# Patient Record
Sex: MALE | Race: WHITE | Employment: FULL TIME | URBAN - METROPOLITAN AREA
[De-identification: names, ages, dates, MRNs, and addresses within clinical notes are randomized per-mention and may not be internally consistent; named-entity substitution may affect disease eponyms.]

---

## 2019-10-13 ENCOUNTER — APPOINTMENT (OUTPATIENT)
Dept: GENERAL RADIOLOGY | Facility: CLINIC | Age: 54
DRG: 003 | End: 2019-10-13
Attending: STUDENT IN AN ORGANIZED HEALTH CARE EDUCATION/TRAINING PROGRAM
Payer: COMMERCIAL

## 2019-10-13 ENCOUNTER — TRANSFERRED RECORDS (OUTPATIENT)
Dept: HEALTH INFORMATION MANAGEMENT | Facility: CLINIC | Age: 54
End: 2019-10-13

## 2019-10-13 ENCOUNTER — APPOINTMENT (OUTPATIENT)
Dept: CT IMAGING | Facility: CLINIC | Age: 54
DRG: 003 | End: 2019-10-13
Attending: STUDENT IN AN ORGANIZED HEALTH CARE EDUCATION/TRAINING PROGRAM
Payer: COMMERCIAL

## 2019-10-13 ENCOUNTER — HOSPITAL ENCOUNTER (INPATIENT)
Facility: CLINIC | Age: 54
LOS: 23 days | Discharge: HOME | DRG: 003 | End: 2019-11-05
Attending: INTERNAL MEDICINE | Admitting: INTERNAL MEDICINE
Payer: COMMERCIAL

## 2019-10-13 DIAGNOSIS — I46.9 CARDIAC ARREST (H): ICD-10-CM

## 2019-10-13 DIAGNOSIS — I50.20 SYSTOLIC HEART FAILURE SECONDARY TO CORONARY ARTERY DISEASE (H): ICD-10-CM

## 2019-10-13 DIAGNOSIS — I25.10 SYSTOLIC HEART FAILURE SECONDARY TO CORONARY ARTERY DISEASE (H): ICD-10-CM

## 2019-10-13 DIAGNOSIS — S22.43XD MULTIPLE CLOSED FRACTURES OF RIBS OF BOTH SIDES WITH ROUTINE HEALING, SUBSEQUENT ENCOUNTER: ICD-10-CM

## 2019-10-13 DIAGNOSIS — I47.20 VENTRICULAR TACHYCARDIA (H): Primary | ICD-10-CM

## 2019-10-13 DIAGNOSIS — T14.8XXA OPEN WOUND: ICD-10-CM

## 2019-10-13 DIAGNOSIS — G40.909 SEIZURE DISORDER (H): ICD-10-CM

## 2019-10-13 DIAGNOSIS — I25.119 CORONARY ARTERY DISEASE INVOLVING NATIVE CORONARY ARTERY OF NATIVE HEART WITH ANGINA PECTORIS (H): ICD-10-CM

## 2019-10-13 LAB
ALBUMIN SERPL-MCNC: 3.1 G/DL (ref 3.4–5)
ALP SERPL-CCNC: 67 U/L (ref 40–150)
ALT SERPL W P-5'-P-CCNC: 89 U/L (ref 0–70)
ANION GAP SERPL CALCULATED.3IONS-SCNC: 12 MMOL/L (ref 3–14)
AST SERPL W P-5'-P-CCNC: 624 U/L (ref 0–45)
BASE DEFICIT BLDA-SCNC: 0.7 MMOL/L
BASOPHILS # BLD AUTO: 0 10E9/L (ref 0–0.2)
BASOPHILS NFR BLD AUTO: 0.2 %
BILIRUB SERPL-MCNC: 1.3 MG/DL (ref 0.2–1.3)
BLD PROD TYP BPU: NORMAL
BLD UNIT ID BPU: 0
BLOOD PRODUCT CODE: NORMAL
BPU ID: NORMAL
BUN SERPL-MCNC: 10 MG/DL (ref 7–30)
CA-I BLD-MCNC: 4 MG/DL (ref 4.4–5.2)
CALCIUM SERPL-MCNC: 6.6 MG/DL (ref 8.5–10.1)
CHLORIDE SERPL-SCNC: 110 MMOL/L (ref 94–109)
CO2 SERPL-SCNC: 21 MMOL/L (ref 20–32)
CORTIS SERPL-MCNC: 15.1 UG/DL (ref 4–22)
CREAT SERPL-MCNC: 0.67 MG/DL (ref 0.66–1.25)
CREAT SERPL-MCNC: 0.86 MG/DL (ref 0.5–1.3)
CRP SERPL-MCNC: 69 MG/L (ref 0–8)
D DIMER PPP FEU-MCNC: 5.7 UG/ML FEU (ref 0–0.5)
DIFFERENTIAL METHOD BLD: ABNORMAL
EOSINOPHIL # BLD AUTO: 0 10E9/L (ref 0–0.7)
EOSINOPHIL NFR BLD AUTO: 0.1 %
ERYTHROCYTE [DISTWIDTH] IN BLOOD BY AUTOMATED COUNT: 14.6 % (ref 10–15)
ERYTHROCYTE [SEDIMENTATION RATE] IN BLOOD BY WESTERGREN METHOD: 8 MM/H (ref 0–20)
GFR SERPL CREATININE-BSD FRML MDRD: 99 ML/MIN
GFR SERPL CREATININE-BSD FRML MDRD: >90 ML/MIN/{1.73_M2}
GLUCOSE BLDC GLUCOMTR-MCNC: 160 MG/DL (ref 70–99)
GLUCOSE SERPL-MCNC: 155 MG/DL (ref 70–100)
GLUCOSE SERPL-MCNC: 158 MG/DL (ref 70–99)
HBA1C MFR BLD: 5.4 % (ref 0–5.6)
HCO3 BLD-SCNC: 24 MMOL/L (ref 21–28)
HCT VFR BLD AUTO: 29.8 % (ref 40–53)
HGB BLD-MCNC: 10.1 G/DL (ref 13.3–17.7)
HGB FREE PLAS-MCNC: 90 MG/DL
IMM GRANULOCYTES # BLD: 0.1 10E9/L (ref 0–0.4)
IMM GRANULOCYTES NFR BLD: 0.4 %
INR PPP: 1.3 (ref 2–3.5)
KCT BLD-ACNC: 227 SEC (ref 75–150)
LACTATE BLD-SCNC: 4.3 MMOL/L (ref 0.7–2)
LDH SERPL L TO P-CCNC: 1413 U/L (ref 85–227)
LYMPHOCYTES # BLD AUTO: 2.3 10E9/L (ref 0.8–5.3)
LYMPHOCYTES NFR BLD AUTO: 19.7 %
MCH RBC QN AUTO: 35.2 PG (ref 26.5–33)
MCHC RBC AUTO-ENTMCNC: 33.9 G/DL (ref 31.5–36.5)
MCV RBC AUTO: 104 FL (ref 78–100)
MONOCYTES # BLD AUTO: 0.8 10E9/L (ref 0–1.3)
MONOCYTES NFR BLD AUTO: 7 %
NEUTROPHILS # BLD AUTO: 8.4 10E9/L (ref 1.6–8.3)
NEUTROPHILS NFR BLD AUTO: 72.6 %
NRBC # BLD AUTO: 0 10*3/UL
NRBC BLD AUTO-RTO: 0 /100
NUM BPU REQUESTED: 1
O2/TOTAL GAS SETTING VFR VENT: 100 %
OXYHGB MFR BLD: 99 % (ref 92–100)
PCO2 BLD: 41 MM HG (ref 35–45)
PH BLD: 7.38 PH (ref 7.35–7.45)
PLATELET # BLD AUTO: 154 10E9/L (ref 150–450)
PO2 BLD: 305 MM HG (ref 80–105)
POTASSIUM SERPL-SCNC: 3.9 MEQ/L (ref 3.5–5)
POTASSIUM SERPL-SCNC: 4.3 MMOL/L (ref 3.4–5.3)
PROCALCITONIN SERPL-MCNC: 5.04 NG/ML
PROT SERPL-MCNC: 5.3 G/DL (ref 6.8–8.8)
RBC # BLD AUTO: 2.87 10E12/L (ref 4.4–5.9)
SODIUM SERPL-SCNC: 144 MMOL/L (ref 133–144)
TRANSFUSION STATUS PATIENT QL: NORMAL
TROPONIN I SERPL-MCNC: 177.84 UG/L (ref 0–0.04)
WBC # BLD AUTO: 11.6 10E9/L (ref 4–11)

## 2019-10-13 PROCEDURE — 74176 CT ABD & PELVIS W/O CONTRAST: CPT

## 2019-10-13 PROCEDURE — 82330 ASSAY OF CALCIUM: CPT | Performed by: STUDENT IN AN ORGANIZED HEALTH CARE EDUCATION/TRAINING PROGRAM

## 2019-10-13 PROCEDURE — 85652 RBC SED RATE AUTOMATED: CPT | Performed by: STUDENT IN AN ORGANIZED HEALTH CARE EDUCATION/TRAINING PROGRAM

## 2019-10-13 PROCEDURE — 85520 HEPARIN ASSAY: CPT | Performed by: STUDENT IN AN ORGANIZED HEALTH CARE EDUCATION/TRAINING PROGRAM

## 2019-10-13 PROCEDURE — 85347 COAGULATION TIME ACTIVATED: CPT

## 2019-10-13 PROCEDURE — 86923 COMPATIBILITY TEST ELECTRIC: CPT | Performed by: STUDENT IN AN ORGANIZED HEALTH CARE EDUCATION/TRAINING PROGRAM

## 2019-10-13 PROCEDURE — 86140 C-REACTIVE PROTEIN: CPT | Performed by: STUDENT IN AN ORGANIZED HEALTH CARE EDUCATION/TRAINING PROGRAM

## 2019-10-13 PROCEDURE — 86316 IMMUNOASSAY TUMOR OTHER: CPT | Performed by: STUDENT IN AN ORGANIZED HEALTH CARE EDUCATION/TRAINING PROGRAM

## 2019-10-13 PROCEDURE — 83615 LACTATE (LD) (LDH) ENZYME: CPT | Performed by: STUDENT IN AN ORGANIZED HEALTH CARE EDUCATION/TRAINING PROGRAM

## 2019-10-13 PROCEDURE — 83605 ASSAY OF LACTIC ACID: CPT | Performed by: STUDENT IN AN ORGANIZED HEALTH CARE EDUCATION/TRAINING PROGRAM

## 2019-10-13 PROCEDURE — 33952 ECMO/ECLS INSJ PRPH CANNULA: CPT | Mod: GC | Performed by: INTERNAL MEDICINE

## 2019-10-13 PROCEDURE — C1894 INTRO/SHEATH, NON-LASER: HCPCS | Performed by: INTERNAL MEDICINE

## 2019-10-13 PROCEDURE — 99221 1ST HOSP IP/OBS SF/LOW 40: CPT | Mod: 25 | Performed by: INTERNAL MEDICINE

## 2019-10-13 PROCEDURE — 86901 BLOOD TYPING SEROLOGIC RH(D): CPT | Performed by: STUDENT IN AN ORGANIZED HEALTH CARE EDUCATION/TRAINING PROGRAM

## 2019-10-13 PROCEDURE — 80053 COMPREHEN METABOLIC PANEL: CPT | Performed by: STUDENT IN AN ORGANIZED HEALTH CARE EDUCATION/TRAINING PROGRAM

## 2019-10-13 PROCEDURE — 93005 ELECTROCARDIOGRAM TRACING: CPT

## 2019-10-13 PROCEDURE — 93010 ELECTROCARDIOGRAM REPORT: CPT | Mod: 59 | Performed by: INTERNAL MEDICINE

## 2019-10-13 PROCEDURE — 84145 PROCALCITONIN (PCT): CPT | Performed by: STUDENT IN AN ORGANIZED HEALTH CARE EDUCATION/TRAINING PROGRAM

## 2019-10-13 PROCEDURE — 83051 HEMOGLOBIN PLASMA: CPT | Performed by: STUDENT IN AN ORGANIZED HEALTH CARE EDUCATION/TRAINING PROGRAM

## 2019-10-13 PROCEDURE — P9041 ALBUMIN (HUMAN),5%, 50ML: HCPCS

## 2019-10-13 PROCEDURE — 25000132 ZZH RX MED GY IP 250 OP 250 PS 637: Performed by: STUDENT IN AN ORGANIZED HEALTH CARE EDUCATION/TRAINING PROGRAM

## 2019-10-13 PROCEDURE — 25800030 ZZH RX IP 258 OP 636: Performed by: STUDENT IN AN ORGANIZED HEALTH CARE EDUCATION/TRAINING PROGRAM

## 2019-10-13 PROCEDURE — 25000128 H RX IP 250 OP 636: Performed by: STUDENT IN AN ORGANIZED HEALTH CARE EDUCATION/TRAINING PROGRAM

## 2019-10-13 PROCEDURE — 83735 ASSAY OF MAGNESIUM: CPT | Performed by: STUDENT IN AN ORGANIZED HEALTH CARE EDUCATION/TRAINING PROGRAM

## 2019-10-13 PROCEDURE — 40000275 ZZH STATISTIC RCP TIME EA 10 MIN

## 2019-10-13 PROCEDURE — 83036 HEMOGLOBIN GLYCOSYLATED A1C: CPT | Performed by: STUDENT IN AN ORGANIZED HEALTH CARE EDUCATION/TRAINING PROGRAM

## 2019-10-13 PROCEDURE — 86850 RBC ANTIBODY SCREEN: CPT | Performed by: STUDENT IN AN ORGANIZED HEALTH CARE EDUCATION/TRAINING PROGRAM

## 2019-10-13 PROCEDURE — 85379 FIBRIN DEGRADATION QUANT: CPT | Performed by: STUDENT IN AN ORGANIZED HEALTH CARE EDUCATION/TRAINING PROGRAM

## 2019-10-13 PROCEDURE — 85610 PROTHROMBIN TIME: CPT | Performed by: STUDENT IN AN ORGANIZED HEALTH CARE EDUCATION/TRAINING PROGRAM

## 2019-10-13 PROCEDURE — 82533 TOTAL CORTISOL: CPT | Performed by: STUDENT IN AN ORGANIZED HEALTH CARE EDUCATION/TRAINING PROGRAM

## 2019-10-13 PROCEDURE — 70450 CT HEAD/BRAIN W/O DYE: CPT

## 2019-10-13 PROCEDURE — 82805 BLOOD GASES W/O2 SATURATION: CPT | Performed by: STUDENT IN AN ORGANIZED HEALTH CARE EDUCATION/TRAINING PROGRAM

## 2019-10-13 PROCEDURE — 37799 UNLISTED PX VASCULAR SURGERY: CPT | Performed by: INTERNAL MEDICINE

## 2019-10-13 PROCEDURE — C1769 GUIDE WIRE: HCPCS | Performed by: INTERNAL MEDICINE

## 2019-10-13 PROCEDURE — 99152 MOD SED SAME PHYS/QHP 5/>YRS: CPT | Performed by: INTERNAL MEDICINE

## 2019-10-13 PROCEDURE — 84484 ASSAY OF TROPONIN QUANT: CPT | Performed by: STUDENT IN AN ORGANIZED HEALTH CARE EDUCATION/TRAINING PROGRAM

## 2019-10-13 PROCEDURE — 25000128 H RX IP 250 OP 636: Performed by: INTERNAL MEDICINE

## 2019-10-13 PROCEDURE — 80307 DRUG TEST PRSMV CHEM ANLYZR: CPT | Performed by: STUDENT IN AN ORGANIZED HEALTH CARE EDUCATION/TRAINING PROGRAM

## 2019-10-13 PROCEDURE — 36620 INSERTION CATHETER ARTERY: CPT | Performed by: INTERNAL MEDICINE

## 2019-10-13 PROCEDURE — 94002 VENT MGMT INPAT INIT DAY: CPT

## 2019-10-13 PROCEDURE — 27210794 ZZH OR GENERAL SUPPLY STERILE: Performed by: INTERNAL MEDICINE

## 2019-10-13 PROCEDURE — 85027 COMPLETE CBC AUTOMATED: CPT | Performed by: STUDENT IN AN ORGANIZED HEALTH CARE EDUCATION/TRAINING PROGRAM

## 2019-10-13 PROCEDURE — 25800030 ZZH RX IP 258 OP 636: Performed by: INTERNAL MEDICINE

## 2019-10-13 PROCEDURE — 33947 ECMO/ECLS INITIATION ARTERY: CPT

## 2019-10-13 PROCEDURE — 00000146 ZZHCL STATISTIC GLUCOSE BY METER IP

## 2019-10-13 PROCEDURE — 85730 THROMBOPLASTIN TIME PARTIAL: CPT | Performed by: STUDENT IN AN ORGANIZED HEALTH CARE EDUCATION/TRAINING PROGRAM

## 2019-10-13 PROCEDURE — 25000128 H RX IP 250 OP 636

## 2019-10-13 PROCEDURE — 85396 CLOTTING ASSAY WHOLE BLOOD: CPT | Performed by: INTERNAL MEDICINE

## 2019-10-13 PROCEDURE — 86900 BLOOD TYPING SEROLOGIC ABO: CPT | Performed by: STUDENT IN AN ORGANIZED HEALTH CARE EDUCATION/TRAINING PROGRAM

## 2019-10-13 PROCEDURE — 20000004 ZZH R&B ICU UMMC

## 2019-10-13 PROCEDURE — 80347 BENZODIAZEPINES 13 OR MORE: CPT | Performed by: STUDENT IN AN ORGANIZED HEALTH CARE EDUCATION/TRAINING PROGRAM

## 2019-10-13 PROCEDURE — P9016 RBC LEUKOCYTES REDUCED: HCPCS | Performed by: STUDENT IN AN ORGANIZED HEALTH CARE EDUCATION/TRAINING PROGRAM

## 2019-10-13 PROCEDURE — 40000986 XR CHEST PORT 1 VW

## 2019-10-13 PROCEDURE — 85025 COMPLETE CBC W/AUTO DIFF WBC: CPT | Performed by: STUDENT IN AN ORGANIZED HEALTH CARE EDUCATION/TRAINING PROGRAM

## 2019-10-13 PROCEDURE — 5A1955Z RESPIRATORY VENTILATION, GREATER THAN 96 CONSECUTIVE HOURS: ICD-10-PCS | Performed by: INTERNAL MEDICINE

## 2019-10-13 PROCEDURE — 41000018 ZZH PER-PERFUSION 1ST 30 MIN: Performed by: INTERNAL MEDICINE

## 2019-10-13 RX ORDER — HEPARIN SODIUM (PORCINE) LOCK FLUSH IV SOLN 100 UNIT/ML 100 UNIT/ML
5-10 SOLUTION INTRAVENOUS EVERY 30 MIN PRN
Status: DISCONTINUED | OUTPATIENT
Start: 2019-10-13 | End: 2019-10-14

## 2019-10-13 RX ORDER — HEPARIN SODIUM 10000 [USP'U]/100ML
10-50 INJECTION, SOLUTION INTRAVENOUS CONTINUOUS
Status: DISCONTINUED | OUTPATIENT
Start: 2019-10-13 | End: 2019-10-13

## 2019-10-13 RX ORDER — ALBUMIN, HUMAN INJ 5% 5 %
SOLUTION INTRAVENOUS
Status: COMPLETED
Start: 2019-10-13 | End: 2019-10-13

## 2019-10-13 RX ORDER — IPRATROPIUM BROMIDE AND ALBUTEROL SULFATE 2.5; .5 MG/3ML; MG/3ML
3 SOLUTION RESPIRATORY (INHALATION) EVERY 4 HOURS PRN
Status: DISCONTINUED | OUTPATIENT
Start: 2019-10-13 | End: 2019-10-28

## 2019-10-13 RX ORDER — FENTANYL CITRATE 50 UG/ML
50 INJECTION, SOLUTION INTRAMUSCULAR; INTRAVENOUS EVERY 30 MIN PRN
Status: DISCONTINUED | OUTPATIENT
Start: 2019-10-13 | End: 2019-10-25

## 2019-10-13 RX ORDER — SODIUM CHLORIDE 9 MG/ML
INJECTION, SOLUTION INTRAVENOUS CONTINUOUS
Status: DISCONTINUED | OUTPATIENT
Start: 2019-10-13 | End: 2019-11-03

## 2019-10-13 RX ORDER — POTASSIUM CHLORIDE 29.8 MG/ML
20 INJECTION INTRAVENOUS
Status: DISCONTINUED | OUTPATIENT
Start: 2019-10-13 | End: 2019-10-15

## 2019-10-13 RX ORDER — DOPAMINE HYDROCHLORIDE 160 MG/100ML
2-20 INJECTION, SOLUTION INTRAVENOUS CONTINUOUS
Status: DISCONTINUED | OUTPATIENT
Start: 2019-10-13 | End: 2019-10-14

## 2019-10-13 RX ORDER — NALOXONE HYDROCHLORIDE 0.4 MG/ML
.1-.4 INJECTION, SOLUTION INTRAMUSCULAR; INTRAVENOUS; SUBCUTANEOUS
Status: DISCONTINUED | OUTPATIENT
Start: 2019-10-13 | End: 2019-11-05 | Stop reason: HOSPADM

## 2019-10-13 RX ORDER — HEPARIN SODIUM 10000 [USP'U]/100ML
10-50 INJECTION, SOLUTION INTRAVENOUS CONTINUOUS
Status: DISCONTINUED | OUTPATIENT
Start: 2019-10-13 | End: 2019-10-18

## 2019-10-13 RX ORDER — ALBUMIN (HUMAN) 12.5 G/50ML
12.5 SOLUTION INTRAVENOUS
Status: DISCONTINUED | OUTPATIENT
Start: 2019-10-13 | End: 2019-11-01

## 2019-10-13 RX ORDER — MAGNESIUM SULFATE HEPTAHYDRATE 40 MG/ML
4 INJECTION, SOLUTION INTRAVENOUS EVERY 4 HOURS PRN
Status: DISCONTINUED | OUTPATIENT
Start: 2019-10-13 | End: 2019-10-15

## 2019-10-13 RX ORDER — LIDOCAINE 40 MG/G
CREAM TOPICAL
Status: DISCONTINUED | OUTPATIENT
Start: 2019-10-13 | End: 2019-11-05 | Stop reason: HOSPADM

## 2019-10-13 RX ORDER — MIDAZOLAM (PF) 1 MG/ML IN 0.9 % SODIUM CHLORIDE INTRAVENOUS SOLUTION
1-14 CONTINUOUS
Status: DISCONTINUED | OUTPATIENT
Start: 2019-10-13 | End: 2019-10-16

## 2019-10-13 RX ORDER — ASPIRIN 81 MG/1
81 TABLET, CHEWABLE ORAL DAILY
Status: DISCONTINUED | OUTPATIENT
Start: 2019-10-14 | End: 2019-11-03

## 2019-10-13 RX ORDER — NOREPINEPHRINE BITARTRATE 0.06 MG/ML
.03-.4 INJECTION, SOLUTION INTRAVENOUS CONTINUOUS
Status: DISCONTINUED | OUTPATIENT
Start: 2019-10-13 | End: 2019-10-17

## 2019-10-13 RX ORDER — ALBUMIN, HUMAN INJ 5% 5 %
SOLUTION INTRAVENOUS
Status: DISCONTINUED
Start: 2019-10-13 | End: 2019-10-13 | Stop reason: HOSPADM

## 2019-10-13 RX ORDER — PROPOFOL 10 MG/ML
5-75 INJECTION, EMULSION INTRAVENOUS CONTINUOUS
Status: DISCONTINUED | OUTPATIENT
Start: 2019-10-13 | End: 2019-10-14

## 2019-10-13 RX ORDER — NICOTINE POLACRILEX 4 MG
15-30 LOZENGE BUCCAL
Status: DISCONTINUED | OUTPATIENT
Start: 2019-10-13 | End: 2019-10-17

## 2019-10-13 RX ORDER — DEXTROSE MONOHYDRATE 25 G/50ML
25-50 INJECTION, SOLUTION INTRAVENOUS
Status: DISCONTINUED | OUTPATIENT
Start: 2019-10-13 | End: 2019-10-17

## 2019-10-13 RX ORDER — PIPERACILLIN SODIUM, TAZOBACTAM SODIUM 3; .375 G/15ML; G/15ML
3.38 INJECTION, POWDER, LYOPHILIZED, FOR SOLUTION INTRAVENOUS EVERY 6 HOURS
Status: COMPLETED | OUTPATIENT
Start: 2019-10-13 | End: 2019-10-18

## 2019-10-13 RX ADMIN — VANCOMYCIN HYDROCHLORIDE 1500 MG: 10 INJECTION, POWDER, LYOPHILIZED, FOR SOLUTION INTRAVENOUS at 23:34

## 2019-10-13 RX ADMIN — HEPARIN SODIUM 1500 UNITS/HR: 10000 INJECTION, SOLUTION INTRAVENOUS at 19:55

## 2019-10-13 RX ADMIN — HEPARIN SODIUM 3 ML/HR: 1000 INJECTION, SOLUTION INTRAVENOUS; SUBCUTANEOUS at 23:44

## 2019-10-13 RX ADMIN — Medication 100 MCG/HR: at 19:55

## 2019-10-13 RX ADMIN — HEPARIN SODIUM 1500 UNITS/HR: 10000 INJECTION, SOLUTION INTRAVENOUS at 23:35

## 2019-10-13 RX ADMIN — PROPOFOL 70 MCG/KG/MIN: 10 INJECTION, EMULSION INTRAVENOUS at 20:03

## 2019-10-13 RX ADMIN — TICAGRELOR 90 MG: 90 TABLET ORAL at 23:32

## 2019-10-13 RX ADMIN — PIPERACILLIN SODIUM AND TAZOBACTAM SODIUM 3.38 G: 3; .375 INJECTION, POWDER, LYOPHILIZED, FOR SOLUTION INTRAVENOUS at 23:32

## 2019-10-13 RX ADMIN — ALBUMIN HUMAN 12.5 G: 0.05 INJECTION, SOLUTION INTRAVENOUS at 20:26

## 2019-10-13 RX ADMIN — AMIODARONE HYDROCHLORIDE 0.5 MG/MIN: 50 INJECTION, SOLUTION INTRAVENOUS at 20:27

## 2019-10-13 ASSESSMENT — MIFFLIN-ST. JEOR: SCORE: 1671.24

## 2019-10-13 ASSESSMENT — ACTIVITIES OF DAILY LIVING (ADL): ADLS_ACUITY_SCORE: 19

## 2019-10-13 NOTE — Clinical Note
dry, intact, no bleeding and no hematoma. Sheaths sutured in place and sites covered with Ioban dressing

## 2019-10-13 NOTE — LETTER
Transition Communication Hand-off for Care Transitions to Next Level of Care Provider    Name: Joel Campbell  : 1965  MRN #: [id number]  Primary Care Provider:    Primary Clinic:  [facility], [place], [place].      Reason for Hospitalization:  ECMO  Chest Pain  Cardiac Arrest  Ventricular tachycardia (H)  Admit Date/Time: 10/13/2019  6:41 PM  Discharge Date: 19  Payor Source: Payor: COMMERCIAL / Plan: OTHER / Product Type: Indemnity   Readmission Assessment Measure (LUKE) Risk Score/category: elevated.   Reason for Communication Hand-off Referral: Multiple providers/specialties  Discharge Plan:  Discharge Needs Assessment:  Needs      Most Recent Value   DME  -- [[employer] (Ph: [phone])]   Other Resources  Other (see comment)   Other  -- [[facility] for INR. ]      Follow-up specialty is recommended: Yes    Follow-up plan:  Appointment with IAN Rai on 19 at 2:15 PM.   Any outstanding tests or procedures:        Referrals     Future Labs/Procedures    Cardiac Rehab Referral     Process Instructions:    Advance to Wellness and Exercise for Life (WEL) Program or to another maintenance exercise program upon completion of phase 2 cardiac rehab.    Weight mgt program is only offered at [facility].    *This therapy referral will be filtered to a centralized scheduling office at [facility] and the patient will receive a call to schedule an appointment at a [place] location most convenient for them. *        Comments:    If you have not heard from the scheduling office within 2 business days, please call [phone] for all locations, with the exception of [place], please call [phone] and [place], please call [phone].    Please be aware that coverage of these services is subject to the terms and limitations of your health insurance plan.  Call member services at your health plan with any benefit or coverage questions.           Key Recommendations: Post hospitalization follow up.     JUSTIN DIANA RN BSN  Care Coordinator Unit [address] [place] [address]  Phone: [phone]

## 2019-10-13 NOTE — H&P
"[facility]  Cardiac ICU H&P  October 13, 2019          H&P:   Joel Campbell is a 53 year old male w/ no PMH who was admitted on 10/13/19 as a transfer for an OSH for refractory VT/VF arrest s/p placement on peripheral VA ECMO.     Per OSH records, patient around 8pm on 10/12 had \"seizure-like activity\" per wife and then became unresponsive. EMS was called and atfer kellen reported 3-4 minutes, EMS arrived and sis not find pulses, so CPR was started. Initial rhythm was shockable. Notes reports that CPR occurred for 40 minutes before ROCS was obtained. Then transferred over to [facility]. There he had refractory VT and amiodarone was started. EKG showed anterior ST-elevations. He was then transferred to [facility].    Upon arrival to [facility], he underwent PCI to pLAD, placement of IABP and was started on VA ECMO due to cardiogenic shock and transferred to ICU on vaso and NE per report. Patient was also started on a cooling protocol.    LVEF was reported to be ~25% on TTE on 10/13. LVEDP 17. Patient was loaded with 180 mg ticagrelor at 6am on 10/13.    Upon arrival to [facility], patient intubated, sedated and not responding to commands.         Medications:   I have reviewed this patient's current medications    No past medical history on file.    No family history on file.  Social History     Socioeconomic History     Marital status: Not on file     Spouse name: Not on file     Number of children: Not on file     Years of education: Not on file     Highest education level: Not on file   Occupational History     Not on file   Social Needs     Financial resource strain: Not on file     Food insecurity:     Worry: Not on file     Inability: Not on file     Transportation needs:     Medical: Not on file     Non-medical: Not on file   Tobacco Use     Smoking status: Not on file   Substance and Sexual Activity     Alcohol use: Not on file     Drug use: Not on file     Sexual activity: Not on file "   Lifestyle     Physical activity:     Days per week: Not on file     Minutes per session: Not on file     Stress: Not on file   Relationships     Social connections:     Talks on phone: Not on file     Gets together: Not on file     Attends Taoist service: Not on file     Active member of club or organization: Not on file     Attends meetings of clubs or organizations: Not on file     Relationship status: Not on file     Intimate partner violence:     Fear of current or ex partner: Not on file     Emotionally abused: Not on file     Physically abused: Not on file     Forced sexual activity: Not on file   Other Topics Concern     Not on file   Social History Narrative     Not on file            Review of Systems:   Unable to obtain as patient is intubated and sedated.            Physical Exam:   Exam and Labs by System  Neuro: intubated and sedated, pupils equal and reactive  Cardiovascular: RRR, nromal S1/S2, no rmg  Pulm: mechanical breath sounds, but no wheezes  GI: soft, mildly distended, +BS  Ext: no edema in BLE, difficult to doppler DP/PT pulses bilaterally, cool extremities  MSK: right chest asymmetry            Data:   All lab results reviewed past 24 hours.  All imaging results reviewed past 24 hours.         Assessment and Plan:   Joel Campbell is a 53 year old male who was admitted on 10/13/19 as a transfer for an OSH for refractory VT/VF arrest s/p placement on peripheral VA ECMO.     Neurology: Intubated, sedated, paralyzed. Cooled to 34 degrees.  - Continue propofol ggt, and cis gtt as needed to maintain paralysis - RASS goal -4 to -5   - CT Head ordered on admission, no acute process   Cardiovascular / Hemodynamics: Refractory VF arrest s/p peripheral V-A ECMO at OSH on 10/13/19.  TTE: ordered  EKG: ordered  - Wean pressors/inotropes as able  - Echo tomorrow with ECMO turndown potentially  - Continue ASA 81mg and ticagrelor 90mg BID  - Hold temp at 34 degrees  - ACT goal 180-200  - Hold lipitor  for now given likely hepatic injury during arrest  - Hold ACE/ARB for now given likely reduced renal fxn after arrest  - Holding beta blocker given shock   - To cath lab for distal reperfusion cannula   Pulmonary: Now weaning vent requirements.  CXR: ordered  - Wean vent as able  - Daily CXR  - Q2H ABGs for now  - Consider scheduled duonebs if signs of lung dz, currently PRN     GI and Nutrition: No known medical hx.  - Monitor BID LFTs  - NPO while cooled - nutrition consult pending feeding tube placement once he is warmed   - Bowel regimen - on hold for now due to hypothermia  - GI Prophylaxis: PPI    Renal, Fluid and Electrolytes: - Monitor urine output  - Maintain K>3 and Mg>2    Infectious Disease: No signs of infection. Leukocytosis c/w arrest. Blood cultures collected.   - Vancomycin/zosyn x5 days for ECMO  - Daily blood cultures  - Monitor for signs of infection given cooling, lines, and leukocytosis   Hematology and Oncology: Receiving heparin for ECMO and ASA/ticagrelor for KAMRON.   - Cryo PRN fibrinogen < 200; FFP for INR >2  - Transfuse for Hgb<10  - Heparin gtt for ECMO with ACT goal 180-200  - US LE w/ arterial duplex per ECMO protocol   - DVT PPX: Heparin as above  - Right chest wall hematoma: monitor exam and Hgb   Endocrinology: No known medical history. BG elevated.  - Insulin gtt PRN   Lines: Restraint: needed    Current lines are required for patient management       Family updated today: Yes    Code Status: FULL    The Pt was discussed and evaluated with Dr. Roberts, attending physician, who agrees with the assessment and plan above.     Shane Valenzuela MD  Cardiology Fellow    I have seen and examined the patient with the CSI team. I agree with the assessment and plan of the note above.I have reviewed pertinent labs.     Anshu Roberts MD  Interventional Cardiology  Pager: [phone]

## 2019-10-14 ENCOUNTER — APPOINTMENT (OUTPATIENT)
Dept: CARDIOLOGY | Facility: CLINIC | Age: 54
DRG: 003 | End: 2019-10-14
Attending: STUDENT IN AN ORGANIZED HEALTH CARE EDUCATION/TRAINING PROGRAM
Payer: COMMERCIAL

## 2019-10-14 ENCOUNTER — APPOINTMENT (OUTPATIENT)
Dept: ULTRASOUND IMAGING | Facility: CLINIC | Age: 54
DRG: 003 | End: 2019-10-14
Attending: STUDENT IN AN ORGANIZED HEALTH CARE EDUCATION/TRAINING PROGRAM
Payer: COMMERCIAL

## 2019-10-14 ENCOUNTER — APPOINTMENT (OUTPATIENT)
Dept: CT IMAGING | Facility: CLINIC | Age: 54
DRG: 003 | End: 2019-10-14
Attending: STUDENT IN AN ORGANIZED HEALTH CARE EDUCATION/TRAINING PROGRAM
Payer: COMMERCIAL

## 2019-10-14 ENCOUNTER — ALLIED HEALTH/NURSE VISIT (OUTPATIENT)
Dept: NEUROLOGY | Facility: CLINIC | Age: 54
DRG: 003 | End: 2019-10-14
Attending: PSYCHIATRY & NEUROLOGY
Payer: COMMERCIAL

## 2019-10-14 ENCOUNTER — APPOINTMENT (OUTPATIENT)
Dept: CARDIOLOGY | Facility: CLINIC | Age: 54
DRG: 003 | End: 2019-10-14
Attending: INTERNAL MEDICINE
Payer: COMMERCIAL

## 2019-10-14 ENCOUNTER — APPOINTMENT (OUTPATIENT)
Dept: GENERAL RADIOLOGY | Facility: CLINIC | Age: 54
DRG: 003 | End: 2019-10-14
Attending: INTERNAL MEDICINE
Payer: COMMERCIAL

## 2019-10-14 DIAGNOSIS — I47.20 VENTRICULAR TACHYCARDIA (H): Primary | ICD-10-CM

## 2019-10-14 LAB
ALBUMIN SERPL-MCNC: 2.6 G/DL (ref 3.4–5)
ALBUMIN SERPL-MCNC: 2.8 G/DL (ref 3.4–5)
ALBUMIN SERPL-MCNC: 2.8 G/DL (ref 3.4–5)
ALBUMIN SERPL-MCNC: 2.9 G/DL (ref 3.4–5)
ALBUMIN SERPL-MCNC: 3 G/DL (ref 3.4–5)
ALBUMIN UR-MCNC: NEGATIVE MG/DL
ALP SERPL-CCNC: 29 U/L (ref 40–150)
ALP SERPL-CCNC: 34 U/L (ref 40–150)
ALP SERPL-CCNC: 42 U/L (ref 40–150)
ALP SERPL-CCNC: 44 U/L (ref 40–150)
ALP SERPL-CCNC: 47 U/L (ref 40–150)
ALT SERPL W P-5'-P-CCNC: 38 U/L (ref 0–70)
ALT SERPL W P-5'-P-CCNC: 39 U/L (ref 0–70)
ALT SERPL W P-5'-P-CCNC: 55 U/L (ref 0–70)
ALT SERPL W P-5'-P-CCNC: 59 U/L (ref 0–70)
ALT SERPL W P-5'-P-CCNC: 66 U/L (ref 0–70)
AMPHETAMINES UR QL SCN: NEGATIVE
ANGLE RATE OF CLOT STRENGTH: 35.8 DEGREES (ref 53–72)
ANION GAP SERPL CALCULATED.3IONS-SCNC: 10 MMOL/L (ref 3–14)
ANION GAP SERPL CALCULATED.3IONS-SCNC: 13 MMOL/L (ref 3–14)
ANION GAP SERPL CALCULATED.3IONS-SCNC: 14 MMOL/L (ref 3–14)
ANION GAP SERPL CALCULATED.3IONS-SCNC: 15 MMOL/L (ref 3–14)
ANION GAP SERPL CALCULATED.3IONS-SCNC: 7 MMOL/L (ref 3–14)
ANION GAP SERPL CALCULATED.3IONS-SCNC: 8 MMOL/L (ref 3–14)
APPEARANCE UR: CLEAR
APTT PPP: 105 SEC (ref 22–37)
APTT PPP: 115 SEC (ref 22–37)
APTT PPP: 38 SEC (ref 22–37)
APTT PPP: 48 SEC (ref 22–37)
APTT PPP: >240 SEC (ref 22–37)
APTT PPP: >240 SEC (ref 22–37)
AST SERPL W P-5'-P-CCNC: 173 U/L (ref 0–45)
AST SERPL W P-5'-P-CCNC: 185 U/L (ref 0–45)
AST SERPL W P-5'-P-CCNC: 308 U/L (ref 0–45)
AST SERPL W P-5'-P-CCNC: 417 U/L (ref 0–45)
AST SERPL W P-5'-P-CCNC: ABNORMAL U/L (ref 0–45)
AT III ACT/NOR PPP CHRO: 29 % (ref 85–135)
BARBITURATES UR QL: NEGATIVE
BASE DEFICIT BLDA-SCNC: 0.1 MMOL/L
BASE DEFICIT BLDA-SCNC: 1.1 MMOL/L
BASE DEFICIT BLDA-SCNC: 1.6 MMOL/L
BASE DEFICIT BLDA-SCNC: 2.3 MMOL/L
BASE DEFICIT BLDA-SCNC: 2.9 MMOL/L
BASE DEFICIT BLDA-SCNC: 2.9 MMOL/L
BASE DEFICIT BLDA-SCNC: 3.4 MMOL/L
BASE DEFICIT BLDA-SCNC: 3.4 MMOL/L
BASE DEFICIT BLDA-SCNC: 4.6 MMOL/L
BASE DEFICIT BLDA-SCNC: 6 MMOL/L
BASE DEFICIT BLDA-SCNC: 6.3 MMOL/L
BASE DEFICIT BLDA-SCNC: 8.8 MMOL/L
BASE DEFICIT BLDA-SCNC: 8.9 MMOL/L
BASE DEFICIT BLDV-SCNC: 2.4 MMOL/L
BASE DEFICIT BLDV-SCNC: 7.9 MMOL/L
BASE EXCESS BLDA CALC-SCNC: 1.5 MMOL/L
BENZODIAZ UR QL: POSITIVE
BILIRUB SERPL-MCNC: 1.3 MG/DL (ref 0.2–1.3)
BILIRUB SERPL-MCNC: 1.4 MG/DL (ref 0.2–1.3)
BILIRUB SERPL-MCNC: 1.6 MG/DL (ref 0.2–1.3)
BILIRUB SERPL-MCNC: 1.6 MG/DL (ref 0.2–1.3)
BILIRUB SERPL-MCNC: 1.8 MG/DL (ref 0.2–1.3)
BILIRUB UR QL STRIP: NEGATIVE
BLD PROD TYP BPU: NORMAL
BLD UNIT ID BPU: 0
BLOOD PRODUCT CODE: NORMAL
BPU ID: NORMAL
BUN SERPL-MCNC: 11 MG/DL (ref 7–30)
BUN SERPL-MCNC: 12 MG/DL (ref 7–30)
BUN SERPL-MCNC: 14 MG/DL (ref 7–30)
BUN SERPL-MCNC: 16 MG/DL (ref 7–30)
CA-I BLD-MCNC: 3.7 MG/DL (ref 4.4–5.2)
CA-I BLD-MCNC: 3.8 MG/DL (ref 4.4–5.2)
CA-I BLD-MCNC: 3.9 MG/DL (ref 4.4–5.2)
CA-I BLD-MCNC: 4.6 MG/DL (ref 4.4–5.2)
CALCIUM SERPL-MCNC: 6.1 MG/DL (ref 8.5–10.1)
CALCIUM SERPL-MCNC: 6.3 MG/DL (ref 8.5–10.1)
CALCIUM SERPL-MCNC: 6.5 MG/DL (ref 8.5–10.1)
CALCIUM SERPL-MCNC: 6.6 MG/DL (ref 8.5–10.1)
CALCIUM SERPL-MCNC: 6.7 MG/DL (ref 8.5–10.1)
CALCIUM SERPL-MCNC: 7.9 MG/DL (ref 8.5–10.1)
CANNABINOIDS UR QL SCN: NEGATIVE
CHLORIDE SERPL-SCNC: 109 MMOL/L (ref 94–109)
CHLORIDE SERPL-SCNC: 110 MMOL/L (ref 94–109)
CHLORIDE SERPL-SCNC: 110 MMOL/L (ref 94–109)
CHLORIDE SERPL-SCNC: 111 MMOL/L (ref 94–109)
CHLORIDE SERPL-SCNC: 111 MMOL/L (ref 94–109)
CHLORIDE SERPL-SCNC: 113 MMOL/L (ref 94–109)
CI HYPOCOAGULATION INDEX: 10.3 RATIO (ref 0–3)
CO2 SERPL-SCNC: 18 MMOL/L (ref 20–32)
CO2 SERPL-SCNC: 19 MMOL/L (ref 20–32)
CO2 SERPL-SCNC: 19 MMOL/L (ref 20–32)
CO2 SERPL-SCNC: 23 MMOL/L (ref 20–32)
CO2 SERPL-SCNC: 24 MMOL/L (ref 20–32)
CO2 SERPL-SCNC: 24 MMOL/L (ref 20–32)
COCAINE UR QL: NEGATIVE
COLOR UR AUTO: YELLOW
CREAT SERPL-MCNC: 0.6 MG/DL (ref 0.66–1.25)
CREAT SERPL-MCNC: 0.68 MG/DL (ref 0.66–1.25)
CREAT SERPL-MCNC: 0.68 MG/DL (ref 0.66–1.25)
CREAT SERPL-MCNC: 0.77 MG/DL (ref 0.66–1.25)
CREAT SERPL-MCNC: 0.77 MG/DL (ref 0.66–1.25)
CREAT SERPL-MCNC: 0.84 MG/DL (ref 0.66–1.25)
CRP SERPL-MCNC: 93 MG/L (ref 0–8)
D DIMER PPP FEU-MCNC: 1.4 UG/ML FEU (ref 0–0.5)
D DIMER PPP FEU-MCNC: 3.8 UG/ML FEU (ref 0–0.5)
ERYTHROCYTE [DISTWIDTH] IN BLOOD BY AUTOMATED COUNT: 15.2 % (ref 10–15)
ERYTHROCYTE [DISTWIDTH] IN BLOOD BY AUTOMATED COUNT: 16.1 % (ref 10–15)
ERYTHROCYTE [DISTWIDTH] IN BLOOD BY AUTOMATED COUNT: 16.3 % (ref 10–15)
ERYTHROCYTE [DISTWIDTH] IN BLOOD BY AUTOMATED COUNT: 17.1 % (ref 10–15)
ERYTHROCYTE [DISTWIDTH] IN BLOOD BY AUTOMATED COUNT: 17.3 % (ref 10–15)
ERYTHROCYTE [DISTWIDTH] IN BLOOD BY AUTOMATED COUNT: 17.4 % (ref 10–15)
ERYTHROCYTE [SEDIMENTATION RATE] IN BLOOD BY WESTERGREN METHOD: 14 MM/H (ref 0–20)
ETHANOL UR QL SCN: NEGATIVE
FIBRINOGEN PPP-MCNC: 200 MG/DL (ref 200–420)
FIBRINOGEN PPP-MCNC: 224 MG/DL (ref 200–420)
FIBRINOGEN PPP-MCNC: 230 MG/DL (ref 200–420)
FIBRINOGEN PPP-MCNC: 250 MG/DL (ref 200–420)
G ACTUAL CLOT STRENGTH: 3.9 KD/SC (ref 4.5–11)
GFR SERPL CREATININE-BSD FRML MDRD: >90 ML/MIN/{1.73_M2}
GLUCOSE BLD-MCNC: 104 MG/DL (ref 70–99)
GLUCOSE BLD-MCNC: 113 MG/DL (ref 70–99)
GLUCOSE BLD-MCNC: 129 MG/DL (ref 70–99)
GLUCOSE BLD-MCNC: 165 MG/DL (ref 70–99)
GLUCOSE BLD-MCNC: 209 MG/DL (ref 70–99)
GLUCOSE BLDC GLUCOMTR-MCNC: 100 MG/DL (ref 70–99)
GLUCOSE BLDC GLUCOMTR-MCNC: 104 MG/DL (ref 70–99)
GLUCOSE BLDC GLUCOMTR-MCNC: 119 MG/DL (ref 70–99)
GLUCOSE BLDC GLUCOMTR-MCNC: 121 MG/DL (ref 70–99)
GLUCOSE BLDC GLUCOMTR-MCNC: 126 MG/DL (ref 70–99)
GLUCOSE BLDC GLUCOMTR-MCNC: 133 MG/DL (ref 70–99)
GLUCOSE BLDC GLUCOMTR-MCNC: 151 MG/DL (ref 70–99)
GLUCOSE BLDC GLUCOMTR-MCNC: 153 MG/DL (ref 70–99)
GLUCOSE BLDC GLUCOMTR-MCNC: 186 MG/DL (ref 70–99)
GLUCOSE BLDC GLUCOMTR-MCNC: 210 MG/DL (ref 70–99)
GLUCOSE BLDC GLUCOMTR-MCNC: 212 MG/DL (ref 70–99)
GLUCOSE BLDC GLUCOMTR-MCNC: 223 MG/DL (ref 70–99)
GLUCOSE BLDC GLUCOMTR-MCNC: 254 MG/DL (ref 70–99)
GLUCOSE BLDC GLUCOMTR-MCNC: 67 MG/DL (ref 70–99)
GLUCOSE SERPL-MCNC: 112 MG/DL (ref 70–99)
GLUCOSE SERPL-MCNC: 121 MG/DL (ref 70–99)
GLUCOSE SERPL-MCNC: 162 MG/DL (ref 70–99)
GLUCOSE SERPL-MCNC: 187 MG/DL (ref 70–99)
GLUCOSE SERPL-MCNC: 200 MG/DL (ref 70–99)
GLUCOSE SERPL-MCNC: 97 MG/DL (ref 70–99)
GLUCOSE UR STRIP-MCNC: NEGATIVE MG/DL
GRAM STN SPEC: NORMAL
GRAM STN SPEC: NORMAL
HCO3 BLD-SCNC: 18 MMOL/L (ref 21–28)
HCO3 BLD-SCNC: 20 MMOL/L (ref 21–28)
HCO3 BLD-SCNC: 20 MMOL/L (ref 21–28)
HCO3 BLD-SCNC: 22 MMOL/L (ref 21–28)
HCO3 BLD-SCNC: 23 MMOL/L (ref 21–28)
HCO3 BLD-SCNC: 24 MMOL/L (ref 21–28)
HCO3 BLD-SCNC: 24 MMOL/L (ref 21–28)
HCO3 BLD-SCNC: 26 MMOL/L (ref 21–28)
HCO3 BLDA-SCNC: 18 MMOL/L (ref 21–28)
HCO3 BLDA-SCNC: 22 MMOL/L (ref 21–28)
HCO3 BLDV-SCNC: 20 MMOL/L (ref 21–28)
HCO3 BLDV-SCNC: 24 MMOL/L (ref 21–28)
HCT VFR BLD AUTO: 21.1 % (ref 40–53)
HCT VFR BLD AUTO: 22.7 % (ref 40–53)
HCT VFR BLD AUTO: 24.1 % (ref 40–53)
HCT VFR BLD AUTO: 24.2 % (ref 40–53)
HCT VFR BLD AUTO: 26 % (ref 40–53)
HCT VFR BLD AUTO: 26.5 % (ref 40–53)
HGB BLD-MCNC: 7.2 G/DL (ref 13.3–17.7)
HGB BLD-MCNC: 7.9 G/DL (ref 13.3–17.7)
HGB BLD-MCNC: 8 G/DL (ref 13.3–17.7)
HGB BLD-MCNC: 8.3 G/DL (ref 13.3–17.7)
HGB BLD-MCNC: 8.9 G/DL (ref 13.3–17.7)
HGB BLD-MCNC: 9 G/DL (ref 13.3–17.7)
HGB FREE PLAS-MCNC: 180 MG/DL
HGB UR QL STRIP: ABNORMAL
HYALINE CASTS #/AREA URNS LPF: 1 /LPF (ref 0–2)
INR PPP: 1.29 (ref 0.86–1.14)
INR PPP: 1.34 (ref 0.86–1.14)
INR PPP: 1.34 (ref 0.86–1.14)
INR PPP: 1.41 (ref 0.86–1.14)
INR PPP: 1.49 (ref 0.86–1.14)
INR PPP: 1.6 (ref 0.86–1.14)
INTERPRETATION ECG - MUSE: NORMAL
INTERPRETATION ECG - MUSE: NORMAL
K TIME TO SPEC CLOT STRENGTH: 5.4 MINUTE (ref 1–3)
KCT BLD-ACNC: 126 SEC (ref 75–150)
KCT BLD-ACNC: 142 SEC (ref 75–150)
KCT BLD-ACNC: 146 SEC (ref 75–150)
KCT BLD-ACNC: 155 SEC (ref 75–150)
KCT BLD-ACNC: 167 SEC (ref 75–150)
KCT BLD-ACNC: 175 SEC (ref 75–150)
KCT BLD-ACNC: 179 SEC (ref 75–150)
KCT BLD-ACNC: 179 SEC (ref 75–150)
KCT BLD-ACNC: 199 SEC (ref 75–150)
KCT BLD-ACNC: 207 SEC (ref 75–150)
KCT BLD-ACNC: 207 SEC (ref 75–150)
KCT BLD-ACNC: 211 SEC (ref 75–150)
KCT BLD-ACNC: 235 SEC (ref 75–150)
KETONES UR STRIP-MCNC: NEGATIVE MG/DL
LACTATE BLD-SCNC: 3.5 MMOL/L (ref 0.7–2)
LACTATE BLD-SCNC: 3.8 MMOL/L (ref 0.7–2)
LACTATE BLD-SCNC: 4.5 MMOL/L (ref 0.7–2)
LACTATE BLD-SCNC: 4.8 MMOL/L (ref 0.7–2)
LACTATE BLD-SCNC: 5 MMOL/L (ref 0.7–2)
LACTATE BLD-SCNC: 5 MMOL/L (ref 0.7–2)
LACTATE BLD-SCNC: 5.2 MMOL/L (ref 0.7–2)
LACTATE BLD-SCNC: 5.7 MMOL/L (ref 0.7–2)
LACTATE BLD-SCNC: 6.9 MMOL/L (ref 0.7–2)
LACTATE BLD-SCNC: 8.2 MMOL/L (ref 0.7–2)
LDH SERPL L TO P-CCNC: NORMAL U/L (ref 85–227)
LEUKOCYTE ESTERASE UR QL STRIP: NEGATIVE
LMWH PPP CHRO-ACNC: 0.14 IU/ML
LMWH PPP CHRO-ACNC: 0.26 IU/ML
LMWH PPP CHRO-ACNC: 0.71 IU/ML
LMWH PPP CHRO-ACNC: 0.75 IU/ML
LMWH PPP CHRO-ACNC: <0.1 IU/ML
LY30 LYSIS AT 30 MINUTES: 0 % (ref 0–8)
LY60 LYSIS AT 60 MINUTES: 0.4 % (ref 0–15)
MA MAXIMUM CLOT STRENGTH: 44 MM (ref 50–70)
MAGNESIUM SERPL-MCNC: 1.8 MG/DL (ref 1.6–2.3)
MAGNESIUM SERPL-MCNC: 1.9 MG/DL (ref 1.6–2.3)
MAGNESIUM SERPL-MCNC: 2 MG/DL (ref 1.6–2.3)
MAGNESIUM SERPL-MCNC: 2.1 MG/DL (ref 1.6–2.3)
MAGNESIUM SERPL-MCNC: 2.4 MG/DL (ref 1.6–2.3)
MAGNESIUM SERPL-MCNC: 2.4 MG/DL (ref 1.6–2.3)
MCH RBC QN AUTO: 32.1 PG (ref 26.5–33)
MCH RBC QN AUTO: 32.4 PG (ref 26.5–33)
MCH RBC QN AUTO: 32.7 PG (ref 26.5–33)
MCH RBC QN AUTO: 34.1 PG (ref 26.5–33)
MCH RBC QN AUTO: 34.2 PG (ref 26.5–33)
MCH RBC QN AUTO: 34.2 PG (ref 26.5–33)
MCHC RBC AUTO-ENTMCNC: 33.2 G/DL (ref 31.5–36.5)
MCHC RBC AUTO-ENTMCNC: 34 G/DL (ref 31.5–36.5)
MCHC RBC AUTO-ENTMCNC: 34.1 G/DL (ref 31.5–36.5)
MCHC RBC AUTO-ENTMCNC: 34.2 G/DL (ref 31.5–36.5)
MCHC RBC AUTO-ENTMCNC: 34.3 G/DL (ref 31.5–36.5)
MCHC RBC AUTO-ENTMCNC: 34.8 G/DL (ref 31.5–36.5)
MCV RBC AUTO: 100 FL (ref 78–100)
MCV RBC AUTO: 100 FL (ref 78–100)
MCV RBC AUTO: 101 FL (ref 78–100)
MCV RBC AUTO: 92 FL (ref 78–100)
MCV RBC AUTO: 96 FL (ref 78–100)
MCV RBC AUTO: 98 FL (ref 78–100)
MISCELLANEOUS TEST: NORMAL
MUCOUS THREADS #/AREA URNS LPF: PRESENT /LPF
NITRATE UR QL: NEGATIVE
NUM BPU REQUESTED: 1
O2/TOTAL GAS SETTING VFR VENT: 100 %
O2/TOTAL GAS SETTING VFR VENT: 40 %
O2/TOTAL GAS SETTING VFR VENT: 50 %
O2/TOTAL GAS SETTING VFR VENT: 60 %
O2/TOTAL GAS SETTING VFR VENT: 70 %
OPIATES UR QL SCN: NEGATIVE
OXYHGB MFR BLD: 92 % (ref 92–100)
OXYHGB MFR BLD: 95 % (ref 92–100)
OXYHGB MFR BLD: 97 % (ref 92–100)
OXYHGB MFR BLD: 97 % (ref 92–100)
OXYHGB MFR BLD: 98 % (ref 92–100)
OXYHGB MFR BLD: 99 % (ref 92–100)
OXYHGB MFR BLDA: 98 % (ref 75–100)
OXYHGB MFR BLDA: 98 % (ref 75–100)
OXYHGB MFR BLDV: 53 %
OXYHGB MFR BLDV: 55 %
PCO2 BLD: 35 MM HG (ref 35–45)
PCO2 BLD: 37 MM HG (ref 35–45)
PCO2 BLD: 38 MM HG (ref 35–45)
PCO2 BLD: 38 MM HG (ref 35–45)
PCO2 BLD: 39 MM HG (ref 35–45)
PCO2 BLD: 39 MM HG (ref 35–45)
PCO2 BLD: 40 MM HG (ref 35–45)
PCO2 BLD: 40 MM HG (ref 35–45)
PCO2 BLD: 41 MM HG (ref 35–45)
PCO2 BLD: 42 MM HG (ref 35–45)
PCO2 BLD: 42 MM HG (ref 35–45)
PCO2 BLD: 46 MM HG (ref 35–45)
PCO2 BLDA: 41 MM HG (ref 35–45)
PCO2 BLDA: 41 MM HG (ref 35–45)
PCO2 BLDV: 48 MM HG (ref 40–50)
PCO2 BLDV: 51 MM HG (ref 40–50)
PH BLD: 7.25 PH (ref 7.35–7.45)
PH BLD: 7.29 PH (ref 7.35–7.45)
PH BLD: 7.29 PH (ref 7.35–7.45)
PH BLD: 7.3 PH (ref 7.35–7.45)
PH BLD: 7.35 PH (ref 7.35–7.45)
PH BLD: 7.37 PH (ref 7.35–7.45)
PH BLD: 7.38 PH (ref 7.35–7.45)
PH BLD: 7.39 PH (ref 7.35–7.45)
PH BLD: 7.44 PH (ref 7.35–7.45)
PH BLD: 7.45 PH (ref 7.35–7.45)
PH BLDA: 7.25 PH (ref 7.35–7.45)
PH BLDA: 7.34 PH (ref 7.35–7.45)
PH BLDV: 7.2 PH (ref 7.32–7.43)
PH BLDV: 7.31 PH (ref 7.32–7.43)
PH UR STRIP: 5 PH (ref 5–7)
PHOSPHATE SERPL-MCNC: 2.5 MG/DL (ref 2.5–4.5)
PHOSPHATE SERPL-MCNC: 4.4 MG/DL (ref 2.5–4.5)
PLATELET # BLD AUTO: 108 10E9/L (ref 150–450)
PLATELET # BLD AUTO: 114 10E9/L (ref 150–450)
PLATELET # BLD AUTO: 80 10E9/L (ref 150–450)
PLATELET # BLD AUTO: 84 10E9/L (ref 150–450)
PLATELET # BLD AUTO: 88 10E9/L (ref 150–450)
PLATELET # BLD AUTO: 90 10E9/L (ref 150–450)
PO2 BLD: 107 MM HG (ref 80–105)
PO2 BLD: 113 MM HG (ref 80–105)
PO2 BLD: 152 MM HG (ref 80–105)
PO2 BLD: 155 MM HG (ref 80–105)
PO2 BLD: 208 MM HG (ref 80–105)
PO2 BLD: 222 MM HG (ref 80–105)
PO2 BLD: 228 MM HG (ref 80–105)
PO2 BLD: 231 MM HG (ref 80–105)
PO2 BLD: 243 MM HG (ref 80–105)
PO2 BLD: 255 MM HG (ref 80–105)
PO2 BLD: 70 MM HG (ref 80–105)
PO2 BLD: 85 MM HG (ref 80–105)
PO2 BLDA: 203 MM HG (ref 80–105)
PO2 BLDA: 228 MM HG (ref 80–105)
PO2 BLDV: 31 MM HG (ref 25–47)
PO2 BLDV: 32 MM HG (ref 25–47)
POTASSIUM BLD-SCNC: 3.6 MMOL/L (ref 3.4–5.3)
POTASSIUM SERPL-SCNC: 2.9 MMOL/L (ref 3.4–5.3)
POTASSIUM SERPL-SCNC: 3 MMOL/L (ref 3.4–5.3)
POTASSIUM SERPL-SCNC: 3 MMOL/L (ref 3.4–5.3)
POTASSIUM SERPL-SCNC: 3.4 MMOL/L (ref 3.4–5.3)
POTASSIUM SERPL-SCNC: 3.6 MMOL/L (ref 3.4–5.3)
POTASSIUM SERPL-SCNC: 4 MMOL/L (ref 3.4–5.3)
PROT SERPL-MCNC: 4.2 G/DL (ref 6.8–8.8)
PROT SERPL-MCNC: 4.4 G/DL (ref 6.8–8.8)
PROT SERPL-MCNC: 4.4 G/DL (ref 6.8–8.8)
PROT SERPL-MCNC: 4.7 G/DL (ref 6.8–8.8)
PROT SERPL-MCNC: 4.7 G/DL (ref 6.8–8.8)
R TIME UNTIL CLOT FORMS: 13.1 MINUTE (ref 5–10)
RBC # BLD AUTO: 2.2 10E12/L (ref 4.4–5.9)
RBC # BLD AUTO: 2.43 10E12/L (ref 4.4–5.9)
RBC # BLD AUTO: 2.46 10E12/L (ref 4.4–5.9)
RBC # BLD AUTO: 2.47 10E12/L (ref 4.4–5.9)
RBC # BLD AUTO: 2.61 10E12/L (ref 4.4–5.9)
RBC # BLD AUTO: 2.63 10E12/L (ref 4.4–5.9)
RBC #/AREA URNS AUTO: 1 /HPF (ref 0–2)
SODIUM SERPL-SCNC: 142 MMOL/L (ref 133–144)
SODIUM SERPL-SCNC: 143 MMOL/L (ref 133–144)
SODIUM SERPL-SCNC: 145 MMOL/L (ref 133–144)
SODIUM SERPL-SCNC: 145 MMOL/L (ref 133–144)
SOURCE: ABNORMAL
SP GR UR STRIP: 1.02 (ref 1–1.03)
SPECIMEN SOURCE: NORMAL
TRANSFUSION STATUS PATIENT QL: NORMAL
TROPONIN I SERPL-MCNC: 103.91 UG/L (ref 0–0.04)
TROPONIN I SERPL-MCNC: 45.77 UG/L (ref 0–0.04)
TROPONIN I SERPL-MCNC: 59.89 UG/L (ref 0–0.04)
TROPONIN I SERPL-MCNC: 71.69 UG/L (ref 0–0.04)
TROPONIN I SERPL-MCNC: 99.18 UG/L (ref 0–0.04)
UROBILINOGEN UR STRIP-MCNC: NORMAL MG/DL (ref 0–2)
VANCOMYCIN SERPL-MCNC: 13.4 MG/L
WBC # BLD AUTO: 7.4 10E9/L (ref 4–11)
WBC # BLD AUTO: 8.3 10E9/L (ref 4–11)
WBC # BLD AUTO: 8.4 10E9/L (ref 4–11)
WBC # BLD AUTO: 9 10E9/L (ref 4–11)
WBC # BLD AUTO: 9.3 10E9/L (ref 4–11)
WBC # BLD AUTO: 9.8 10E9/L (ref 4–11)
WBC #/AREA URNS AUTO: 2 /HPF (ref 0–5)

## 2019-10-14 PROCEDURE — 80307 DRUG TEST PRSMV CHEM ANLYZR: CPT | Performed by: STUDENT IN AN ORGANIZED HEALTH CARE EDUCATION/TRAINING PROGRAM

## 2019-10-14 PROCEDURE — 84295 ASSAY OF SERUM SODIUM: CPT

## 2019-10-14 PROCEDURE — 00000146 ZZHCL STATISTIC GLUCOSE BY METER IP

## 2019-10-14 PROCEDURE — 25000128 H RX IP 250 OP 636: Performed by: INTERNAL MEDICINE

## 2019-10-14 PROCEDURE — 84132 ASSAY OF SERUM POTASSIUM: CPT

## 2019-10-14 PROCEDURE — 85384 FIBRINOGEN ACTIVITY: CPT | Performed by: STUDENT IN AN ORGANIZED HEALTH CARE EDUCATION/TRAINING PROGRAM

## 2019-10-14 PROCEDURE — 25800030 ZZH RX IP 258 OP 636: Performed by: INTERNAL MEDICINE

## 2019-10-14 PROCEDURE — 25000132 ZZH RX MED GY IP 250 OP 250 PS 637: Performed by: STUDENT IN AN ORGANIZED HEALTH CARE EDUCATION/TRAINING PROGRAM

## 2019-10-14 PROCEDURE — 83051 HEMOGLOBIN PLASMA: CPT | Performed by: STUDENT IN AN ORGANIZED HEALTH CARE EDUCATION/TRAINING PROGRAM

## 2019-10-14 PROCEDURE — 84100 ASSAY OF PHOSPHORUS: CPT | Performed by: STUDENT IN AN ORGANIZED HEALTH CARE EDUCATION/TRAINING PROGRAM

## 2019-10-14 PROCEDURE — 25000128 H RX IP 250 OP 636: Performed by: STUDENT IN AN ORGANIZED HEALTH CARE EDUCATION/TRAINING PROGRAM

## 2019-10-14 PROCEDURE — P9037 PLATE PHERES LEUKOREDU IRRAD: HCPCS | Performed by: STUDENT IN AN ORGANIZED HEALTH CARE EDUCATION/TRAINING PROGRAM

## 2019-10-14 PROCEDURE — 82565 ASSAY OF CREATININE: CPT

## 2019-10-14 PROCEDURE — 84075 ASSAY ALKALINE PHOSPHATASE: CPT

## 2019-10-14 PROCEDURE — 80053 COMPREHEN METABOLIC PANEL: CPT | Performed by: STUDENT IN AN ORGANIZED HEALTH CARE EDUCATION/TRAINING PROGRAM

## 2019-10-14 PROCEDURE — 82805 BLOOD GASES W/O2 SATURATION: CPT | Performed by: STUDENT IN AN ORGANIZED HEALTH CARE EDUCATION/TRAINING PROGRAM

## 2019-10-14 PROCEDURE — 85730 THROMBOPLASTIN TIME PARTIAL: CPT | Performed by: STUDENT IN AN ORGANIZED HEALTH CARE EDUCATION/TRAINING PROGRAM

## 2019-10-14 PROCEDURE — 85300 ANTITHROMBIN III ACTIVITY: CPT | Performed by: STUDENT IN AN ORGANIZED HEALTH CARE EDUCATION/TRAINING PROGRAM

## 2019-10-14 PROCEDURE — 80320 DRUG SCREEN QUANTALCOHOLS: CPT | Performed by: STUDENT IN AN ORGANIZED HEALTH CARE EDUCATION/TRAINING PROGRAM

## 2019-10-14 PROCEDURE — 85652 RBC SED RATE AUTOMATED: CPT | Performed by: STUDENT IN AN ORGANIZED HEALTH CARE EDUCATION/TRAINING PROGRAM

## 2019-10-14 PROCEDURE — 87205 SMEAR GRAM STAIN: CPT | Performed by: STUDENT IN AN ORGANIZED HEALTH CARE EDUCATION/TRAINING PROGRAM

## 2019-10-14 PROCEDURE — 84460 ALANINE AMINO (ALT) (SGPT): CPT

## 2019-10-14 PROCEDURE — 25800030 ZZH RX IP 258 OP 636: Performed by: STUDENT IN AN ORGANIZED HEALTH CARE EDUCATION/TRAINING PROGRAM

## 2019-10-14 PROCEDURE — 84520 ASSAY OF UREA NITROGEN: CPT

## 2019-10-14 PROCEDURE — 85379 FIBRIN DEGRADATION QUANT: CPT | Performed by: STUDENT IN AN ORGANIZED HEALTH CARE EDUCATION/TRAINING PROGRAM

## 2019-10-14 PROCEDURE — P9059 PLASMA, FRZ BETWEEN 8-24HOUR: HCPCS | Performed by: STUDENT IN AN ORGANIZED HEALTH CARE EDUCATION/TRAINING PROGRAM

## 2019-10-14 PROCEDURE — 85520 HEPARIN ASSAY: CPT | Performed by: STUDENT IN AN ORGANIZED HEALTH CARE EDUCATION/TRAINING PROGRAM

## 2019-10-14 PROCEDURE — 80202 ASSAY OF VANCOMYCIN: CPT | Performed by: INTERNAL MEDICINE

## 2019-10-14 PROCEDURE — 82374 ASSAY BLOOD CARBON DIOXIDE: CPT

## 2019-10-14 PROCEDURE — 86316 IMMUNOASSAY TUMOR OTHER: CPT | Performed by: STUDENT IN AN ORGANIZED HEALTH CARE EDUCATION/TRAINING PROGRAM

## 2019-10-14 PROCEDURE — 84484 ASSAY OF TROPONIN QUANT: CPT | Performed by: STUDENT IN AN ORGANIZED HEALTH CARE EDUCATION/TRAINING PROGRAM

## 2019-10-14 PROCEDURE — 86140 C-REACTIVE PROTEIN: CPT | Performed by: STUDENT IN AN ORGANIZED HEALTH CARE EDUCATION/TRAINING PROGRAM

## 2019-10-14 PROCEDURE — 85027 COMPLETE CBC AUTOMATED: CPT | Performed by: INTERNAL MEDICINE

## 2019-10-14 PROCEDURE — 82310 ASSAY OF CALCIUM: CPT

## 2019-10-14 PROCEDURE — 84132 ASSAY OF SERUM POTASSIUM: CPT | Performed by: STUDENT IN AN ORGANIZED HEALTH CARE EDUCATION/TRAINING PROGRAM

## 2019-10-14 PROCEDURE — 83735 ASSAY OF MAGNESIUM: CPT | Performed by: INTERNAL MEDICINE

## 2019-10-14 PROCEDURE — 93010 ELECTROCARDIOGRAM REPORT: CPT | Performed by: INTERNAL MEDICINE

## 2019-10-14 PROCEDURE — 0W9930Z DRAINAGE OF RIGHT PLEURAL CAVITY WITH DRAINAGE DEVICE, PERCUTANEOUS APPROACH: ICD-10-PCS | Performed by: INTERNAL MEDICINE

## 2019-10-14 PROCEDURE — 85610 PROTHROMBIN TIME: CPT | Performed by: STUDENT IN AN ORGANIZED HEALTH CARE EDUCATION/TRAINING PROGRAM

## 2019-10-14 PROCEDURE — 40000275 ZZH STATISTIC RCP TIME EA 10 MIN

## 2019-10-14 PROCEDURE — 40000076 ZZH STATISTIC IABP MONITORING

## 2019-10-14 PROCEDURE — 85610 PROTHROMBIN TIME: CPT | Performed by: INTERNAL MEDICINE

## 2019-10-14 PROCEDURE — 85027 COMPLETE CBC AUTOMATED: CPT | Performed by: STUDENT IN AN ORGANIZED HEALTH CARE EDUCATION/TRAINING PROGRAM

## 2019-10-14 PROCEDURE — 84155 ASSAY OF PROTEIN SERUM: CPT

## 2019-10-14 PROCEDURE — C9113 INJ PANTOPRAZOLE SODIUM, VIA: HCPCS | Performed by: STUDENT IN AN ORGANIZED HEALTH CARE EDUCATION/TRAINING PROGRAM

## 2019-10-14 PROCEDURE — 25000125 ZZHC RX 250: Performed by: STUDENT IN AN ORGANIZED HEALTH CARE EDUCATION/TRAINING PROGRAM

## 2019-10-14 PROCEDURE — 25000125 ZZHC RX 250: Performed by: INTERNAL MEDICINE

## 2019-10-14 PROCEDURE — 94003 VENT MGMT INPAT SUBQ DAY: CPT

## 2019-10-14 PROCEDURE — 83735 ASSAY OF MAGNESIUM: CPT | Performed by: STUDENT IN AN ORGANIZED HEALTH CARE EDUCATION/TRAINING PROGRAM

## 2019-10-14 PROCEDURE — P9041 ALBUMIN (HUMAN),5%, 50ML: HCPCS

## 2019-10-14 PROCEDURE — 74176 CT ABD & PELVIS W/O CONTRAST: CPT

## 2019-10-14 PROCEDURE — 33949 ECMO/ECLS DAILY MGMT ARTERY: CPT

## 2019-10-14 PROCEDURE — 82247 BILIRUBIN TOTAL: CPT

## 2019-10-14 PROCEDURE — 84100 ASSAY OF PHOSPHORUS: CPT | Performed by: INTERNAL MEDICINE

## 2019-10-14 PROCEDURE — 40000344 ZZHCL STATISTIC THAWING COMPONENT: Performed by: STUDENT IN AN ORGANIZED HEALTH CARE EDUCATION/TRAINING PROGRAM

## 2019-10-14 PROCEDURE — 87070 CULTURE OTHR SPECIMN AEROBIC: CPT | Performed by: STUDENT IN AN ORGANIZED HEALTH CARE EDUCATION/TRAINING PROGRAM

## 2019-10-14 PROCEDURE — 99291 CRITICAL CARE FIRST HOUR: CPT | Mod: 25 | Performed by: INTERNAL MEDICINE

## 2019-10-14 PROCEDURE — 25000128 H RX IP 250 OP 636

## 2019-10-14 PROCEDURE — 80048 BASIC METABOLIC PNL TOTAL CA: CPT | Performed by: INTERNAL MEDICINE

## 2019-10-14 PROCEDURE — 33949 ECMO/ECLS DAILY MGMT ARTERY: CPT | Performed by: INTERNAL MEDICINE

## 2019-10-14 PROCEDURE — 93925 LOWER EXTREMITY STUDY: CPT

## 2019-10-14 PROCEDURE — 85384 FIBRINOGEN ACTIVITY: CPT | Performed by: INTERNAL MEDICINE

## 2019-10-14 PROCEDURE — 93306 TTE W/DOPPLER COMPLETE: CPT

## 2019-10-14 PROCEDURE — 40000986 XR CHEST PORT 1 VW

## 2019-10-14 PROCEDURE — 82435 ASSAY OF BLOOD CHLORIDE: CPT

## 2019-10-14 PROCEDURE — 93880 EXTRACRANIAL BILAT STUDY: CPT

## 2019-10-14 PROCEDURE — 85347 COAGULATION TIME ACTIVATED: CPT

## 2019-10-14 PROCEDURE — 82947 ASSAY GLUCOSE BLOOD QUANT: CPT

## 2019-10-14 PROCEDURE — 85730 THROMBOPLASTIN TIME PARTIAL: CPT | Performed by: INTERNAL MEDICINE

## 2019-10-14 PROCEDURE — 82947 ASSAY GLUCOSE BLOOD QUANT: CPT | Performed by: STUDENT IN AN ORGANIZED HEALTH CARE EDUCATION/TRAINING PROGRAM

## 2019-10-14 PROCEDURE — 93306 TTE W/DOPPLER COMPLETE: CPT | Mod: 26 | Performed by: INTERNAL MEDICINE

## 2019-10-14 PROCEDURE — 95951 ZZHC EEG VIDEO EACH 24 HR: CPT | Mod: ZF

## 2019-10-14 PROCEDURE — 82330 ASSAY OF CALCIUM: CPT | Performed by: STUDENT IN AN ORGANIZED HEALTH CARE EDUCATION/TRAINING PROGRAM

## 2019-10-14 PROCEDURE — 40000014 ZZH STATISTIC ARTERIAL MONITORING DAILY

## 2019-10-14 PROCEDURE — G0463 HOSPITAL OUTPT CLINIC VISIT: HCPCS

## 2019-10-14 PROCEDURE — 93308 TTE F-UP OR LMTD: CPT | Mod: 26 | Performed by: INTERNAL MEDICINE

## 2019-10-14 PROCEDURE — P9016 RBC LEUKOCYTES REDUCED: HCPCS | Performed by: STUDENT IN AN ORGANIZED HEALTH CARE EDUCATION/TRAINING PROGRAM

## 2019-10-14 PROCEDURE — 82040 ASSAY OF SERUM ALBUMIN: CPT

## 2019-10-14 PROCEDURE — 93308 TTE F-UP OR LMTD: CPT

## 2019-10-14 PROCEDURE — 83605 ASSAY OF LACTIC ACID: CPT | Performed by: STUDENT IN AN ORGANIZED HEALTH CARE EDUCATION/TRAINING PROGRAM

## 2019-10-14 PROCEDURE — 27211402 ZZH SENSOR NIRS OXIMETER, ADULT

## 2019-10-14 PROCEDURE — 81001 URINALYSIS AUTO W/SCOPE: CPT | Performed by: STUDENT IN AN ORGANIZED HEALTH CARE EDUCATION/TRAINING PROGRAM

## 2019-10-14 PROCEDURE — 93325 DOPPLER ECHO COLOR FLOW MAPG: CPT | Mod: 26 | Performed by: INTERNAL MEDICINE

## 2019-10-14 PROCEDURE — 40000281 ZZH STATISTIC TRANSPORT TIME EA 15 MIN

## 2019-10-14 PROCEDURE — 32551 INSERTION OF CHEST TUBE: CPT | Performed by: INTERNAL MEDICINE

## 2019-10-14 PROCEDURE — 93321 DOPPLER ECHO F-UP/LMTD STD: CPT | Mod: 26 | Performed by: INTERNAL MEDICINE

## 2019-10-14 PROCEDURE — 20000004 ZZH R&B ICU UMMC

## 2019-10-14 RX ORDER — ALBUMIN, HUMAN INJ 5% 5 %
SOLUTION INTRAVENOUS
Status: COMPLETED
Start: 2019-10-14 | End: 2019-10-14

## 2019-10-14 RX ORDER — CALCIUM CHLORIDE 100 MG/ML
1 INJECTION INTRAVENOUS; INTRAVENTRICULAR ONCE
Status: COMPLETED | OUTPATIENT
Start: 2019-10-14 | End: 2019-10-14

## 2019-10-14 RX ADMIN — CALCIUM GLUCONATE 1 G: 98 INJECTION, SOLUTION INTRAVENOUS at 11:46

## 2019-10-14 RX ADMIN — PANTOPRAZOLE SODIUM 8 MG/HR: 40 INJECTION, POWDER, FOR SOLUTION INTRAVENOUS at 15:32

## 2019-10-14 RX ADMIN — Medication: at 19:50

## 2019-10-14 RX ADMIN — EPINEPHRINE 0.03 MCG/KG/MIN: 1 INJECTION PARENTERAL at 11:13

## 2019-10-14 RX ADMIN — TICAGRELOR 90 MG: 90 TABLET ORAL at 08:22

## 2019-10-14 RX ADMIN — VANCOMYCIN HYDROCHLORIDE 1500 MG: 10 INJECTION, POWDER, LYOPHILIZED, FOR SOLUTION INTRAVENOUS at 08:23

## 2019-10-14 RX ADMIN — SODIUM PHOSPHATE, MONOBASIC, MONOHYDRATE AND SODIUM PHOSPHATE, DIBASIC, ANHYDROUS 10 MMOL: 276; 142 INJECTION, SOLUTION INTRAVENOUS at 08:22

## 2019-10-14 RX ADMIN — ALBUMIN HUMAN 500 ML: 50 SOLUTION INTRAVENOUS at 15:08

## 2019-10-14 RX ADMIN — PANTOPRAZOLE SODIUM 8 MG/HR: 40 INJECTION, POWDER, FOR SOLUTION INTRAVENOUS at 23:29

## 2019-10-14 RX ADMIN — CALCIUM GLUCONATE 1 G: 98 INJECTION, SOLUTION INTRAVENOUS at 00:05

## 2019-10-14 RX ADMIN — SODIUM BICARBONATE 100 MEQ: 84 INJECTION, SOLUTION INTRAVENOUS at 18:19

## 2019-10-14 RX ADMIN — CALCIUM GLUCONATE 3 G: 98 INJECTION, SOLUTION INTRAVENOUS at 20:40

## 2019-10-14 RX ADMIN — PIPERACILLIN SODIUM AND TAZOBACTAM SODIUM 3.38 G: 3; .375 INJECTION, POWDER, LYOPHILIZED, FOR SOLUTION INTRAVENOUS at 19:50

## 2019-10-14 RX ADMIN — PROPOFOL 45 MCG/KG/MIN: 10 INJECTION, EMULSION INTRAVENOUS at 03:49

## 2019-10-14 RX ADMIN — Medication 2 G: at 02:35

## 2019-10-14 RX ADMIN — CALCIUM GLUCONATE 1 G: 98 INJECTION, SOLUTION INTRAVENOUS at 17:05

## 2019-10-14 RX ADMIN — PANTOPRAZOLE SODIUM 40 MG: 40 INJECTION, POWDER, FOR SOLUTION INTRAVENOUS at 08:22

## 2019-10-14 RX ADMIN — POTASSIUM CHLORIDE 20 MEQ: 29.8 INJECTION, SOLUTION INTRAVENOUS at 22:36

## 2019-10-14 RX ADMIN — ALBUMIN HUMAN 250 ML: 50 SOLUTION INTRAVENOUS at 11:45

## 2019-10-14 RX ADMIN — HUMAN INSULIN 4 UNITS/HR: 100 INJECTION, SOLUTION SUBCUTANEOUS at 21:14

## 2019-10-14 RX ADMIN — MIDAZOLAM 2 MG/HR: 5 INJECTION INTRAMUSCULAR; INTRAVENOUS at 10:07

## 2019-10-14 RX ADMIN — SODIUM BICARBONATE 50 MEQ: 84 INJECTION, SOLUTION INTRAVENOUS at 19:47

## 2019-10-14 RX ADMIN — ASPIRIN 81 MG CHEWABLE TABLET 81 MG: 81 TABLET CHEWABLE at 08:22

## 2019-10-14 RX ADMIN — Medication 100 MCG/HR: at 19:44

## 2019-10-14 RX ADMIN — PIPERACILLIN SODIUM AND TAZOBACTAM SODIUM 3.38 G: 3; .375 INJECTION, POWDER, LYOPHILIZED, FOR SOLUTION INTRAVENOUS at 08:22

## 2019-10-14 RX ADMIN — CALCIUM CHLORIDE 1 G: 100 INJECTION INTRAVENOUS; INTRAVENTRICULAR at 18:19

## 2019-10-14 RX ADMIN — PIPERACILLIN SODIUM AND TAZOBACTAM SODIUM 3.38 G: 3; .375 INJECTION, POWDER, LYOPHILIZED, FOR SOLUTION INTRAVENOUS at 15:43

## 2019-10-14 RX ADMIN — CALCIUM GLUCONATE 1 G: 98 INJECTION, SOLUTION INTRAVENOUS at 05:52

## 2019-10-14 RX ADMIN — Medication: at 10:50

## 2019-10-14 RX ADMIN — PIPERACILLIN SODIUM AND TAZOBACTAM SODIUM 3.38 G: 3; .375 INJECTION, POWDER, LYOPHILIZED, FOR SOLUTION INTRAVENOUS at 03:49

## 2019-10-14 RX ADMIN — TICAGRELOR 90 MG: 90 TABLET ORAL at 19:50

## 2019-10-14 RX ADMIN — HUMAN INSULIN 7 UNITS/HR: 100 INJECTION, SOLUTION SUBCUTANEOUS at 16:36

## 2019-10-14 ASSESSMENT — ACTIVITIES OF DAILY LIVING (ADL)
ADLS_ACUITY_SCORE: 22
ADLS_ACUITY_SCORE: 22
ADLS_ACUITY_SCORE: 19
ADLS_ACUITY_SCORE: 22

## 2019-10-14 ASSESSMENT — MIFFLIN-ST. JEOR: SCORE: 1901.24

## 2019-10-14 NOTE — SIGNIFICANT EVENT
Significant Event Note    Time of event: 1:35 AM October 14, 2019    Description of event:  Notified by RN that a chest port that patient came with fell out. Patient had the chest port in place upon transfer and per CT terminated in the R pec major. Chest port had been placed on LCS w/ total of 5-10cc sanguinous/serousanguinous drainage. Assessed patient after port fell out. Pt has asymmetric (R higher than L) since arrival, which has been unchanged. MAP remains stable in the mid 80s. Satting 100% w/ no change on vent setting. Breath sounds are heard bilaterally. Do not think pt is developing pneumo/hemothorax.   Plan:  - dress port site  - monitor for bleeding  - if bleeding increases will call surg to eval  - if HD decompensation or desatting, will get stat CXR and SICU eval immediately    Discussed with: bedside nurse    Ela Angela MD

## 2019-10-14 NOTE — PROGRESS NOTES
ECMO Shift Summary:    Patient remains on VA ECMO, all equipment is functioning and alarms are appropriately set. RPM's 3100 with flow range 3.65 L/min. Sweep gas is at 7 LPM and FiO2 70%. Circuit remains free of air, clot and fibrin. Cannulas are secure with no bleeding from site. Extremities are cool. Suctioned ETT for small amount of thick tan secretions.    Significant Shift Events: pt was shocked out of vfib prior to trying to go to CT    Vent settings:  Ventilation Mode: CMV/AC  (Continuous Mandatory Ventilation/ Assist Control)  FiO2 (%): 40 %  Rate Set (breaths/minute): 14 breaths/min  Tidal Volume Set (mL): 500 mL  PEEP (cm H2O): 7 cmH2O  Oxygen Concentration (%): 40 %  Resp: 14  .    Heparin is off, ACT range 160-180.    Urine output is as charted, blood loss was not visible. Product given included 4 unit(s) PRBC, 3 unit(s) FFP, 2 unit(s) PLTs 1500 of albumin.      Intake/Output Summary (Last 24 hours) at 10/14/2019 1820  Last data filed at 10/14/2019 1800  Gross per 24 hour   Intake 7868.13 ml   Output 3133 ml   Net 4735.13 ml       ECHO:  No results found for this or any previous visit.No results found for this or any previous visit.    CXR:  Recent Results (from the past 24 hour(s))   CT Head w/o Contrast    Narrative    CT HEAD W/O CONTRAST 10/13/2019 7:39 PM    Provided History: ECMO    Comparison: None.    Technique: Using multidetector thin collimation helical acquisition  technique, axial, coronal and sagittal CT images from the skull base  to the vertex were obtained without intravenous contrast.     Findings:    No intracranial hemorrhage, mass effect, extra-axial fluid collection,  or midline shift. The ventricles are proportionate to the cerebral  sulci. The gray to white matter differentiation of the cerebral  hemispheres is preserved. The basal cisterns are patent.   No calvarial fracture. Small laceration along the left posterior  scalp. Layering debris in the left maxillary sinus and  sphenoid sinus  locules along with scattered regions of paranasal sinus mucosal  thickening, nonspecific in the setting of intubation. Mastoid air  cells are clear. Debris in the left external auditory canal, likely  cerumen.       Impression    Impression:   1. No acute intracranial pathology.  2. Small laceration along the posterior left scalp.    I have personally reviewed the examination and initial interpretation  and I agree with the findings.    PABLO CASE MD   CT Chest Abdomen Pelvis w/o Contrast    Narrative    EXAMINATION: CT CHEST ABDOMEN PELVIS W/O CONTRAST, 10/13/2019 7:39 PM    TECHNIQUE:  Helical CT images from the thoracic inlet through the  symphysis pubis were obtained  without IV contrast.     COMPARISON: None    HISTORY: ECMO    FINDINGS:    Chest: Endotracheal tube tip at the high thoracic trachea 7 cm from  the adiel. Mild debris in the central airways with near complete  obstruction of the right lower lobe bronchus. There is confluent  consolidation in the right lower lobe with patchy consolidation in the  dependent portions of the right upper lobe and left lower lobe.  Scattered tree-in-bud nodularity and groundglass opacities along with  mild interlobular septal thickening, especially in the right upper  lobe. Small bilateral pleural effusions. No pneumothorax.    Left IJ CVC with tip at the high SVC. Left lower extremity ECMO  cannula with tip terminating at the innominate confluence. Heart size  is normal with small hemopericardium. Small hyperdense retrosternal  hematoma. Ascending aorta is normal caliber. LAD stent. Esophageal  temperature probe terminates in the midesophagus. No enlarged thoracic  lymph nodes.    Abdomen and pelvis:   Vicarious extravasation contrast into the gallbladder. Normal  noncontrast appearance of the liver, pancreas, and spleen. Tiny  splenule near the hilum.1.8 x 1.3 cm lipid rich right adrenal adenoma  (series 9 image 327). Indeterminant 1.5 x 1.4 cm left  adrenal nodule  (image 321). Indeterminant 2.2 x 1.9 cm exophytic right renal cystic  lesion arising from the inferior pole (series 9 image 470) Hounsfield  units 22. Left kidney is unremarkable. Contrast in the renal  collecting systems, presumably from prior intervention. Bladder is  decompressed with Lawrence catheter in place and small volume  intravesicular air. Bowel is nonobstructed. Appendix is not inflamed.  Colonic diverticulosis without diverticulitis. Enteric tube projects  at the gastric pylorus. Small volume ascites in the pelvis and mild  haziness of the mesentery. No free air.    Right lower extremity central venous catheter terminating at the  distal right common iliac vein. Right lower extremity arterial line  terminating at the external iliac vein. Left lower extremity AIBP with  proximal tip distal to the left subclavian origin and distal tip just  proximal to the renal artery ostia.     Bones and soft tissues: Mildly displaced fractures of right ribs 2-4  anteriorly. Nondisplaced fractures of left ribs 2 and 3 anteriorly  with subtle buckle fracture of left fourth rib anteriorly.  Nondisplaced fractures of bilateral first ribs posteriorly near the  costovertebral articulation. Mildly displaced fracture of the mid  sternal body. Large right intramuscular pectoral hematoma. Presumed  right chest wall port terminating in the right pectoral musculature.  Nonspecific lytic focus in the left ilium near the SI joint measuring  2.0 x 1.4 cm (series 9 image 534) Cannot assess for extravasation  given lack of intravenous contrast.      Impression    IMPRESSION:   1. Mildly displaced fractures of bilateral 2-4th ribs anteriorly and  bilateral nondisplaced fractures of the first ribs posteriorly. Large  right chest wall hematoma presumably intrapectoral, likely sequelae  from traumatic chest compressions.  2. Mildly displaced sternal fracture with associated small  retrosternal hematoma and small  hemopericardium.  3. Complete opacification of the right lower lobe with patchy  opacities along the dependent portions of the right upper lobe and  left lower lobe. Additional regions of scattered ground glass  opacities and mild interlobular septal thickening, right lung  predominant. These findings could represent atelectasis with  termination of aspiration and developing pulmonary edema.  4. Small bilateral pleural effusions.  5. 1.5 cm indeterminate left adrenal nodule. Consider comparison with  prior images if available to document stability. 1.8 cm lipid rich  right adrenal adenoma.  6. Left lower cavity echo cannulae tip terminates at the innominate  confluence. Endotracheal tube tip terminates at the high thoracic  trachea. Additional support devices as described above.  7. Small volume ascites in the pelvis.    I have personally reviewed the examination and initial interpretation  and I agree with the findings.    JOSE SANCHEZ MD   XR Chest Port 1 View    Narrative    Exam: XR CHEST PORT 1 VW, 10/13/2019 8:40 PM    Indication: Check endotracheal tube placement and ECLS cannula  placement. DO NOT log-roll patient.  Place film under patient using  patient safety handling process.    Comparison: CT 10/13/2018, 7:15 PM.    Findings:   Lower extremity sheath tip at the confluence of the innominate veins.  Left subclavian central line tip in the high superior vena cava.  Enteric balloon pump 2 cm above the adiel. Temperature probe tip in  the upper esophagus. Endotracheal tube tip at T2. Enteric tube is seen  passing through the mediastinum with its tip projecting off the film.  CT earlier shows the tip in the body of the stomach. Additional  sheath-like structure projecting over over the heart felt to be  extrinsic to the patient.    Increased diffuse opacity throughout the right thorax. This is  consistent with his known effusion and associated atelectasis seen on  the CT.      Impression    Impression:  Lines and tubes similar to the CT of 10/13/2019 performed  at 1915 hours. Opacity over the right chest found to be atelectasis  and effusion, better seen on CT same date.    JOSE SANCHEZ MD   US Carotid Bilateral    Narrative     US CAROTID BILATERAL 10/14/2019 1:11 AM     History:  Cardiac Arrest on ECMO     Comparison Study: None    Findings: Grayscale, color, and spectral ultrasound performed.    Right carotid peak velocities systolic/diastolic:  CCA mid:    56/10 cm/s  ICA proximal:  30/7 cm/s  ICA mid:    39/29 cm/s  ICA distal:    64/33cm/s  ICA/CCA:              1.1  ECA:      31 cm/s  vertebral:    Antegrade     There is not significant plaque burden identified.    Left carotid peak velocities systolic/diastolic:  CCA mid:    50/0 cm/s  ICA proximal:  26/12 cm/s  ICA mid:    46/15 cm/s  ICA distal:    98/60 cm/s  ICA/CCA:             2.0  ECA:      61 cm/s  vertebral:                 Antegrade    There is not significant plaque burden identified.    Consensus Panel Gray-Scale and Doppler US Criteria for Diagnosis of  ICA Stenosis (Radiology 11/2003)       Normal         ICA PSV < 125 cm/sec       Plaque Estimate None       ICA/CCA  PSV Ratio < 2.0       ICA EDV < 40 cm/sec       < 50%          ICA PSV < 125 cm/sec       Plaque Estimate < 50%       ICA/CCA  PSV Ratio < 2.0       ICA EDV < 40 cm/sec       50- 69%       ICA -230 cm/sec       Plaque Estimate > or = 50%       ICA/CCA PSV Ratio 2.0-4.0       ICA EDV  cm/sec         > or = 70%, less than near occlusion       ICA PSV > 230 cm/sec       Plaque Estimate > or = 50%       ICA/CCA Ratio > 4.0       ICA EDV > 100 cm/sec                                            Additional criteria from vascular surgery     > 80%       EDV > 120 cm/sec       Impression    Impression: No hemodynamically significant stenosis by velocity  criteria, although velocity criteria were not developed in the setting  of ECMO. Internal carotid artery to CCA ratio is  borderline elevated  on the left, of uncertain significance in the absence of significant  visible plaque burden. Abnormal waveforms due to ECMO.    I have personally reviewed the examination and initial interpretation  and I agree with the findings.    ISH FOX MD   US Lower Extremity Arterial Duplex Bilateral    Narrative    Exam: US LOWER EXTREMITY ARTERIAL DUPLEX BILATERAL, 10/14/2019 1:12 AM    Indication: Baseline to assess blood flow to Lower Extremities (VA  ECMO)    Technique: Duplex arterial ultrasound evaluation of the lateral lower  extremities.    Comparison: None    Findings: All velocities in cm/s    Right lower extremity: Abnormal monophasic waveforms with some  undulating phasicity due to ECMO cannulation.  SFA mid thigh: 71/54  SFA distal thigh: 17/3  Popliteal artery: 13/3  PTA: 10/5  JAMEY calf: 19/13    Left lower extremity: Abnormal monophasic waveforms with some  undulating phasicity due to ECMO cannulation  SFA mid thigh: 12/7  SFA distal thigh: 29/10  Popliteal artery: 18/12  PTA: 11/0  JAMEY calf: 29/0    Common femoral artery, profunda femoral artery, and superficial  femoral artery origin are obscured bilaterally due to overlying  bandages and dressings. Support devices lines seen within the SFA  bilaterally.      Impression    Impression: Patent bilateral lower arteries as described above with  abnormal monophasic waveforms due to ECMO. Bilateral CFA, PFA, and SFA  at the origin are obscured due to overlying bandages.    I have personally reviewed the examination and initial interpretation  and I agree with the findings.    ISH FOX MD       Labs:  Recent Labs   Lab 10/14/19  1759 10/14/19  1612 10/14/19  1332 10/14/19  1215   PH 7.30* 7.25* 7.29* 7.29*   PCO2 40 40 42 46*   PO2 70* 107* 208* 155*   HCO3 20* 18* 20* 22   O2PER 40.0 40.0 40 40       Lab Results   Component Value Date    HGB 8.0 (L) 10/14/2019    PHGB 180 (H) 10/14/2019    PLT 84 (L) 10/14/2019    FIBR 224  10/14/2019    INR 1.49 (H) 10/14/2019     (H) 10/14/2019    DD 1.4 (H) 10/14/2019    AXA 0.14 10/14/2019    ANTCH 29 (LL) 10/14/2019         Plan is continue to support.      Nicky Campbell, RT  10/14/2019 6:20 PM

## 2019-10-14 NOTE — PROGRESS NOTES
Care Coordinator Progress Note    Admission Date/Time:  10/13/2019  Attending MD:  Anshu Roberts MD    Data  Chart reviewed, discussed with interdisciplinary team.   Patient was admitted for: Ventricular tachycardia (H).    Concerns with insurance coverage for discharge needs: None.  Current Living Situation: Patient lives with spouse.  Support System: Supportive and Involved  Services Involved: None.  Transportation at Discharge: Family or friend will provide  Transportation to Medical Appointments:   - Not applicable  Barriers to Discharge: Medical stability.    Assessment  Pt transfer from OSH for refractory VT/VF arrest s/p PCI to LAD and placement on peripheral VA ECMO.   Pt is vented, sedated and with ECMO.  Met with pt spouse to introduce self and for support.  RNCC role discussed and contact info provided.  Per discussion with pt spouse, pt and spouse live in [place].  Pt was ind. with all cares and working prior hospitalization.  RNCC will cont to follow plan of care.     Plan  Anticipated Discharge Date:  TBD.  Anticipated Discharge Plan:   TBD.  RNCC will cont to follow plan of care.      Savana Quintanilla RN, PHN, BSN  4A and 4E/ ICU  Care Coordinator  Phone: [phone]  Pager: [phone]

## 2019-10-14 NOTE — PHARMACY-VANCOMYCIN DOSING SERVICE
Pharmacy Vancomycin Initial Note  Date of Service 2019  Patient's  1965  53 year old, male    Indication: ECMO ppx    Current estimated CrCl = Estimated Creatinine Clearance: 135.3 mL/min (based on SCr of 0.67 mg/dL).    Creatinine for last 3 days  10/13/2019:  7:47 PM Creatinine 0.67 mg/dL    Recent Vancomycin Level(s) for last 3 days  No results found for requested labs within last 72 hours.        Vancomycin IV Administrations (past 72 hours)      No vancomycin orders with administrations in past 72 hours.                Nephrotoxins and other renal medications (From now, onward)    Start     Dose/Rate Route Frequency Ordered Stop    10/13/19 2000  piperacillin-tazobactam (ZOSYN) 3.375 g vial to attach to  mL bag      3.375 g  over 30 Minutes Intravenous EVERY 6 HOURS 10/13/19 1902 10/18/19 1959    10/13/19 1945  vasopressin (VASOSTRICT) 40 Units in D5W 40 mL infusion      2.4 Units/hr  2.4 mL/hr  Intravenous CONTINUOUS 10/13/19 1943      10/13/19 1915  norepinephrine (LEVOPHED) 16 mg in  mL infusion      0.03-0.4 mcg/kg/min × 75 kg (Dosing Weight)  2.1-28.1 mL/hr  Intravenous CONTINUOUS 10/13/19 1902      10/13/19 1915  phenylephrine (MCKENNA-SYNEPHRINE) 50 mg in sodium chloride 0.9 % 250 mL infusion      0.5-6 mcg/kg/min × 75 kg (Dosing Weight)  11.3-135 mL/hr  Intravenous CONTINUOUS 10/13/19 1902            Contrast Orders - past 72 hours (72h ago, onward)    None                Plan:  1.  Start vancomycin 1500 mg (20 mg/kg) IV q12h x5d.  2.  Goal Trough Level: 15-20 mg/L   3.  Pharmacy will check trough levels as appropriate in 1-3 Days.    4. Serum creatinine levels will be ordered daily for the first week of therapy and at least twice weekly for subsequent weeks.  5. Valders method utilized to dose vancomycin therapy: Method 2    Lelo Montalvo, MisbahD

## 2019-10-14 NOTE — PROGRESS NOTES
Brief Note    SW was consulted to speak with pt's wife about lodging. Wife stated she has extremely limited funds and is coming from 5 hours away with no family or friends in the area to assist. [place]. SW explained that we have limited funds for lodging assistance but might be able to book a room for tonight.    SW attempted to contact accommodations coordinator coverage with no success as it was late in the day. Using after hours procedures, SW was able to book a room for pt's wife at [facility] for one night (tonight). SW provided information to wife. Wife expressed appreciation and is aware of limited funds. She states she will look into potential options on her own going forward. SW encouraged wife to ask RN to page SW if additional needs arise.    JERRY Anderson, [facility]  ICU    P: [phone]  Pager: [phone]

## 2019-10-14 NOTE — PROGRESS NOTES
Continuous EEG Monitoring  CPT Code:39126  [facility]/VEEG-started before Noon  MD: Spike    Date Continuous EEG monitoring qblfyfeit85/14/2019 @ 09:30  Hair braided: no-too short; male  Leads O1 and O2 changed: n/a  Optifoam around O1 and O2: yes  Skin breakdown/concerns: l sore between C4/P4 on R side-skin off  Asked about continuing EEG monitoring past Day 5(Day 3): na/  Due for hygiene break (day 5): yes-10/18/19  Removed electrodes: n/a

## 2019-10-14 NOTE — CONSULTS
[facility], [place]    NEUROCRITICAL CARE CONSULT NOTE    Reason for Consult:  Post cardiac arrest      Consult requested by:   Paradise Villareal MD    Consult Team:  Neuro-Critical Care    Patient Name:  Joel Campbell       Date of Admission:  10/13/2019       Problem List    Active Hospital Problems    Ventricular tachycardia (H)         Past Medical History   No past medical history on file.    Past Surgical History   No past surgical history on file.    Family History   No family history on file.    Social History   Not on file     Allergies   Allergies not on file       Brief summary of hospital stay to date:    53 year old male w/no pmh admitted 10/13/19 as transfer from [facility] for refractory VT/VF arrest placed on VA ECMO.   Per chart review patient had seizure like activity at 8 pm 10/12 and became unresponsive, EMS arrived in 3-4 minutes. CPR was started and occurred for 40 min prior to achieving ROSC. He was then transferred to [facility] where he had refractory VT and was started on amiodarone.   He was then transferred to [place] where he underwent PCI to LAD and was started on VA ecmo due to cardiac shock. He was then transferred here and started on cooling at 34 degrees and paralyzed on propofol and fentanyl.      Present Medications    artificial tears   Both Eyes Q8H     [START ON 10/14/2019] aspirin  81 mg Per Feeding Tube Daily     [START ON 10/14/2019] pantoprazole (PROTONIX) IV  40 mg Intravenous Daily     piperacillin-tazobactam  3.375 g Intravenous Q6H     sodium chloride (PF)  3 mL Intracatheter Q8H     ticagrelor  90 mg Oral or Feeding Tube BID     vancomycin (VANCOCIN) IV  1,500 mg Intravenous Q12H       amiodarone 0.5 mg/min (10/13/19 2027)     cisatracurium (NIMBEX) infusion ADULT Stopped (10/13/19 2019)     IV fluid REPLACEMENT ONLY       DOPamine 5 mcg/kg/min (10/13/19 1951)     EPINEPHrine IV infusion ADULT Stopped (10/13/19 2017)     fentaNYL 100  "mcg/hr (10/13/19 1955)     heparin 2 unit/mL in 0.9% NaCl       HEParin       insulin (regular) 1 Units/hr (10/13/19 2002)     midazolam Stopped (10/13/19 2017)     nitroPRUsside (NIPRIDE) IV infusion ADULT/PEDS GREATER than or EQUAL to 45 kg std conc       norepinephrine 0.01 mcg/kg/min (10/13/19 1953)     phenylephrine Stopped (10/13/19 2018)     propofol (DIPRIVAN) infusion 70 mcg/kg/min (10/13/19 2003)     sodium chloride       vasopressin (PITRESSIN) infusion ADULT (40 mL) 2.4 Units/hr (10/13/19 2004)       EXAMINATION    Vital Signs  Temp (!) 88.5  F (31.4  C)   Resp 14   Ht 1.89 m (6' 2.41\")   Wt 75 kg (165 lb 5.5 oz)   SpO2 100%   BMI 21.00 kg/m      Tmax: Temp (24hrs), Av.5  F (31.9  C), Min:88.5  F (31.4  C), Max:91.2  F (32.9  C)      Intake/Output:     Intake/Output Summary (Last 24 hours) at 10/13/2019 2128  Last data filed at 10/13/2019 2000  Gross per 24 hour   Intake --   Output 1300 ml   Net -1300 ml         NeuroCritical Examination   General: Adult male patient, lying in bed, sedated, on ECMO  HEENT: NC/AT, no icterus  Chest: intubated and mechanically ventilated  Abdomen: S/ND  Extremities: warm  Skin: No rash or lesion   Neuro: Sedated. Pupils equal, round, 3 mm and briskly reactive to light bilaterally. Cough not intact per nursing. Does not withdraw to noxious stimuli. Normal muscle bulk and tone.    Laboratory Findings    Data     CMP   Recent Labs   Lab 10/13/19  1947      POTASSIUM 4.3   CHLORIDE 110*   CO2 21   ANIONGAP 12   *   BUN 10   CR 0.67   GFRESTIMATED >90   GFRESTBLACK >90   HEATHER 6.6*   PROTTOTAL 5.3*   ALBUMIN 3.1*   BILITOTAL 1.3   ALKPHOS 67   *   ALT 89*        CBC   Recent Labs   Lab 10/13/19  1947   WBC 11.6*   RBC 2.87*   HGB 10.1*   HCT 29.8*   *   MCH 35.2*   MCHC 33.9   RDW 14.6          INR, PTT No lab results found in last 7 days.     Arterial Blood Gas   Recent Labs   Lab 10/13/19  1947   PH 7.38   PCO2 41   PO2 305*   HCO3 24 "   O2PER 100       UA  No lab results found in last 7 days.    Micro No lab results found in last 7 days.       Radiological Data  Data   Recent Results (from the past 48 hour(s))   CT Head w/o Contrast    Narrative    CT HEAD W/O CONTRAST 10/13/2019 7:39 PM    Provided History: ECMO    Comparison: None.    Technique: Using multidetector thin collimation helical acquisition  technique, axial, coronal and sagittal CT images from the skull base  to the vertex were obtained without intravenous contrast.     Findings:    No intracranial hemorrhage, mass effect, extra-axial fluid collection,  or midline shift. The ventricles are proportionate to the cerebral  sulci. The gray to white matter differentiation of the cerebral  hemispheres is preserved. The basal cisterns are patent.   No calvarial fracture. Small laceration along the left posterior  scalp. Layering debris in the left maxillary sinus and sphenoid sinus  locules along with scattered regions of paranasal sinus mucosal  thickening, nonspecific in the setting of intubation. Mastoid air  cells are clear. Debris in the left external auditory canal, likely  cerumen.       Impression    Impression:   1. No acute intracranial pathology.  2. Small laceration along the posterior left scalp.    I have personally reviewed the examination and initial interpretation  and I agree with the findings.    PABLO CASE MD   XR Chest Port 1 View    Narrative    Exam: XR CHEST PORT 1 VW, 10/13/2019 8:40 PM    Indication: Check endotracheal tube placement and ECLS cannula  placement. DO NOT log-roll patient.  Place film under patient using  patient safety handling process.    Comparison: CT 10/13/2018, 7:15 PM.    Findings:   Lower extremity sheath tip at the confluence of the innominate veins.  Left subclavian central line tip in the high superior vena cava.  Enteric balloon pump 2 cm above the adiel. Temperature probe tip in  the upper esophagus. Endotracheal tube tip at T2.  Enteric tube is seen  passing through the mediastinum with its tip projecting off the film.  CT earlier shows the tip in the body of the stomach. Additional  sheath-like structure projecting over over the heart felt to be  extrinsic to the patient.    Increased diffuse opacity throughout the right thorax. This is  consistent with his known effusion and associated atelectasis seen on  the CT.      Impression    Impression: Lines and tubes similar to the CT of 10/13/2019 performed  at 1915 hours. Opacity over the right chest found to be atelectasis  and effusion, better seen on CT same date.    JOSE SANCHEZ MD          ASSESSMENT AND PLAN  53 year old male with out known pmh s/p refractory VT/Vf arrest requiring 40 min CPR with second prior to ROSC, and then return of VT now on ECMO on sedation with propofol and fentanyl. Exam is limited, Cooling in progress beginning 10/13, got back from RLE reperfusion cannular and cooling catheter placement around 11 pm. Unable to assess extent of deficit(s) from potential anoxic brain injury in the setting of cardiac arrest at this time. Will re-evaluate once able to wean sedation.    -Neurocritical care will continue to follow and monitor clinically as patient is warmed    Data            Thank you for involving the Neuro-Critical Care Team in the care of this patient.  We will continue to follow.  Please do not hesitate to contact us for any further questions.    Tena Davis,

## 2019-10-14 NOTE — PROGRESS NOTES
ECMO Shift Summary:    Patient remains on VA ECMO, all equipment is functioning and alarms are appropriately set. RPM's 3310 with flow range 2.9-4.01 L/min. Sweep gas is at 6 LPM and FiO2 70%. Circuit remains free of air, clot and fibrin. Cannulas are secure with minimal oozing from site. Extremities are cool. Suctioned ETT for nothing.    Significant Shift Events: Emergent cathlab for DRPC with Y connector, Bedside ECHO, Carotid and ECHO of both limbs    Vent settings:  Ventilation Mode: CMV/AC  (Continuous Mandatory Ventilation/ Assist Control)  FiO2 (%): 70 %  Rate Set (breaths/minute): 14 breaths/min  Tidal Volume Set (mL): 500 mL  PEEP (cm H2O): 7 cmH2O  Oxygen Concentration (%): 70 %  Resp: 14  .    Heparin is running at 1000 u/hr, ACT range 207-235.    Urine output is as RN charted, blood loss was small mostly from cannulation site. Product given included 2 RBCS, 500 albumin, 1 unit LR, 1L saline      Intake/Output Summary (Last 24 hours) at 10/14/2019 0637  Last data filed at 10/14/2019 0600  Gross per 24 hour   Intake 2094.47 ml   Output 2360 ml   Net -265.53 ml       ECHO:  No results found for this or any previous visit.No results found for this or any previous visit.    CXR:  Recent Results (from the past 24 hour(s))   CT Head w/o Contrast    Narrative    CT HEAD W/O CONTRAST 10/13/2019 7:39 PM    Provided History: ECMO    Comparison: None.    Technique: Using multidetector thin collimation helical acquisition  technique, axial, coronal and sagittal CT images from the skull base  to the vertex were obtained without intravenous contrast.     Findings:    No intracranial hemorrhage, mass effect, extra-axial fluid collection,  or midline shift. The ventricles are proportionate to the cerebral  sulci. The gray to white matter differentiation of the cerebral  hemispheres is preserved. The basal cisterns are patent.   No calvarial fracture. Small laceration along the left posterior  scalp. Layering debris in  the left maxillary sinus and sphenoid sinus  locules along with scattered regions of paranasal sinus mucosal  thickening, nonspecific in the setting of intubation. Mastoid air  cells are clear. Debris in the left external auditory canal, likely  cerumen.       Impression    Impression:   1. No acute intracranial pathology.  2. Small laceration along the posterior left scalp.    I have personally reviewed the examination and initial interpretation  and I agree with the findings.    PABLO CASE MD   CT Chest Abdomen Pelvis w/o Contrast    Narrative    EXAMINATION: CT CHEST ABDOMEN PELVIS W/O CONTRAST, 10/13/2019 7:39 PM    TECHNIQUE:  Helical CT images from the thoracic inlet through the  symphysis pubis were obtained  without IV contrast.     COMPARISON: None    HISTORY: ECMO    FINDINGS:    Chest: Endotracheal tube tip at the high thoracic trachea 7 cm from  the adiel. Mild debris in the central airways with near complete  obstruction of the right lower lobe bronchus. There is confluent  consolidation in the right lower lobe with patchy consolidation in the  dependent portions of the right upper lobe and left lower lobe.  Scattered tree-in-bud nodularity and groundglass opacities along with  mild interlobular septal thickening, especially in the right upper  lobe. Small bilateral pleural effusions. No pneumothorax.    Left IJ CVC with tip at the high SVC. Left lower extremity ECMO  cannula with tip terminating at the innominate confluence. Heart size  is normal with small hemopericardium. Small hyperdense retrosternal  hematoma. Ascending aorta is normal caliber. LAD stent. Esophageal  temperature probe terminates in the midesophagus. No enlarged thoracic  lymph nodes.    Abdomen and pelvis:   Vicarious extravasation contrast into the gallbladder. Normal  noncontrast appearance of the liver, pancreas, and spleen. Tiny  splenule near the hilum.1.8 x 1.3 cm lipid rich right adrenal adenoma  (series 9 image 327).  Indeterminant 1.5 x 1.4 cm left adrenal nodule  (image 321). Indeterminant 2.2 x 1.9 cm exophytic right renal cystic  lesion arising from the inferior pole (series 9 image 470) Hounsfield  units 22. Left kidney is unremarkable. Contrast in the renal  collecting systems, presumably from prior intervention. Bladder is  decompressed with Lawrence catheter in place and small volume  intravesicular air. Bowel is nonobstructed. Appendix is not inflamed.  Colonic diverticulosis without diverticulitis. Enteric tube projects  at the gastric pylorus. Small volume ascites in the pelvis and mild  haziness of the mesentery. No free air.    Right lower extremity central venous catheter terminating at the  distal right common iliac vein. Right lower extremity arterial line  terminating at the external iliac vein. Left lower extremity AIBP with  proximal tip distal to the left subclavian origin and distal tip just  proximal to the renal artery ostia.     Bones and soft tissues: Mildly displaced fractures of right ribs 2-4  anteriorly. Nondisplaced fractures of left ribs 2 and 3 anteriorly  with subtle buckle fracture of left fourth rib anteriorly.  Nondisplaced fractures of bilateral first ribs posteriorly near the  costovertebral articulation. Mildly displaced fracture of the mid  sternal body. Large right intramuscular pectoral hematoma. Presumed  right chest wall port terminating in the right pectoral musculature.  Nonspecific lytic focus in the left ilium near the SI joint measuring  2.0 x 1.4 cm (series 9 image 534) Cannot assess for extravasation  given lack of intravenous contrast.      Impression    IMPRESSION:   1. Mildly displaced fractures of bilateral 2-4th ribs anteriorly and  bilateral nondisplaced fractures of the first ribs posteriorly. Large  right chest wall hematoma presumably intrapectoral, likely sequelae  from traumatic chest compressions.  2. Mildly displaced sternal fracture with associated small  retrosternal  hematoma and small hemopericardium.  3. Complete opacification of the right lower lobe with patchy  opacities along the dependent portions of the right upper lobe and  left lower lobe. Additional regions of scattered ground glass  opacities and mild interlobular septal thickening, right lung  predominant. These findings could represent atelectasis with  termination of aspiration and developing pulmonary edema.  4. Small bilateral pleural effusions.  5. 1.5 cm indeterminate left adrenal nodule. Consider comparison with  prior images if available to document stability. 1.8 cm lipid rich  right adrenal adenoma.  6. Left lower cavity echo cannulae tip terminates at the innominate  confluence. Endotracheal tube tip terminates at the high thoracic  trachea. Additional support devices as described above.  7. Small volume ascites in the pelvis.     XR Chest Port 1 View    Narrative    Exam: XR CHEST PORT 1 VW, 10/13/2019 8:40 PM    Indication: Check endotracheal tube placement and ECLS cannula  placement. DO NOT log-roll patient.  Place film under patient using  patient safety handling process.    Comparison: CT 10/13/2018, 7:15 PM.    Findings:   Lower extremity sheath tip at the confluence of the innominate veins.  Left subclavian central line tip in the high superior vena cava.  Enteric balloon pump 2 cm above the adiel. Temperature probe tip in  the upper esophagus. Endotracheal tube tip at T2. Enteric tube is seen  passing through the mediastinum with its tip projecting off the film.  CT earlier shows the tip in the body of the stomach. Additional  sheath-like structure projecting over over the heart felt to be  extrinsic to the patient.    Increased diffuse opacity throughout the right thorax. This is  consistent with his known effusion and associated atelectasis seen on  the CT.      Impression    Impression: Lines and tubes similar to the CT of 10/13/2019 performed  at 1915 hours. Opacity over the right chest found  to be atelectasis  and effusion, better seen on CT same date.    JOSE SANCHEZ MD       Labs:  Recent Labs   Lab 10/14/19  0630 10/14/19  0443 10/14/19  0243 10/14/19  0005   PH 7.37 7.37 7.35 7.44   PCO2 39 38 41 35   PO2 113* 85 231* 255*   HCO3 22 22 23 24   O2PER 50 50 70.0 100       Lab Results   Component Value Date    HGB 9.0 (L) 10/14/2019    PHGB 180 (H) 10/14/2019     (L) 10/14/2019    FIBR 200 10/14/2019    INR 1.41 (H) 10/14/2019    PTT >240 (HH) 10/14/2019    DD 3.8 (H) 10/14/2019    AXA 0.71 10/14/2019         Plan is remain on VA ECMO.      Bronson Lipscomb, RT  10/14/2019 6:37 AM

## 2019-10-14 NOTE — PROGRESS NOTES
[facility], [facility] Nurse Inpatient Adult Pressure Injury Prevention Assessment: ECMO  Initial     Positioning Tolerance: Fair  Date of ECMO cannulation: 10/13 bilateral groin VA ECMO  Presence of Ischemia: No  Location of ischemia: NA    Pressure Injury Prevention Interventions In Place:  Optifoam Dressing under ECMO Cannula, Z flow Positioner under head, Pillows for repositioning, TAPs Wedge Positioners in use, Heel off-loading boots, Pillows under calves for heel suspension, Mepilex Sacral Dressing, Dressing to sacrum for prevention and Preventative optifoam on occiput over EEG leads   Current support surface: Standard  Low air loss mattress with pulsation        Pressure Injury Prevention Interventions Added:  None        Plan of Care for Positioning and Pressure Injury Prevention  Reposition patient every 1-2 hours using TAP Wedges  Position head on Z flow positioner, mold indentation at areas of pressure points.  Pad ECMO IJ cannula and occipital EEG with Optifoam (#[id number]) along face and scalp between skin and cannula and under Coban head wraps    Pad ECMO groin and chest cannula under rigid connectors with Optifoam or Soft cloth  Heel off-loading Boots at all times  Sacral Mepilex for Prevention, change every 5 days and prn  Low Air loss mattress    Patient History:   According to medical record: Joel Campbell is a 53 year old male who was admitted on 10/13/19 as a transfer for an OSH for refractory VT/VF arrest s/p PCI to [facility] and placement on peripheral VA ECMO.      Current Diet / Nutrition:     Orders Placed This Encounter      NPO for Medical/Clinical Reasons Except for: No Exceptions    Output:    I/O last 3 completed shifts:  In: 2489.57 [I.V.:1859.57; NG/GT:30]  Out: 2460 [Urine:2060; Emesis/NG output:400]  Containment: of urine/stool: Urinary Catheter    Risk Assessment:   Sensory Perception: 1-->completely limited  Moisture: 3-->occasionally moist  Activity:  1-->bedfast  Mobility: 1-->completely immobile  Nutrition: 1-->very poor  Friction and Shear: 2-->potential problem  Franck Score: 9    Labs:    Recent Labs   Lab 10/14/19  0424  10/13/19  1947   ALBUMIN 2.8*   < > 3.1*   HGB 9.0*   < > 10.1*   INR 1.41*   < >  --    WBC 9.8   < > 11.6*   A1C  --   --  5.4   CRP 93.0*  --  69.0*    < > = values in this interval not displayed.       Focused Assessment:  Right groin ECMO cannula, Left groin ECMO cannula, Face and Feet    Pressure Injury Present::No    Education provided to: nurse  Discussed importance of:their role in pressure injury prevention  Discussed plan of care with Nurse  WOC Nurse follow-up plan:weekly    Mela Colón RN CWOCN

## 2019-10-14 NOTE — PROGRESS NOTES
"[facility]: [place]  NeuroCritical Care Consult    Patient Name:  Joel Campbell  MRN:  [id number]    :  1965  Date of Service:  2019  Primary care provider:  No primary care provider on file.    Reason for Consult: Post cardiac arrest     History of Present Illness:   Joel Campbell is a 53 year old male w/no PMH who was admitted on 10/13/19 as a transfer for an OSH for refractory VT/VF arrest s/p placement on peripheral VA ECMO.    Per OSH records, patient around 8pm on 10/12 had \"seizure-like activity\" per wife and then became unresponsive. EMS was called and atfer a reported 3-4 minutes, EMS arrived and did not find pulses, so CPR was started. Initial rhythm was shockable. Notes report that CPR occurred for 40 minutes before ROCS was obtained. Then transferred over to [facility]. There he had refractory VT and amiodarone was started. EKG showed anterior ST-elevations. He was then transferred to [place].     Upon arrival to [place], he underwent PCI to pLAD, placement of IABP and was started on VA ECMO due to cardiogenic shock and transferred to ICU on Vasopressin and Norepinephrine per report. Patient was also started on a cooling protocol.     LVEF was reported to be ~25% on TTE on 10/13. LVEDP 17. Patient was loaded with 180 mg ticagrelor at 6am on 10/13.     Pt this morning is heavily sedated. Pupils are +2 bilaterally, equal and reactive. Does not respond to noxious stimuli x 4 extremities. No cough present.    ROS: History obtained by chart review. The patient is intubated and sedated.    PMH:  No past medical history on file.  Past Surgical History:   Procedure Laterality Date     CV EXTRACORPERAL MEMBRANE OXYGENATION N/A 10/13/2019    Procedure: Placement of RLE reperfusion cannula, placement of cooling catheter;  Surgeon: Anshu Roberts MD;  Location:  HEART CARDIAC CATH LAB       Allergies:  No Known Allergies    Medications:      Current " Facility-Administered Medications:      albumin human 25 % injection 12.5 g, 12.5 g, Intravenous, Q1H PRN, Shane Valenzuela MD     amiodarone (NEXTERONE) 1,500 mg in D5W 250 mL infusion, 0.5 mg/min, Intravenous, Continuous, Anshu Roberts MD, Last Rate: 5 mL/hr at 10/14/19 1100, 0.5 mg/min at 10/14/19 1100     artificial tears ophthalmic ointment, , Both Eyes, Q8H, Paradise Villareal MD     aspirin (ASA) chewable tablet 81 mg, 81 mg, Per Feeding Tube, Daily, Paradise Villareal MD, 81 mg at 10/14/19 0822     calcium gluconate in D5W 100 mL intermittent infusion 1 g, 1 g, Intravenous, Q4H PRN, Shane Valenzuela MD, 1 g at 10/14/19 0552     dextrose 10 % 1,000 mL infusion, , Intravenous, Continuous PRN, Paradise Villareal MD     glucose gel 15-30 g, 15-30 g, Oral, Q15 Min PRN **OR** dextrose 50 % injection 25-50 mL, 25-50 mL, Intravenous, Q15 Min PRN **OR** glucagon injection 1 mg, 1 mg, Subcutaneous, Q15 Min PRN, Paradise Villareal MD     EPINEPHrine (ADRENALIN) 5 mg in sodium chloride 0.9 % 250 mL infusion, 0.03-0.3 mcg/kg/min (Dosing Weight), Intravenous, Continuous, Paradise Villareal MD, Last Rate: 9 mL/hr at 10/14/19 1124, 0.04 mcg/kg/min at 10/14/19 1124     fentaNYL (PF) (SUBLIMAZE) injection 50 mcg, 50 mcg, Intravenous, Q30 Min PRN, Paradise Villareal MD     fentaNYL (SUBLIMAZE) infusion,  mcg/hr, Intravenous, Continuous, Paradise Villareal MD, Last Rate: 2 mL/hr at 10/14/19 1100, 100 mcg/hr at 10/14/19 1100     heparin 100 UNIT/ML injection 420-830 Units, 5-10 Units/kg (Ideal), Other, Q30 Min PRN, Paradise Villareal MD     heparin 2 unit/mL in 0.9% NaCl (for REPERFUSION CATHETER), 3 mL/hr, Intravenous, Continuous, Paradise Villareal MD, Last Rate: 3 mL/hr at 10/14/19 0600, 3 mL/hr at 10/14/19 0600     heparin 25,000 units in 0.45% NaCl 250 mL infusion, 10-50 Units/kg/hr (Ideal), Other, Continuous, Paradise Villareal MD, Last Rate: 5  mL/hr at 10/14/19 1100, 500 Units/hr at 10/14/19 1100     insulin 1 unit/mL in saline (NovoLIN, HumuLIN Regular) drip - ADULT IV Infusion, 0-24 Units/hr, Intravenous, Continuous, Paradise Villareal MD, Last Rate: 1 mL/hr at 10/14/19 1100, 1 Units/hr at 10/14/19 1100     ipratropium - albuterol 0.5 mg/2.5 mg/3 mL (DUONEB) neb solution 3 mL, 3 mL, Nebulization, Q4H PRN, Paraidse Villareal MD     lidocaine (LMX4) cream, , Topical, Q1H PRN, Paradise Villareal MD     lidocaine 1 % 0.1-1 mL, 0.1-1 mL, Other, Q1H PRN, Paradise Villareal MD     magnesium sulfate 2 g in NS intermittent infusion (PharMEDium or FV Cmpd), 2 g, Intravenous, Daily PRN, Paradise Villareal MD, 2 g at 10/14/19 0235     magnesium sulfate 4 g in 100 mL sterile water (premade), 4 g, Intravenous, Q4H PRN, Paradise Villareal MD     midazolam (VERSED) drip - ADULT 100 mg/100 mL in NS PRE-MIX, 1-8 mg/hr, Intravenous, Continuous, Paradise Villareal MD, Last Rate: 4 mL/hr at 10/14/19 1100, 4 mg/hr at 10/14/19 1100     midazolam (VERSED) injection 1-3 mg, 1-3 mg, Intravenous, Q1H PRN, Paradise Villareal MD     naloxone (NARCAN) injection 0.1-0.4 mg, 0.1-0.4 mg, Intravenous, Q2 Min PRN, Paradise Villareal MD     norepinephrine (LEVOPHED) 16 mg in  mL infusion, 0.03-0.4 mcg/kg/min (Dosing Weight), Intravenous, Continuous, Paradise Villareal MD, Last Rate: 7 mL/hr at 10/14/19 1100, 0.1 mcg/kg/min at 10/14/19 1100     pantoprazole (PROTONIX) 40 mg IV push injection, 40 mg, Intravenous, Daily, Paradise Villareal MD, 40 mg at 10/14/19 0822     piperacillin-tazobactam (ZOSYN) 3.375 g vial to attach to  mL bag, 3.375 g, Intravenous, Q6H, Paradise Villareal MD, 3.375 g at 10/14/19 0822     potassium chloride 20 mEq in 50 mL intermittent infusion, 20 mEq, Intravenous, Q1H PRN, Paradise Villareal MD     propofol (DIPRIVAN) infusion, 5-75 mcg/kg/min (Dosing Weight), Intravenous, Continuous,  "Shane Valenzuela MD, Stopped at 10/14/19 1035     sodium chloride (PF) 0.9% PF flush 3 mL, 3 mL, Intracatheter, q1 min prn, Paradise Villareal MD     sodium chloride (PF) 0.9% PF flush 3 mL, 3 mL, Intracatheter, Q8H, Paradise Villareal MD, 3 mL at 10/14/19 1049     sodium chloride 0.9% infusion, , Intravenous, Continuous, Paradise Villareal MD     sodium phosphate 10 mmol in D5W intermittent infusion, 10 mmol, Intravenous, Daily PRN, Paradise Villareal MD, 10 mmol at 10/14/19 0822     sodium phosphate 15 mmol in D5W intermittent infusion, 15 mmol, Intravenous, Daily PRN, Paradise Villareal MD     sodium phosphate 20 mmol in D5W intermittent infusion, 20 mmol, Intravenous, Q6H PRN, Paradise Villareal MD     sodium phosphate 25 mmol in D5W intermittent infusion, 25 mmol, Intravenous, Q8H PRN, Paradise Villareal MD     ticagrelor (BRILINTA) tablet 90 mg, 90 mg, Oral or Feeding Tube, BID, Shane Valenzuela MD, 90 mg at 10/14/19 0822     vancomycin 1500 mg in 0.9% NaCl 250 ml intermittent infusion 1,500 mg, 1,500 mg, Intravenous, Q12H, Anshu Roberts MD, 1,500 mg at 10/14/19 0823     vasopressin (VASOSTRICT) 40 Units in D5W 40 mL infusion, 2.4 Units/hr, Intravenous, Continuous, Shane Valenzuela MD, Last Rate: 3 mL/hr at 10/14/19 1118, 3 Units/hr at 10/14/19 1118    Social History:  Social History     Tobacco Use     Smoking status: Not on file   Substance Use Topics     Alcohol use: Not on file       Family History:    No family history on file.    Physical Examination:   Temp 92.8  F (33.8  C)   Resp 14   Ht 1.89 m (6' 2.41\")   Wt 98 kg (216 lb 0.8 oz)   SpO2 100%   BMI 27.44 kg/m    EXAM: Examined while heavily sedated to prevent shivering during cooling protocol and to prevent movement while on ECMO  - No spontaneous eye opening or in response to verbal or tactile stimuli  - No eye movements  - Pupils symmetric, +2mm, and reactive bilaterally  - Absent " corneal response  - No clear facial asymmetry  - No cough present with deep suctioning  - No limb movements     Imagin. CT head w/o contrast on 10/13/2019  Impression:   1. No acute intracranial pathology.  2. Small laceration along the posterior left scalp.    Assessment:  Mr. Campbell is a 54yo male with no known history currently admitted for refractory VT/VF arrest s/p placement of VA ECMO.     Recommendations:   He remains deeply sedated while cannulated. When he is able to be decannulated and sedation is lifted, more accurate neurological evaluation will be possible. After sedation is lifted, if he continues to be encephalopathic or has neurological abnormalities, advanced imaging with MRI or EEG may be indicated.     Patient discussed with attending neurologist, Dr. Leigh, who agrees with above.    VIRGEN Jimenes, CNP  NeuroCritical Care Nurse Practitioner  [phone]
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
## 2019-10-14 NOTE — PROGRESS NOTES
[facility]   Cardiology Progress Note    Assessment & Plan   Joel Campbell is a 53 year old male who was admitted on 10/13/19 as a transfer for an OSH for refractory VT/VF arrest s/p PCI to LAD and placement on peripheral VA ECMO.     Changes Today:  - Switch dopamine to epi, wean pressors as able  - Switch propofol to versed  - Exchange femoral CVC to Thermogaurd, rewarm from 34 degrees starting this evening  - ACT goal 160-180  - TTE today     Neurology: Intubated, sedated, paralyzed. Cooled to 34 degrees.  - Continue propofol ggt, and cis gtt as needed to maintain paralysis - RASS goal -4 to -5   - CT Head ordered on admission, no acute process   Cardiovascular / Hemodynamics: Refractory VF arrest s/p peripheral V-A ECMO at OSH on 10/13/19.  TTE: ordered  - Wean pressors/inotropes as able  - Echo 10/14  - Continue ASA 81mg and ticagrelor 90mg BID  - Hold temp at 34 degrees for 24 hours then start rewarming  - ACT goal 160-180  - Hold lipitor for now given likely hepatic injury during arrest  - Hold ACE/ARB for now given likely reduced renal fxn after arrest  - Holding beta blocker given shock    Pulmonary: Now weaning vent requirements.  CXR: stable devices  - Wean vent as able  - Daily CXR  - Q2H ABGs for now  - Consider scheduled duonebs if signs of lung dz, currently PRN     GI and Nutrition: No known medical hx.  - Monitor BID LFTs  - NPO while cooled - nutrition consult pending feeding tube placement once he is warmed   - Bowel regimen - on hold for now due to hypothermia  - GI Prophylaxis: PPI    Renal, Fluid and Electrolytes: - Monitor urine output  - Maintain K>3 and Mg>2    Infectious Disease: No signs of infection. Leukocytosis c/w arrest. Blood cultures collected.   - Vancomycin/zosyn x5 days for ECMO  - Daily blood cultures  - Monitor for signs of infection given cooling, lines, and leukocytosis   Hematology and Oncology: Receiving heparin for ECMO and ASA/ticagrelor for KAMRON.   - Cryo PRN fibrinogen < 200;  "FFP for INR >2  - Transfuse for Hgb<10  - Heparin gtt for ECMO with ACT goal 160-180  - US LE w/ arterial duplex per ECMO protocol   - DVT PPX: Heparin as above  - Right chest wall hematoma: monitor exam and Hgb   Endocrinology: No known medical history. BG elevated.  - Insulin gtt PRN   Lines: Restraint: needed    Current lines are required for patient management       Family updated today: Yes  Code Status: FULL    Pt was seen and examined with Dr. Santillan, who agrees with the assessment and plan.    Paradise Villareal MD  Cardiology Fellow      Interval History    Pt's chest hematoma remains overall stable, otherwise no major changes since admission.    ROS: Unable to obtain as Pt is intubated and sedated.    Data reviewed today: I reviewed all new labs and imaging results over the last 24 hours. I personally reviewed:    Physical Exam   Temp: 93.4  F (34.1  C) Temp src: Axillary     Heart Rate: 76 Resp: 14 SpO2: 100 % O2 Device: Mechanical Ventilator    Vitals:    10/13/19 1905 10/14/19 0300   Weight: 75 kg (165 lb 5.5 oz) 98 kg (216 lb 0.8 oz)     Vital Signs with Ranges  Temp:  [59.4  F (15.2  C)-93.4  F (34.1  C)] 93.4  F (34.1  C)  Heart Rate:  [38-76] 76  Resp:  [14] 14  MAP:  [55 mmHg-98 mmHg] 61 mmHg  Arterial Line BP: ()/(35-78) 79/48  FiO2 (%):  [70 %] 70 %  SpO2:  [98 %-100 %] 100 %  I/O last 3 completed shifts:  In: 998.77 [I.V.:698.77]  Out: 1500 [Urine:1500]    Heart Rate: 76, Temperature 93.4  F (34.1  C), resp. rate 14, height 1.89 m (6' 2.41\"), weight 98 kg (216 lb 0.8 oz), SpO2 100 %.  216 lbs .81 oz  GEN:  Intubated, sedated, cooled  CV:  RRR, IABP augmentation  LUNGS: Mechanical breath sounds  ABD: Soft BS, soft, mildly distended  EXT: Cool, trace edema, dopplerable pulses  SKIN: Large hematoma along chest wall    Medications     amiodarone 0.5 mg/min (10/14/19 0500)     cisatracurium (NIMBEX) infusion ADULT Stopped (10/13/19 2019)     IV fluid REPLACEMENT ONLY       DOPamine 3 " mcg/kg/min (10/14/19 0400)     EPINEPHrine IV infusion ADULT Stopped (10/13/19 2017)     fentaNYL 100 mcg/hr (10/14/19 0500)     heparin 2 unit/mL in 0.9% NaCl 3 mL/hr (10/14/19 0500)     HEParin 1,200 Units/hr (10/14/19 0600)     insulin (regular) 0.5 Units/hr (10/14/19 0500)     midazolam Stopped (10/13/19 2017)     nitroPRUsside (NIPRIDE) IV infusion ADULT/PEDS GREATER than or EQUAL to 45 kg std conc       norepinephrine 0.09 mcg/kg/min (10/14/19 0500)     phenylephrine Stopped (10/13/19 2018)     propofol (DIPRIVAN) infusion 50 mcg/kg/min (10/14/19 0500)     sodium chloride       vasopressin (PITRESSIN) infusion ADULT (40 mL) 2.4 Units/hr (10/14/19 0500)       artificial tears   Both Eyes Q8H     aspirin  81 mg Per Feeding Tube Daily     pantoprazole (PROTONIX) IV  40 mg Intravenous Daily     piperacillin-tazobactam  3.375 g Intravenous Q6H     sodium chloride (PF)  3 mL Intracatheter Q8H     ticagrelor  90 mg Oral or Feeding Tube BID     vancomycin (VANCOCIN) IV  1,500 mg Intravenous Q12H       Data   Recent Labs   Lab 10/14/19  0630 10/14/19  0443 10/14/19  0424 10/14/19  0005 10/13/19  6161 10/13/19  0417   WBC  --   --  9.8  --  9.3 11.6*   HGB  --   --  9.0*  --  8.9* 10.1*   MCV  --   --  101*  --  100 104*   PLT  --   --  114*  --  108* 154   INR  --   --  1.41*  --  1.60*  --    NA  --   --  145*  --  142 144   POTASSIUM 3.6  --  3.6  --  4.0 4.3   CHLORIDE  --   --  113*  --  111* 110*   CO2  --   --  19*  --  24 21   BUN  --   --  12  --  11 10   CR  --   --  0.68  --  0.60* 0.67   ANIONGAP  --   --  13  --  7 12   HEATHER  --   --  6.3*  --  6.1* 6.6*   GLC  --  113* 112* 165* 162* 158*   ALBUMIN  --   --  2.8*  --  2.9* 3.1*   PROTTOTAL  --   --  4.4*  --  4.4* 5.3*   BILITOTAL  --   --  1.6*  --  1.3 1.3   ALKPHOS  --   --  47  --  44 67   ALT  --   --  59  --  66 89*   AST  --   --  Canceled, Test credited  --  417* 624*   TROPI  --   --  99.176*  --  103.910* 177.835*       Recent Results (from the  past 24 hour(s))   CT Head w/o Contrast    Narrative    CT HEAD W/O CONTRAST 10/13/2019 7:39 PM    Provided History: ECMO    Comparison: None.    Technique: Using multidetector thin collimation helical acquisition  technique, axial, coronal and sagittal CT images from the skull base  to the vertex were obtained without intravenous contrast.     Findings:    No intracranial hemorrhage, mass effect, extra-axial fluid collection,  or midline shift. The ventricles are proportionate to the cerebral  sulci. The gray to white matter differentiation of the cerebral  hemispheres is preserved. The basal cisterns are patent.   No calvarial fracture. Small laceration along the left posterior  scalp. Layering debris in the left maxillary sinus and sphenoid sinus  locules along with scattered regions of paranasal sinus mucosal  thickening, nonspecific in the setting of intubation. Mastoid air  cells are clear. Debris in the left external auditory canal, likely  cerumen.       Impression    Impression:   1. No acute intracranial pathology.  2. Small laceration along the posterior left scalp.    I have personally reviewed the examination and initial interpretation  and I agree with the findings.    PABLO CASE MD   CT Chest Abdomen Pelvis w/o Contrast    Narrative    EXAMINATION: CT CHEST ABDOMEN PELVIS W/O CONTRAST, 10/13/2019 7:39 PM    TECHNIQUE:  Helical CT images from the thoracic inlet through the  symphysis pubis were obtained  without IV contrast.     COMPARISON: None    HISTORY: ECMO    FINDINGS:    Chest: Endotracheal tube tip at the high thoracic trachea 7 cm from  the adiel. Mild debris in the central airways with near complete  obstruction of the right lower lobe bronchus. There is confluent  consolidation in the right lower lobe with patchy consolidation in the  dependent portions of the right upper lobe and left lower lobe.  Scattered tree-in-bud nodularity and groundglass opacities along with  mild interlobular  septal thickening, especially in the right upper  lobe. Small bilateral pleural effusions. No pneumothorax.    Left IJ CVC with tip at the high SVC. Left lower extremity ECMO  cannula with tip terminating at the innominate confluence. Heart size  is normal with small hemopericardium. Small hyperdense retrosternal  hematoma. Ascending aorta is normal caliber. LAD stent. Esophageal  temperature probe terminates in the midesophagus. No enlarged thoracic  lymph nodes.    Abdomen and pelvis:   Vicarious extravasation contrast into the gallbladder. Normal  noncontrast appearance of the liver, pancreas, and spleen. Tiny  splenule near the hilum.1.8 x 1.3 cm lipid rich right adrenal adenoma  (series 9 image 327). Indeterminant 1.5 x 1.4 cm left adrenal nodule  (image 321). Indeterminant 2.2 x 1.9 cm exophytic right renal cystic  lesion arising from the inferior pole (series 9 image 470) Hounsfield  units 22. Left kidney is unremarkable. Contrast in the renal  collecting systems, presumably from prior intervention. Bladder is  decompressed with Lawrence catheter in place and small volume  intravesicular air. Bowel is nonobstructed. Appendix is not inflamed.  Colonic diverticulosis without diverticulitis. Enteric tube projects  at the gastric pylorus. Small volume ascites in the pelvis and mild  haziness of the mesentery. No free air.    Right lower extremity central venous catheter terminating at the  distal right common iliac vein. Right lower extremity arterial line  terminating at the external iliac vein. Left lower extremity AIBP with  proximal tip distal to the left subclavian origin and distal tip just  proximal to the renal artery ostia.     Bones and soft tissues: Mildly displaced fractures of right ribs 2-4  anteriorly. Nondisplaced fractures of left ribs 2 and 3 anteriorly  with subtle buckle fracture of left fourth rib anteriorly.  Nondisplaced fractures of bilateral first ribs posteriorly near the  costovertebral  articulation. Mildly displaced fracture of the mid  sternal body. Large right intramuscular pectoral hematoma. Presumed  right chest wall port terminating in the right pectoral musculature.  Nonspecific lytic focus in the left ilium near the SI joint measuring  2.0 x 1.4 cm (series 9 image 534) Cannot assess for extravasation  given lack of intravenous contrast.      Impression    IMPRESSION:   1. Mildly displaced fractures of bilateral 2-4th ribs anteriorly and  bilateral nondisplaced fractures of the first ribs posteriorly. Large  right chest wall hematoma presumably intrapectoral, likely sequelae  from traumatic chest compressions.  2. Mildly displaced sternal fracture with associated small  retrosternal hematoma and small hemopericardium.  3. Complete opacification of the right lower lobe with patchy  opacities along the dependent portions of the right upper lobe and  left lower lobe. Additional regions of scattered ground glass  opacities and mild interlobular septal thickening, right lung  predominant. These findings could represent atelectasis with  termination of aspiration and developing pulmonary edema.  4. Small bilateral pleural effusions.  5. 1.5 cm indeterminate left adrenal nodule. Consider comparison with  prior images if available to document stability. 1.8 cm lipid rich  right adrenal adenoma.  6. Left lower cavity echo cannulae tip terminates at the innominate  confluence. Endotracheal tube tip terminates at the high thoracic  trachea. Additional support devices as described above.  7. Small volume ascites in the pelvis.     XR Chest Port 1 View    Narrative    Exam: XR CHEST PORT 1 VW, 10/13/2019 8:40 PM    Indication: Check endotracheal tube placement and ECLS cannula  placement. DO NOT log-roll patient.  Place film under patient using  patient safety handling process.    Comparison: CT 10/13/2018, 7:15 PM.    Findings:   Lower extremity sheath tip at the confluence of the innominate  veins.  Left subclavian central line tip in the high superior vena cava.  Enteric balloon pump 2 cm above the adiel. Temperature probe tip in  the upper esophagus. Endotracheal tube tip at T2. Enteric tube is seen  passing through the mediastinum with its tip projecting off the film.  CT earlier shows the tip in the body of the stomach. Additional  sheath-like structure projecting over over the heart felt to be  extrinsic to the patient.    Increased diffuse opacity throughout the right thorax. This is  consistent with his known effusion and associated atelectasis seen on  the CT.      Impression    Impression: Lines and tubes similar to the CT of 10/13/2019 performed  at 1915 hours. Opacity over the right chest found to be atelectasis  and effusion, better seen on CT same date.    JOSE SANCHEZ MD   US Carotid Bilateral    Narrative     US CAROTID BILATERAL 10/14/2019 1:11 AM     History:  Cardiac Arrest on ECMO     Comparison Study: None    Findings: Grayscale, color, and spectral ultrasound performed.    Right carotid peak velocities systolic/diastolic:  CCA mid:    56/10 cm/s  ICA proximal:  30/7 cm/s  ICA mid:    39/29 cm/s  ICA distal:    64/33cm/s  ICA/CCA:              1.1  ECA:      31 cm/s  vertebral:    Antegrade     There is not significant plaque burden identified.    Left carotid peak velocities systolic/diastolic:  CCA mid:    50/0 cm/s  ICA proximal:  26/12 cm/s  ICA mid:    46/15 cm/s  ICA distal:    98/60 cm/s  ICA/CCA:             2.0  ECA:      61 cm/s  vertebral:                 Antegrade    There is not significant plaque burden identified.    Consensus Panel Gray-Scale and Doppler US Criteria for Diagnosis of  ICA Stenosis (Radiology 11/2003)       Normal         ICA PSV < 125 cm/sec       Plaque Estimate None       ICA/CCA  PSV Ratio < 2.0       ICA EDV < 40 cm/sec       < 50%          ICA PSV < 125 cm/sec       Plaque Estimate < 50%       ICA/CCA  PSV Ratio < 2.0       ICA EDV < 40  cm/sec       50- 69%       ICA -230 cm/sec       Plaque Estimate > or = 50%       ICA/CCA PSV Ratio 2.0-4.0       ICA EDV  cm/sec         > or = 70%, less than near occlusion       ICA PSV > 230 cm/sec       Plaque Estimate > or = 50%       ICA/CCA Ratio > 4.0       ICA EDV > 100 cm/sec                                            Additional criteria from vascular surgery     > 80%       EDV > 120 cm/sec       Impression    Impression: No hemodynamically significant stenosis by velocity  criteria, although velocity criteria were not developed in the setting  of ECMO. Internal carotid artery to CCA ratio is borderline elevated  on the left, of uncertain significance in the absence of significant  visible plaque burden. Abnormal waveforms due to ECMO.    I have personally reviewed the examination and initial interpretation  and I agree with the findings.    ISH FOX MD   US Lower Extremity Arterial Duplex Bilateral    Narrative    Exam: US LOWER EXTREMITY ARTERIAL DUPLEX BILATERAL, 10/14/2019 1:12 AM    Indication: Baseline to assess blood flow to Lower Extremities (VA  ECMO)    Technique: Duplex arterial ultrasound evaluation of the lateral lower  extremities.    Comparison: None    Findings: All velocities in cm/s    Right lower extremity: Abnormal monophasic waveforms with some  undulating phasicity due to ECMO cannulation.  SFA mid thigh: 71/54  SFA distal thigh: 17/3  Popliteal artery: 13/3  PTA: 10/5  JAMEY calf: 19/13    Left lower extremity: Abnormal monophasic waveforms with some  undulating phasicity due to ECMO cannulation  SFA mid thigh: 12/7  SFA distal thigh: 29/10  Popliteal artery: 18/12  PTA: 11/0  JAMEY calf: 29/0    Common femoral artery, profunda femoral artery, and superficial  femoral artery origin are obscured bilaterally due to overlying  bandages and dressings. Support devices lines seen within the SFA  bilaterally.      Impression    Impression: Patent bilateral lower  arteries as described above with  abnormal monophasic waveforms due to ECMO. Bilateral CFA, PFA, and SFA  at the origin are obscured due to overlying bandages.    I have personally reviewed the examination and initial interpretation  and I agree with the findings.    ISH FOX MD

## 2019-10-14 NOTE — PLAN OF CARE
Nights:  HEMANTH  Pt arrived from OSH around 1900, Ct head completed and pt to cath lab for repurfusion cath to Right and Left V/A ECMO cannulation sites in groins.  Pt has doppler pulses in both LE only.  Hgb 8-10 and 2 units PRBC's given during shift.  Albumin 5% 750CC given for chugging of ECMO.  Pt cooled starting at 1900 temp 34.0C.  IABP 1:1, 100% augmentation.  HR SB 40-50 then junctional per 12 lead EKG 50-60's with Pvc'S.  Lungs coarse with left stronger than right.  Right pectoral muscle has pre-existing hematoma that is oozing blood and area is swollen on chest.  Propofol/Fentanyl for sedation and pt is comfortable with no response.  Perlla.  CMV 50%/14/500/7.  Dopamine, Levo, Vaso needed to keep MAP >65 (See MAR).  Patient remains on VA ECMO, all equipment is functioning and alarms are appropriately set. RPM's 3300 with flow range 3-4 L/min. Sweep gas is at 6 LPM and FiO2 70%. Circuit remains free of air, clot and fibrin. Cannulas are secure with moderate oozing from site.   P:  Cont ECMO and update MD with changes.  Rewarm 10/14 starting @ 1900.

## 2019-10-14 NOTE — PROGRESS NOTES
"CLINICAL NUTRITION SERVICES - ASSESSMENT NOTE     Nutrition Prescription    RECOMMENDATIONS FOR MDs/PROVIDERS TO ORDER:  If plan to begin nutrition support, please consult RD to \"Assess and Order TF\"     Malnutrition Status:    None observed    Recommendations already ordered by Registered Dietitian (RD):  None at this time    Future/Additional Recommendations:  Monitor ability to complete metabolic cart  Monitor propofol needs/usage    Nutren 1.5 @ 75 mL/hr to provide 2700 kcals (28 kcal/kg/day), 122 g PRO (1.2 g/kg/day), 1368 mL H2O, 317 g CHO and no fiber daily.    Pending need for more concentrated fluids, RD to rec:   TwoCal HN @ 55 mL/hr (1320 mL/day) to provide 2640 kcals, 111 g PRO, 924 mL H2O, 289 g CHO and 7 g Fiber daily. + 1 pkts Prsource   TF+ modular: 2680 kcals (27), 122 g pro (1.2)     REASON FOR ASSESSMENT  Joel Campbell is a/an 53 year old male assessed by the dietitian for Interdisciplinary Rounds    NUTRITION HISTORY  Per chart: admitted on 10/13/19 as a transfer for an OSH for refractory VT/VF arrest s/p placement on peripheral VA ECMO.     CURRENT NUTRITION ORDERS  Diet: NPO  Intake/Tolerance: NPO while cooled - nutrition consult pending feeding tube placement once he is warmed   Access: NGT     LABS  Tbili: 1.6 (H), Na: 145 (H), Labs reviewed    MEDICATIONS  Fentanyl, Vasopressin, Propofol, Norepinephrine, Medications reviewed    ANTHROPOMETRICS  Height: 189 cm (6' 2.409\")  Most Recent Weight: 98 kg (216 lb 0.8 oz)    IBW: 86.4 kg  BMI: Overweight BMI 25-29.9  Weight History:   Wt Readings from Last 10 Encounters:   10/14/19 98 kg (216 lb 0.8 oz)     Dosing Weight: 98 kg (actual)    ASSESSED NUTRITION NEEDS  Estimated Energy Needs: 6759-4788 kcals/day (25 - 30 kcals/kg)  Justification: Maintenance  Estimated Protein Needs: 118 - 147 grams protein/day (1.2-1.5 grams of pro/kg)  Justification: Hypercatabolism with critical illness and Maintenance  Estimated Fluid Needs: 1 mL/kcal/day "   Justification: Maintenance and Per provider pending fluid status    PHYSICAL FINDINGS  See malnutrition section below.    MALNUTRITION  % Intake: Unable to assess  % Weight Loss: Unable to assess  Subcutaneous Fat Loss: None observed  Muscle Loss: None observed  Fluid Accumulation/Edema: None noted  Malnutrition Diagnosis: Patient does not meet two of the above criteria necessary for diagnosing malnutrition    NUTRITION DIAGNOSIS  Inadequate protein-energy intake related to intubation inhibiting PO intakes as evidenced by NPO x 1 day and no plan for nutrition support at this time.       INTERVENTIONS  Implementation  Nutrition Education: Not appropriate at this time due to patient condition   Enteral Nutrition - recommendations      Goals  Diet adv v nutrition support within 1-2days.  Total avg nutritional intake to meet a minimum of 25 kcal/kg and 1.2 g PRO/kg daily (per dosing wt 98 kg).     Monitoring/Evaluation  Progress toward goals will be monitored and evaluated per protocol.      Ingrid Chapa, NARA, MS, LD  SICU: [phone]

## 2019-10-15 ENCOUNTER — ALLIED HEALTH/NURSE VISIT (OUTPATIENT)
Dept: NEUROLOGY | Facility: CLINIC | Age: 54
DRG: 003 | End: 2019-10-15
Attending: PSYCHIATRY & NEUROLOGY
Payer: COMMERCIAL

## 2019-10-15 ENCOUNTER — APPOINTMENT (OUTPATIENT)
Dept: GENERAL RADIOLOGY | Facility: CLINIC | Age: 54
DRG: 003 | End: 2019-10-15
Attending: STUDENT IN AN ORGANIZED HEALTH CARE EDUCATION/TRAINING PROGRAM
Payer: COMMERCIAL

## 2019-10-15 DIAGNOSIS — I47.20 VENTRICULAR TACHYCARDIA (H): Primary | ICD-10-CM

## 2019-10-15 LAB
ABO + RH BLD: NORMAL
ABO + RH BLD: NORMAL
ALBUMIN SERPL-MCNC: 2.7 G/DL (ref 3.4–5)
ALBUMIN SERPL-MCNC: 2.8 G/DL (ref 3.4–5)
ALBUMIN SERPL-MCNC: 3 G/DL (ref 3.4–5)
ALBUMIN SERPL-MCNC: 3 G/DL (ref 3.4–5)
ALP SERPL-CCNC: 37 U/L (ref 40–150)
ALP SERPL-CCNC: 43 U/L (ref 40–150)
ALP SERPL-CCNC: 45 U/L (ref 40–150)
ALP SERPL-CCNC: 48 U/L (ref 40–150)
ALT SERPL W P-5'-P-CCNC: 36 U/L (ref 0–70)
ALT SERPL W P-5'-P-CCNC: 37 U/L (ref 0–70)
ALT SERPL W P-5'-P-CCNC: 38 U/L (ref 0–70)
ALT SERPL W P-5'-P-CCNC: 44 U/L (ref 0–70)
ANGLE RATE OF CLOT STRENGTH: 59.6 DEGREES (ref 53–72)
ANION GAP SERPL CALCULATED.3IONS-SCNC: 12 MMOL/L (ref 3–14)
ANION GAP SERPL CALCULATED.3IONS-SCNC: 6 MMOL/L (ref 3–14)
ANION GAP SERPL CALCULATED.3IONS-SCNC: 7 MMOL/L (ref 3–14)
ANION GAP SERPL CALCULATED.3IONS-SCNC: 9 MMOL/L (ref 3–14)
APTT PPP: 56 SEC (ref 22–37)
APTT PPP: 57 SEC (ref 22–37)
APTT PPP: 63 SEC (ref 22–37)
APTT PPP: 67 SEC (ref 22–37)
AST SERPL W P-5'-P-CCNC: 173 U/L (ref 0–45)
AST SERPL W P-5'-P-CCNC: 175 U/L (ref 0–45)
AST SERPL W P-5'-P-CCNC: 183 U/L (ref 0–45)
AST SERPL W P-5'-P-CCNC: 190 U/L (ref 0–45)
AT III ACT/NOR PPP CHRO: 38 % (ref 85–135)
BASE DEFICIT BLDA-SCNC: 0.6 MMOL/L
BASE DEFICIT BLDA-SCNC: 2.3 MMOL/L
BASE EXCESS BLDA CALC-SCNC: 0.5 MMOL/L
BASE EXCESS BLDA CALC-SCNC: 1.8 MMOL/L
BASE EXCESS BLDA CALC-SCNC: 2.4 MMOL/L
BASE EXCESS BLDA CALC-SCNC: 2.9 MMOL/L
BASE EXCESS BLDA CALC-SCNC: 4.6 MMOL/L
BASE EXCESS BLDA CALC-SCNC: 4.8 MMOL/L
BASE EXCESS BLDA CALC-SCNC: 5.9 MMOL/L
BASE EXCESS BLDA CALC-SCNC: 6.1 MMOL/L
BASE EXCESS BLDA CALC-SCNC: 6.7 MMOL/L
BASE EXCESS BLDA CALC-SCNC: 6.9 MMOL/L
BASE EXCESS BLDA CALC-SCNC: 7.7 MMOL/L
BASE EXCESS BLDA CALC-SCNC: 7.9 MMOL/L
BASE EXCESS BLDV CALC-SCNC: 2.6 MMOL/L
BASE EXCESS BLDV CALC-SCNC: 6.6 MMOL/L
BILIRUB SERPL-MCNC: 2.2 MG/DL (ref 0.2–1.3)
BILIRUB SERPL-MCNC: 2.4 MG/DL (ref 0.2–1.3)
BILIRUB SERPL-MCNC: 2.5 MG/DL (ref 0.2–1.3)
BILIRUB SERPL-MCNC: 2.9 MG/DL (ref 0.2–1.3)
BLD GP AB SCN SERPL QL: NORMAL
BLD PROD TYP BPU: NORMAL
BLD UNIT ID BPU: 0
BLOOD BANK CMNT PATIENT-IMP: NORMAL
BLOOD PRODUCT CODE: NORMAL
BPU ID: NORMAL
BUN SERPL-MCNC: 15 MG/DL (ref 7–30)
BUN SERPL-MCNC: 16 MG/DL (ref 7–30)
BUN SERPL-MCNC: 18 MG/DL (ref 7–30)
BUN SERPL-MCNC: 19 MG/DL (ref 7–30)
CA-I BLD-MCNC: 4.2 MG/DL (ref 4.4–5.2)
CA-I BLD-MCNC: 4.3 MG/DL (ref 4.4–5.2)
CA-I BLD-MCNC: 4.3 MG/DL (ref 4.4–5.2)
CA-I BLD-MCNC: 4.5 MG/DL (ref 4.4–5.2)
CALCIUM SERPL-MCNC: 7.7 MG/DL (ref 8.5–10.1)
CALCIUM SERPL-MCNC: 7.8 MG/DL (ref 8.5–10.1)
CALCIUM SERPL-MCNC: 7.8 MG/DL (ref 8.5–10.1)
CALCIUM SERPL-MCNC: 8 MG/DL (ref 8.5–10.1)
CHLORIDE SERPL-SCNC: 106 MMOL/L (ref 94–109)
CHLORIDE SERPL-SCNC: 107 MMOL/L (ref 94–109)
CHLORIDE SERPL-SCNC: 108 MMOL/L (ref 94–109)
CHLORIDE SERPL-SCNC: 110 MMOL/L (ref 94–109)
CI HYPOCOAGULATION INDEX: 4.2 RATIO (ref 0–3)
CO2 SERPL-SCNC: 26 MMOL/L (ref 20–32)
CO2 SERPL-SCNC: 27 MMOL/L (ref 20–32)
CO2 SERPL-SCNC: 27 MMOL/L (ref 20–32)
CO2 SERPL-SCNC: 30 MMOL/L (ref 20–32)
CREAT SERPL-MCNC: 0.84 MG/DL (ref 0.66–1.25)
CREAT SERPL-MCNC: 0.87 MG/DL (ref 0.66–1.25)
CREAT SERPL-MCNC: 1.08 MG/DL (ref 0.66–1.25)
CREAT SERPL-MCNC: 1.14 MG/DL (ref 0.66–1.25)
CRP SERPL-MCNC: 180 MG/L (ref 0–8)
D DIMER PPP FEU-MCNC: 3.1 UG/ML FEU (ref 0–0.5)
D DIMER PPP FEU-MCNC: 3.5 UG/ML FEU (ref 0–0.5)
ERYTHROCYTE [DISTWIDTH] IN BLOOD BY AUTOMATED COUNT: 17.3 % (ref 10–15)
ERYTHROCYTE [DISTWIDTH] IN BLOOD BY AUTOMATED COUNT: 17.8 % (ref 10–15)
ERYTHROCYTE [DISTWIDTH] IN BLOOD BY AUTOMATED COUNT: 18 % (ref 10–15)
ERYTHROCYTE [DISTWIDTH] IN BLOOD BY AUTOMATED COUNT: 18.1 % (ref 10–15)
ERYTHROCYTE [SEDIMENTATION RATE] IN BLOOD BY WESTERGREN METHOD: 33 MM/H (ref 0–20)
FIBRINOGEN PPP-MCNC: 346 MG/DL (ref 200–420)
FIBRINOGEN PPP-MCNC: 474 MG/DL (ref 200–420)
G ACTUAL CLOT STRENGTH: 9.1 KD/SC (ref 4.5–11)
GFR SERPL CREATININE-BSD FRML MDRD: 73 ML/MIN/{1.73_M2}
GFR SERPL CREATININE-BSD FRML MDRD: 78 ML/MIN/{1.73_M2}
GFR SERPL CREATININE-BSD FRML MDRD: >90 ML/MIN/{1.73_M2}
GFR SERPL CREATININE-BSD FRML MDRD: >90 ML/MIN/{1.73_M2}
GLUCOSE BLD-MCNC: 103 MG/DL (ref 70–99)
GLUCOSE BLD-MCNC: 105 MG/DL (ref 70–99)
GLUCOSE BLD-MCNC: 110 MG/DL (ref 70–99)
GLUCOSE BLD-MCNC: 123 MG/DL (ref 70–99)
GLUCOSE BLD-MCNC: 131 MG/DL (ref 70–99)
GLUCOSE BLD-MCNC: 139 MG/DL (ref 70–99)
GLUCOSE BLD-MCNC: 91 MG/DL (ref 70–99)
GLUCOSE BLD-MCNC: 92 MG/DL (ref 70–99)
GLUCOSE BLD-MCNC: 98 MG/DL (ref 70–99)
GLUCOSE BLDC GLUCOMTR-MCNC: 100 MG/DL (ref 70–99)
GLUCOSE BLDC GLUCOMTR-MCNC: 108 MG/DL (ref 70–99)
GLUCOSE BLDC GLUCOMTR-MCNC: 114 MG/DL (ref 70–99)
GLUCOSE BLDC GLUCOMTR-MCNC: 67 MG/DL (ref 70–99)
GLUCOSE BLDC GLUCOMTR-MCNC: 68 MG/DL (ref 70–99)
GLUCOSE BLDC GLUCOMTR-MCNC: 72 MG/DL (ref 70–99)
GLUCOSE BLDC GLUCOMTR-MCNC: 79 MG/DL (ref 70–99)
GLUCOSE BLDC GLUCOMTR-MCNC: 92 MG/DL (ref 70–99)
GLUCOSE SERPL-MCNC: 103 MG/DL (ref 70–99)
GLUCOSE SERPL-MCNC: 130 MG/DL (ref 70–99)
GLUCOSE SERPL-MCNC: 92 MG/DL (ref 70–99)
GLUCOSE SERPL-MCNC: 99 MG/DL (ref 70–99)
HCO3 BLD-SCNC: 22 MMOL/L (ref 21–28)
HCO3 BLD-SCNC: 24 MMOL/L (ref 21–28)
HCO3 BLD-SCNC: 26 MMOL/L (ref 21–28)
HCO3 BLD-SCNC: 27 MMOL/L (ref 21–28)
HCO3 BLD-SCNC: 28 MMOL/L (ref 21–28)
HCO3 BLD-SCNC: 29 MMOL/L (ref 21–28)
HCO3 BLD-SCNC: 30 MMOL/L (ref 21–28)
HCO3 BLD-SCNC: 31 MMOL/L (ref 21–28)
HCO3 BLD-SCNC: 31 MMOL/L (ref 21–28)
HCO3 BLD-SCNC: 32 MMOL/L (ref 21–28)
HCO3 BLDA-SCNC: 28 MMOL/L (ref 21–28)
HCO3 BLDA-SCNC: 30 MMOL/L (ref 21–28)
HCO3 BLDV-SCNC: 29 MMOL/L (ref 21–28)
HCO3 BLDV-SCNC: 32 MMOL/L (ref 21–28)
HCT VFR BLD AUTO: 24.3 % (ref 40–53)
HCT VFR BLD AUTO: 24.7 % (ref 40–53)
HCT VFR BLD AUTO: 24.8 % (ref 40–53)
HCT VFR BLD AUTO: 25.3 % (ref 40–53)
HGB BLD-MCNC: 8.3 G/DL (ref 13.3–17.7)
HGB BLD-MCNC: 8.3 G/DL (ref 13.3–17.7)
HGB BLD-MCNC: 8.5 G/DL (ref 13.3–17.7)
HGB BLD-MCNC: 8.7 G/DL (ref 13.3–17.7)
HGB FREE PLAS-MCNC: <30 MG/DL
INR PPP: 1.24 (ref 0.86–1.14)
INR PPP: 1.25 (ref 0.86–1.14)
INR PPP: 1.26 (ref 0.86–1.14)
INR PPP: 1.29 (ref 0.86–1.14)
K TIME TO SPEC CLOT STRENGTH: 2.2 MINUTE (ref 1–3)
KCT BLD-ACNC: 126 SEC (ref 75–150)
KCT BLD-ACNC: 142 SEC (ref 75–150)
KCT BLD-ACNC: 146 SEC (ref 75–150)
KCT BLD-ACNC: 146 SEC (ref 75–150)
KCT BLD-ACNC: 150 SEC (ref 75–150)
KCT BLD-ACNC: 150 SEC (ref 75–150)
KCT BLD-ACNC: 155 SEC (ref 75–150)
KCT BLD-ACNC: 158 SEC (ref 75–150)
KCT BLD-ACNC: 175 SEC (ref 75–150)
KCT BLD-ACNC: 187 SEC (ref 75–150)
LACTATE BLD-SCNC: 1.9 MMOL/L (ref 0.7–2)
LACTATE BLD-SCNC: 2.2 MMOL/L (ref 0.7–2)
LACTATE BLD-SCNC: 2.6 MMOL/L (ref 0.7–2)
LACTATE BLD-SCNC: 3.4 MMOL/L (ref 0.7–2)
LDH SERPL L TO P-CCNC: 679 U/L (ref 85–227)
LMWH PPP CHRO-ACNC: 0.1 IU/ML
LMWH PPP CHRO-ACNC: 0.1 IU/ML
LMWH PPP CHRO-ACNC: 0.11 IU/ML
LMWH PPP CHRO-ACNC: <0.1 IU/ML
LY30 LYSIS AT 30 MINUTES: 0 % (ref 0–8)
LY60 LYSIS AT 60 MINUTES: 1.8 % (ref 0–15)
MA MAXIMUM CLOT STRENGTH: 64.6 MM (ref 50–70)
MAGNESIUM SERPL-MCNC: 1.9 MG/DL (ref 1.6–2.3)
MAGNESIUM SERPL-MCNC: 2.1 MG/DL (ref 1.6–2.3)
MAGNESIUM SERPL-MCNC: 2.3 MG/DL (ref 1.6–2.3)
MAGNESIUM SERPL-MCNC: 2.4 MG/DL (ref 1.6–2.3)
MCH RBC QN AUTO: 30.9 PG (ref 26.5–33)
MCH RBC QN AUTO: 31.1 PG (ref 26.5–33)
MCH RBC QN AUTO: 31.4 PG (ref 26.5–33)
MCH RBC QN AUTO: 31.5 PG (ref 26.5–33)
MCHC RBC AUTO-ENTMCNC: 33.6 G/DL (ref 31.5–36.5)
MCHC RBC AUTO-ENTMCNC: 34.2 G/DL (ref 31.5–36.5)
MCHC RBC AUTO-ENTMCNC: 34.3 G/DL (ref 31.5–36.5)
MCHC RBC AUTO-ENTMCNC: 34.4 G/DL (ref 31.5–36.5)
MCV RBC AUTO: 91 FL (ref 78–100)
MCV RBC AUTO: 91 FL (ref 78–100)
MCV RBC AUTO: 92 FL (ref 78–100)
MCV RBC AUTO: 92 FL (ref 78–100)
NUM BPU REQUESTED: 1
NUM BPU REQUESTED: 12
O2/TOTAL GAS SETTING VFR VENT: 50 %
O2/TOTAL GAS SETTING VFR VENT: 60 %
O2/TOTAL GAS SETTING VFR VENT: 60 %
OXYHGB MFR BLD: 94 % (ref 92–100)
OXYHGB MFR BLD: 95 % (ref 92–100)
OXYHGB MFR BLD: 95 % (ref 92–100)
OXYHGB MFR BLD: 96 % (ref 92–100)
OXYHGB MFR BLD: 98 % (ref 92–100)
OXYHGB MFR BLD: 99 % (ref 92–100)
OXYHGB MFR BLDA: 98 % (ref 75–100)
OXYHGB MFR BLDA: 99 % (ref 75–100)
OXYHGB MFR BLDV: 55 %
OXYHGB MFR BLDV: 65 %
PCO2 BLD: 35 MM HG (ref 35–45)
PCO2 BLD: 38 MM HG (ref 35–45)
PCO2 BLD: 39 MM HG (ref 35–45)
PCO2 BLD: 39 MM HG (ref 35–45)
PCO2 BLD: 40 MM HG (ref 35–45)
PCO2 BLD: 40 MM HG (ref 35–45)
PCO2 BLD: 43 MM HG (ref 35–45)
PCO2 BLD: 45 MM HG (ref 35–45)
PCO2 BLDA: 43 MM HG (ref 35–45)
PCO2 BLDA: 47 MM HG (ref 35–45)
PCO2 BLDV: 47 MM HG (ref 40–50)
PCO2 BLDV: 52 MM HG (ref 40–50)
PH BLD: 7.39 PH (ref 7.35–7.45)
PH BLD: 7.39 PH (ref 7.35–7.45)
PH BLD: 7.4 PH (ref 7.35–7.45)
PH BLD: 7.41 PH (ref 7.35–7.45)
PH BLD: 7.42 PH (ref 7.35–7.45)
PH BLD: 7.44 PH (ref 7.35–7.45)
PH BLD: 7.46 PH (ref 7.35–7.45)
PH BLD: 7.5 PH (ref 7.35–7.45)
PH BLD: 7.51 PH (ref 7.35–7.45)
PH BLD: 7.52 PH (ref 7.35–7.45)
PH BLDA: 7.38 PH (ref 7.35–7.45)
PH BLDA: 7.46 PH (ref 7.35–7.45)
PH BLDV: 7.35 PH (ref 7.32–7.43)
PH BLDV: 7.44 PH (ref 7.32–7.43)
PHOSPHATE SERPL-MCNC: 4.2 MG/DL (ref 2.5–4.5)
PLATELET # BLD AUTO: 105 10E9/L (ref 150–450)
PLATELET # BLD AUTO: 70 10E9/L (ref 150–450)
PLATELET # BLD AUTO: 82 10E9/L (ref 150–450)
PLATELET # BLD AUTO: 86 10E9/L (ref 150–450)
PO2 BLD: 106 MM HG (ref 80–105)
PO2 BLD: 120 MM HG (ref 80–105)
PO2 BLD: 140 MM HG (ref 80–105)
PO2 BLD: 153 MM HG (ref 80–105)
PO2 BLD: 237 MM HG (ref 80–105)
PO2 BLD: 267 MM HG (ref 80–105)
PO2 BLD: 71 MM HG (ref 80–105)
PO2 BLD: 73 MM HG (ref 80–105)
PO2 BLD: 83 MM HG (ref 80–105)
PO2 BLD: 86 MM HG (ref 80–105)
PO2 BLD: 88 MM HG (ref 80–105)
PO2 BLD: 92 MM HG (ref 80–105)
PO2 BLDA: 208 MM HG (ref 80–105)
PO2 BLDA: 269 MM HG (ref 80–105)
PO2 BLDV: 30 MM HG (ref 25–47)
PO2 BLDV: 35 MM HG (ref 25–47)
POTASSIUM SERPL-SCNC: 3.6 MMOL/L (ref 3.4–5.3)
POTASSIUM SERPL-SCNC: 3.7 MMOL/L (ref 3.4–5.3)
POTASSIUM SERPL-SCNC: 4.1 MMOL/L (ref 3.4–5.3)
POTASSIUM SERPL-SCNC: 4.2 MMOL/L (ref 3.4–5.3)
PROCALCITONIN SERPL-MCNC: 6.61 NG/ML
PROT SERPL-MCNC: 5 G/DL (ref 6.8–8.8)
PROT SERPL-MCNC: 5.1 G/DL (ref 6.8–8.8)
PROT SERPL-MCNC: 5.2 G/DL (ref 6.8–8.8)
PROT SERPL-MCNC: 5.3 G/DL (ref 6.8–8.8)
R TIME UNTIL CLOT FORMS: 12.2 MINUTE (ref 5–10)
RBC # BLD AUTO: 2.67 10E12/L (ref 4.4–5.9)
RBC # BLD AUTO: 2.69 10E12/L (ref 4.4–5.9)
RBC # BLD AUTO: 2.7 10E12/L (ref 4.4–5.9)
RBC # BLD AUTO: 2.77 10E12/L (ref 4.4–5.9)
S100 CA BINDING PROTEIN B SER-MCNC: 208 NG/L (ref 0–96)
S100 CA BINDING PROTEIN B SER-MCNC: 257 NG/L (ref 0–96)
SODIUM SERPL-SCNC: 142 MMOL/L (ref 133–144)
SODIUM SERPL-SCNC: 144 MMOL/L (ref 133–144)
SODIUM SERPL-SCNC: 145 MMOL/L (ref 133–144)
SODIUM SERPL-SCNC: 145 MMOL/L (ref 133–144)
SPECIMEN EXP DATE BLD: NORMAL
TRANSFUSION STATUS PATIENT QL: NORMAL
TROPONIN I SERPL-MCNC: 104.02 UG/L (ref 0–0.04)
TROPONIN I SERPL-MCNC: 59.43 UG/L (ref 0–0.04)
TROPONIN I SERPL-MCNC: 71.63 UG/L (ref 0–0.04)
TROPONIN I SERPL-MCNC: 81.63 UG/L (ref 0–0.04)
WBC # BLD AUTO: 6.2 10E9/L (ref 4–11)
WBC # BLD AUTO: 8 10E9/L (ref 4–11)
WBC # BLD AUTO: 8.2 10E9/L (ref 4–11)
WBC # BLD AUTO: 9.1 10E9/L (ref 4–11)

## 2019-10-15 PROCEDURE — P9037 PLATE PHERES LEUKOREDU IRRAD: HCPCS | Performed by: STUDENT IN AN ORGANIZED HEALTH CARE EDUCATION/TRAINING PROGRAM

## 2019-10-15 PROCEDURE — 85396 CLOTTING ASSAY WHOLE BLOOD: CPT | Performed by: INTERNAL MEDICINE

## 2019-10-15 PROCEDURE — 00000146 ZZHCL STATISTIC GLUCOSE BY METER IP

## 2019-10-15 PROCEDURE — 82330 ASSAY OF CALCIUM: CPT | Performed by: STUDENT IN AN ORGANIZED HEALTH CARE EDUCATION/TRAINING PROGRAM

## 2019-10-15 PROCEDURE — 82947 ASSAY GLUCOSE BLOOD QUANT: CPT | Performed by: STUDENT IN AN ORGANIZED HEALTH CARE EDUCATION/TRAINING PROGRAM

## 2019-10-15 PROCEDURE — 83605 ASSAY OF LACTIC ACID: CPT | Performed by: STUDENT IN AN ORGANIZED HEALTH CARE EDUCATION/TRAINING PROGRAM

## 2019-10-15 PROCEDURE — 20000004 ZZH R&B ICU UMMC

## 2019-10-15 PROCEDURE — 25000132 ZZH RX MED GY IP 250 OP 250 PS 637: Performed by: STUDENT IN AN ORGANIZED HEALTH CARE EDUCATION/TRAINING PROGRAM

## 2019-10-15 PROCEDURE — 80053 COMPREHEN METABOLIC PANEL: CPT | Performed by: INTERNAL MEDICINE

## 2019-10-15 PROCEDURE — 82805 BLOOD GASES W/O2 SATURATION: CPT | Performed by: STUDENT IN AN ORGANIZED HEALTH CARE EDUCATION/TRAINING PROGRAM

## 2019-10-15 PROCEDURE — 40000076 ZZH STATISTIC IABP MONITORING

## 2019-10-15 PROCEDURE — 40000275 ZZH STATISTIC RCP TIME EA 10 MIN

## 2019-10-15 PROCEDURE — 85347 COAGULATION TIME ACTIVATED: CPT

## 2019-10-15 PROCEDURE — 25800030 ZZH RX IP 258 OP 636: Performed by: INTERNAL MEDICINE

## 2019-10-15 PROCEDURE — P9059 PLASMA, FRZ BETWEEN 8-24HOUR: HCPCS | Performed by: STUDENT IN AN ORGANIZED HEALTH CARE EDUCATION/TRAINING PROGRAM

## 2019-10-15 PROCEDURE — 84484 ASSAY OF TROPONIN QUANT: CPT | Performed by: INTERNAL MEDICINE

## 2019-10-15 PROCEDURE — 25000128 H RX IP 250 OP 636: Performed by: STUDENT IN AN ORGANIZED HEALTH CARE EDUCATION/TRAINING PROGRAM

## 2019-10-15 PROCEDURE — 83615 LACTATE (LD) (LDH) ENZYME: CPT | Performed by: STUDENT IN AN ORGANIZED HEALTH CARE EDUCATION/TRAINING PROGRAM

## 2019-10-15 PROCEDURE — P9016 RBC LEUKOCYTES REDUCED: HCPCS | Performed by: STUDENT IN AN ORGANIZED HEALTH CARE EDUCATION/TRAINING PROGRAM

## 2019-10-15 PROCEDURE — 84145 PROCALCITONIN (PCT): CPT | Performed by: STUDENT IN AN ORGANIZED HEALTH CARE EDUCATION/TRAINING PROGRAM

## 2019-10-15 PROCEDURE — 94003 VENT MGMT INPAT SUBQ DAY: CPT

## 2019-10-15 PROCEDURE — 83735 ASSAY OF MAGNESIUM: CPT | Performed by: STUDENT IN AN ORGANIZED HEALTH CARE EDUCATION/TRAINING PROGRAM

## 2019-10-15 PROCEDURE — 25800030 ZZH RX IP 258 OP 636: Performed by: STUDENT IN AN ORGANIZED HEALTH CARE EDUCATION/TRAINING PROGRAM

## 2019-10-15 PROCEDURE — 85027 COMPLETE CBC AUTOMATED: CPT | Performed by: STUDENT IN AN ORGANIZED HEALTH CARE EDUCATION/TRAINING PROGRAM

## 2019-10-15 PROCEDURE — 25800025 ZZH RX 258: Performed by: STUDENT IN AN ORGANIZED HEALTH CARE EDUCATION/TRAINING PROGRAM

## 2019-10-15 PROCEDURE — 85730 THROMBOPLASTIN TIME PARTIAL: CPT | Performed by: STUDENT IN AN ORGANIZED HEALTH CARE EDUCATION/TRAINING PROGRAM

## 2019-10-15 PROCEDURE — 83051 HEMOGLOBIN PLASMA: CPT | Performed by: STUDENT IN AN ORGANIZED HEALTH CARE EDUCATION/TRAINING PROGRAM

## 2019-10-15 PROCEDURE — 99253 IP/OBS CNSLTJ NEW/EST LOW 45: CPT | Performed by: NURSE PRACTITIONER

## 2019-10-15 PROCEDURE — 99291 CRITICAL CARE FIRST HOUR: CPT | Mod: GC | Performed by: INTERNAL MEDICINE

## 2019-10-15 PROCEDURE — 85379 FIBRIN DEGRADATION QUANT: CPT | Performed by: STUDENT IN AN ORGANIZED HEALTH CARE EDUCATION/TRAINING PROGRAM

## 2019-10-15 PROCEDURE — 25000128 H RX IP 250 OP 636: Performed by: INTERNAL MEDICINE

## 2019-10-15 PROCEDURE — 84100 ASSAY OF PHOSPHORUS: CPT | Performed by: STUDENT IN AN ORGANIZED HEALTH CARE EDUCATION/TRAINING PROGRAM

## 2019-10-15 PROCEDURE — 85652 RBC SED RATE AUTOMATED: CPT | Performed by: STUDENT IN AN ORGANIZED HEALTH CARE EDUCATION/TRAINING PROGRAM

## 2019-10-15 PROCEDURE — 80053 COMPREHEN METABOLIC PANEL: CPT | Performed by: STUDENT IN AN ORGANIZED HEALTH CARE EDUCATION/TRAINING PROGRAM

## 2019-10-15 PROCEDURE — 85300 ANTITHROMBIN III ACTIVITY: CPT | Performed by: STUDENT IN AN ORGANIZED HEALTH CARE EDUCATION/TRAINING PROGRAM

## 2019-10-15 PROCEDURE — 86140 C-REACTIVE PROTEIN: CPT | Performed by: STUDENT IN AN ORGANIZED HEALTH CARE EDUCATION/TRAINING PROGRAM

## 2019-10-15 PROCEDURE — 85520 HEPARIN ASSAY: CPT | Performed by: STUDENT IN AN ORGANIZED HEALTH CARE EDUCATION/TRAINING PROGRAM

## 2019-10-15 PROCEDURE — 71045 X-RAY EXAM CHEST 1 VIEW: CPT

## 2019-10-15 PROCEDURE — 85730 THROMBOPLASTIN TIME PARTIAL: CPT | Performed by: INTERNAL MEDICINE

## 2019-10-15 PROCEDURE — 85384 FIBRINOGEN ACTIVITY: CPT | Performed by: STUDENT IN AN ORGANIZED HEALTH CARE EDUCATION/TRAINING PROGRAM

## 2019-10-15 PROCEDURE — 83735 ASSAY OF MAGNESIUM: CPT | Performed by: INTERNAL MEDICINE

## 2019-10-15 PROCEDURE — 40000014 ZZH STATISTIC ARTERIAL MONITORING DAILY

## 2019-10-15 PROCEDURE — 84484 ASSAY OF TROPONIN QUANT: CPT | Performed by: STUDENT IN AN ORGANIZED HEALTH CARE EDUCATION/TRAINING PROGRAM

## 2019-10-15 PROCEDURE — 27211402 ZZH SENSOR NIRS OXIMETER, ADULT

## 2019-10-15 PROCEDURE — 25000125 ZZHC RX 250: Performed by: STUDENT IN AN ORGANIZED HEALTH CARE EDUCATION/TRAINING PROGRAM

## 2019-10-15 PROCEDURE — C9113 INJ PANTOPRAZOLE SODIUM, VIA: HCPCS | Performed by: STUDENT IN AN ORGANIZED HEALTH CARE EDUCATION/TRAINING PROGRAM

## 2019-10-15 PROCEDURE — 95951 ZZHC EEG VIDEO EACH 24 HR: CPT | Mod: ZF

## 2019-10-15 PROCEDURE — 85520 HEPARIN ASSAY: CPT | Performed by: INTERNAL MEDICINE

## 2019-10-15 PROCEDURE — 87070 CULTURE OTHR SPECIMN AEROBIC: CPT | Performed by: STUDENT IN AN ORGANIZED HEALTH CARE EDUCATION/TRAINING PROGRAM

## 2019-10-15 PROCEDURE — 85027 COMPLETE CBC AUTOMATED: CPT | Performed by: INTERNAL MEDICINE

## 2019-10-15 PROCEDURE — 33949 ECMO/ECLS DAILY MGMT ARTERY: CPT

## 2019-10-15 PROCEDURE — 99207 ZZC CDG-CODE CATEGORY CHANGED: CPT | Performed by: NURSE PRACTITIONER

## 2019-10-15 PROCEDURE — 85610 PROTHROMBIN TIME: CPT | Performed by: INTERNAL MEDICINE

## 2019-10-15 PROCEDURE — 40000344 ZZHCL STATISTIC THAWING COMPONENT: Performed by: STUDENT IN AN ORGANIZED HEALTH CARE EDUCATION/TRAINING PROGRAM

## 2019-10-15 PROCEDURE — 85610 PROTHROMBIN TIME: CPT | Performed by: STUDENT IN AN ORGANIZED HEALTH CARE EDUCATION/TRAINING PROGRAM

## 2019-10-15 RX ORDER — LANOLIN ALCOHOL/MO/W.PET/CERES
100 CREAM (GRAM) TOPICAL DAILY
Status: DISCONTINUED | OUTPATIENT
Start: 2019-10-15 | End: 2019-11-05 | Stop reason: HOSPADM

## 2019-10-15 RX ORDER — AMOXICILLIN 250 MG
1 CAPSULE ORAL 2 TIMES DAILY
Status: DISCONTINUED | OUTPATIENT
Start: 2019-10-15 | End: 2019-10-17

## 2019-10-15 RX ORDER — POTASSIUM CL/LIDO/0.9 % NACL 10MEQ/0.1L
10 INTRAVENOUS SOLUTION, PIGGYBACK (ML) INTRAVENOUS
Status: DISCONTINUED | OUTPATIENT
Start: 2019-10-15 | End: 2019-11-05 | Stop reason: HOSPADM

## 2019-10-15 RX ORDER — POTASSIUM CHLORIDE 750 MG/1
20-40 TABLET, EXTENDED RELEASE ORAL
Status: DISCONTINUED | OUTPATIENT
Start: 2019-10-15 | End: 2019-11-05 | Stop reason: HOSPADM

## 2019-10-15 RX ORDER — FUROSEMIDE 10 MG/ML
60 INJECTION INTRAMUSCULAR; INTRAVENOUS ONCE
Status: COMPLETED | OUTPATIENT
Start: 2019-10-15 | End: 2019-10-15

## 2019-10-15 RX ORDER — FOLIC ACID 1 MG/1
1 TABLET ORAL DAILY
Status: DISCONTINUED | OUTPATIENT
Start: 2019-10-15 | End: 2019-11-05 | Stop reason: HOSPADM

## 2019-10-15 RX ORDER — POTASSIUM CHLORIDE 7.45 MG/ML
10 INJECTION INTRAVENOUS
Status: DISCONTINUED | OUTPATIENT
Start: 2019-10-15 | End: 2019-11-05 | Stop reason: HOSPADM

## 2019-10-15 RX ORDER — POTASSIUM CHLORIDE 1.5 G/1.58G
20-40 POWDER, FOR SOLUTION ORAL
Status: DISCONTINUED | OUTPATIENT
Start: 2019-10-15 | End: 2019-11-05 | Stop reason: HOSPADM

## 2019-10-15 RX ORDER — DEXTROSE MONOHYDRATE 100 MG/ML
INJECTION, SOLUTION INTRAVENOUS CONTINUOUS
Status: DISCONTINUED | OUTPATIENT
Start: 2019-10-15 | End: 2019-10-28

## 2019-10-15 RX ORDER — MAGNESIUM SULFATE HEPTAHYDRATE 40 MG/ML
4 INJECTION, SOLUTION INTRAVENOUS EVERY 4 HOURS PRN
Status: DISCONTINUED | OUTPATIENT
Start: 2019-10-15 | End: 2019-11-05 | Stop reason: HOSPADM

## 2019-10-15 RX ORDER — POTASSIUM CHLORIDE 29.8 MG/ML
20 INJECTION INTRAVENOUS
Status: DISCONTINUED | OUTPATIENT
Start: 2019-10-15 | End: 2019-11-05 | Stop reason: HOSPADM

## 2019-10-15 RX ADMIN — PIPERACILLIN SODIUM AND TAZOBACTAM SODIUM 3.38 G: 3; .375 INJECTION, POWDER, LYOPHILIZED, FOR SOLUTION INTRAVENOUS at 20:37

## 2019-10-15 RX ADMIN — Medication: at 02:42

## 2019-10-15 RX ADMIN — VANCOMYCIN HYDROCHLORIDE 1500 MG: 10 INJECTION, POWDER, LYOPHILIZED, FOR SOLUTION INTRAVENOUS at 06:39

## 2019-10-15 RX ADMIN — THIAMINE HCL TAB 100 MG 100 MG: 100 TAB at 20:36

## 2019-10-15 RX ADMIN — POTASSIUM CHLORIDE 20 MEQ: 29.8 INJECTION, SOLUTION INTRAVENOUS at 23:03

## 2019-10-15 RX ADMIN — TICAGRELOR 90 MG: 90 TABLET ORAL at 07:56

## 2019-10-15 RX ADMIN — TICAGRELOR 90 MG: 90 TABLET ORAL at 20:36

## 2019-10-15 RX ADMIN — ASPIRIN 81 MG CHEWABLE TABLET 81 MG: 81 TABLET CHEWABLE at 07:56

## 2019-10-15 RX ADMIN — PANTOPRAZOLE SODIUM 8 MG/HR: 40 INJECTION, POWDER, FOR SOLUTION INTRAVENOUS at 21:11

## 2019-10-15 RX ADMIN — SENNOSIDES AND DOCUSATE SODIUM 1 TABLET: 8.6; 5 TABLET ORAL at 20:36

## 2019-10-15 RX ADMIN — PIPERACILLIN SODIUM AND TAZOBACTAM SODIUM 3.38 G: 3; .375 INJECTION, POWDER, LYOPHILIZED, FOR SOLUTION INTRAVENOUS at 07:52

## 2019-10-15 RX ADMIN — HEPARIN SODIUM 750 UNITS/HR: 10000 INJECTION, SOLUTION INTRAVENOUS at 06:05

## 2019-10-15 RX ADMIN — DEXTROSE MONOHYDRATE: 100 INJECTION, SOLUTION INTRAVENOUS at 05:52

## 2019-10-15 RX ADMIN — FUROSEMIDE 60 MG: 10 INJECTION, SOLUTION INTRAVENOUS at 07:56

## 2019-10-15 RX ADMIN — PIPERACILLIN SODIUM AND TAZOBACTAM SODIUM 3.38 G: 3; .375 INJECTION, POWDER, LYOPHILIZED, FOR SOLUTION INTRAVENOUS at 14:30

## 2019-10-15 RX ADMIN — PANTOPRAZOLE SODIUM 8 MG/HR: 40 INJECTION, POWDER, FOR SOLUTION INTRAVENOUS at 10:45

## 2019-10-15 RX ADMIN — FOLIC ACID 1 MG: 1 TABLET ORAL at 20:36

## 2019-10-15 RX ADMIN — MIDAZOLAM 2 MG/HR: 5 INJECTION INTRAMUSCULAR; INTRAVENOUS at 21:11

## 2019-10-15 RX ADMIN — VASOPRESSIN 4 UNITS/HR: 20 INJECTION INTRAVENOUS at 07:50

## 2019-10-15 RX ADMIN — Medication 2 G: at 17:15

## 2019-10-15 RX ADMIN — MIDAZOLAM 16 MG/HR: 5 INJECTION INTRAMUSCULAR; INTRAVENOUS at 02:41

## 2019-10-15 RX ADMIN — PIPERACILLIN SODIUM AND TAZOBACTAM SODIUM 3.38 G: 3; .375 INJECTION, POWDER, LYOPHILIZED, FOR SOLUTION INTRAVENOUS at 02:40

## 2019-10-15 RX ADMIN — Medication 0.1 MCG/KG/MIN: at 07:48

## 2019-10-15 RX ADMIN — VASOPRESSIN 4 UNITS/HR: 20 INJECTION INTRAVENOUS at 03:12

## 2019-10-15 RX ADMIN — POTASSIUM CHLORIDE 20 MEQ: 29.8 INJECTION, SOLUTION INTRAVENOUS at 17:00

## 2019-10-15 RX ADMIN — CALCIUM GLUCONATE 1 G: 98 INJECTION, SOLUTION INTRAVENOUS at 16:54

## 2019-10-15 ASSESSMENT — ACTIVITIES OF DAILY LIVING (ADL)
ADLS_ACUITY_SCORE: 26

## 2019-10-15 ASSESSMENT — MIFFLIN-ST. JEOR: SCORE: 2013.24

## 2019-10-15 NOTE — PROGRESS NOTES
"[facility] Unit 4E  Palliative Care Initial Spiritual Assessment    Patient: Joel Campbell  Date of Admission:  10/13/2019  Reason for consult: goals of care and patient/family coping      Summary and Recommendations:  Patient Joel \"Claudio\" Shannan has strong support from wife Jackelyn and [place] of friends and family. Wife Jackelyn believes he may struggle emotionally and mentally during hospitalization and is appreciative of support, including integrative therapies.    Palliative Care  will follow while Palliative Care is consulted.    These recommendations have been discussed with family and palliative care team.    Laila Kathleen  Palliative   Pager [phone]  [facility] Inpatient Team Consult pager [phone] (M-F 8-4:30)  After-hours Answering Service [phone]      Assessments:   Visit with patient Joel \"Claudio\" Shannan's wife Jackelyn and mother-in-law Annetta.    Distress:  Distress in the context of serious illness was assessed today:    Existential/spiritual/emotional distress: Yes; Jackelyn understands the seriousness of Claudio's condition. She also talked about his struggle with alcohol abuse and smoking. She believes that Claudio self-medicates due to emotional and financial stress, including his grief over the death of his daughter by suicide 11 years ago.       Advent distress:  No; not Judaism or spiritual      Social/economic/relational distress:  Yes; Claudio is estranged from oldest daughter Gladys. It is also a financial stress for Jackelyn to have Claudio in hospital so far from their home in [place]. Jackelyn's co-workers (she works for a home hospice agency) have been supportive.    Coping, Meaning, & Spirituality:   Coping, meaning, and/or spirituality in the context of serious illness were assessed today:    Spiritual background and preferences: none      Beliefs, rituals, and practices: none for Claudio; Jackelyn finds value to integrative therapies including reiki " "and requested them for Claudio and herself.      Meaning-making: through relationship with Jackelyn and daughter Yennifer; Jackelyn described Claudio as someone who is \"macho\" and independent and \"rowdy\", who noemy with difficult times through use of humor.       Strengths and resources: family, friends    Prognosis, Goals, & Planning:     Prognosis, Goals, and/or Advance Care Planning were assessed today: Yes - Jackelyn is both aware of the seriousness of Claudio's situation and optimistic that Claudio will recover; she wants to \"focus on the positive\".      Preferred language: English      Patient's decision making preferences: unable to assess - although wife Jackelyn believes he would make decisions independently.      I have concerns about the patient/family's health literacy today: No      Patient has a completed Health Care Directive: No.       Code status per chart review: full code    Key Palliative Symptom Data:  We are not helping to manage these symptoms currently in this patient.      Interventions:  I offered emotional and spiritual support through reflective listening that affirmed emotions, experience, and meaning, assistance during conversation related to values and goals of care, and referral for energy work.              "

## 2019-10-15 NOTE — PROGRESS NOTES
ECMO Shift Summary:    Patient remains on [facility] ECMO, all equipment is functioning and alarms are appropriately set. RPM's 3100 with flow range 3.61-3.83 L/min. Sweep gas is at 4 LPM and FiO2 80%. Circuit remains free of air, clot and fibrin. Cannulas are secure with no bleeding from site. Extremities are cool to the touch. Suctioned ETT and patient had no secretions .    Significant Shift Events: Weaned off pressors and weaned sedation medications. Patient opened his eyes.     Vent settings:  Ventilation Mode: CMV/AC  (Continuous Mandatory Ventilation/ Assist Control)  FiO2 (%): 50 %  Rate Set (breaths/minute): 14 breaths/min  Tidal Volume Set (mL): 500 mL  PEEP (cm H2O): 7 cmH2O  Oxygen Concentration (%): 50 %  Resp: 14  .    Heparin is running at 600 u/hr, ACT range 146-155.    Urine output is good, blood loss was minimal. Product given included  2 units of PRBC, 2 units of FFP, 1 unit of Platelets.      Intake/Output Summary (Last 24 hours) at 10/15/2019 1716  Last data filed at 10/15/2019 1600  Gross per 24 hour   Intake 4850.68 ml   Output 4675 ml   Net 175.68 ml       ECHO:  No results found for this or any previous visit.No results found for this or any previous visit.    CXR:  Recent Results (from the past 24 hour(s))   CT Chest Abdomen Pelvis w/o Contrast    Narrative    EXAMINATION: CT CHEST ABDOMEN PELVIS W/O CONTRAST, 10/14/2019 7:24 PM    TECHNIQUE:  Helical CT images from the thoracic inlet through the  symphysis pubis were obtained  without contrast.     COMPARISON: CT CAP 10/13/2019    HISTORY: ECMO Pt with increasing pressor needs and concern for  bleeding    FINDINGS:    Chest: Redemonstration of endotracheal tube projecting at the thoracic  inlet. There is layering debris in the trachea and both bronchi. ECMO  cannula visualized in the low SVC. Left sided IJ central venous  catheter with tip located in the lower SVC. The main pulmonary artery  and aorta are normal in diameter. Mild hemopericardium.  Unchanged  appearance of mild retrosternal hematoma. LAD stent once again  visualized. Esophageal temperature probe with tip located in the mid  thoracic esophagus. Multiple enlarged periaortic lymph nodes measuring  1.2 x 1.5 cm (series 4 image 69).    There is increased fluid attenuation and consolidation, now complete,  of the right lower lobe. Increased consolidation in the right upper  lobe with continued nodular and groundglass opacities. Probable right  pleural effusion. Small left-sided pleural effusion with new  associated consolidation. No appreciable pneumothorax.     Abdomen and pelvis:   Liver is homogeneous. No focal hepatic lesion. Spleen is unremarkable.  Pancreas is within normal limits. Vicarious excretion of contrast from  the gallbladder. Bilaterally symmetric appearing kidneys. Unchanged  appearance of small right adrenal adenoma. Unchanged appearance of  left adrenal nodule. Unchanged exophytic cyst from the inferior pole  of the right kidney. Moderately prominent right renal pelvis.  Surrounding fat stranding adjacent to the kidneys is within normal  limits. No hydronephrosis. The bladder is decompressed with Lawrence  catheter in place.     Large bowel is normal in caliber. Diverticulosis without evidence of  diverticulitis. There is mild thickening of the large bowel most  prominently in the right lower quadrant. The appendix is unremarkable.  Small bowel is normal in size and appearance. Enteric tube with tip  located in the pylorus.    Bilateral lower posterior arterial lines with the right terminating in  the mid iliac artery and the left terminating in the distal aorta.  There is mild fusiform dilation of the infrarenal aorta. Bilateral  femoral vein approach ECMO cannulas.    No suspicious abdominal or pelvic lymphadenopathy.     Bones and soft tissues: Diffuse tissue anasarca. Once again there is  mildly displaced fractures of the bilateral 2nd through 4th ribs  anteriorly. Nondisplaced  fracture of the first ribs posteriorly.  Mildly displaced fracture of the sternal body. Again there is a right  pectoral intramuscular hematoma which appears similar. Mild hemorrhage  at the sites of puncture in the right groin.      Impression    IMPRESSION:     1. Increased right lower and upper lobe confluent consolidation which  may represent worsening aspiration or infection.  2. New left lower lobe aspiration, infection or atelectasis.   3. Probable bilateral pleural effusions  4. Unchanged right retropectoral hematoma, small anterior mediastinal  hematoma, hemopericardium and small right groin hematoma.  5. Redemonstration of displaced fractures of the bilateral second  through fourth ribs anteriorly and nondisplaced fracture of the first  ribs posteriorly.  6. Similar appearing mildly displaced sternal fracture with associated  small retrosternal hematoma and hemopericardium.  7. Endotracheal tube projects near the thoracic inlet. Consider  advancing.    I have personally reviewed the examination and initial interpretation  and I agree with the findings.    JOCELYN CASTRO MD   XR Chest Port 1 View    Narrative    EXAMINATION: XR CHEST PORT 1 VW, 10/14/2019 9:09 PM    INDICATION: f/u chest tube placement    COMPARISON: Chest radiograph 10/13/2019    FINDINGS: Single AP supine radiograph of the chest.    Defibrillator pads projecting over the mid chest. Endotracheal tube  projects just above the thoracic inlet. Esophageal temperature probe  projects in the mid thoracic esophagus. Enteric tube with tip obscured  by the defibrillator pads, however appears to be within the stomach.  ECMO cannula visualized. Intra-aortic balloon pump in unchanged  position. Left IJ central venous catheter in the mid SVC. Interval  replacement of right apical chest tube. No appreciable pneumothorax.  There are diffuse right lung opacities. Left basilar opacities.  Bilateral costophrenic angles are collimated off the  field-of-view.      Impression    IMPRESSION:  1. Intervally placed right apical chest tube with improved aeration of  the right lung  2. Endotracheal tube with tip projecting above the thoracic inlet.  Consider advancing.  3. Right lung consolidation and left base consolidation with possible  pleural effusions.     I have personally reviewed the examination and initial interpretation  and I agree with the findings.    JOCELYN CASTRO MD   XR Chest Port 1 View    Narrative    EXAMINATION: XR CHEST PORT 1 VW, 10/15/2019 6:10 AM    INDICATION: Check endotracheal tube placement and ECLS cannula  placement. DO NOT log-roll patient.  Place film under patient using  patient safety handling process.    COMPARISON: Chest x-ray yesterday    FINDINGS: Single AP supine radiograph of the chest.    Defibrillator pads projecting over the mid chest. Endotracheal tube  projects just above the thoracic inlet. Esophageal temperature probe  projects in the mid thoracic esophagus. Enteric tube with tip obscured  by the defibrillator pads, however appears to be within the stomach.  ECMO cannula visualized. Intra-aortic balloon pump in unchanged  position. Left IJ central venous catheter in the mid SVC with tip in  high SVC. Right apical chest tube in similar position with the  appearance of sidehole in chest wall. No appreciable pneumothorax.  There is complete hazy opacity at the right lung, unchanged. Left  basilar hazy opacities. Bilateral costophrenic angles are silhouetted.      Impression    IMPRESSION: In this patient undergoing ECMO after cardiac arrest  1. Endotracheal tube with tip terminating approximately 10 cm proximal  to adiel. Consider advancing.  2. Complete hazy opacity of right lung and hazy opacity left lung base  likely represents pleural effusions. Possible superimposed infection  and/or edema and/or atelectasis.  3. Right apical chest tube with appearance of sidehole in chest wall.    I have personally  reviewed the examination and initial interpretation  and I agree with the findings.    AGNIESZKA GRANADO MD       Labs:  Recent Labs   Lab 10/15/19  1614 10/15/19  1427 10/15/19  1217 10/15/19  0959   PH 7.51* 7.50* 7.44 7.46*   PCO2 38 38 43 40   PO2 71* 86 237* 83   HCO3 30* 30* 30* 29*   O2PER 50 50 50 50       Lab Results   Component Value Date    HGB 8.3 (L) 10/15/2019    PHGB <30 10/15/2019    PLT 70 (L) 10/15/2019    FIBR 474 (H) 10/15/2019    INR 1.26 (H) 10/15/2019    PTT 57 (H) 10/15/2019    DD 3.5 (H) 10/15/2019    AXA 0.10 10/15/2019    ANTCH 38 (L) 10/15/2019         Plan is to remain on [facility] ECMO support.       Kinsey Stewart  10/15/2019 5:16 PM

## 2019-10-15 NOTE — PROGRESS NOTES
[facility]   Cardiology Progress Note    Assessment & Plan   Joel Campbell is a 53 year old male who was admitted on 10/13/19 as a transfer for an OSH for refractory VT/VF arrest s/p PCI to LAD and placement on peripheral [facility] ECMO.     Changes Today:  - Place another chest tube on right-side to drain additional effusion  - Wean pressors as able  - Stop amiodarone given no further ectopy since rewarming  - Diuresis as able  - Monitor for s/s of bleeding  - Slowly rewarm off ECMO circuit  - ACT goal 140-160     Neurology: Intubated, sedated, paralyzed. Cooled to 34 degrees, slowly rewarming on 10/15  - NeuroCrit consulted  - Continue versed gtt, and fent gtt as needed  - CT Head ordered on admission, no acute changes   Cardiovascular / Hemodynamics: Refractory VF arrest s/p peripheral V-A ECMO at OSH on 10/13/19.  TTE: LVEF 5-10%  - Wean pressors/inotropes as able  - Possible turndown echo in next 1-2 days  - Continue ASA 81mg and ticagrelor 90mg BID  - Held temp at 34 degrees for 24 hours then started rewarming  - ACT goal 140-160  - Hold lipitor for now given likely hepatic injury during arrest  - Hold ACE/ARB for now given likely reduced renal fxn after arrest  - Holding beta blocker given shock    Pulmonary: Now weaning vent requirements. Large hemothorax on 10/14 s/p right-sided chest tube.  CXR: stable devices  - Wean vent as able  - Monitor CT output  - Daily CXR  - Q2H ABGs for now  - Consider scheduled duonebs if signs of lung dz, currently PRN     GI and Nutrition: Per Pt's wife, he is an alcoholic. Concern for possible GIB given black OG output.  - Monitor BID LFTs  - NPO while cooled - nutrition consult pending feeding tube placement once he is warmed   - Bowel regimen - on hold for now due to hypothermia  - GI Prophylaxis: PPI gtt   Renal, Fluid and Electrolytes: - Monitor urine output  - Maintain K>3 and Mg>2    Infectious Disease: No signs of infection. Leukocytosis c/w arrest. Blood cultures collected.   -  Vancomycin/zosyn x5 days for ECMO  - Daily blood cultures  - Monitor for signs of infection given cooling, lines, and leukocytosis   Hematology and Oncology: Receiving heparin for ECMO and ASA/ticagrelor for KAMRON. Large intramuscular hematoma of right pec and hemothorax on 10/14 with possible GIB as above.  - Cryo PRN fibrinogen < 200; FFP for INR >2  - Transfuse for Hgb<10, Plts<100  - Heparin gtt for ECMO with ACT goal 140-160 given bleeding concerns as above  - US LE w/ arterial duplex per ECMO protocol   - DVT PPX: Heparin as above  - Right chest wall hematoma: monitor exam and Hgb closely   Endocrinology: No known medical history. BG elevated.  - Insulin gtt PRN   Lines: Restraint: needed    Current lines are required for patient management       Family updated today: Yes  Code Status: FULL    Pt was seen and examined with Dr. Santillan, who agrees with the assessment and plan.    Paradise Villareal MD  Cardiology Fellow      Interval History    S/p chest tube placement at bedside last night with car blood output. Pt was weaned off epi and overall able to decrease amount of product given overnight.    ROS: Unable to obtain as Pt is intubated and sedated.    Data reviewed today: I reviewed all new labs and imaging results over the last 24 hours. I personally reviewed:    Physical Exam   Temp: 97.3  F (36.3  C) Temp src: Bladder     Heart Rate: 55 Resp: 14 SpO2: 100 % O2 Device: Mechanical Ventilator    Vitals:    10/13/19 1905 10/14/19 0300 10/15/19 0200   Weight: 75 kg (165 lb 5.5 oz) 98 kg (216 lb 0.8 oz) 109.2 kg (240 lb 11.9 oz)     Vital Signs with Ranges  Temp:  [92.1  F (33.4  C)-97.3  F (36.3  C)] 97.3  F (36.3  C)  Heart Rate:  [49-78] 55  Resp:  [14-15] 14  MAP:  [52 mmHg-206 mmHg] 65 mmHg  Arterial Line BP: ()/(-) 81/50  FiO2 (%):  [40 %-60 %] 50 %  SpO2:  [90 %-100 %] 100 %  I/O last 3 completed shifts:  In: 7490.53 [I.V.:4260.53; NG/GT:90]  Out: 2093 [Urine:1373; Emesis/NG output:250;  "Chest Tube:470]    Heart Rate: 55, Temperature 97.3  F (36.3  C), resp. rate 14, height 1.89 m (6' 2.41\"), weight 109.2 kg (240 lb 11.9 oz), SpO2 100 %.  240 lbs 11.88 oz  GEN:  Intubated, sedated, cool  CV:  RRR, IABP augmentation  LUNGS: Mechanical breath sounds, diminished along right side, right-sided chest tube in place  ABD: Soft BS, soft, mildly distended  EXT: Cool, trace edema, dopplerable pulses  SKIN: Large hematoma along chest wall    Medications     amiodarone 0.5 mg/min (10/15/19 0700)     dextrose 50 mL/hr at 10/15/19 0700     EPINEPHrine IV infusion ADULT Stopped (10/14/19 2300)     fentaNYL 100 mcg/hr (10/15/19 0700)     heparin 2 unit/mL in 0.9% NaCl 3 mL/hr (10/15/19 0700)     HEParin 600 Units/hr (10/15/19 0700)     insulin (regular) Stopped (10/14/19 2300)     midazolam 8 mg/hr (10/15/19 0700)     norepinephrine 0.1 mcg/kg/min (10/15/19 0700)     pantoprazole (PROTONIX) infusion ADULT/PEDS GREATER than or EQUAL to 45 kg 8 mg/hr (10/15/19 0700)     phenylephrine       sodium chloride       vasopressin (PITRESSIN) infusion ADULT (40 mL) 4 Units/hr (10/15/19 0700)       artificial tears   Both Eyes Q8H     aspirin  81 mg Per Feeding Tube Daily     piperacillin-tazobactam  3.375 g Intravenous Q6H     sodium chloride (PF)  3 mL Intracatheter Q8H     ticagrelor  90 mg Oral or Feeding Tube BID     vancomycin (VANCOCIN) IV  1,500 mg Intravenous Q24H       Data   Recent Labs   Lab 10/15/19  0341 10/14/19  2159 10/14/19  1812 10/14/19  1612   WBC 8.0 7.4 8.4 9.0   HGB 8.5* 7.9* 7.2* 8.0*   MCV 92 92 96 98   PLT 86* 80* 88* 84*   INR 1.29* 1.29* 1.34* 1.49*   * 145* 143 142   POTASSIUM 4.1 2.9* 3.0* 3.0*   CHLORIDE 110* 110* 109 110*   CO2 26 24 19* 18*   BUN 15 16 14 14   CR 0.84 0.77 0.84 0.77   ANIONGAP 9 10 15* 14   HEATHER 7.8* 7.9* 6.7* 6.5*   *  105* 97  104* 187* 200*  209*   ALBUMIN 3.0* 2.8*  --  3.0*   PROTTOTAL 5.0* 4.7*  --  4.7*   BILITOTAL 2.2* 1.6*  --  1.4*   ALKPHOS 37* 34*  " --  29*   ALT 36 39  --  38   * 173*  --  185*   TROPI 59.428* 59.890*  --  45.774*       Recent Results (from the past 24 hour(s))   CT Chest Abdomen Pelvis w/o Contrast    Narrative    EXAMINATION: CT CHEST ABDOMEN PELVIS W/O CONTRAST, 10/14/2019 7:24 PM    TECHNIQUE:  Helical CT images from the thoracic inlet through the  symphysis pubis were obtained  without contrast.     COMPARISON: CT CAP 10/13/2019    HISTORY: ECMO Pt with increasing pressor needs and concern for  bleeding    FINDINGS:    Chest: Redemonstration of endotracheal tube projecting at the thoracic  inlet. There is layering debris in the trachea and both bronchi. ECMO  cannula visualized in the low SVC. Left sided IJ central venous  catheter with tip located in the lower SVC. The main pulmonary artery  and aorta are normal in diameter. Mild hemopericardium. Unchanged  appearance of mild retrosternal hematoma. LAD stent once again  visualized. Esophageal temperature probe with tip located in the mid  thoracic esophagus. Multiple enlarged periaortic lymph nodes measuring  1.2 x 1.5 cm (series 4 image 69).    There is increased fluid attenuation and consolidation, now complete,  of the right lower lobe. Increased consolidation in the right upper  lobe with continued nodular and groundglass opacities. Probable right  pleural effusion. Small left-sided pleural effusion with new  associated consolidation. No appreciable pneumothorax.     Abdomen and pelvis:   Liver is homogeneous. No focal hepatic lesion. Spleen is unremarkable.  Pancreas is within normal limits. Vicarious excretion of contrast from  the gallbladder. Bilaterally symmetric appearing kidneys. Unchanged  appearance of small right adrenal adenoma. Unchanged appearance of  left adrenal nodule. Unchanged exophytic cyst from the inferior pole  of the right kidney. Moderately prominent right renal pelvis.  Surrounding fat stranding adjacent to the kidneys is within normal  limits. No  hydronephrosis. The bladder is decompressed with Lawrence  catheter in place.     Large bowel is normal in caliber. Diverticulosis without evidence of  diverticulitis. There is mild thickening of the large bowel most  prominently in the right lower quadrant. The appendix is unremarkable.  Small bowel is normal in size and appearance. Enteric tube with tip  located in the pylorus.    Bilateral lower posterior arterial lines with the right terminating in  the mid iliac artery and the left terminating in the distal aorta.  There is mild fusiform dilation of the infrarenal aorta. Bilateral  femoral vein approach ECMO cannulas.    No suspicious abdominal or pelvic lymphadenopathy.     Bones and soft tissues: Diffuse tissue anasarca. Once again there is  mildly displaced fractures of the bilateral 2nd through 4th ribs  anteriorly. Nondisplaced fracture of the first ribs posteriorly.  Mildly displaced fracture of the sternal body. Again there is a right  pectoral intramuscular hematoma which appears similar. Mild hemorrhage  at the sites of puncture in the right groin.      Impression    IMPRESSION:     1. Increased right lower and upper lobe confluent consolidation which  may represent worsening aspiration or infection.  2. New left lower lobe aspiration, infection or atelectasis.   3. Probable bilateral pleural effusions  4. Unchanged right retropectoral hematoma, small anterior mediastinal  hematoma, hemopericardium and small right groin hematoma.  5. Redemonstration of displaced fractures of the bilateral second  through fourth ribs anteriorly and nondisplaced fracture of the first  ribs posteriorly.  6. Similar appearing mildly displaced sternal fracture with associated  small retrosternal hematoma and hemopericardium.  7. Endotracheal tube projects near the thoracic inlet. Consider  advancing.    I have personally reviewed the examination and initial interpretation  and I agree with the findings.    JOCELYN  MD GLORIA   XR Chest Port 1 View    Narrative    EXAMINATION: XR CHEST PORT 1 VW, 10/14/2019 9:09 PM    INDICATION: f/u chest tube placement    COMPARISON: Chest radiograph 10/13/2019    FINDINGS: Single AP supine radiograph of the chest.    Defibrillator pads projecting over the mid chest. Endotracheal tube  projects just above the thoracic inlet. Esophageal temperature probe  projects in the mid thoracic esophagus. Enteric tube with tip obscured  by the defibrillator pads, however appears to be within the stomach.  ECMO cannula visualized. Intra-aortic balloon pump in unchanged  position. Left IJ central venous catheter in the mid SVC. Interval  replacement of right apical chest tube. No appreciable pneumothorax.  There are diffuse right lung opacities. Left basilar opacities.  Bilateral costophrenic angles are collimated off the field-of-view.      Impression    IMPRESSION:  1. Intervally placed right apical chest tube with improved aeration of  the right lung  2. Endotracheal tube with tip projecting above the thoracic inlet.  Consider advancing.  3. Right lung consolidation and left base consolidation with possible  pleural effusions.     I have personally reviewed the examination and initial interpretation  and I agree with the findings.    JOCELYN CASTRO MD

## 2019-10-15 NOTE — PROGRESS NOTES
Neuro- PERRL. No spontaneous movement or movement to painful stimuli. No seizure-like activity noted. BIS 50-70. Versed for sedation; continuing to wean as pt tolerates. Fentanyl for pain control. Re-warming slowly via ECMO circuit.  CV- SR/SB with frequent ectopy. IABP 1:1, 100% augmentation. MAP goal >60. Epi weaned off, continuing to titrate vaso and norepi. ECMO continues without issues. 1 unit PRBCs given for hgb<8. 2 units platelets given for platelet<100.  Pulm-Remains on vent, CMV settings. CT inserted at bedside per Dr. Santillan. Approx. 400mL out this shift.   GI- Hypoactive BS. BG low; insulin infusion turned off. D10 started at 50mL/hr, as ordered.  - Adequate UOP per bahena this shift  Skin-No new concerns. Repositioned Q0xsytn.  See flow sheets for further interventions and assessments. Continuing to monitor.

## 2019-10-15 NOTE — PROGRESS NOTES
Preliminary Video EEG impression on October 14-15, 2019 until 6am   Full report to follow. Please look in inpatient chart, under procedure section.     This video EEG is abnormal due to the presences of continuous generalized polymorphic delta/theta slowing. In the last 24 hours patient's temperature is gradually increased anesthetic drip medications have decreased.  Patient continues to have diffuse generalized theta slowing at times with faster rhythms.  There is no parieto-occipital rhythm noted overnight or sleep architecture.      This EEG is consistent with a severe diffuse encephalopathy in the setting of hypothermia treatment and anesthetic drip medications.  As patient is being rewarmed and there is a reduction of anesthetic drip medication there is no emergence of seizures. No epileptiform discharges or electrographic seizures were seen in this record. If events of interest are noted, please press the event button. Clinical correlation is advised.     Viri Lala MD  Staff Epilepsy Neurologist   [phone]

## 2019-10-15 NOTE — PROGRESS NOTES
Social Work Services Progress Note    Hospital Day: 3  Date of Initial Social Work Evaluation:  N/A  Collaborated with:  Pt's wife Jackelyn, [employer]SI team, bedside RN    Data:  Joel Campbell is a 53 year old male who was cannulated for ECMO 10/13/19 due to refractory VF arrest    Intervention:  SW was contacted by bedside RN to assist wife with FMLA paperwork. SW and wife reviewed FMLA paperwork that had been faxed by her job and the patient's job for completion by MD. SW left a copy with wife and gave copy to [employer] CSI team for completion. Alerted wife that we may not be able to complete it before Thursday, which she says is fine.    Emotional support provided, facilitated FMLA paperwork completion.    Assessment:  Pt remains critically ill    Plan:    Anticipated Disposition:  TBD    Barriers to d/c plan:  Pt remains critically ill    Follow Up:  SW will continue to remain available for patient and family support, discharge planning, other resources and support PRN.    JERRY Anderson, [facility]  [facility]    P: [phone]  Pager: [phone]

## 2019-10-15 NOTE — PROGRESS NOTES
Preliminary Video EEG impression on October 14, 2019 1st 2 hours  Full report to follow. Please look in inpatient chart, under procedure section.     This video EEG is abnormal due to the presences of continuous generalized polymorphic delta slowing. There is no clear parieto-occipital rhythm or well formed sleep architecture. EEG is  reactive with stimulation. This EEG is consistent with a severe diffuse encephalopathy. No epileptiform discharges or electrographic seizures were seen in this record. If events of interest are noted, please press the event button. Clinical correlation is advised.     Viri Lala MD  Staff Epilepsy Neurologist   [phone]

## 2019-10-15 NOTE — PROGRESS NOTES
ECMO Shift Summary:    Patient remains on [facility] ECMO, all equipment is functioning and alarms are appropriately set. RPM's 3100 with flow range 3.40-3.70 L/min. Sweep gas is at 5 LPM and FiO2 80%. Circuit remains free of air, clot and fibrin. Cannulas are secure with no bleeding from site. Extremities are cool and mottled. Suctioned ETT for thick blood streaked.    Significant Shift Events: Chest tube placed in right pleural space.    Vent settings:  Ventilation Mode: CMV/AC  (Continuous Mandatory Ventilation/ Assist Control)  FiO2 (%): 50 %  Rate Set (breaths/minute): 14 breaths/min  Tidal Volume Set (mL): 500 mL  PEEP (cm H2O): 7 cmH2O  Oxygen Concentration (%): 50 %  Resp: 14  .    Heparin is running at 600 u/hr, ACT range 126-175.    Urine output is good, blood loss was 480 mls from RCT. Product given included one unit RBC's, 2 units platelets.      Intake/Output Summary (Last 24 hours) at 10/15/2019 0625  Last data filed at 10/15/2019 0600  Gross per 24 hour   Intake 7490.53 ml   Output 2093 ml   Net 5397.53 ml       ECHO:  No results found for this or any previous visit.No results found for this or any previous visit.    CXR:  Recent Results (from the past 24 hour(s))   CT Chest Abdomen Pelvis w/o Contrast    Narrative    EXAMINATION: CT CHEST ABDOMEN PELVIS W/O CONTRAST, 10/14/2019 7:24 PM    TECHNIQUE:  Helical CT images from the thoracic inlet through the  symphysis pubis were obtained  without contrast.     COMPARISON: CT CAP 10/13/2019    HISTORY: ECMO Pt with increasing pressor needs and concern for  bleeding    FINDINGS:    Chest: Redemonstration of endotracheal tube projecting at the thoracic  inlet. There is layering debris in the trachea and both bronchi. ECMO  cannula visualized in the low SVC. Left sided IJ central venous  catheter with tip located in the lower SVC. The main pulmonary artery  and aorta are normal in diameter. Mild hemopericardium. Unchanged  appearance of mild retrosternal hematoma.  LAD stent once again  visualized. Esophageal temperature probe with tip located in the mid  thoracic esophagus. Multiple enlarged periaortic lymph nodes measuring  1.2 x 1.5 cm (series 4 image 69).    There is increased fluid attenuation and consolidation, now complete,  of the right lower lobe. Increased consolidation in the right upper  lobe with continued nodular and groundglass opacities. Probable right  pleural effusion. Small left-sided pleural effusion with new  associated consolidation. No appreciable pneumothorax.     Abdomen and pelvis:   Liver is homogeneous. No focal hepatic lesion. Spleen is unremarkable.  Pancreas is within normal limits. Vicarious excretion of contrast from  the gallbladder. Bilaterally symmetric appearing kidneys. Unchanged  appearance of small right adrenal adenoma. Unchanged appearance of  left adrenal nodule. Unchanged exophytic cyst from the inferior pole  of the right kidney. Moderately prominent right renal pelvis.  Surrounding fat stranding adjacent to the kidneys is within normal  limits. No hydronephrosis. The bladder is decompressed with Lawrence  catheter in place.     Large bowel is normal in caliber. Diverticulosis without evidence of  diverticulitis. There is mild thickening of the large bowel most  prominently in the right lower quadrant. The appendix is unremarkable.  Small bowel is normal in size and appearance. Enteric tube with tip  located in the pylorus.    Bilateral lower posterior arterial lines with the right terminating in  the mid iliac artery and the left terminating in the distal aorta.  There is mild fusiform dilation of the infrarenal aorta. Bilateral  femoral vein approach ECMO cannulas.    No suspicious abdominal or pelvic lymphadenopathy.     Bones and soft tissues: Diffuse tissue anasarca. Once again there is  mildly displaced fractures of the bilateral 2nd through 4th ribs  anteriorly. Nondisplaced fracture of the first ribs posteriorly.  Mildly  displaced fracture of the sternal body. Again there is a right  pectoral intramuscular hematoma which appears similar. Mild hemorrhage  at the sites of puncture in the right groin.      Impression    IMPRESSION:     1. Increased right lower and upper lobe confluent consolidation which  may represent worsening aspiration or infection.  2. New left lower lobe aspiration, infection or atelectasis.   3. Probable bilateral pleural effusions  4. Unchanged right retropectoral hematoma, small anterior mediastinal  hematoma, hemopericardium and small right groin hematoma.  5. Redemonstration of displaced fractures of the bilateral second  through fourth ribs anteriorly and nondisplaced fracture of the first  ribs posteriorly.  6. Similar appearing mildly displaced sternal fracture with associated  small retrosternal hematoma and hemopericardium.  7. Endotracheal tube projects near the thoracic inlet. Consider  advancing.    I have personally reviewed the examination and initial interpretation  and I agree with the findings.    JOCELYN CASTRO MD   XR Chest Port 1 View    Narrative    EXAMINATION: XR CHEST PORT 1 VW, 10/14/2019 9:09 PM    INDICATION: f/u chest tube placement    COMPARISON: Chest radiograph 10/13/2019    FINDINGS: Single AP supine radiograph of the chest.    Defibrillator pads projecting over the mid chest. Endotracheal tube  projects just above the thoracic inlet. Esophageal temperature probe  projects in the mid thoracic esophagus. Enteric tube with tip obscured  by the defibrillator pads, however appears to be within the stomach.  ECMO cannula visualized. Intra-aortic balloon pump in unchanged  position. Left IJ central venous catheter in the mid SVC. Interval  replacement of right apical chest tube. No appreciable pneumothorax.  There are diffuse right lung opacities. Left basilar opacities.  Bilateral costophrenic angles are collimated off the field-of-view.      Impression    IMPRESSION:  1.  Intervally placed right apical chest tube with improved aeration of  the right lung  2. Endotracheal tube with tip projecting above the thoracic inlet.  Consider advancing.  3. Right lung consolidation and left base consolidation with possible  pleural effusions.     I have personally reviewed the examination and initial interpretation  and I agree with the findings.    JOCELYN CASTRO MD       Labs:  Recent Labs   Lab 10/15/19  0557 10/15/19  0341 10/15/19  0209 10/14/19  2353   PH 7.40 7.39 7.39 7.41   PCO2 39 45 43 35   PO2 92 140* 153* 267*   HCO3 24 27 26 22   O2PER 50.0 50.0 50.0 50.0       Lab Results   Component Value Date    HGB 8.5 (L) 10/15/2019    PHGB 180 (H) 10/14/2019    PLT 86 (L) 10/15/2019    FIBR 346 10/15/2019    INR 1.29 (H) 10/15/2019    PTT 67 (H) 10/15/2019    DD 3.1 (H) 10/15/2019    AXA 0.10 10/15/2019    ANTCH 29 (LL) 10/14/2019         Plan is to continue ECMO support.      Jodie Carolina, RT  10/15/2019 6:25 AM

## 2019-10-15 NOTE — PROGRESS NOTES
ECMO Attending Progress Note  10/14/2019    Joel Campbell is a 53 year old male who was cannulated for ECMO 10/13/19 due to refractory VF arrest    Interval events: still cooled, changing pressors/inotropes, LA improving    Physical Exam:  Temp:  [59.4  F (15.2  C)-94.6  F (34.8  C)] (P) 94.6  F (34.8  C)  Heart Rate:  [38-78] 71  Resp:  [14] (P) 14  MAP:  [52 mmHg-92 mmHg] 92 mmHg  Arterial Line BP: ()/(-17-72) 116/72  FiO2 (%):  [40 %-70 %] (P) 60 %  SpO2:  [93 %-100 %] 94 %    Intake/Output Summary (Last 24 hours) at 10/14/2019 2050  Last data filed at 10/14/2019 2000  Gross per 24 hour   Intake 8173.93 ml   Output 2113 ml   Net 6060.93 ml    Ventilation Mode: CMV/AC  (Continuous Mandatory Ventilation/ Assist Control)  FiO2 (%): 40 %  Rate Set (breaths/minute): 14 breaths/min  Tidal Volume Set (mL): 500 mL  PEEP (cm H2O): 7 cmH2O  Oxygen Concentration (%): 40 %  Resp: 14     General: no acute distress, intubated and sedated  HEENT: PERRLA, conjunctiva clear, without icterus or pallor, oropharyx clear  Cardiac: RRR nl S1S2   Lungs: clear to auscultation and percussion bilaterally anterior  Abdomen: soft, nontender, non distended, no hepatosplenomegaly or masses  Extremities: without edema or cyanosis; pulses are dopplerable;   Skin: normal skin appearance without worrisome lesions.   Neurologic:intubated and sedated    Labs:  Recent Labs   Lab 10/14/19  2036 10/14/19  1759 10/14/19  1612 10/14/19  1332   PH 7.37 7.30* 7.25* 7.29*   PCO2 42 40 40 42   PO2 152* 70* 107* 208*   HCO3 24 20* 18* 20*   O2PER 60.0 40.0 40.0 40      Recent Labs   Lab 10/14/19  1812 10/14/19  1612 10/14/19  0943 10/14/19  0424   WBC 8.4 9.0 8.3 9.8   HGB 7.2* 8.0* 8.3* 9.0*     Creatinine   Date Value Ref Range Status   10/14/2019 0.84 0.66 - 1.25 mg/dL Final   10/14/2019 0.77 0.66 - 1.25 mg/dL Final   10/14/2019 0.68 0.66 - 1.25 mg/dL Final   10/14/2019 0.68 0.66 - 1.25 mg/dL Final       ECMO Issues including assessments and plan on  DOS 10/14/2019:  Neuro: Sedated for mechanical ventilation and ECMO.  NIRS stable b/l  RASS goal: -3  CV: Cardiogenic shock.  Hemodynamically stable on multiple pressors  Pulm: Flows unchanged at 3.5, Sweep unchanged at 7, Keep vent settings at rest settings  FEN/Renal: Electrolytes stable w/ replacement protocols in place, Cr stable, UOP stable  Heme: ACT goal: 140-160, Hemoglobin declining .  Goals: if O2 sat >85% Hgb >8.  If O2 Sat <85% keep Hgb 10-12.  Minimal oozing around the ECMO cannulas.  ID: Receiving empiric antibiotics  Cannulae: Position is acceptable on exam and the available imaging.  Distal perfusion cannula is in place and patent.     I have personally reviewed the ECMO flows, oxygenation and CO2 clearance, anticoagulation, and cannula position.  I have also personally assessed the patient's systemic response with hemodynamics, oxygenation, ventilation, and bleeding.       The patient requires continued ECMO support and management in the ICU.  I have discussed patient care and treatment plan with the primary team.      Juan Pablo Santillan MD, PhD  Interventional/Critical Care Cardiology  [phone]    October 14, 2019

## 2019-10-15 NOTE — PROCEDURES
Procedure Date: 10/14/2019      EEG #-1 (Video EEG day 1)      DATE OF RECORDING/SERVICE DATE:  10/14/2019      SOURCE FILE DURATION:  14 hours, 28 minutes.      PATIENT INFORMATION:  A 53-year-old male who presents after cardiac arrest.  The patient has been placed on hypothermia treatment.  Per outside hospital, the patient had seizure-like episode on 10/12/2019.  EEG is being done to evaluate for seizures.     TECHNICAL SUMMARY: This video EEG monitoring procedure was performed with 23 scalp electrodes in 10-20 system placements, and additional scalp, precordial and other surface electrodes used for electrical referencing and artifact detection. Video was reviewed intermittently by EEG technologist and physician for electroclinical seizures.      MEDICATIONS:  Fentanyl, propofol drip was stopped at 12:05 and patient is on midazolam.  On this day of recording, temperature is 92 to 93 degrees Fahrenheit.  Patient is on midazolam 4-6 mg per hour and propofol 50-60 mcg per kilogram per minute.      EEG FINDINGS:  The patient has diffuse generalized theta slowing.  Electrocerebral potentials are 3-5 Hz in frequency.  Well-formed parietooccipital rhythm or sleep architecture is not seen.  The patient does have somewhat of a monomorphic theta pattern noted throughout the entire EEG.      ACTIVATION PROCEDURES:  Auditory and sensory stimuli given and patient's EEG does not appear to deviate from baseline EEG.  There is electromyogenic artifact intermittently throughout the recording.      EPILEPTIFORM DISCHARGES:  None.      ICTAL:  None.      IMPRESSION:  Video EEG day 1 is abnormal due to the presence of diffuse generalized theta slowing with no parietooccipital rhythm.  These findings are consistent with severe encephalopathy.  On this day of recording, the patient is in hypothermia treatment and anesthetic drip medication which would account for this degree of slowing on the EEG.  No epileptiform discharges or  electrographic seizures were seen.  Clinical correlation is advised.         DUSTY SCHWARTZ MD             D: 10/15/2019   T: 10/15/2019   MT: CLEMENTINE      Name:     JF COLLAZO   MRN:      [id number]        Account:        [id number]   :      1965           Procedure Date: 10/14/2019      Document: [id number]

## 2019-10-15 NOTE — PLAN OF CARE
Problem: Goal Outcome Summary  Goal: Goal Outcome Summary  Outcome:  Sift Duration: 2731-0855      NEURO: LULI, WD to pain in all for extremities, opens eyes with oral care. Weaning sedation down throughout day.  CARDIAC: Increasing pulsatility, in arterial line, over the day. Vasopressin and Levophed weaned to off. IABP without issues. MEAN >65.  PULM: R lung very coarse crackles throughout. L lung clear and diminished. No sputum suctioned out all day. Lavaged once and still no secretions. PO2 fluctuating on blood gas. Low PO2 when lying on L side. MD increased PEEP, will recheck ABG at 1800.  GI/: No BM. 200 ml Maroon output from NG this afternoon, MD aware, continues on Protonix drip. Monitoring CBC and Coag's, transfusing as ordered. Good UO after lasix this morning. Electrolytes replaced.  SKIN: Intact except lines and drains. This morning feet cool, pale, cyanotic, knees cyanotic, L ear cyanotic-all steadily improved all day.  MOBILITY: Tolerating turns

## 2019-10-15 NOTE — PLAN OF CARE
Problem: Adult Inpatient Plan of Care  Goal: Plan of Care Review  Outcome: No Change  Goal: Patient-Specific Goal (Individualization)  Outcome: No Change  Goal: Absence of Hospital-Acquired Illness or Injury  Outcome: No Change  Goal: Optimal Comfort and Wellbeing  Outcome: No Change  Goal: Readiness for Transition of Care  Outcome: No Change  Goal: Rounds/Family Conference  Outcome: No Change     Problem: Bleeding (Extracorporeal Life Support/ECMO)  Goal: Absence of Bleeding  Outcome: No Change     Problem: Cardiac Output Decreased (Extracorporeal Life Support/ECMO)  Goal: Effective Cardiac Output  Outcome: No Change     Problem: Device-Related Complication Risk (Extracorporeal Life Support/ECMO)  Goal: Optimal Device Function  Outcome: No Change     Problem: Infection (Extracorporeal Life Support/ECMO)  Goal: Absence of Infection Signs/Symptoms  Outcome: No Change     Problem: Parent/Caregiver Adjustment to Therapy (Extracorporeal Life Support/ECMO)  Goal: Optimal Parent/Caregiver Coping  Outcome: No Change

## 2019-10-15 NOTE — PROCEDURES
Cardiac ICU Procedure Note  Procedure: Chest Tube Insertion  Indication: Right sided Hemothorax  Attending Provider: Dr. Juan Pablo Santillan    Risks and benefits of the procedure were discussed with the patient in detail.  Consent was signed and placed in the chart.  The site was marked and time out taken.      Mr. Joel Campbell was requiring multiple units of PRBCs leading to chest CT demonstrating a large R-sided hemothorax.  He was only requiring 40% FiO2 on the vent at the time but his vent pressures were bryan.  The patient was positioned in the supine position.  The area was cleaned w/ chlorhexadine before being draped in sterile fashion. No lidocaine was given due to comatose state. A 2cm incision was made in the upper lateral chest.  Blunt dissection was performed to the ribs.  A large curved clamp was used to guide the 32Fr chest tube through the pleura.  The tube was advanced and bloody fluid was immediately emerging into the tube.  The tube was connected to an atrium for continued fluid removal.  Post procedure CXR ordered immediately and pt had b/l breath sounds to auscultation. The patient tolerated the procedure well and there were no complications.    Juan Pablo Santillan MD, PhD  Interventional/Critical Care Cardiology  [phone]    October 14, 2019

## 2019-10-15 NOTE — PHARMACY-VANCOMYCIN DOSING SERVICE
Pharmacy Vancomycin Note  Date of Service 2019  Patient's  1965   53 year old, male    Indication:  ECMO ppx   Goal Trough Level: 15-20 mg/L  Day of Therapy: 2  Current Vancomycin regimen:  1500 mg IV q12h    Current estimated CrCl = Estimated Creatinine Clearance: 141 mL/min (based on SCr of 0.84 mg/dL).    Creatinine for last 3 days  10/13/2019:  7:47 PM Creatinine 0.67 mg/dL; 11:51 PM Creatinine 0.60 mg/dL  10/14/2019:  4:24 AM Creatinine 0.68 mg/dL;  9:43 AM Creatinine 0.68 mg/dL;  4:12 PM Creatinine 0.77 mg/dL;  6:12 PM Creatinine 0.84 mg/dL    Recent Vancomycin Levels (past 3 days)  10/14/2019:  8:36 PM Vancomycin Level 13.4 mg/L    Vancomycin IV Administrations (past 72 hours)                   vancomycin 1500 mg in 0.9% NaCl 250 ml intermittent infusion 1,500 mg (mg) 1,500 mg Given 10/14/19 0823     1,500 mg Given 10/13/19 2334                Nephrotoxins and other renal medications (From now, onward)    Start     Dose/Rate Route Frequency Ordered Stop    10/14/19 1530  phenylephrine (MCKENNA-SYNEPHRINE) 50 mg in sodium chloride 0.9 % 250 mL infusion      0.5-6 mcg/kg/min × 75 kg (Dosing Weight)  11.3-135 mL/hr  Intravenous CONTINUOUS 10/14/19 1519      10/13/19 2045  vancomycin 1500 mg in 0.9% NaCl 250 ml intermittent infusion 1,500 mg      1,500 mg  over 90 Minutes Intravenous EVERY 12 HOURS 10/13/19 2041 10/18/19 2044    10/13/19 2000  piperacillin-tazobactam (ZOSYN) 3.375 g vial to attach to  mL bag      3.375 g  over 30 Minutes Intravenous EVERY 6 HOURS 10/13/19 1902 10/18/19 1959    10/13/19 1945  vasopressin (VASOSTRICT) 40 Units in D5W 40 mL infusion      2.4 Units/hr  2.4 mL/hr  Intravenous CONTINUOUS 10/13/19 1943      10/13/19 191  norepinephrine (LEVOPHED) 16 mg in  mL infusion      0.03-0.4 mcg/kg/min × 75 kg (Dosing Weight)  2.1-28.1 mL/hr  Intravenous CONTINUOUS 10/13/19 903               Contrast Orders - past 72 hours (72h ago, onward)    None           Interpretation of levels and current regimen:  Trough level is an early level drawn after 2 doses, not at steady state, and is  Subtherapeutic. However level is higher than should be after 2 doses.     Has serum creatinine changed > 50% in last 72 hours: No; SrCr was 0.60mg/dL on 10/13 currently SrCr = 0.84mg/dL    Urine output:  good urine output    Renal Function: appears Stable however pt is on ECMO    Plan:  1.  Decrease Dose to vancomycin 1500mg IV q24h.  Pt is already almost therapeutic after 2 dose on q12h vancomycin.  Not at steady state.  Anticipate patient will be supra therapeutic once at steady state on this dose. Therefore lengthened dosing interval to q24h    2.  Pharmacy will check trough levels as appropriate in 1-3 Days.    3. Serum creatinine levels will be ordered daily for the first week of therapy and at least twice weekly for subsequent weeks.      Nurys Chan, PharmD

## 2019-10-16 ENCOUNTER — APPOINTMENT (OUTPATIENT)
Dept: ULTRASOUND IMAGING | Facility: CLINIC | Age: 54
DRG: 003 | End: 2019-10-16
Attending: STUDENT IN AN ORGANIZED HEALTH CARE EDUCATION/TRAINING PROGRAM
Payer: COMMERCIAL

## 2019-10-16 ENCOUNTER — APPOINTMENT (OUTPATIENT)
Dept: GENERAL RADIOLOGY | Facility: CLINIC | Age: 54
DRG: 003 | End: 2019-10-16
Attending: STUDENT IN AN ORGANIZED HEALTH CARE EDUCATION/TRAINING PROGRAM
Payer: COMMERCIAL

## 2019-10-16 ENCOUNTER — PREP FOR PROCEDURE (OUTPATIENT)
Dept: CARDIOLOGY | Facility: CLINIC | Age: 54
End: 2019-10-16

## 2019-10-16 ENCOUNTER — APPOINTMENT (OUTPATIENT)
Dept: CARDIOLOGY | Facility: CLINIC | Age: 54
DRG: 003 | End: 2019-10-16
Attending: STUDENT IN AN ORGANIZED HEALTH CARE EDUCATION/TRAINING PROGRAM
Payer: COMMERCIAL

## 2019-10-16 ENCOUNTER — APPOINTMENT (OUTPATIENT)
Dept: CT IMAGING | Facility: CLINIC | Age: 54
DRG: 003 | End: 2019-10-16
Attending: STUDENT IN AN ORGANIZED HEALTH CARE EDUCATION/TRAINING PROGRAM
Payer: COMMERCIAL

## 2019-10-16 ENCOUNTER — ALLIED HEALTH/NURSE VISIT (OUTPATIENT)
Dept: NEUROLOGY | Facility: CLINIC | Age: 54
DRG: 003 | End: 2019-10-16
Attending: PSYCHIATRY & NEUROLOGY
Payer: COMMERCIAL

## 2019-10-16 DIAGNOSIS — I47.20 VENTRICULAR TACHYCARDIA (H): Primary | ICD-10-CM

## 2019-10-16 DIAGNOSIS — I46.9 CARDIAC ARREST (H): ICD-10-CM

## 2019-10-16 DIAGNOSIS — I46.9 CARDIAC ARREST (H): Primary | ICD-10-CM

## 2019-10-16 LAB
ALBUMIN SERPL-MCNC: 2.8 G/DL (ref 3.4–5)
ALBUMIN SERPL-MCNC: 2.9 G/DL (ref 3.4–5)
ALBUMIN SERPL-MCNC: 2.9 G/DL (ref 3.4–5)
ALBUMIN SERPL-MCNC: 3.1 G/DL (ref 3.4–5)
ALP SERPL-CCNC: 54 U/L (ref 40–150)
ALP SERPL-CCNC: 62 U/L (ref 40–150)
ALP SERPL-CCNC: 62 U/L (ref 40–150)
ALP SERPL-CCNC: 66 U/L (ref 40–150)
ALT SERPL W P-5'-P-CCNC: 39 U/L (ref 0–70)
ALT SERPL W P-5'-P-CCNC: 44 U/L (ref 0–70)
ALT SERPL W P-5'-P-CCNC: 47 U/L (ref 0–70)
ALT SERPL W P-5'-P-CCNC: 49 U/L (ref 0–70)
ANION GAP SERPL CALCULATED.3IONS-SCNC: 4 MMOL/L (ref 3–14)
ANION GAP SERPL CALCULATED.3IONS-SCNC: 5 MMOL/L (ref 3–14)
ANION GAP SERPL CALCULATED.3IONS-SCNC: 5 MMOL/L (ref 3–14)
ANION GAP SERPL CALCULATED.3IONS-SCNC: 6 MMOL/L (ref 3–14)
ANION GAP SERPL CALCULATED.3IONS-SCNC: 9 MMOL/L (ref 3–14)
APTT PPP: 45 SEC (ref 22–37)
APTT PPP: 46 SEC (ref 22–37)
APTT PPP: 51 SEC (ref 22–37)
APTT PPP: 51 SEC (ref 22–37)
AST SERPL W P-5'-P-CCNC: 190 U/L (ref 0–45)
AST SERPL W P-5'-P-CCNC: 212 U/L (ref 0–45)
AST SERPL W P-5'-P-CCNC: 215 U/L (ref 0–45)
AST SERPL W P-5'-P-CCNC: 228 U/L (ref 0–45)
AT III ACT/NOR PPP CHRO: 49 % (ref 85–135)
BACTERIA SPEC CULT: NORMAL
BASE EXCESS BLDA CALC-SCNC: 4.6 MMOL/L
BASE EXCESS BLDA CALC-SCNC: 5.2 MMOL/L
BASE EXCESS BLDA CALC-SCNC: 6 MMOL/L
BASE EXCESS BLDA CALC-SCNC: 6.3 MMOL/L
BASE EXCESS BLDA CALC-SCNC: 6.4 MMOL/L
BASE EXCESS BLDA CALC-SCNC: 6.9 MMOL/L
BASE EXCESS BLDA CALC-SCNC: 6.9 MMOL/L
BASE EXCESS BLDA CALC-SCNC: 7.2 MMOL/L
BASE EXCESS BLDA CALC-SCNC: 8.6 MMOL/L
BASE EXCESS BLDA CALC-SCNC: 8.7 MMOL/L
BASE EXCESS BLDA CALC-SCNC: 8.9 MMOL/L
BASE EXCESS BLDA CALC-SCNC: 9.2 MMOL/L
BASE EXCESS BLDA CALC-SCNC: 9.5 MMOL/L
BASE EXCESS BLDA CALC-SCNC: 9.6 MMOL/L
BASE EXCESS BLDA CALC-SCNC: 9.7 MMOL/L
BASE EXCESS BLDV CALC-SCNC: 10.1 MMOL/L
BASE EXCESS BLDV CALC-SCNC: 7.5 MMOL/L
BILIRUB SERPL-MCNC: 3.2 MG/DL (ref 0.2–1.3)
BILIRUB SERPL-MCNC: 3.6 MG/DL (ref 0.2–1.3)
BILIRUB SERPL-MCNC: 3.6 MG/DL (ref 0.2–1.3)
BILIRUB SERPL-MCNC: 4 MG/DL (ref 0.2–1.3)
BLD PROD TYP BPU: NORMAL
BLD UNIT ID BPU: 0
BLD UNIT ID BPU: 0
BLOOD PRODUCT CODE: NORMAL
BLOOD PRODUCT CODE: NORMAL
BPU ID: NORMAL
BPU ID: NORMAL
BUN SERPL-MCNC: 19 MG/DL (ref 7–30)
BUN SERPL-MCNC: 20 MG/DL (ref 7–30)
BUN SERPL-MCNC: 22 MG/DL (ref 7–30)
CA-I BLD-MCNC: 4.3 MG/DL (ref 4.4–5.2)
CA-I BLD-MCNC: 4.5 MG/DL (ref 4.4–5.2)
CA-I BLD-MCNC: 4.6 MG/DL (ref 4.4–5.2)
CALCIUM SERPL-MCNC: 8.2 MG/DL (ref 8.5–10.1)
CALCIUM SERPL-MCNC: 8.3 MG/DL (ref 8.5–10.1)
CALCIUM SERPL-MCNC: 8.4 MG/DL (ref 8.5–10.1)
CALCIUM SERPL-MCNC: 8.5 MG/DL (ref 8.5–10.1)
CALCIUM SERPL-MCNC: 8.5 MG/DL (ref 8.5–10.1)
CHLORIDE SERPL-SCNC: 102 MMOL/L (ref 94–109)
CHLORIDE SERPL-SCNC: 103 MMOL/L (ref 94–109)
CHLORIDE SERPL-SCNC: 104 MMOL/L (ref 94–109)
CHLORIDE SERPL-SCNC: 106 MMOL/L (ref 94–109)
CHLORIDE SERPL-SCNC: 107 MMOL/L (ref 94–109)
CO2 SERPL-SCNC: 28 MMOL/L (ref 20–32)
CO2 SERPL-SCNC: 31 MMOL/L (ref 20–32)
CO2 SERPL-SCNC: 32 MMOL/L (ref 20–32)
CO2 SERPL-SCNC: 33 MMOL/L (ref 20–32)
CO2 SERPL-SCNC: 33 MMOL/L (ref 20–32)
CREAT SERPL-MCNC: 0.99 MG/DL (ref 0.66–1.25)
CREAT SERPL-MCNC: 1.03 MG/DL (ref 0.66–1.25)
CREAT SERPL-MCNC: 1.04 MG/DL (ref 0.66–1.25)
CREAT SERPL-MCNC: 1.09 MG/DL (ref 0.66–1.25)
CREAT SERPL-MCNC: 1.13 MG/DL (ref 0.66–1.25)
CRP SERPL-MCNC: 270 MG/L (ref 0–8)
D DIMER PPP FEU-MCNC: 3.3 UG/ML FEU (ref 0–0.5)
D DIMER PPP FEU-MCNC: 3.3 UG/ML FEU (ref 0–0.5)
ERYTHROCYTE [DISTWIDTH] IN BLOOD BY AUTOMATED COUNT: 17.7 % (ref 10–15)
ERYTHROCYTE [DISTWIDTH] IN BLOOD BY AUTOMATED COUNT: 18 % (ref 10–15)
ERYTHROCYTE [DISTWIDTH] IN BLOOD BY AUTOMATED COUNT: 18.1 % (ref 10–15)
ERYTHROCYTE [DISTWIDTH] IN BLOOD BY AUTOMATED COUNT: 18.2 % (ref 10–15)
ERYTHROCYTE [SEDIMENTATION RATE] IN BLOOD BY WESTERGREN METHOD: 68 MM/H (ref 0–20)
FIBRINOGEN PPP-MCNC: 546 MG/DL (ref 200–420)
FIBRINOGEN PPP-MCNC: 682 MG/DL (ref 200–420)
GFR SERPL CREATININE-BSD FRML MDRD: 73 ML/MIN/{1.73_M2}
GFR SERPL CREATININE-BSD FRML MDRD: 77 ML/MIN/{1.73_M2}
GFR SERPL CREATININE-BSD FRML MDRD: 81 ML/MIN/{1.73_M2}
GFR SERPL CREATININE-BSD FRML MDRD: 82 ML/MIN/{1.73_M2}
GFR SERPL CREATININE-BSD FRML MDRD: 86 ML/MIN/{1.73_M2}
GLUCOSE BLD-MCNC: 86 MG/DL (ref 70–99)
GLUCOSE BLD-MCNC: 88 MG/DL (ref 70–99)
GLUCOSE BLD-MCNC: 89 MG/DL (ref 70–99)
GLUCOSE BLD-MCNC: 90 MG/DL (ref 70–99)
GLUCOSE BLD-MCNC: 90 MG/DL (ref 70–99)
GLUCOSE BLD-MCNC: 94 MG/DL (ref 70–99)
GLUCOSE BLD-MCNC: 95 MG/DL (ref 70–99)
GLUCOSE BLD-MCNC: 95 MG/DL (ref 70–99)
GLUCOSE BLD-MCNC: 98 MG/DL (ref 70–99)
GLUCOSE BLDC GLUCOMTR-MCNC: 79 MG/DL (ref 70–99)
GLUCOSE BLDC GLUCOMTR-MCNC: 82 MG/DL (ref 70–99)
GLUCOSE BLDC GLUCOMTR-MCNC: 86 MG/DL (ref 70–99)
GLUCOSE BLDC GLUCOMTR-MCNC: 89 MG/DL (ref 70–99)
GLUCOSE BLDC GLUCOMTR-MCNC: 90 MG/DL (ref 70–99)
GLUCOSE BLDC GLUCOMTR-MCNC: 91 MG/DL (ref 70–99)
GLUCOSE BLDC GLUCOMTR-MCNC: 99 MG/DL (ref 70–99)
GLUCOSE SERPL-MCNC: 78 MG/DL (ref 70–99)
GLUCOSE SERPL-MCNC: 84 MG/DL (ref 70–99)
GLUCOSE SERPL-MCNC: 90 MG/DL (ref 70–99)
GLUCOSE SERPL-MCNC: 90 MG/DL (ref 70–99)
GLUCOSE SERPL-MCNC: 92 MG/DL (ref 70–99)
HCO3 BLD-SCNC: 29 MMOL/L (ref 21–28)
HCO3 BLD-SCNC: 30 MMOL/L (ref 21–28)
HCO3 BLD-SCNC: 30 MMOL/L (ref 21–28)
HCO3 BLD-SCNC: 31 MMOL/L (ref 21–28)
HCO3 BLD-SCNC: 32 MMOL/L (ref 21–28)
HCO3 BLD-SCNC: 32 MMOL/L (ref 21–28)
HCO3 BLD-SCNC: 33 MMOL/L (ref 21–28)
HCO3 BLD-SCNC: 34 MMOL/L (ref 21–28)
HCO3 BLD-SCNC: 35 MMOL/L (ref 21–28)
HCO3 BLDA-SCNC: 33 MMOL/L (ref 21–28)
HCO3 BLDA-SCNC: 35 MMOL/L (ref 21–28)
HCO3 BLDV-SCNC: 34 MMOL/L (ref 21–28)
HCO3 BLDV-SCNC: 36 MMOL/L (ref 21–28)
HCT VFR BLD AUTO: 23.8 % (ref 40–53)
HCT VFR BLD AUTO: 25.9 % (ref 40–53)
HCT VFR BLD AUTO: 27 % (ref 40–53)
HCT VFR BLD AUTO: 27.3 % (ref 40–53)
HGB BLD-MCNC: 7.8 G/DL (ref 13.3–17.7)
HGB BLD-MCNC: 8.4 G/DL (ref 13.3–17.7)
HGB BLD-MCNC: 8.8 G/DL (ref 13.3–17.7)
HGB BLD-MCNC: 9 G/DL (ref 13.3–17.7)
HGB FREE PLAS-MCNC: <30 MG/DL
INR PPP: 1.1 (ref 0.86–1.14)
INR PPP: 1.11 (ref 0.86–1.14)
INR PPP: 1.15 (ref 0.86–1.14)
INR PPP: 1.23 (ref 0.86–1.14)
KCT BLD-ACNC: 134 SEC (ref 75–150)
KCT BLD-ACNC: 138 SEC (ref 75–150)
KCT BLD-ACNC: 142 SEC (ref 75–150)
KCT BLD-ACNC: 150 SEC (ref 75–150)
KCT BLD-ACNC: 154 SEC (ref 75–150)
KCT BLD-ACNC: 155 SEC (ref 75–150)
LACTATE BLD-SCNC: 0.7 MMOL/L (ref 0.7–2)
LACTATE BLD-SCNC: 1 MMOL/L (ref 0.7–2)
LACTATE BLD-SCNC: 1 MMOL/L (ref 0.7–2)
LACTATE BLD-SCNC: 1.2 MMOL/L (ref 0.7–2)
LDH SERPL L TO P-CCNC: 647 U/L (ref 85–227)
LMWH PPP CHRO-ACNC: <0.1 IU/ML
MAGNESIUM SERPL-MCNC: 2.1 MG/DL (ref 1.6–2.3)
MAGNESIUM SERPL-MCNC: 2.2 MG/DL (ref 1.6–2.3)
MAGNESIUM SERPL-MCNC: 2.2 MG/DL (ref 1.6–2.3)
MAGNESIUM SERPL-MCNC: 2.4 MG/DL (ref 1.6–2.3)
MAGNESIUM SERPL-MCNC: 2.4 MG/DL (ref 1.6–2.3)
MCH RBC QN AUTO: 30.7 PG (ref 26.5–33)
MCH RBC QN AUTO: 30.7 PG (ref 26.5–33)
MCH RBC QN AUTO: 30.8 PG (ref 26.5–33)
MCH RBC QN AUTO: 31 PG (ref 26.5–33)
MCHC RBC AUTO-ENTMCNC: 32.4 G/DL (ref 31.5–36.5)
MCHC RBC AUTO-ENTMCNC: 32.6 G/DL (ref 31.5–36.5)
MCHC RBC AUTO-ENTMCNC: 32.8 G/DL (ref 31.5–36.5)
MCHC RBC AUTO-ENTMCNC: 33 G/DL (ref 31.5–36.5)
MCV RBC AUTO: 94 FL (ref 78–100)
MCV RBC AUTO: 94 FL (ref 78–100)
MCV RBC AUTO: 95 FL (ref 78–100)
MCV RBC AUTO: 95 FL (ref 78–100)
NSE SERPL-MCNC: 28 UG/L (ref 3.7–8.9)
NUM BPU REQUESTED: 1
O2/TOTAL GAS SETTING VFR VENT: 40 %
O2/TOTAL GAS SETTING VFR VENT: 60 %
O2/TOTAL GAS SETTING VFR VENT: 80 %
OXYHGB MFR BLD: 92 % (ref 92–100)
OXYHGB MFR BLD: 94 % (ref 92–100)
OXYHGB MFR BLD: 96 % (ref 92–100)
OXYHGB MFR BLD: 96 % (ref 92–100)
OXYHGB MFR BLD: 97 % (ref 92–100)
OXYHGB MFR BLD: 98 % (ref 92–100)
OXYHGB MFR BLD: 98 % (ref 92–100)
OXYHGB MFR BLDA: 98 % (ref 75–100)
OXYHGB MFR BLDA: 99 % (ref 75–100)
OXYHGB MFR BLDV: 67 %
OXYHGB MFR BLDV: 70 %
PCO2 BLD: 37 MM HG (ref 35–45)
PCO2 BLD: 37 MM HG (ref 35–45)
PCO2 BLD: 43 MM HG (ref 35–45)
PCO2 BLD: 45 MM HG (ref 35–45)
PCO2 BLD: 46 MM HG (ref 35–45)
PCO2 BLD: 47 MM HG (ref 35–45)
PCO2 BLD: 48 MM HG (ref 35–45)
PCO2 BLD: 49 MM HG (ref 35–45)
PCO2 BLD: 50 MM HG (ref 35–45)
PCO2 BLD: 52 MM HG (ref 35–45)
PCO2 BLD: 52 MM HG (ref 35–45)
PCO2 BLD: 53 MM HG (ref 35–45)
PCO2 BLD: 54 MM HG (ref 35–45)
PCO2 BLDA: 55 MM HG (ref 35–45)
PCO2 BLDA: 59 MM HG (ref 35–45)
PCO2 BLDV: 57 MM HG (ref 40–50)
PCO2 BLDV: 59 MM HG (ref 40–50)
PH BLD: 7.39 PH (ref 7.35–7.45)
PH BLD: 7.42 PH (ref 7.35–7.45)
PH BLD: 7.43 PH (ref 7.35–7.45)
PH BLD: 7.44 PH (ref 7.35–7.45)
PH BLD: 7.45 PH (ref 7.35–7.45)
PH BLD: 7.45 PH (ref 7.35–7.45)
PH BLD: 7.46 PH (ref 7.35–7.45)
PH BLD: 7.47 PH (ref 7.35–7.45)
PH BLD: 7.48 PH (ref 7.35–7.45)
PH BLD: 7.5 PH (ref 7.35–7.45)
PH BLD: 7.51 PH (ref 7.35–7.45)
PH BLDA: 7.38 PH (ref 7.35–7.45)
PH BLDA: 7.39 PH (ref 7.35–7.45)
PH BLDV: 7.38 PH (ref 7.32–7.43)
PH BLDV: 7.39 PH (ref 7.32–7.43)
PHOSPHATE SERPL-MCNC: 2.9 MG/DL (ref 2.5–4.5)
PHOSPHATE SERPL-MCNC: 3.2 MG/DL (ref 2.5–4.5)
PHOSPHATE SERPL-MCNC: 3.2 MG/DL (ref 2.5–4.5)
PLATELET # BLD AUTO: 103 10E9/L (ref 150–450)
PLATELET # BLD AUTO: 107 10E9/L (ref 150–450)
PLATELET # BLD AUTO: 110 10E9/L (ref 150–450)
PLATELET # BLD AUTO: 92 10E9/L (ref 150–450)
PO2 BLD: 106 MM HG (ref 80–105)
PO2 BLD: 117 MM HG (ref 80–105)
PO2 BLD: 122 MM HG (ref 80–105)
PO2 BLD: 137 MM HG (ref 80–105)
PO2 BLD: 153 MM HG (ref 80–105)
PO2 BLD: 64 MM HG (ref 80–105)
PO2 BLD: 71 MM HG (ref 80–105)
PO2 BLD: 72 MM HG (ref 80–105)
PO2 BLD: 72 MM HG (ref 80–105)
PO2 BLD: 73 MM HG (ref 80–105)
PO2 BLD: 82 MM HG (ref 80–105)
PO2 BLD: 94 MM HG (ref 80–105)
PO2 BLD: 96 MM HG (ref 80–105)
PO2 BLDA: 228 MM HG (ref 80–105)
PO2 BLDA: 330 MM HG (ref 80–105)
PO2 BLDV: 36 MM HG (ref 25–47)
PO2 BLDV: 39 MM HG (ref 25–47)
POTASSIUM SERPL-SCNC: 3.6 MMOL/L (ref 3.4–5.3)
POTASSIUM SERPL-SCNC: 3.8 MMOL/L (ref 3.4–5.3)
PROT SERPL-MCNC: 5.4 G/DL (ref 6.8–8.8)
PROT SERPL-MCNC: 6 G/DL (ref 6.8–8.8)
PROT SERPL-MCNC: 6.3 G/DL (ref 6.8–8.8)
PROT SERPL-MCNC: 6.3 G/DL (ref 6.8–8.8)
RADIOLOGIST FLAGS: ABNORMAL
RADIOLOGIST FLAGS: ABNORMAL
RBC # BLD AUTO: 2.54 10E12/L (ref 4.4–5.9)
RBC # BLD AUTO: 2.74 10E12/L (ref 4.4–5.9)
RBC # BLD AUTO: 2.84 10E12/L (ref 4.4–5.9)
RBC # BLD AUTO: 2.92 10E12/L (ref 4.4–5.9)
SODIUM SERPL-SCNC: 140 MMOL/L (ref 133–144)
SODIUM SERPL-SCNC: 141 MMOL/L (ref 133–144)
SODIUM SERPL-SCNC: 141 MMOL/L (ref 133–144)
SODIUM SERPL-SCNC: 142 MMOL/L (ref 133–144)
SODIUM SERPL-SCNC: 144 MMOL/L (ref 133–144)
SPECIMEN SOURCE: NORMAL
TRANSFUSION STATUS PATIENT QL: NORMAL
TROPONIN I SERPL-MCNC: 78.99 UG/L (ref 0–0.04)
TROPONIN I SERPL-MCNC: 81.31 UG/L (ref 0–0.04)
TROPONIN I SERPL-MCNC: 88.89 UG/L (ref 0–0.04)
TROPONIN I SERPL-MCNC: 91.87 UG/L (ref 0–0.04)
WBC # BLD AUTO: 5.8 10E9/L (ref 4–11)
WBC # BLD AUTO: 5.9 10E9/L (ref 4–11)
WBC # BLD AUTO: 6.4 10E9/L (ref 4–11)
WBC # BLD AUTO: 6.9 10E9/L (ref 4–11)

## 2019-10-16 PROCEDURE — 86140 C-REACTIVE PROTEIN: CPT | Performed by: STUDENT IN AN ORGANIZED HEALTH CARE EDUCATION/TRAINING PROGRAM

## 2019-10-16 PROCEDURE — 25000128 H RX IP 250 OP 636: Performed by: INTERNAL MEDICINE

## 2019-10-16 PROCEDURE — 25800030 ZZH RX IP 258 OP 636: Performed by: STUDENT IN AN ORGANIZED HEALTH CARE EDUCATION/TRAINING PROGRAM

## 2019-10-16 PROCEDURE — 00000146 ZZHCL STATISTIC GLUCOSE BY METER IP

## 2019-10-16 PROCEDURE — 99233 SBSQ HOSP IP/OBS HIGH 50: CPT | Mod: 25 | Performed by: INTERNAL MEDICINE

## 2019-10-16 PROCEDURE — C9113 INJ PANTOPRAZOLE SODIUM, VIA: HCPCS | Performed by: STUDENT IN AN ORGANIZED HEALTH CARE EDUCATION/TRAINING PROGRAM

## 2019-10-16 PROCEDURE — 25000128 H RX IP 250 OP 636: Performed by: STUDENT IN AN ORGANIZED HEALTH CARE EDUCATION/TRAINING PROGRAM

## 2019-10-16 PROCEDURE — 86901 BLOOD TYPING SEROLOGIC RH(D): CPT | Performed by: INTERNAL MEDICINE

## 2019-10-16 PROCEDURE — P9037 PLATE PHERES LEUKOREDU IRRAD: HCPCS | Performed by: STUDENT IN AN ORGANIZED HEALTH CARE EDUCATION/TRAINING PROGRAM

## 2019-10-16 PROCEDURE — 93308 TTE F-UP OR LMTD: CPT

## 2019-10-16 PROCEDURE — 85610 PROTHROMBIN TIME: CPT | Performed by: STUDENT IN AN ORGANIZED HEALTH CARE EDUCATION/TRAINING PROGRAM

## 2019-10-16 PROCEDURE — 82805 BLOOD GASES W/O2 SATURATION: CPT | Performed by: STUDENT IN AN ORGANIZED HEALTH CARE EDUCATION/TRAINING PROGRAM

## 2019-10-16 PROCEDURE — 93926 LOWER EXTREMITY STUDY: CPT | Mod: RT

## 2019-10-16 PROCEDURE — 33949 ECMO/ECLS DAILY MGMT ARTERY: CPT

## 2019-10-16 PROCEDURE — 25000132 ZZH RX MED GY IP 250 OP 250 PS 637: Performed by: STUDENT IN AN ORGANIZED HEALTH CARE EDUCATION/TRAINING PROGRAM

## 2019-10-16 PROCEDURE — 93005 ELECTROCARDIOGRAM TRACING: CPT

## 2019-10-16 PROCEDURE — 82947 ASSAY GLUCOSE BLOOD QUANT: CPT | Performed by: STUDENT IN AN ORGANIZED HEALTH CARE EDUCATION/TRAINING PROGRAM

## 2019-10-16 PROCEDURE — 93010 ELECTROCARDIOGRAM REPORT: CPT | Performed by: INTERNAL MEDICINE

## 2019-10-16 PROCEDURE — 25000125 ZZHC RX 250: Performed by: STUDENT IN AN ORGANIZED HEALTH CARE EDUCATION/TRAINING PROGRAM

## 2019-10-16 PROCEDURE — 40000281 ZZH STATISTIC TRANSPORT TIME EA 15 MIN

## 2019-10-16 PROCEDURE — 40000014 ZZH STATISTIC ARTERIAL MONITORING DAILY

## 2019-10-16 PROCEDURE — 86900 BLOOD TYPING SEROLOGIC ABO: CPT | Performed by: INTERNAL MEDICINE

## 2019-10-16 PROCEDURE — 70450 CT HEAD/BRAIN W/O DYE: CPT

## 2019-10-16 PROCEDURE — 71045 X-RAY EXAM CHEST 1 VIEW: CPT

## 2019-10-16 PROCEDURE — 25800025 ZZH RX 258: Performed by: STUDENT IN AN ORGANIZED HEALTH CARE EDUCATION/TRAINING PROGRAM

## 2019-10-16 PROCEDURE — 94003 VENT MGMT INPAT SUBQ DAY: CPT

## 2019-10-16 PROCEDURE — 83605 ASSAY OF LACTIC ACID: CPT | Performed by: STUDENT IN AN ORGANIZED HEALTH CARE EDUCATION/TRAINING PROGRAM

## 2019-10-16 PROCEDURE — 86923 COMPATIBILITY TEST ELECTRIC: CPT | Performed by: INTERNAL MEDICINE

## 2019-10-16 PROCEDURE — 40000344 ZZHCL STATISTIC THAWING COMPONENT: Performed by: STUDENT IN AN ORGANIZED HEALTH CARE EDUCATION/TRAINING PROGRAM

## 2019-10-16 PROCEDURE — 85652 RBC SED RATE AUTOMATED: CPT | Performed by: STUDENT IN AN ORGANIZED HEALTH CARE EDUCATION/TRAINING PROGRAM

## 2019-10-16 PROCEDURE — 36415 COLL VENOUS BLD VENIPUNCTURE: CPT | Performed by: INTERNAL MEDICINE

## 2019-10-16 PROCEDURE — 83051 HEMOGLOBIN PLASMA: CPT | Performed by: STUDENT IN AN ORGANIZED HEALTH CARE EDUCATION/TRAINING PROGRAM

## 2019-10-16 PROCEDURE — 85384 FIBRINOGEN ACTIVITY: CPT | Performed by: STUDENT IN AN ORGANIZED HEALTH CARE EDUCATION/TRAINING PROGRAM

## 2019-10-16 PROCEDURE — 20000004 ZZH R&B ICU UMMC

## 2019-10-16 PROCEDURE — 84100 ASSAY OF PHOSPHORUS: CPT | Performed by: INTERNAL MEDICINE

## 2019-10-16 PROCEDURE — 80053 COMPREHEN METABOLIC PANEL: CPT | Performed by: STUDENT IN AN ORGANIZED HEALTH CARE EDUCATION/TRAINING PROGRAM

## 2019-10-16 PROCEDURE — 85520 HEPARIN ASSAY: CPT | Performed by: STUDENT IN AN ORGANIZED HEALTH CARE EDUCATION/TRAINING PROGRAM

## 2019-10-16 PROCEDURE — 85379 FIBRIN DEGRADATION QUANT: CPT | Performed by: STUDENT IN AN ORGANIZED HEALTH CARE EDUCATION/TRAINING PROGRAM

## 2019-10-16 PROCEDURE — 87040 BLOOD CULTURE FOR BACTERIA: CPT | Performed by: INTERNAL MEDICINE

## 2019-10-16 PROCEDURE — 40000275 ZZH STATISTIC RCP TIME EA 10 MIN

## 2019-10-16 PROCEDURE — 83735 ASSAY OF MAGNESIUM: CPT | Performed by: STUDENT IN AN ORGANIZED HEALTH CARE EDUCATION/TRAINING PROGRAM

## 2019-10-16 PROCEDURE — 44500 INTRO GASTROINTESTINAL TUBE: CPT | Performed by: DIETITIAN, REGISTERED

## 2019-10-16 PROCEDURE — P9016 RBC LEUKOCYTES REDUCED: HCPCS | Performed by: STUDENT IN AN ORGANIZED HEALTH CARE EDUCATION/TRAINING PROGRAM

## 2019-10-16 PROCEDURE — 27210429 ZZH NUTRITION PRODUCT INTERMEDIATE LITER

## 2019-10-16 PROCEDURE — 93321 DOPPLER ECHO F-UP/LMTD STD: CPT | Mod: 26 | Performed by: INTERNAL MEDICINE

## 2019-10-16 PROCEDURE — 25800030 ZZH RX IP 258 OP 636: Performed by: INTERNAL MEDICINE

## 2019-10-16 PROCEDURE — 93926 LOWER EXTREMITY STUDY: CPT | Mod: 26 | Performed by: INTERNAL MEDICINE

## 2019-10-16 PROCEDURE — 82330 ASSAY OF CALCIUM: CPT | Performed by: STUDENT IN AN ORGANIZED HEALTH CARE EDUCATION/TRAINING PROGRAM

## 2019-10-16 PROCEDURE — 85300 ANTITHROMBIN III ACTIVITY: CPT | Performed by: STUDENT IN AN ORGANIZED HEALTH CARE EDUCATION/TRAINING PROGRAM

## 2019-10-16 PROCEDURE — 85730 THROMBOPLASTIN TIME PARTIAL: CPT | Performed by: STUDENT IN AN ORGANIZED HEALTH CARE EDUCATION/TRAINING PROGRAM

## 2019-10-16 PROCEDURE — 84484 ASSAY OF TROPONIN QUANT: CPT | Performed by: STUDENT IN AN ORGANIZED HEALTH CARE EDUCATION/TRAINING PROGRAM

## 2019-10-16 PROCEDURE — 93325 DOPPLER ECHO COLOR FLOW MAPG: CPT | Mod: 26 | Performed by: INTERNAL MEDICINE

## 2019-10-16 PROCEDURE — 80048 BASIC METABOLIC PNL TOTAL CA: CPT | Performed by: INTERNAL MEDICINE

## 2019-10-16 PROCEDURE — 93308 TTE F-UP OR LMTD: CPT | Mod: 26 | Performed by: INTERNAL MEDICINE

## 2019-10-16 PROCEDURE — P9059 PLASMA, FRZ BETWEEN 8-24HOUR: HCPCS | Performed by: STUDENT IN AN ORGANIZED HEALTH CARE EDUCATION/TRAINING PROGRAM

## 2019-10-16 PROCEDURE — 84100 ASSAY OF PHOSPHORUS: CPT | Performed by: STUDENT IN AN ORGANIZED HEALTH CARE EDUCATION/TRAINING PROGRAM

## 2019-10-16 PROCEDURE — 40000986 XR ABDOMEN PORT 1 VW

## 2019-10-16 PROCEDURE — 83735 ASSAY OF MAGNESIUM: CPT | Performed by: INTERNAL MEDICINE

## 2019-10-16 PROCEDURE — 85027 COMPLETE CBC AUTOMATED: CPT | Performed by: STUDENT IN AN ORGANIZED HEALTH CARE EDUCATION/TRAINING PROGRAM

## 2019-10-16 PROCEDURE — 85347 COAGULATION TIME ACTIVATED: CPT

## 2019-10-16 PROCEDURE — 86850 RBC ANTIBODY SCREEN: CPT | Performed by: INTERNAL MEDICINE

## 2019-10-16 PROCEDURE — 83615 LACTATE (LD) (LDH) ENZYME: CPT | Performed by: STUDENT IN AN ORGANIZED HEALTH CARE EDUCATION/TRAINING PROGRAM

## 2019-10-16 RX ORDER — FUROSEMIDE 10 MG/ML
80 INJECTION INTRAMUSCULAR; INTRAVENOUS ONCE
Status: COMPLETED | OUTPATIENT
Start: 2019-10-16 | End: 2019-10-16

## 2019-10-16 RX ORDER — LIDOCAINE HYDROCHLORIDE 20 MG/ML
5 SOLUTION OROPHARYNGEAL ONCE
Status: COMPLETED | OUTPATIENT
Start: 2019-10-16 | End: 2019-10-16

## 2019-10-16 RX ORDER — AMINO AC/PROTEIN HYDR/WHEY PRO 10G-100/30
1 LIQUID (ML) ORAL DAILY
Status: DISCONTINUED | OUTPATIENT
Start: 2019-10-16 | End: 2019-10-21

## 2019-10-16 RX ORDER — MIDAZOLAM (PF) 1 MG/ML IN 0.9 % SODIUM CHLORIDE INTRAVENOUS SOLUTION
1-8 CONTINUOUS
Status: DISCONTINUED | OUTPATIENT
Start: 2019-10-16 | End: 2019-10-20

## 2019-10-16 RX ADMIN — PIPERACILLIN SODIUM AND TAZOBACTAM SODIUM 3.38 G: 3; .375 INJECTION, POWDER, LYOPHILIZED, FOR SOLUTION INTRAVENOUS at 19:26

## 2019-10-16 RX ADMIN — POTASSIUM CHLORIDE 20 MEQ: 29.8 INJECTION, SOLUTION INTRAVENOUS at 17:17

## 2019-10-16 RX ADMIN — PIPERACILLIN SODIUM AND TAZOBACTAM SODIUM 3.38 G: 3; .375 INJECTION, POWDER, LYOPHILIZED, FOR SOLUTION INTRAVENOUS at 13:40

## 2019-10-16 RX ADMIN — POTASSIUM CHLORIDE 20 MEQ: 29.8 INJECTION, SOLUTION INTRAVENOUS at 05:09

## 2019-10-16 RX ADMIN — TICAGRELOR 90 MG: 90 TABLET ORAL at 19:26

## 2019-10-16 RX ADMIN — POTASSIUM CHLORIDE 20 MEQ: 29.8 INJECTION, SOLUTION INTRAVENOUS at 23:39

## 2019-10-16 RX ADMIN — SENNOSIDES AND DOCUSATE SODIUM 1 TABLET: 8.6; 5 TABLET ORAL at 07:38

## 2019-10-16 RX ADMIN — Medication 50 MCG/HR: at 02:27

## 2019-10-16 RX ADMIN — POTASSIUM CHLORIDE 20 MEQ: 29.8 INJECTION, SOLUTION INTRAVENOUS at 09:38

## 2019-10-16 RX ADMIN — MULTIVITAMIN 15 ML: LIQUID ORAL at 13:40

## 2019-10-16 RX ADMIN — THIAMINE HCL TAB 100 MG 100 MG: 100 TAB at 07:38

## 2019-10-16 RX ADMIN — CALCIUM GLUCONATE 1 G: 98 INJECTION, SOLUTION INTRAVENOUS at 00:20

## 2019-10-16 RX ADMIN — PANTOPRAZOLE SODIUM 8 MG/HR: 40 INJECTION, POWDER, FOR SOLUTION INTRAVENOUS at 08:12

## 2019-10-16 RX ADMIN — TICAGRELOR 90 MG: 90 TABLET ORAL at 07:38

## 2019-10-16 RX ADMIN — FOLIC ACID 1 MG: 1 TABLET ORAL at 07:38

## 2019-10-16 RX ADMIN — PANTOPRAZOLE SODIUM 8 MG/HR: 40 INJECTION, POWDER, FOR SOLUTION INTRAVENOUS at 18:44

## 2019-10-16 RX ADMIN — SENNOSIDES AND DOCUSATE SODIUM 1 TABLET: 8.6; 5 TABLET ORAL at 19:26

## 2019-10-16 RX ADMIN — DEXTROSE MONOHYDRATE: 100 INJECTION, SOLUTION INTRAVENOUS at 22:35

## 2019-10-16 RX ADMIN — Medication 1 PACKET: at 13:41

## 2019-10-16 RX ADMIN — CALCIUM GLUCONATE 1 G: 98 INJECTION, SOLUTION INTRAVENOUS at 13:43

## 2019-10-16 RX ADMIN — VANCOMYCIN HYDROCHLORIDE 1500 MG: 10 INJECTION, POWDER, LYOPHILIZED, FOR SOLUTION INTRAVENOUS at 06:58

## 2019-10-16 RX ADMIN — POTASSIUM CHLORIDE 20 MEQ: 29.8 INJECTION, SOLUTION INTRAVENOUS at 15:04

## 2019-10-16 RX ADMIN — LIDOCAINE HYDROCHLORIDE 5 ML: 20 SOLUTION ORAL; TOPICAL at 13:40

## 2019-10-16 RX ADMIN — PIPERACILLIN SODIUM AND TAZOBACTAM SODIUM 3.38 G: 3; .375 INJECTION, POWDER, LYOPHILIZED, FOR SOLUTION INTRAVENOUS at 07:38

## 2019-10-16 RX ADMIN — DEXTROSE MONOHYDRATE: 100 INJECTION, SOLUTION INTRAVENOUS at 01:31

## 2019-10-16 RX ADMIN — AMIODARONE HYDROCHLORIDE 0.5 MG/MIN: 50 INJECTION, SOLUTION INTRAVENOUS at 18:49

## 2019-10-16 RX ADMIN — ASPIRIN 81 MG CHEWABLE TABLET 81 MG: 81 TABLET CHEWABLE at 07:38

## 2019-10-16 RX ADMIN — PIPERACILLIN SODIUM AND TAZOBACTAM SODIUM 3.38 G: 3; .375 INJECTION, POWDER, LYOPHILIZED, FOR SOLUTION INTRAVENOUS at 02:12

## 2019-10-16 RX ADMIN — FUROSEMIDE 80 MG: 10 INJECTION, SOLUTION INTRAVENOUS at 07:38

## 2019-10-16 ASSESSMENT — ACTIVITIES OF DAILY LIVING (ADL)
RETIRED_COMMUNICATION: 0-->UNDERSTANDS/COMMUNICATES WITHOUT DIFFICULTY
COGNITION: 0 - NO COGNITION ISSUES REPORTED
DRESS: 0-->INDEPENDENT
ADLS_ACUITY_SCORE: 19
RETIRED_EATING: 0-->INDEPENDENT
ADLS_ACUITY_SCORE: 19
TOILETING: 0-->INDEPENDENT
ADLS_ACUITY_SCORE: 19
AMBULATION: 0-->INDEPENDENT
BATHING: 0-->INDEPENDENT
SWALLOWING: 0-->SWALLOWS FOODS/LIQUIDS WITHOUT DIFFICULTY
ADLS_ACUITY_SCORE: 26
FALL_HISTORY_WITHIN_LAST_SIX_MONTHS: NO
TRANSFERRING: 0-->INDEPENDENT

## 2019-10-16 ASSESSMENT — MIFFLIN-ST. JEOR: SCORE: 2023.24

## 2019-10-16 NOTE — PROCEDURES
ECMO [place] STUDY    Inotropes/Pressors:  N/A    Flow  HR BP                SVO2     3.6       64        111/55(77)    100%  3          66        102/50(71)    100%  2.5       66        100/47(71)    100%  2          70        98/52(72)       99%  1.5       69        98/48(72)       97%     Prelim review on the TTE shows LVEF moderately improved from 3.6 L to 1.5 L flow. ABG on 1.5L 7.43/53/64 on 60% FiO2 on the vent. Pt appeared to tolerate the turndown well. Will discuss with CVTS about decannulation.    Reviewed with Dr. Roberts.        Paradise Villareal MD  Cardiology Fellow

## 2019-10-16 NOTE — PROGRESS NOTES
Social Work Services Progress Note    Hospital Day: 4  Date of Initial Social Work Evaluation:  Not yet completed  Collaborated with:  Pt's wife Jackelyn, mother-in-law Annetta    Data:  Joel Campbell is a 53 year old male who was cannulated for ECMO 10/13/19 due to refractory VF arrest    Intervention:  Cards team completed FMLA paperwork and returned it to SW. SW met with pt's wife and mother. Wife requested SW fax paperwork directly to her and her 's respective HR departments. Signed ROIs to this effect. Wife experiencing some anxiety around insurance and hospitalization. SW was able to assure wife that we have a department that will connect with her insurance company if they have not already, and offered patient financial services number if future specific questions come up. Wife voiced understanding. SW faxed FMLA paperwork to respective companies using fax numbers provided on FMLA requests, and returned all paperwork to wife.    Assessment:  Pt remains critically ill    Plan:    Anticipated Disposition:  TBD    Barriers to d/c plan:  Pt remains critically ill    Follow Up:  SW will continue to remain available for patient and family support, discharge planning, other resources and support PRN.    JERRY Anderson, [facility]  ICU    P: [phone]  Pager: [phone]

## 2019-10-16 NOTE — PROGRESS NOTES
Cardiology Critical Care Note - Cardiology  Anshu Roberts M.D.  The patient remains unstable in the ICU with on-going need for ventilator support, parenteral medications for the adjustment of blood pressure and cardiac output and maintenance of renal function.      The patient is seen for prolonged ventilator management requiring oxygen and/or pressure modulation; shock requiring vasopressor and or inotropic agents; low cardiac output necessitating inotropic agents, vasopressors and afterload reducing agents; VA ECMO; acute renal failure requiring fluid and diuretic management; sepsis requiring antibiotic selection and monitoring, vasopressors, fluid management to maintain blood pressure and secondary organ function. Thoracic wall bleeding and right sided hemothorax: draining     I personally reviewed:  Arterial and venous blood gases to assess acid base balance, oxygenation, and ventilator settings.    Hemodynamic parameters were assessed such as  RAP, estimated LVEDP cardiac output and vascular resistances in order to adjust fluids and infused medications for blood pressure and cardiac output maintenance.  Ventilatory settings and/or supplement oxygen needs in order to obtain optimal oxygenation and electrolyte balance at low possible pressure support and inspired oxygen tension      .  ECMO Attending Progress Note  10/16/2019    Joel Campbell is a 53 year old male who was admitted on 10/13/19 as a transfer for an OSH for refractory VT/VF arrest s/p PCI to LAD and placement on peripheral VA ECMO.     Interval events: bleeding is improved     Physical Exam:  Temp:  [98.1  F (36.7  C)-98.6  F (37  C)] 98.1  F (36.7  C)  Heart Rate:  [53-92] 76  Resp:  [14] 14  MAP:  [61 mmHg-148 mmHg] 84 mmHg  Arterial Line BP: ()/() 111/56  FiO2 (%):  [50 %-60 %] 60 %  SpO2:  [86 %-100 %] 100 %    Intake/Output Summary (Last 24 hours) at 10/16/2019 0939  Last data filed at 10/16/2019 0900  Gross per 24 hour    Intake 6051.48 ml   Output 5695 ml   Net 356.48 ml    Ventilation Mode: CMV/AC  (Continuous Mandatory Ventilation/ Assist Control)  FiO2 (%): 60 %  Rate Set (breaths/minute): 14 breaths/min  Tidal Volume Set (mL): 500 mL  PEEP (cm H2O): 10 cmH2O  Oxygen Concentration (%): 50 %  Resp: 14     General: no acute distress, intubated and sedated  HEENT: PERRLA, conjunctiva clear, without icterus or pallor, oropharyx clear  Cardiac: RRR nl S1S2   Lungs: clear to auscultation and percussion bilaterally anterior  Abdomen: soft, nontender, non distended, no hepatosplenomegaly or masses  Extremities: without edema or cyanosis; pulses are palpable    Skin: normal skin appearance without worrisome lesions.   Neurologic:intubated and sedated,     Labs:  Recent Labs   Lab 10/16/19  0901 10/16/19  0731 10/16/19  0602 10/16/19  0351   PH 7.39 7.42 7.50* 7.48*   PCO2 54* 49* 37 43   PO2 106* 94 153* 117*   HCO3 33* 32* 29* 31*   O2PER 60 60.0 60.0 60.0      Recent Labs   Lab 10/16/19  0856 10/16/19  0351 10/15/19  2158 10/15/19  1614   WBC 6.9 5.8 6.2 8.2   HGB 9.0* 7.8* 8.3* 8.3*     Creatinine   Date Value Ref Range Status   10/16/2019 PENDING 0.66 - 1.25 mg/dL Incomplete   10/16/2019 1.09 0.66 - 1.25 mg/dL Final   10/15/2019 1.14 0.66 - 1.25 mg/dL Final   10/15/2019 1.08 0.66 - 1.25 mg/dL Final       ECMO Issues including assessments and plan on DOS 10/16/2019:  Neuro: Sedated for mechanical ventilation and ECMO.  NIRS stable  b/l  RASS goal: -4  CV: Cardiogenic shock.  Hemodynamically stable on ECMO  Pulm: Flows unchanged at 4 Sweep unchanged at 3, Keep vent settings at rest settings: stable  FEN/Renal: Electrolytes stable w/ replacement protocols in place, Cr stable, UOP stable  Heme: ACT goal: 140-160, Hemoglobin 9 .  Goals: if O2 sat >85% Hgb >8.  If O2 Sat <85% keep Hgb 10-12.  Minimal oozing around the ECMO cannulas.  ID: Receiving empiric antibiotics  Cannulae: Position is acceptable on exam and the available imaging.   Distal perfusion cannula is in place and patent.     I have personally reviewed the ECMO flows, oxygenation and CO2 clearance, anticoagulation, and cannula position.  I have also personally assessed the patient's systemic response with hemodynamics, oxygenation, ventilation, and bleeding.       The patient requires continued ECMO support and management in the ICU.  I have discussed patient care and treatment plan with the primary team.        Anshu Roberts MD  Interventional Cardiology  Pager: [phone]  October 16, 2019

## 2019-10-16 NOTE — PROGRESS NOTES
[facility]   Cardiology Progress Note    Assessment & Plan   Joel Campbell is a 53 year old male who was admitted on 10/13/19 as a transfer for an OSH for refractory VT/VF arrest s/p PCI to LAD and placement on peripheral VA ECMO.     Changes Today:  - ECMO turndown today  - Repeat CT Head  - Diuresis as able  - Wean sedation as able  - Wean ventilator as able  - Monitor for s/s of bleeding  - ACT goal 140-160     Neurology: Intubated, sedated, paralyzed. Cooled to 34 degrees, slowly rewarming on 10/15  - NeuroCrit consulted  - Continue versed gtt and fent gtt, wean as able  - CT Head ordered on admission, no acute changes   Cardiovascular / Hemodynamics: Refractory VF arrest s/p peripheral V-A ECMO at OSH on 10/13/19.  TTE: LVEF 5-10%  - Wean pressors/inotropes as able, currently not on any  - Turndown today  - Continue ASA 81mg and ticagrelor 90mg BID  - Held temp at 34 degrees for 24 hours, now rewarmed  - ACT goal 140-160  - Hold lipitor for now given likely hepatic injury during arrest  - Hold ACE/ARB for now given likely reduced renal fxn after arrest  - Holding beta blocker given shock    Pulmonary: Now weaning vent requirements. Large hemothorax on 10/14 s/p right-sided chest tube.  CXR: stable devices  - Wean vent as able  - Monitor CT output  - Daily CXR  - Q2H ABGs for now  - Consider scheduled duonebs if signs of lung dz, currently PRN     GI and Nutrition: Per Pt's wife, he is an alcoholic. Concern for possible GIB given black OG output.  - Monitor BID LFTs  - NPO while cooled - nutrition consult pending feeding tube placement once he is warmed   - Bowel regimen - started  - GI Prophylaxis: PPI gtt   Renal, Fluid and Electrolytes: - Monitor urine output  - Maintain K>3 and Mg>2    Infectious Disease: No signs of infection. Leukocytosis c/w arrest. Blood cultures collected.   - Vancomycin/zosyn x5 days for ECMO  - Daily blood cultures  - Monitor for signs of infection given cooling, lines, and leukocytosis  "  Hematology and Oncology: Receiving heparin for ECMO and ASA/ticagrelor for KAMRON. Large intramuscular hematoma of right pec and hemothorax on 10/14 with possible GIB as above.  - Cryo PRN fibrinogen < 200; FFP for INR >2  - Transfuse for Hgb<10, Plts<100  - Heparin gtt for ECMO with ACT goal 140-160 given bleeding concerns as above  - US LE w/ arterial duplex per ECMO protocol   - DVT PPX: Heparin as above  - Right chest wall hematoma: monitor exam and Hgb closely   Endocrinology: No known medical history. BG elevated.  - Insulin gtt PRN   Lines: Restraint: needed    Current lines are required for patient management       Family updated today: Yes  Code Status: FULL    Pt was seen and examined with Dr. Roberts, who agrees with the assessment and plan.    Paradise Villareal MD  Cardiology Fellow      Interval History    S/p chest tube placement at bedside last night with car blood output. Pt was weaned off epi and overall able to decrease amount of product given overnight.    ROS: Unable to obtain as Pt is intubated and sedated.    Data reviewed today: I reviewed all new labs and imaging results over the last 24 hours. I personally reviewed:    Physical Exam   Temp: 98.2  F (36.8  C) Temp src: Bladder     Heart Rate: 61 Resp: 14 SpO2: 100 % O2 Device: Mechanical Ventilator    Vitals:    10/14/19 0300 10/15/19 0200 10/16/19 0000   Weight: 98 kg (216 lb 0.8 oz) 109.2 kg (240 lb 11.9 oz) 110.2 kg (242 lb 15.2 oz)     Vital Signs with Ranges  Temp:  [97.3  F (36.3  C)-98.6  F (37  C)] 98.2  F (36.8  C)  Heart Rate:  [53-77] 61  Resp:  [14] 14  MAP:  [61 mmHg-148 mmHg] 73 mmHg  Arterial Line BP: ()/(3-158) 102/49  FiO2 (%):  [50 %-60 %] 60 %  SpO2:  [79 %-100 %] 100 %  I/O last 3 completed shifts:  In: 5250.88 [I.V.:2799.88; NG/GT:45]  Out: 5005 [Urine:3775; Emesis/NG output:700; Chest Tube:530]    Heart Rate: 61, Temperature 98.2  F (36.8  C), resp. rate 14, height 1.89 m (6' 2.41\"), weight 110.2 kg (242 " lb 15.2 oz), SpO2 100 %.  242 lbs 15.15 oz  GEN:  Intubated, sedated  CV:  RRR, IABP augmentation  LUNGS: Mechanical breath sounds, diminished along right side, right-sided chest tube in place  ABD: Soft BS, soft, mildly distended  EXT: warm, trace-1+ edema, dopplerable pulses  SKIN: Large hematoma along chest wall    Medications     dextrose 50 mL/hr at 10/16/19 0600     fentaNYL 50 mcg/hr (10/16/19 0600)     heparin 2 unit/mL in 0.9% NaCl 3 mL/hr (10/16/19 0600)     HEParin 600 Units/hr (10/16/19 0600)     insulin (regular) Stopped (10/14/19 2300)     midazolam 2 mg/hr (10/16/19 0600)     norepinephrine Stopped (10/15/19 1500)     pantoprazole (PROTONIX) infusion ADULT/PEDS GREATER than or EQUAL to 45 kg 8 mg/hr (10/16/19 0600)     sodium chloride       vasopressin (PITRESSIN) infusion ADULT (40 mL) Stopped (10/15/19 1600)       aspirin  81 mg Per Feeding Tube Daily     folic acid  1 mg Oral Daily     piperacillin-tazobactam  3.375 g Intravenous Q6H     senna-docusate  1 tablet Oral BID     sodium chloride (PF)  3 mL Intracatheter Q8H     ticagrelor  90 mg Oral or Feeding Tube BID     vancomycin (VANCOCIN) IV  1,500 mg Intravenous Q24H     vitamin B1  100 mg Oral Daily       Data   Recent Labs   Lab 10/16/19  0602 10/16/19  0351 10/16/19  0206  10/15/19  2158  10/15/19  1614   WBC  --  5.8  --   --  6.2  --  8.2   HGB  --  7.8*  --   --  8.3*  --  8.3*   MCV  --  94  --   --  92  --  91   PLT  --  92*  --   --  82*  --  70*   INR  --  1.23*  --   --  1.24*  --  1.26*   NA  --  144  --   --  144  --  145*   POTASSIUM  --  3.6  --   --  3.6  --  3.7   CHLORIDE  --  107  --   --  107  --  106   CO2  --  28  --   --  30  --  27   BUN  --  20  --   --  19  --  18   CR  --  1.09  --   --  1.14  --  1.08   ANIONGAP  --  9  --   --  7  --  12   HEATHER  --  8.2*  --   --  8.0*  --  7.7*   GLC 86 92  98 90   < > 92  98   < > 99  103*   ALBUMIN  --  2.9*  --   --  2.8*  --  2.7*   PROTTOTAL  --  5.4*  --   --  5.2*  --  5.1*    BILITOTAL  --  3.2*  --   --  2.9*  --  2.4*   ALKPHOS  --  54  --   --  48  --  45   ALT  --  39  --   --  37  --  38   AST  --  190*  --   --  183*  --  173*   TROPI  --  91.867*  --   --  104.016*  --  81.632*    < > = values in this interval not displayed.       No results found for this or any previous visit (from the past 24 hour(s)).  I have seen and examined the patient with the CSI team. I agree with the assessment and plan of the note above.I have reviewed pertinent labs.     Anshu Roberts MD  Interventional Cardiology  Pager: [phone]

## 2019-10-16 NOTE — PROGRESS NOTES
ECMO Shift Summary:    Patient remains on VA ECMO, all equipment is functioning and alarms are appropriately set. RPM's 3100 with flow range 3.64-3.72 L/min. Sweep gas is at 0.5 LPM and FiO2 80%. Circuit remains free of air and clot; fibrin beginning to form in corners of oxy. Cannulas are secure with no bleeding from site. Extremities are warm.    Significant Shift Events: none    Vent settings:  Ventilation Mode: CMV/AC  (Continuous Mandatory Ventilation/ Assist Control)  FiO2 (%): 60 %  Rate Set (breaths/minute): 14 breaths/min  Tidal Volume Set (mL): 500 mL  PEEP (cm H2O): 10 cmH2O  Oxygen Concentration (%): 60 %  Resp: 14  .    Heparin is running at 600 u/hr, ACT range 146-155.    Urine output is good, blood loss was none. Product given included 1 unit RBC's, 1 unit FFP, 2 units platelets.      Intake/Output Summary (Last 24 hours) at 10/16/2019 0635  Last data filed at 10/16/2019 0600  Gross per 24 hour   Intake 6117.71 ml   Output 5065 ml   Net 1052.71 ml       ECHO:  No results found for this or any previous visit.No results found for this or any previous visit.    CXR:  No results found for this or any previous visit (from the past 24 hour(s)).    Labs:  Recent Labs   Lab 10/16/19  0602 10/16/19  0351 10/16/19  0206 10/15/19  2358   PH 7.50* 7.48* 7.43 7.51*   PCO2 37 43 45 37   PO2 153* 117* 73* 137*   HCO3 29* 31* 30* 30*   O2PER 60.0 60.0 60.0 60.0       Lab Results   Component Value Date    HGB 7.8 (L) 10/16/2019    PHGB <30 10/16/2019    PLT 92 (L) 10/16/2019    FIBR 546 (H) 10/16/2019    INR 1.23 (H) 10/16/2019    PTT 51 (H) 10/16/2019    DD 3.3 (H) 10/16/2019    AXA <0.10 10/16/2019    ANTCH 38 (L) 10/15/2019         Plan is to continue ECMO support.      Jodie Carolina, RT  10/16/2019 6:35 AM

## 2019-10-16 NOTE — PROCEDURES
Small Bowel Feeding Tube Placement Assessment  Reason for Feeding Tube Placement: Post-pyloric feeding tube placement for nutrition and medication administration - on VA ECMO.  Cortrak Start Time: 1330   Cortrak End Time: 1415  Medicine Delivered During Procedure: Lidocaine gel  Placement Successful:  Yes - presumed post-pyloric. Confirmed tube patency with water flush at end of placement.    Procedure Complications: Pt agitation (moving head, opening eyes, tears - agitation improved when family member came to bedside), resistance from other NGT, kinking, coiling, difficulty passing presumed pylorus.   Final Placement Esteban at exit of nare 111 cm  Face to Face time with patient: 60 minutes total    Pamela Menon RD, LD  Pager: [phone]

## 2019-10-16 NOTE — PROGRESS NOTES
SPIRITUAL HEALTH SERVICES  SPIRITUAL ASSESSMENT Progress Note (Palliative Focus)  [facility] ([place] 4E    REFERRAL SOURCE: Palliative care follow up.    Visit with patient Joel Campbell's wife Jackelyn at Joel's bedside. Wife Jackelyn requested energy work for both Joel and herself, which Palliative Care Energy Work student Jhonathan will provide later this morning. Jackelyn finds great comfort in integrative therapies in addition to medical interventions.     Plan: I will follow for spiritual support while Palliative Care is consulted.    Laila Kathleen  Palliative   Pager [phone]  [facility] Inpatient Team Consult pager [phone] (M-F 8-4:30)  After-hours Answering Service [phone]

## 2019-10-16 NOTE — PROCEDURES
Bridle Placement:   Reason for bridle placement: Securement of feeding tube.   Medicine delivered during procedure: Lidocaine gel   Procedure: Successful - pt family report pt had bad nose bleed years ago that required multiple nasal tampons. No recent issue and no issue with bridle or FT placement.  Location of top of clip on FT: @ 113 cm marker   Condition of nose/skin at time of bridle placement: Unremarkable   Face to Face time with patient: <5 minutes.    Pamela Menon RD, LD  Pager: [phone]

## 2019-10-16 NOTE — PROGRESS NOTES
CLINICAL NUTRITION SERVICES - BRIEF NOTE   (See RD note on 10/14 for full assessment)     Reason for RD note: Provider order for Cortrak + TF. Post-pyloric d/t VA ECMO.    New Findings:  Nutrition support: Plan to start TF via post-pyloric feeding tube (pending AXR confirmation).    GI: 700 mL output from NGT yesterday. Concern for possible GIB d/t black NGT output, but no concern for blind FT placement per CSI team (Hgb stable, no prior issues with bleeds).     Labs: Na 142 (WNL), K 3.6 (WNL), Phos 3.2 (WNL), Mg 2.4 (H), BUN 19 (WNL), Cr 1.03 (WNL)     Meds: Folvite, thiamine, bowel regimen, D5W @ 50 mL/hr, insulin gtt, norepi gtt - received Lasix today    Nutrition History: Per pt family, eating well prior to falling ill on 10/12. Denies TF at previous hospitals before transfer here.     Interventions:  1. Given wt is up +12 kg since admit, starting more-volume concentrated formula (TwoCal HN):  Once post-pyloric FT position verified via AXR, initiate TwoCal HN @ 15 ml/hr and advance by 10 ml q8hr (pending K+/Mg++ WNL, Phos > 1.9 and GI tolerance) to goal @ 55 mL/hr (1320 mL/day) + 1 pkt Prosource to provide 2680 kcals (27 kcal/kg), 122 g PRO (1.2 g pro/kg), 924 mL H2O, 289 g CHO and 7 g Fiber daily    --H2O flushes 30 ml q4hr for tube patency   --Certavite daily     2. FT placement - presumed post-pyloric    3. Nutrition education: Explained FT placement procedure to pt's family.     Future/Additional Recommendations:  Monitor TF start/advancement/tolerance/lytes    Nutrition will continue to follow per protocol.    Pamela Menon RD, LD  Pager: [phone]

## 2019-10-16 NOTE — PROGRESS NOTES
Neuroscience Intensive Care Progress Note  10/16/2019    REASON FOR CRITICAL CARE ADMISSION: Ventricular tachycardia     ADMITTING PHYSICIAN: Anshu Roberts MD     Date of Service (when I saw the patient): 10/16/2019    ASSESSMENT: Joel Campbell is a 53 year old male admitted on 10/13/2019 w/no PMH who was a transfer from an OSH for refractory VT/VF arrest s/p placement on peripheral VA ECMO.    Problem List  1. Ventricular Tachycardia    24 hour events:  No acute events overnight, weaning sedation.    24 Hour Vital Signs Summary:  Temperatures:  Current - Temp: 98.1  F (36.7  C); Max - Temp  Av.3  F (36.8  C)  Min: 98.1  F (36.7  C)  Max: 98.6  F (37  C)  Respiration range: Resp  Av  Min: 14  Max: 14  Pulse range: No data recorded  Blood pressure range: No data recorded.  ; No data recorded.    Pulse oximetry range: SpO2  Av.9 %  Min: 86 %  Max: 100 %    Ventilator Settings  Ventilation Mode: CMV/AC  (Continuous Mandatory Ventilation/ Assist Control)  FiO2 (%): 60 %  Rate Set (breaths/minute): 14 breaths/min  Tidal Volume Set (mL): 500 mL  PEEP (cm H2O): 10 cmH2O  Oxygen Concentration (%): 50 %  Resp: 14      Intake/Output Summary (Last 24 hours) at 10/16/2019 1130  Last data filed at 10/16/2019 1033  Gross per 24 hour   Intake 5964.93 ml   Output 5390 ml   Net 574.93 ml       Arterial Line BP: ()/() 98/55  MAP:  [61 mmHg-148 mmHg] 73 mmHg  Location: Cerebral Right;Cerebral Left;Lower Extremity Right;Lower Extremity Left    Current Medications:    aspirin  81 mg Per Feeding Tube Daily     folic acid  1 mg Oral Daily     lidocaine  5 mL Topical Once     piperacillin-tazobactam  3.375 g Intravenous Q6H     senna-docusate  1 tablet Oral BID     sodium chloride (PF)  3 mL Intracatheter Q8H     ticagrelor  90 mg Oral or Feeding Tube BID     vancomycin (VANCOCIN) IV  1,500 mg Intravenous Q24H     vitamin B1  100 mg Oral Daily       PRN Medications:  albumin human, artificial tears,  "calcium gluconate, glucose **OR** dextrose **OR** glucagon, fentaNYL, ipratropium - albuterol 0.5 mg/2.5 mg/3 mL, lidocaine 4%, lidocaine (buffered or not buffered), magnesium sulfate, magnesium sulfate, midazolam, naloxone, potassium chloride, potassium chloride with lidocaine, potassium chloride, potassium chloride, potassium chloride, sodium chloride (PF)    Infusions:    dextrose 50 mL/hr at 10/16/19 1000     fentaNYL 50 mcg/hr (10/16/19 0900)     heparin 2 unit/mL in 0.9% NaCl 3 mL/hr (10/16/19 0800)     HEParin 600 Units/hr (10/16/19 0900)     insulin (regular) Stopped (10/14/19 2300)     midazolam 2 mg/hr (10/16/19 0900)     norepinephrine Stopped (10/15/19 1500)     pantoprazole (PROTONIX) infusion ADULT/PEDS GREATER than or EQUAL to 45 kg 8 mg/hr (10/16/19 0900)     sodium chloride         No Known Allergies    Physical Examination:  Vitals: Temp 98.1  F (36.7  C)   Resp 14   Ht 1.89 m (6' 2.41\")   Wt 110.2 kg (242 lb 15.2 oz)   SpO2 100%   BMI 30.85 kg/m    EXAM: Examined while sedated to prevent movement while on ECMO  - No spontaneous eye opening or in response to verbal or tactile stimuli  - No eye movements  - Pupils symmetric, +2mm, and reactive bilaterally  - Absent corneal response  - No clear facial asymmetry  - No cough present with deep suctioning  - No limb movements     Labs/Studies:  Recent Labs   Lab Test 10/16/19  0901 10/16/19  0856 10/16/19  0602 10/16/19  0351  10/15/19  3781   NA  --  142  --  144  --  144   POTASSIUM  --  3.6  --  3.6  --  3.6   CHLORIDE  --  106  --  107  --  107   CO2  --  31  --  28  --  30   ANIONGAP  --  5  --  9  --  7   GLC 88 84 86 92  98   < > 92  98   BUN  --  19  --  20  --  19   CR  --  1.03  --  1.09  --  1.14   HEATHER  --  8.3*  --  8.2*  --  8.0*   WBC  --  6.9  --  5.8  --  6.2   RBC  --  2.92*  --  2.54*  --  2.69*   HGB  --  9.0*  --  7.8*  --  8.3*   PLT  --  103*  --  92*  --  82*    < > = values in this interval not displayed.       Recent Labs "   Lab Test 10/16/19  0856 10/16/19  0351 10/15/19  2158   INR 1.15* 1.23* 1.24*   PTT 51* 51* 56*       Recent Labs   Lab 10/16/19  0901 10/16/19  0731 10/16/19  0602 10/16/19  0351   PH 7.39 7.42 7.50* 7.48*   PCO2 54* 49* 37 43   PO2 106* 94 153* 117*   HCO3 33* 32* 29* 31*   O2PER 60 60.0 60.0 60.0     Imagin. CT Head w/o contrast on 10/16 at 1115  Impression:   1. Better visualized nonspecific hypodensity in the right paracentral  lobule, which is indeterminate for a small infarct versus small mass  lesion is better evaluated by MRI (although may not be feasible), or post-contrast CT could help to visualize further.   2. A new right hemicerebellum lesion as well is more suspicious for  infarct.  3. Diffuse debris and fluid throughout the paranasal sinuses could  relate to intubation, but increased, and could also represent  sinusitis.     [Access Center: Right paracentral-frontal small infarct versus tiny  mass, and right cerebellar lesion that is new.]    Assessment/Plan  Mr. Campbell is a 54yo male currently admitted for refractory VT/VF arrest s/p placement of VA ECMO.     Plan  Neuro:  #Hypodenisty in the right paracentral lobule  #Small infarct vs small mass lesion  #New right hemicerebellum lesion  - Obtain MRI Brain when patient is more stable  - Will continue to follow QOD      Code Status: Full    Dispo: Remain in CVICU while on ECMO    The patient was seen and discussed with the attending, Dr. Leigh.    Daphne NEW, CNP  NeuroCritical Care Nurse Practitioner  [phone]

## 2019-10-16 NOTE — PROGRESS NOTES
Hemodynamically stable this shift. Given 1 unit PRBCs, 1 FFP, and 2 units platelets to meet goals. Replaced calcium and potassium per protocol. Midazolam and fentanyl for sedation/pain control. IABP and ECMO without issues or alarms. Remains on CMV settings on vent. NG continues to pull moderate amount of OP. UOP adequate this shift. Tolerated repositioning J4thryn. No acute events this shift. Continuing to monitor.

## 2019-10-16 NOTE — PLAN OF CARE
Problem: Adult Inpatient Plan of Care  Goal: Plan of Care Review  10/16/2019 0625 by Bree Yin RN  Outcome: Improving     Problem: Adult Inpatient Plan of Care  Goal: Patient-Specific Goal (Individualization)  Outcome: Improving     Problem: Adult Inpatient Plan of Care  Goal: Absence of Hospital-Acquired Illness or Injury  Outcome: Improving     Problem: Adult Inpatient Plan of Care  Goal: Optimal Comfort and Wellbeing  10/16/2019 0625 by Bree Yin RN  Outcome: Improving     Problem: Adult Inpatient Plan of Care  Goal: Readiness for Transition of Care  Outcome: Improving     Problem: Adult Inpatient Plan of Care  Goal: Rounds/Family Conference  Outcome: Improving     Problem: Bleeding (Extracorporeal Life Support/ECMO)  Goal: Absence of Bleeding  10/16/2019 0625 by Bree Yin RN  Outcome: Improving     Problem: Cardiac Output Decreased (Extracorporeal Life Support/ECMO)  Goal: Effective Cardiac Output  Outcome: Improving     Problem: Device-Related Complication Risk (Extracorporeal Life Support/ECMO)  Goal: Optimal Device Function  10/16/2019 0625 by Bree Yin RN  Outcome: Improving     Problem: Infection (Extracorporeal Life Support/ECMO)  Goal: Absence of Infection Signs/Symptoms  10/16/2019 0625 by Bree Yin RN  Outcome: Improving     Problem: Parent/Caregiver Adjustment to Therapy (Extracorporeal Life Support/ECMO)  Goal: Optimal Parent/Caregiver Coping  Outcome: Improving

## 2019-10-16 NOTE — PROGRESS NOTES
ECMO Shift Summary:    Patient remains on VA ECMO, all equipment is functioning and alarms are appropriately set. RPM's 3100 with flow range 3.5-3.6 L/min. Sweep gas is at 1 LPM and FiO2 80%. Circuit remains free of air, clot and fibrin. Cannulas are secure with no bleeding from site. Extremities are warm. Suctioned ETT for small amount of thick tan secretions.    Significant Shift Events: traveled to head ct and did bedside turndown with ECHO. See Cards note.    Vent settings:  Ventilation Mode: CMV/AC  (Continuous Mandatory Ventilation/ Assist Control)  FiO2 (%): 60 %  Rate Set (breaths/minute): 14 breaths/min  Tidal Volume Set (mL): 500 mL  PEEP (cm H2O): 10 cmH2O  Oxygen Concentration (%): 60 %  Resp: 14  .    Heparin is running at 600 u/hr, ACT range 140-160.    Urine output is as charted, blood loss was minimal oozing from groings. Product given included none.      Intake/Output Summary (Last 24 hours) at 10/16/2019 1756  Last data filed at 10/16/2019 1700  Gross per 24 hour   Intake 5446 ml   Output 6420 ml   Net -974 ml       ECHO:  No results found for this or any previous visit.No results found for this or any previous visit.    CXR:  Recent Results (from the past 24 hour(s))   XR Chest Port 1 View    Narrative    Exam: XR CHEST PORT 1 VW, 10/16/2019 1:50 AM    Indication: Check endotracheal tube placement and ECLS cannula  placement. DO NOT log-roll patient.  Place film under patient using  patient safety handling process.    Comparison: 10/15/2019.    Findings:   AP view the chest. ET tube tip high thoracic trachea 10.6 cm from the  adiel. Esophageal temperature probe in the midesophagus. Enteric tube  collimated outside the field-of-view. Left IJ CVC with tip at the mid  SVC. IABP and ECMO cannula in stable position. Stable right apical  chest tube with sidehole appearing outside the pleural cavity. Grossly  stable diffuse hazy opacification of the right lung. Mild hazy  opacities in the left lung  base, otherwise clear. Likely small  bilateral pleural effusions, right greater than left. No pneumothorax.      Impression    Impression:   1. High positioning of endotracheal tube 10.6 cm from the adiel.  Consider advancing.  2. Hazy opacification of the right lung and left lower lobe are  relatively stable and likely represent layering pleural effusions with  overlying atelectasis/consolidations  3. Stable position of right apical chest tube with sidehole appearing  outside the pleural cavity.    I have personally reviewed the examination and initial interpretation  and I agree with the findings.    QAMAR MELGAR MD   CT Head w/o Contrast   Result Value    Radiologist flags (Urgent)     Right paracentral-frontal small infarct versus tiny    Narrative    CT HEAD W/O CONTRAST 10/16/2019 11:15 AM    History: ECMO  ICD-10:  Comparison: 10/13/2019.    Technique: Using multidetector thin collimation helical acquisition  technique, axial, coronal and sagittal CT images from the skull base  to the vertex were obtained without intravenous contrast.     Findings:    On the noncontrast images of the brain, there is better visualization  of a possible hypodense abnormality in the right paracentral lobule at  the junction of the right frontal and parietal lobes (series 2 image  27) which is nonspecific and without adjacent mass effect. Right new  cerebellar linear focus is suspicious for infarct as it is  wedge-shaped. The ventricles are not enlarged. The gray to white  matter differentiation of the cerebral hemispheres is preserved. The  basal cisterns are patent.  Increasing diffuse pansinus debris and fluid. The mastoid air cells  are clear.       Impression    Impression:   1. Better visualized nonspecific hypodensity in the right paracentral  lobule, which is indeterminate for a small infarct versus small mass  lesion is better evaluated by MRI (although may not be feasible), or  postcontrast CT could help to  visualize further.   2. A new right hemicerebellum lesion as well is more suspicious for  infarct.  3. Diffuse debris and fluid throughout the paranasal sinuses could  relate to intubation, but increased, and could also represent  sinusitis.    [Access Center: Right paracentral-frontal small infarct versus tiny  mass, and right cerebellar lesion that is new.]    This report will be copied to the [facility] to ensure a  provider acknowledges the finding. Access Center is available Monday  through Friday 8am-3:30 pm.     DICKSON LUNSFORD MD   US Lower Extremity Arterial Duplex Right   Result Value    Radiologist flags (Urgent)     Absent flow in the distal anterior tibial artery and    Narrative    Exam: US LOWER EXTREMITY ARTERIAL DUPLEX RIGHT, 10/16/2019 1:56 PM    Indication: absent DP pulse on right, on VA ECMO    Comparison: Ultrasound dated 10/14/2019, CT CAP dated 10/14/2019.    TECHNIQUE: Duplex arterial ultrasound evaluation of the right lower  extremity    Technical note: Proximal arteries obscured by ECMO cannula    Findings:   Peak systolic velocities in the right lower extremity as follows:    CFA: Obscured by ECMO  PFA origin: Obscured by ECMO  SFA origin: Obscured by ECMO  SFA mid thigh: 53 cm/s  SFA distal thigh 11 cm/s  Popliteal artery: 17 cm/s  PTA at the ankle: 28 cm/s  JAMEY in the calf: 6 cm/s  JAMEY at the ankle: No flow  Dorsal pedal artery: Not visualized.    Waveforms are diffusely monophasic and with poor phasicity, likely  related to ECMO.      Impression    Impression:    1. In the right lower extremity the dorsal pedal artery was  nonvisualized and there was absent flow in the distal anterior tibial  artery.  2. Remaining visualized arteries are patent with velocities as  detailed above.    [Urgent Result: Absent flow in the distal anterior tibial artery and  nonvisualization of the dorsal pedal artery]    Finding was identified on 10/16/2019 2:29 PM.     Paradise Villareal was  contacted by Dr. Ariel Melo DO ([facility]iology [facility])  at 10/16/2019 2:37 PM and verbalized understanding of the urgent  finding.     I have personally reviewed the examination and initial interpretation  and I agree with the findings.    DWAIN GOSS MD   XR Abdomen Port 1 View    Narrative    Exam: XR ABDOMEN PORT 1 VW, 10/16/2019 5:09 PM    Indication: NJ placement    Comparison: 10/14/2019, 10/13/2019.    Findings:   A single supine AP view of the abdomen was obtained. A feeding tube  tip projects over the proximal jejunum. The gastric tube tip projects  over the expected location of the pylorus with the sidehole projecting  over the gastric antrum. An ECMO cannula extends below the  field-of-view. Nonobstructive bowel gas pattern without pneumatosis or  portal venous gas. Stable retrocardiac opacities, likely atelectasis.      Impression    Impression:   The feeding tube tip projects over the proximal jejunum. The gastric  tube tip projects over the expected location of the pylorus, consider  slight retraction.    JAKE STRINGER MD       Labs:  Recent Labs   Lab 10/16/19  1606 10/16/19  1330 10/16/19  1205 10/16/19  0901   PH 7.47* 7.43 7.43 7.39   PCO2 47* 52* 53* 54*   PO2 122* 96 64* 106*   HCO3 34* 34* 35* 33*   O2PER 80  60  60 60 60 60       Lab Results   Component Value Date    HGB 8.4 (L) 10/16/2019    PHGB <30 10/16/2019     (L) 10/16/2019    FIBR 682 (H) 10/16/2019    INR 1.10 10/16/2019    PTT 46 (H) 10/16/2019    DD 3.3 (H) 10/16/2019    AXA <0.10 10/16/2019    ANTCH 49 (L) 10/16/2019         Plan is continue to provide support and wean as able..      Nicky Campbell, RT  10/16/2019 5:56 PM

## 2019-10-16 NOTE — PLAN OF CARE
Condition stable, hematocrit revealed potentially new lesion, neurocrit following, ultrasound of right lower extremity for absent dorsalis pedis pulses, PPFT placed, TF started, turndown performed, diuresing with lasix, amiodarone started for increasingly frequent ectopy and runs of PSVT    Neuro: ELIO, moves both upper and minimal movement in lower extremities, EEG on,   Head CT performed per ECLS protocol, see results for further details  CV: stable HR with frequent ectopy, stable BP, turndown performed, 2+ upper extremitiy pulses, doppler lower pulses, absent dorsalis pedis pulses, ultrasound performed to assess perfusion, see ultrasound note for details  Pulm: LS coarse on vent, suction for minimal amount of thin clear/frothy secretions, saturating well on CMV 14/500/60/10  GI: OG to LIS, PPFT placed and starting feeds  : voiding with bahena  Skin: WDL  Pain: WDL    Continue to monitor

## 2019-10-16 NOTE — PROGRESS NOTES
"PALLIATIVE CARE INTEGRATIVE THERAPIES Progress Note   [facility] ([place]) 4E     REFERRAL SOURCE: Integrative therapies referral.     Visit with patient Joel Campbell at bedside. Joel's wife Jackelyn requested energy work (Reiki) for both Joel and herself.     Patient on ECMO and unable to name goals for 15 minute reiki session. Wife Jackelyn named goals for general health and well-being.     After Jackelyn's own 30 minute reiki session, she stated the session was \"very helpful\" and that she felt more \"relaxed.\" Jackelyn requested follow up for reiki for Joel and herself 1 to 2 times per week if possible during hospitalization.     Plan: I will follow for energy work while Palliative Care is consulted.     Casandra Ring  Integrative Therapies Intern    [facility] Inpatient Palliative Care Team Consult pager [phone] (M-F 8-4:30)  After-hours Answering Service [phone]  "

## 2019-10-16 NOTE — PROGRESS NOTES
Preliminary Video EEG impression on October 16-17, 2019 until 6am    Full report to follow. Please look in inpatient chart, under procedure section.     This video EEG is abnormal due to the presences of continuous generalized polymorphic delta/theta slowing. In the last 24 hours as anesthetic medications have been decreased and patient has become normothermic at 98  F's.  There is an emergence of generalized periodic epileptiform discharges that have occupied nearly 50% of the record.  Initially these discharges were slow in frequency occurring at 0.25 to 1 Hz in frequency.  Overnight these discharges have increased up to 3 Hz in frequency.  On the video during burst of 3 Hz generalized periodic epileptiform discharges there was not obvious clinical manifestations of seizures on the video.  This raises a concern for seizures.  EEG is consistent with diffuse cortical irritability and seizures in the setting of severe encephalopathy encephalopathy.  This morning at 5 AM the EEG does have.  To have improved compared to overnight EEG. If events of interest are noted, please press the event button. Clinical correlation is advised.     Viri Lala MD  Staff Epilepsy Neurologist   [phone]

## 2019-10-16 NOTE — PHARMACY-CONSULT NOTE
Pharmacy Tube Feeding Consult    Medication reviewed for administration by feeding tube and for potential food/drug interactions.    Recommendation: No changes are needed at this time.     Pharmacy will continue to follow as new medications are ordered.    Nurys Chan, PharmD

## 2019-10-16 NOTE — CONSULTS
"[facility] PATIENT CONSULTATION NOTE  Consulted by: FLORENCIA Solis MD  Reason for consultation: Loss of AT signal    HPI:  Joel Campbell is a 53 year old year old male who was transferred from outside hospital for refractory VT/VF arrest s/p peripheral VA ECMO.10/13/18. Prior to admission, the patient initially became unresponsive following seizure like activity without pulses and compressions were initiated. ROSC was obtained after 40 minutes of compressions. Patient was transferred from [place] then [place]. Patient had refractory VT/VF started on amio with EKG showing anterior ST elevations.  Patient underwent PCI, IABP placement then started on VA ECMO, cooling protocol. EF was found to be 25%. A Distal perfusion catheter was placed on 10/13 in the RLE. DP signal was unable to be obtained. A Duplex was performed showing occlusion of JAMEY, with continued flow from peroneal and PT arteries. Vascular surgery was consulted    Review Of Systems: Unable to be obtained    PAST MEDICAL HISTORY:  No past medical history on file.    PAST SURGICAL HISTORY:  Past Surgical History:   Procedure Laterality Date     CV EXTRACORPERAL MEMBRANE OXYGENATION N/A 10/13/2019    Procedure: Placement of RLE reperfusion cannula, placement of cooling catheter;  Surgeon: Anshu Roberts MD;  Location:  [facility]       FAMILY HISTORY:  No family history on file.    SOCIAL HISTORY:   Social History     Tobacco Use     Smoking status: Not on file   Substance Use Topics     Alcohol use: Not on file       HOME MEDICATIONS:  Prior to Admission medications    Not on File       VITAL SIGNS:  Temp 97.5  F (36.4  C)   Resp 14   Ht 1.89 m (6' 2.41\")   Wt 110.2 kg (242 lb 15.2 oz)   SpO2 99%   BMI 30.85 kg/m      Intake/Output Summary (Last 24 hours) at 10/16/2019 1503  Last data filed at 10/16/2019 1400  Gross per 24 hour   Intake 5818 ml   Output 6030 ml   Net -212 ml       Labs:  ROUTINE IP LABS (Last " four results)  BMP  Recent Labs   Lab 10/16/19  1330 10/16/19  0901 10/16/19  0856 10/16/19  0602 10/16/19  0351  10/15/19  2158     --  142  --  144  --  144   POTASSIUM 3.6  --  3.6  --  3.6  --  3.6   CHLORIDE 104  --  106  --  107  --  107   HEATHER 8.4*  --  8.3*  --  8.2*  --  8.0*   CO2 33*  --  31  --  28  --  30   BUN 19  --  19  --  20  --  19   CR 1.04  --  1.03  --  1.09  --  1.14   GLC 78 88 84 86 92  98   < > 92  98    < > = values in this interval not displayed.     CBC  Recent Labs   Lab 10/16/19  0856 10/16/19  0351 10/15/19  2158 10/15/19  1614   WBC 6.9 5.8 6.2 8.2   RBC 2.92* 2.54* 2.69* 2.67*   HGB 9.0* 7.8* 8.3* 8.3*   HCT 27.3* 23.8* 24.7* 24.3*   MCV 94 94 92 91   MCH 30.8 30.7 30.9 31.1   MCHC 33.0 32.8 33.6 34.2   RDW 18.0* 17.7* 17.3* 18.0*   * 92* 82* 70*     INR  Recent Labs   Lab 10/16/19  0856 10/16/19  0351 10/15/19  2158 10/15/19  1614   INR 1.15* 1.23* 1.24* 1.26*       PHYSICAL EXAM:    General: intubated, sedated  Extremities: no open lesions, extremities warm and well perfused, bilateral cannulas noted in place  Pulses: Right PT/Peroneal Signal, absent JAMEY/DP, Left DP/PT/peroneal signal  Hematology: no bruising on visible skin    IMAGING:  Recent Results (from the past 24 hour(s))   XR Chest Port 1 View    Narrative    Exam: XR CHEST PORT 1 VW, 10/16/2019 1:50 AM    Indication: Check endotracheal tube placement and ECLS cannula  placement. DO NOT log-roll patient.  Place film under patient using  patient safety handling process.    Comparison: 10/15/2019.    Findings:   AP view the chest. ET tube tip high thoracic trachea 10.6 cm from the  adiel. Esophageal temperature probe in the midesophagus. Enteric tube  collimated outside the field-of-view. Left IJ CVC with tip at the mid  SVC. IABP and ECMO cannula in stable position. Stable right apical  chest tube with sidehole appearing outside the pleural cavity. Grossly  stable diffuse hazy opacification of the right lung.  Mild hazy  opacities in the left lung base, otherwise clear. Likely small  bilateral pleural effusions, right greater than left. No pneumothorax.      Impression    Impression:   1. High positioning of endotracheal tube 10.6 cm from the adiel.  Consider advancing.  2. Hazy opacification of the right lung and left lower lobe are  relatively stable and likely represent layering pleural effusions with  overlying atelectasis/consolidations  3. Stable position of right apical chest tube with sidehole appearing  outside the pleural cavity.    I have personally reviewed the examination and initial interpretation  and I agree with the findings.    QAMAR MELGAR MD   CT Head w/o Contrast   Result Value    Radiologist flags (Urgent)     Right paracentral-frontal small infarct versus tiny    Narrative    CT HEAD W/O CONTRAST 10/16/2019 11:15 AM    History: ECMO  ICD-10:  Comparison: 10/13/2019.    Technique: Using multidetector thin collimation helical acquisition  technique, axial, coronal and sagittal CT images from the skull base  to the vertex were obtained without intravenous contrast.     Findings:    On the noncontrast images of the brain, there is better visualization  of a possible hypodense abnormality in the right paracentral lobule at  the junction of the right frontal and parietal lobes (series 2 image  27) which is nonspecific and without adjacent mass effect. Right new  cerebellar linear focus is suspicious for infarct as it is  wedge-shaped. The ventricles are not enlarged. The gray to white  matter differentiation of the cerebral hemispheres is preserved. The  basal cisterns are patent.  Increasing diffuse pansinus debris and fluid. The mastoid air cells  are clear.       Impression    Impression:   1. Better visualized nonspecific hypodensity in the right paracentral  lobule, which is indeterminate for a small infarct versus small mass  lesion is better evaluated by MRI (although may not be feasible),  or  postcontrast CT could help to visualize further.   2. A new right hemicerebellum lesion as well is more suspicious for  infarct.  3. Diffuse debris and fluid throughout the paranasal sinuses could  relate to intubation, but increased, and could also represent  sinusitis.    [Access Center: Right paracentral-frontal small infarct versus tiny  mass, and right cerebellar lesion that is new.]    This report will be copied to the [facility] to ensure a  provider acknowledges the finding. Access Center is available Monday  through Friday 8am-3:30 pm.     DICKSON LUNSFORD MD   US Lower Extremity Arterial Duplex Right   Result Value    Radiologist flags (Urgent)     Absent flow in the distal anterior tibial artery and    Narrative    Exam: US LOWER EXTREMITY ARTERIAL DUPLEX RIGHT, 10/16/2019 1:56 PM    Indication: absent DP pulse on right, on VA ECMO    Comparison: Ultrasound dated 10/14/2019, CT CAP dated 10/14/2019.    TECHNIQUE: Duplex arterial ultrasound evaluation of the right lower  extremity    Technical note: Proximal arteries obscured by ECMO cannula    Findings:   Peak systolic velocities in the right lower extremity as follows:    CFA: Obscured by ECMO  PFA origin: Obscured by ECMO  SFA origin: Obscured by ECMO  SFA mid thigh: 53 cm/s  SFA distal thigh 11 cm/s  Popliteal artery: 17 cm/s  PTA at the ankle: 28 cm/s  JAMEY in the calf: 6 cm/s  JAMEY at the ankle: No flow  Dorsal pedal artery: Not visualized.    Waveforms are diffusely monophasic and with poor phasicity, likely  related to ECMO.      Impression    Impression:    1. In the right lower extremity the dorsal pedal artery was  nonvisualized and there was absent flow in the distal anterior tibial  artery.  2. Remaining visualized arteries are patent with velocities as  detailed above.    [Urgent Result: Absent flow in the distal anterior tibial artery and  nonvisualization of the dorsal pedal artery]    Finding was identified on 10/16/2019  2:29 PM.     Paradise Villareal was contacted by Dr. Ariel Melo DO (Radiology [facility])  at 10/16/2019 2:37 PM and verbalized understanding of the urgent  finding.     I have personally reviewed the examination and initial interpretation  and I agree with the findings.    DWAIN GOSS MD         Patient Active Problem List   Diagnosis     Ventricular tachycardia (H)     Cardiac arrest (H)        ASSESSMENT:  54yo male currently admitted for refractory VT/VF arrest s/p placement of VA ECMO with RLE JAMEY occlusion, and patent peroneal and PT arteries      PLAN:  No acute surgical indications at this time  Wean pressors as tolerated  Decannulation as soon as possible  Continue heparin gtt    Jabier New MD  Vascular Surgery fellow  [facility]

## 2019-10-17 ENCOUNTER — ANESTHESIA EVENT (OUTPATIENT)
Dept: SURGERY | Facility: CLINIC | Age: 54
DRG: 003 | End: 2019-10-17
Payer: COMMERCIAL

## 2019-10-17 ENCOUNTER — ALLIED HEALTH/NURSE VISIT (OUTPATIENT)
Dept: NEUROLOGY | Facility: CLINIC | Age: 54
DRG: 003 | End: 2019-10-17
Attending: PSYCHIATRY & NEUROLOGY
Payer: COMMERCIAL

## 2019-10-17 ENCOUNTER — APPOINTMENT (OUTPATIENT)
Dept: CARDIOLOGY | Facility: CLINIC | Age: 54
DRG: 003 | End: 2019-10-17
Attending: STUDENT IN AN ORGANIZED HEALTH CARE EDUCATION/TRAINING PROGRAM
Payer: COMMERCIAL

## 2019-10-17 ENCOUNTER — APPOINTMENT (OUTPATIENT)
Dept: GENERAL RADIOLOGY | Facility: CLINIC | Age: 54
DRG: 003 | End: 2019-10-17
Attending: STUDENT IN AN ORGANIZED HEALTH CARE EDUCATION/TRAINING PROGRAM
Payer: COMMERCIAL

## 2019-10-17 DIAGNOSIS — I46.9 CARDIAC ARREST (H): Primary | ICD-10-CM

## 2019-10-17 LAB
ALBUMIN SERPL-MCNC: 3 G/DL (ref 3.4–5)
ALBUMIN SERPL-MCNC: 3.3 G/DL (ref 3.4–5)
ALBUMIN SERPL-MCNC: 3.4 G/DL (ref 3.4–5)
ALBUMIN SERPL-MCNC: 3.4 G/DL (ref 3.4–5)
ALP SERPL-CCNC: 110 U/L (ref 40–150)
ALP SERPL-CCNC: 70 U/L (ref 40–150)
ALP SERPL-CCNC: 78 U/L (ref 40–150)
ALP SERPL-CCNC: 78 U/L (ref 40–150)
ALT SERPL W P-5'-P-CCNC: 45 U/L (ref 0–70)
ALT SERPL W P-5'-P-CCNC: 45 U/L (ref 0–70)
ALT SERPL W P-5'-P-CCNC: 47 U/L (ref 0–70)
ALT SERPL W P-5'-P-CCNC: 49 U/L (ref 0–70)
ANION GAP SERPL CALCULATED.3IONS-SCNC: 5 MMOL/L (ref 3–14)
ANION GAP SERPL CALCULATED.3IONS-SCNC: 6 MMOL/L (ref 3–14)
ANION GAP SERPL CALCULATED.3IONS-SCNC: 7 MMOL/L (ref 3–14)
ANION GAP SERPL CALCULATED.3IONS-SCNC: 8 MMOL/L (ref 3–14)
APTT PPP: 44 SEC (ref 22–37)
APTT PPP: 48 SEC (ref 22–37)
APTT PPP: 52 SEC (ref 22–37)
APTT PPP: 60 SEC (ref 22–37)
AST SERPL W P-5'-P-CCNC: 203 U/L (ref 0–45)
AST SERPL W P-5'-P-CCNC: 203 U/L (ref 0–45)
AST SERPL W P-5'-P-CCNC: 208 U/L (ref 0–45)
AST SERPL W P-5'-P-CCNC: 241 U/L (ref 0–45)
AT III ACT/NOR PPP CHRO: 64 % (ref 85–135)
BACTERIA SPEC CULT: NO GROWTH
BASE EXCESS BLDA CALC-SCNC: 10.7 MMOL/L
BASE EXCESS BLDA CALC-SCNC: 10.9 MMOL/L
BASE EXCESS BLDA CALC-SCNC: 11.2 MMOL/L
BASE EXCESS BLDA CALC-SCNC: 11.5 MMOL/L
BASE EXCESS BLDA CALC-SCNC: 11.5 MMOL/L
BASE EXCESS BLDA CALC-SCNC: 11.6 MMOL/L
BASE EXCESS BLDA CALC-SCNC: 11.8 MMOL/L
BASE EXCESS BLDA CALC-SCNC: 12.1 MMOL/L
BASE EXCESS BLDA CALC-SCNC: 12.2 MMOL/L
BASE EXCESS BLDA CALC-SCNC: 12.5 MMOL/L
BASE EXCESS BLDA CALC-SCNC: 12.6 MMOL/L
BASE EXCESS BLDA CALC-SCNC: 13.4 MMOL/L
BASE EXCESS BLDA CALC-SCNC: 5.8 MMOL/L
BASE EXCESS BLDA CALC-SCNC: 6.4 MMOL/L
BASE EXCESS BLDA CALC-SCNC: 7.9 MMOL/L
BASE EXCESS BLDV CALC-SCNC: 11.6 MMOL/L
BASE EXCESS BLDV CALC-SCNC: 12.8 MMOL/L
BILIRUB SERPL-MCNC: 3.8 MG/DL (ref 0.2–1.3)
BILIRUB SERPL-MCNC: 3.9 MG/DL (ref 0.2–1.3)
BILIRUB SERPL-MCNC: 4.1 MG/DL (ref 0.2–1.3)
BILIRUB SERPL-MCNC: 4.2 MG/DL (ref 0.2–1.3)
BLD PROD TYP BPU: NORMAL
BLD UNIT ID BPU: 0
BLOOD PRODUCT CODE: NORMAL
BPU ID: NORMAL
BUN SERPL-MCNC: 20 MG/DL (ref 7–30)
BUN SERPL-MCNC: 21 MG/DL (ref 7–30)
BUN SERPL-MCNC: 21 MG/DL (ref 7–30)
BUN SERPL-MCNC: 22 MG/DL (ref 7–30)
CA-I BLD-MCNC: 4.2 MG/DL (ref 4.4–5.2)
CA-I BLD-MCNC: 4.4 MG/DL (ref 4.4–5.2)
CA-I BLD-MCNC: 4.4 MG/DL (ref 4.4–5.2)
CALCIUM SERPL-MCNC: 8.5 MG/DL (ref 8.5–10.1)
CALCIUM SERPL-MCNC: 8.6 MG/DL (ref 8.5–10.1)
CALCIUM SERPL-MCNC: 8.8 MG/DL (ref 8.5–10.1)
CALCIUM SERPL-MCNC: 8.8 MG/DL (ref 8.5–10.1)
CHLORIDE SERPL-SCNC: 100 MMOL/L (ref 94–109)
CHLORIDE SERPL-SCNC: 102 MMOL/L (ref 94–109)
CHLORIDE SERPL-SCNC: 98 MMOL/L (ref 94–109)
CHLORIDE SERPL-SCNC: 99 MMOL/L (ref 94–109)
CO2 SERPL-SCNC: 31 MMOL/L (ref 20–32)
CO2 SERPL-SCNC: 33 MMOL/L (ref 20–32)
CO2 SERPL-SCNC: 34 MMOL/L (ref 20–32)
CO2 SERPL-SCNC: 34 MMOL/L (ref 20–32)
CREAT SERPL-MCNC: 0.95 MG/DL (ref 0.66–1.25)
CREAT SERPL-MCNC: 0.99 MG/DL (ref 0.66–1.25)
CREAT SERPL-MCNC: 1 MG/DL (ref 0.66–1.25)
CREAT SERPL-MCNC: 1.1 MG/DL (ref 0.66–1.25)
CRP SERPL-MCNC: 290 MG/L (ref 0–8)
D DIMER PPP FEU-MCNC: 11.5 UG/ML FEU (ref 0–0.5)
D DIMER PPP FEU-MCNC: 6.2 UG/ML FEU (ref 0–0.5)
ERYTHROCYTE [DISTWIDTH] IN BLOOD BY AUTOMATED COUNT: 17.1 % (ref 10–15)
ERYTHROCYTE [DISTWIDTH] IN BLOOD BY AUTOMATED COUNT: 17.2 % (ref 10–15)
ERYTHROCYTE [DISTWIDTH] IN BLOOD BY AUTOMATED COUNT: 17.4 % (ref 10–15)
ERYTHROCYTE [DISTWIDTH] IN BLOOD BY AUTOMATED COUNT: 17.6 % (ref 10–15)
ERYTHROCYTE [SEDIMENTATION RATE] IN BLOOD BY WESTERGREN METHOD: 83 MM/H (ref 0–20)
FIBRINOGEN PPP-MCNC: 682 MG/DL (ref 200–420)
FIBRINOGEN PPP-MCNC: 766 MG/DL (ref 200–420)
GFR SERPL CREATININE-BSD FRML MDRD: 75 ML/MIN/{1.73_M2}
GFR SERPL CREATININE-BSD FRML MDRD: 85 ML/MIN/{1.73_M2}
GFR SERPL CREATININE-BSD FRML MDRD: 86 ML/MIN/{1.73_M2}
GFR SERPL CREATININE-BSD FRML MDRD: >90 ML/MIN/{1.73_M2}
GLUCOSE BLD-MCNC: 104 MG/DL (ref 70–99)
GLUCOSE BLD-MCNC: 107 MG/DL (ref 70–99)
GLUCOSE BLD-MCNC: 94 MG/DL (ref 70–99)
GLUCOSE BLDC GLUCOMTR-MCNC: 105 MG/DL (ref 70–99)
GLUCOSE BLDC GLUCOMTR-MCNC: 108 MG/DL (ref 70–99)
GLUCOSE BLDC GLUCOMTR-MCNC: 110 MG/DL (ref 70–99)
GLUCOSE BLDC GLUCOMTR-MCNC: 110 MG/DL (ref 70–99)
GLUCOSE BLDC GLUCOMTR-MCNC: 114 MG/DL (ref 70–99)
GLUCOSE BLDC GLUCOMTR-MCNC: 117 MG/DL (ref 70–99)
GLUCOSE SERPL-MCNC: 102 MG/DL (ref 70–99)
GLUCOSE SERPL-MCNC: 103 MG/DL (ref 70–99)
GLUCOSE SERPL-MCNC: 113 MG/DL (ref 70–99)
GLUCOSE SERPL-MCNC: 95 MG/DL (ref 70–99)
HCO3 BLD-SCNC: 30 MMOL/L (ref 21–28)
HCO3 BLD-SCNC: 30 MMOL/L (ref 21–28)
HCO3 BLD-SCNC: 32 MMOL/L (ref 21–28)
HCO3 BLD-SCNC: 36 MMOL/L (ref 21–28)
HCO3 BLD-SCNC: 36 MMOL/L (ref 21–28)
HCO3 BLD-SCNC: 37 MMOL/L (ref 21–28)
HCO3 BLD-SCNC: 38 MMOL/L (ref 21–28)
HCO3 BLD-SCNC: 38 MMOL/L (ref 21–28)
HCO3 BLDA-SCNC: 37 MMOL/L (ref 21–28)
HCO3 BLDA-SCNC: 37 MMOL/L (ref 21–28)
HCO3 BLDV-SCNC: 38 MMOL/L (ref 21–28)
HCO3 BLDV-SCNC: 39 MMOL/L (ref 21–28)
HCT VFR BLD AUTO: 25.3 % (ref 40–53)
HCT VFR BLD AUTO: 26.6 % (ref 40–53)
HCT VFR BLD AUTO: 27.4 % (ref 40–53)
HCT VFR BLD AUTO: 27.8 % (ref 40–53)
HGB BLD-MCNC: 8.2 G/DL (ref 13.3–17.7)
HGB BLD-MCNC: 8.6 G/DL (ref 13.3–17.7)
HGB BLD-MCNC: 8.9 G/DL (ref 13.3–17.7)
HGB BLD-MCNC: 8.9 G/DL (ref 13.3–17.7)
HGB FREE PLAS-MCNC: 30 MG/DL
INR PPP: 1.08 (ref 0.86–1.14)
INR PPP: 1.11 (ref 0.86–1.14)
INR PPP: 1.12 (ref 0.86–1.14)
INR PPP: 1.12 (ref 0.86–1.14)
INTERPRETATION ECG - MUSE: NORMAL
KCT BLD-ACNC: 126 SEC (ref 75–150)
KCT BLD-ACNC: 130 SEC (ref 75–150)
KCT BLD-ACNC: 134 SEC (ref 75–150)
KCT BLD-ACNC: 134 SEC (ref 75–150)
KCT BLD-ACNC: 138 SEC (ref 75–150)
KCT BLD-ACNC: 142 SEC (ref 75–150)
KCT BLD-ACNC: 142 SEC (ref 75–150)
KCT BLD-ACNC: 155 SEC (ref 75–150)
LACTATE BLD-SCNC: 0.8 MMOL/L (ref 0.7–2)
LACTATE BLD-SCNC: 0.8 MMOL/L (ref 0.7–2)
LACTATE BLD-SCNC: 1 MMOL/L (ref 0.7–2)
LDH SERPL L TO P-CCNC: 711 U/L (ref 85–227)
LMWH PPP CHRO-ACNC: 0.15 IU/ML
LMWH PPP CHRO-ACNC: <0.1 IU/ML
MAGNESIUM SERPL-MCNC: 2 MG/DL (ref 1.6–2.3)
MAGNESIUM SERPL-MCNC: 2.1 MG/DL (ref 1.6–2.3)
MAGNESIUM SERPL-MCNC: 2.4 MG/DL (ref 1.6–2.3)
MAGNESIUM SERPL-MCNC: 2.4 MG/DL (ref 1.6–2.3)
MCH RBC QN AUTO: 30.7 PG (ref 26.5–33)
MCH RBC QN AUTO: 30.7 PG (ref 26.5–33)
MCH RBC QN AUTO: 30.8 PG (ref 26.5–33)
MCH RBC QN AUTO: 30.8 PG (ref 26.5–33)
MCHC RBC AUTO-ENTMCNC: 32 G/DL (ref 31.5–36.5)
MCHC RBC AUTO-ENTMCNC: 32.3 G/DL (ref 31.5–36.5)
MCHC RBC AUTO-ENTMCNC: 32.4 G/DL (ref 31.5–36.5)
MCHC RBC AUTO-ENTMCNC: 32.5 G/DL (ref 31.5–36.5)
MCV RBC AUTO: 95 FL (ref 78–100)
MCV RBC AUTO: 96 FL (ref 78–100)
O2/TOTAL GAS SETTING VFR VENT: 40 %
O2/TOTAL GAS SETTING VFR VENT: 50 %
O2/TOTAL GAS SETTING VFR VENT: ABNORMAL %
OXYHGB MFR BLD: 92 % (ref 92–100)
OXYHGB MFR BLD: 93 % (ref 92–100)
OXYHGB MFR BLD: 96 % (ref 92–100)
OXYHGB MFR BLD: 97 % (ref 92–100)
OXYHGB MFR BLD: 98 % (ref 92–100)
OXYHGB MFR BLD: 98 % (ref 92–100)
OXYHGB MFR BLDA: 98 % (ref 75–100)
OXYHGB MFR BLDA: 99 % (ref 75–100)
OXYHGB MFR BLDV: 55 %
OXYHGB MFR BLDV: 70 %
PCO2 BLD: 39 MM HG (ref 35–45)
PCO2 BLD: 43 MM HG (ref 35–45)
PCO2 BLD: 43 MM HG (ref 35–45)
PCO2 BLD: 45 MM HG (ref 35–45)
PCO2 BLD: 46 MM HG (ref 35–45)
PCO2 BLD: 47 MM HG (ref 35–45)
PCO2 BLD: 48 MM HG (ref 35–45)
PCO2 BLD: 49 MM HG (ref 35–45)
PCO2 BLD: 50 MM HG (ref 35–45)
PCO2 BLD: 51 MM HG (ref 35–45)
PCO2 BLD: 51 MM HG (ref 35–45)
PCO2 BLDA: 56 MM HG (ref 35–45)
PCO2 BLDA: 56 MM HG (ref 35–45)
PCO2 BLDV: 58 MM HG (ref 40–50)
PCO2 BLDV: 61 MM HG (ref 40–50)
PH BLD: 7.46 PH (ref 7.35–7.45)
PH BLD: 7.47 PH (ref 7.35–7.45)
PH BLD: 7.48 PH (ref 7.35–7.45)
PH BLD: 7.49 PH (ref 7.35–7.45)
PH BLD: 7.5 PH (ref 7.35–7.45)
PH BLD: 7.5 PH (ref 7.35–7.45)
PH BLD: 7.51 PH (ref 7.35–7.45)
PH BLD: 7.52 PH (ref 7.35–7.45)
PH BLDA: 7.43 PH (ref 7.35–7.45)
PH BLDA: 7.43 PH (ref 7.35–7.45)
PH BLDV: 7.41 PH (ref 7.32–7.43)
PH BLDV: 7.43 PH (ref 7.32–7.43)
PHOSPHATE SERPL-MCNC: 2.8 MG/DL (ref 2.5–4.5)
PLATELET # BLD AUTO: 100 10E9/L (ref 150–450)
PLATELET # BLD AUTO: 77 10E9/L (ref 150–450)
PLATELET # BLD AUTO: 79 10E9/L (ref 150–450)
PLATELET # BLD AUTO: 91 10E9/L (ref 150–450)
PO2 BLD: 100 MM HG (ref 80–105)
PO2 BLD: 139 MM HG (ref 80–105)
PO2 BLD: 157 MM HG (ref 80–105)
PO2 BLD: 63 MM HG (ref 80–105)
PO2 BLD: 66 MM HG (ref 80–105)
PO2 BLD: 78 MM HG (ref 80–105)
PO2 BLD: 80 MM HG (ref 80–105)
PO2 BLD: 83 MM HG (ref 80–105)
PO2 BLD: 88 MM HG (ref 80–105)
PO2 BLD: 93 MM HG (ref 80–105)
PO2 BLD: 93 MM HG (ref 80–105)
PO2 BLD: 96 MM HG (ref 80–105)
PO2 BLD: 98 MM HG (ref 80–105)
PO2 BLDA: 167 MM HG (ref 80–105)
PO2 BLDA: 360 MM HG (ref 80–105)
PO2 BLDV: 30 MM HG (ref 25–47)
PO2 BLDV: 38 MM HG (ref 25–47)
POTASSIUM SERPL-SCNC: 3.1 MMOL/L (ref 3.4–5.3)
POTASSIUM SERPL-SCNC: 3.1 MMOL/L (ref 3.4–5.3)
POTASSIUM SERPL-SCNC: 3.4 MMOL/L (ref 3.4–5.3)
POTASSIUM SERPL-SCNC: 3.6 MMOL/L (ref 3.4–5.3)
PROT SERPL-MCNC: 6.4 G/DL (ref 6.8–8.8)
PROT SERPL-MCNC: 6.5 G/DL (ref 6.8–8.8)
PROT SERPL-MCNC: 6.6 G/DL (ref 6.8–8.8)
PROT SERPL-MCNC: 6.7 G/DL (ref 6.8–8.8)
RBC # BLD AUTO: 2.66 10E12/L (ref 4.4–5.9)
RBC # BLD AUTO: 2.8 10E12/L (ref 4.4–5.9)
RBC # BLD AUTO: 2.89 10E12/L (ref 4.4–5.9)
RBC # BLD AUTO: 2.9 10E12/L (ref 4.4–5.9)
SODIUM SERPL-SCNC: 139 MMOL/L (ref 133–144)
SODIUM SERPL-SCNC: 139 MMOL/L (ref 133–144)
SODIUM SERPL-SCNC: 140 MMOL/L (ref 133–144)
SODIUM SERPL-SCNC: 140 MMOL/L (ref 133–144)
SPECIMEN SOURCE: NORMAL
TRANSFUSION STATUS PATIENT QL: NORMAL
TROPONIN I SERPL-MCNC: 56.09 UG/L (ref 0–0.04)
TROPONIN I SERPL-MCNC: 60.22 UG/L (ref 0–0.04)
TROPONIN I SERPL-MCNC: 68.98 UG/L (ref 0–0.04)
TROPONIN I SERPL-MCNC: 75.62 UG/L (ref 0–0.04)
VANCOMYCIN SERPL-MCNC: 9.6 MG/L
WBC # BLD AUTO: 5.5 10E9/L (ref 4–11)
WBC # BLD AUTO: 5.9 10E9/L (ref 4–11)
WBC # BLD AUTO: 6.2 10E9/L (ref 4–11)
WBC # BLD AUTO: 6.3 10E9/L (ref 4–11)

## 2019-10-17 PROCEDURE — 25800030 ZZH RX IP 258 OP 636: Performed by: STUDENT IN AN ORGANIZED HEALTH CARE EDUCATION/TRAINING PROGRAM

## 2019-10-17 PROCEDURE — P9041 ALBUMIN (HUMAN),5%, 50ML: HCPCS

## 2019-10-17 PROCEDURE — 25000128 H RX IP 250 OP 636: Performed by: STUDENT IN AN ORGANIZED HEALTH CARE EDUCATION/TRAINING PROGRAM

## 2019-10-17 PROCEDURE — 93321 DOPPLER ECHO F-UP/LMTD STD: CPT | Mod: 26 | Performed by: INTERNAL MEDICINE

## 2019-10-17 PROCEDURE — 25000132 ZZH RX MED GY IP 250 OP 250 PS 637: Performed by: STUDENT IN AN ORGANIZED HEALTH CARE EDUCATION/TRAINING PROGRAM

## 2019-10-17 PROCEDURE — 40000344 ZZHCL STATISTIC THAWING COMPONENT: Performed by: STUDENT IN AN ORGANIZED HEALTH CARE EDUCATION/TRAINING PROGRAM

## 2019-10-17 PROCEDURE — 85610 PROTHROMBIN TIME: CPT | Performed by: STUDENT IN AN ORGANIZED HEALTH CARE EDUCATION/TRAINING PROGRAM

## 2019-10-17 PROCEDURE — 20000004 ZZH R&B ICU UMMC

## 2019-10-17 PROCEDURE — 84484 ASSAY OF TROPONIN QUANT: CPT | Performed by: STUDENT IN AN ORGANIZED HEALTH CARE EDUCATION/TRAINING PROGRAM

## 2019-10-17 PROCEDURE — 82805 BLOOD GASES W/O2 SATURATION: CPT | Performed by: STUDENT IN AN ORGANIZED HEALTH CARE EDUCATION/TRAINING PROGRAM

## 2019-10-17 PROCEDURE — 82330 ASSAY OF CALCIUM: CPT | Performed by: STUDENT IN AN ORGANIZED HEALTH CARE EDUCATION/TRAINING PROGRAM

## 2019-10-17 PROCEDURE — P9016 RBC LEUKOCYTES REDUCED: HCPCS | Performed by: INTERNAL MEDICINE

## 2019-10-17 PROCEDURE — 83615 LACTATE (LD) (LDH) ENZYME: CPT | Performed by: STUDENT IN AN ORGANIZED HEALTH CARE EDUCATION/TRAINING PROGRAM

## 2019-10-17 PROCEDURE — 85379 FIBRIN DEGRADATION QUANT: CPT | Performed by: STUDENT IN AN ORGANIZED HEALTH CARE EDUCATION/TRAINING PROGRAM

## 2019-10-17 PROCEDURE — 80202 ASSAY OF VANCOMYCIN: CPT | Performed by: INTERNAL MEDICINE

## 2019-10-17 PROCEDURE — 85347 COAGULATION TIME ACTIVATED: CPT

## 2019-10-17 PROCEDURE — 85730 THROMBOPLASTIN TIME PARTIAL: CPT | Performed by: STUDENT IN AN ORGANIZED HEALTH CARE EDUCATION/TRAINING PROGRAM

## 2019-10-17 PROCEDURE — 83051 HEMOGLOBIN PLASMA: CPT | Performed by: STUDENT IN AN ORGANIZED HEALTH CARE EDUCATION/TRAINING PROGRAM

## 2019-10-17 PROCEDURE — 93308 TTE F-UP OR LMTD: CPT

## 2019-10-17 PROCEDURE — 93308 TTE F-UP OR LMTD: CPT | Mod: 26 | Performed by: INTERNAL MEDICINE

## 2019-10-17 PROCEDURE — 36415 COLL VENOUS BLD VENIPUNCTURE: CPT | Performed by: INTERNAL MEDICINE

## 2019-10-17 PROCEDURE — C9113 INJ PANTOPRAZOLE SODIUM, VIA: HCPCS | Performed by: STUDENT IN AN ORGANIZED HEALTH CARE EDUCATION/TRAINING PROGRAM

## 2019-10-17 PROCEDURE — 27210429 ZZH NUTRITION PRODUCT INTERMEDIATE LITER

## 2019-10-17 PROCEDURE — 85520 HEPARIN ASSAY: CPT | Performed by: STUDENT IN AN ORGANIZED HEALTH CARE EDUCATION/TRAINING PROGRAM

## 2019-10-17 PROCEDURE — 40000275 ZZH STATISTIC RCP TIME EA 10 MIN

## 2019-10-17 PROCEDURE — 94003 VENT MGMT INPAT SUBQ DAY: CPT

## 2019-10-17 PROCEDURE — 25000128 H RX IP 250 OP 636: Performed by: INTERNAL MEDICINE

## 2019-10-17 PROCEDURE — 95951 ZZHC EEG VIDEO EACH 24 HR: CPT | Mod: ZF

## 2019-10-17 PROCEDURE — 99233 SBSQ HOSP IP/OBS HIGH 50: CPT | Mod: GC | Performed by: INTERNAL MEDICINE

## 2019-10-17 PROCEDURE — 84100 ASSAY OF PHOSPHORUS: CPT | Performed by: STUDENT IN AN ORGANIZED HEALTH CARE EDUCATION/TRAINING PROGRAM

## 2019-10-17 PROCEDURE — 82803 BLOOD GASES ANY COMBINATION: CPT | Performed by: STUDENT IN AN ORGANIZED HEALTH CARE EDUCATION/TRAINING PROGRAM

## 2019-10-17 PROCEDURE — 82947 ASSAY GLUCOSE BLOOD QUANT: CPT | Performed by: STUDENT IN AN ORGANIZED HEALTH CARE EDUCATION/TRAINING PROGRAM

## 2019-10-17 PROCEDURE — 33949 ECMO/ECLS DAILY MGMT ARTERY: CPT

## 2019-10-17 PROCEDURE — 87070 CULTURE OTHR SPECIMN AEROBIC: CPT | Performed by: STUDENT IN AN ORGANIZED HEALTH CARE EDUCATION/TRAINING PROGRAM

## 2019-10-17 PROCEDURE — 85300 ANTITHROMBIN III ACTIVITY: CPT | Performed by: STUDENT IN AN ORGANIZED HEALTH CARE EDUCATION/TRAINING PROGRAM

## 2019-10-17 PROCEDURE — 40000014 ZZH STATISTIC ARTERIAL MONITORING DAILY

## 2019-10-17 PROCEDURE — 85652 RBC SED RATE AUTOMATED: CPT | Performed by: STUDENT IN AN ORGANIZED HEALTH CARE EDUCATION/TRAINING PROGRAM

## 2019-10-17 PROCEDURE — 25800030 ZZH RX IP 258 OP 636: Performed by: INTERNAL MEDICINE

## 2019-10-17 PROCEDURE — 00000146 ZZHCL STATISTIC GLUCOSE BY METER IP

## 2019-10-17 PROCEDURE — 85027 COMPLETE CBC AUTOMATED: CPT | Performed by: STUDENT IN AN ORGANIZED HEALTH CARE EDUCATION/TRAINING PROGRAM

## 2019-10-17 PROCEDURE — 86140 C-REACTIVE PROTEIN: CPT | Performed by: STUDENT IN AN ORGANIZED HEALTH CARE EDUCATION/TRAINING PROGRAM

## 2019-10-17 PROCEDURE — 83605 ASSAY OF LACTIC ACID: CPT | Performed by: STUDENT IN AN ORGANIZED HEALTH CARE EDUCATION/TRAINING PROGRAM

## 2019-10-17 PROCEDURE — 85384 FIBRINOGEN ACTIVITY: CPT | Performed by: STUDENT IN AN ORGANIZED HEALTH CARE EDUCATION/TRAINING PROGRAM

## 2019-10-17 PROCEDURE — P9059 PLASMA, FRZ BETWEEN 8-24HOUR: HCPCS | Performed by: STUDENT IN AN ORGANIZED HEALTH CARE EDUCATION/TRAINING PROGRAM

## 2019-10-17 PROCEDURE — 93325 DOPPLER ECHO COLOR FLOW MAPG: CPT | Mod: 26 | Performed by: INTERNAL MEDICINE

## 2019-10-17 PROCEDURE — 25000128 H RX IP 250 OP 636

## 2019-10-17 PROCEDURE — 87040 BLOOD CULTURE FOR BACTERIA: CPT | Performed by: INTERNAL MEDICINE

## 2019-10-17 PROCEDURE — 80053 COMPREHEN METABOLIC PANEL: CPT | Performed by: STUDENT IN AN ORGANIZED HEALTH CARE EDUCATION/TRAINING PROGRAM

## 2019-10-17 PROCEDURE — 83735 ASSAY OF MAGNESIUM: CPT | Performed by: STUDENT IN AN ORGANIZED HEALTH CARE EDUCATION/TRAINING PROGRAM

## 2019-10-17 PROCEDURE — 71045 X-RAY EXAM CHEST 1 VIEW: CPT

## 2019-10-17 RX ORDER — POLYETHYLENE GLYCOL 3350 17 G/17G
17 POWDER, FOR SOLUTION ORAL DAILY
Status: DISCONTINUED | OUTPATIENT
Start: 2019-10-17 | End: 2019-10-20

## 2019-10-17 RX ORDER — BISACODYL 10 MG
10 SUPPOSITORY, RECTAL RECTAL DAILY PRN
Status: DISCONTINUED | OUTPATIENT
Start: 2019-10-17 | End: 2019-11-05 | Stop reason: HOSPADM

## 2019-10-17 RX ORDER — NICOTINE POLACRILEX 4 MG
15-30 LOZENGE BUCCAL
Status: DISCONTINUED | OUTPATIENT
Start: 2019-10-17 | End: 2019-11-05 | Stop reason: HOSPADM

## 2019-10-17 RX ORDER — FUROSEMIDE 80 MG
80 TABLET ORAL ONCE
Status: DISCONTINUED | OUTPATIENT
Start: 2019-10-17 | End: 2019-10-17

## 2019-10-17 RX ORDER — ALBUMIN, HUMAN INJ 5% 5 %
SOLUTION INTRAVENOUS
Status: COMPLETED
Start: 2019-10-17 | End: 2019-10-17

## 2019-10-17 RX ORDER — AMOXICILLIN 250 MG
2 CAPSULE ORAL 2 TIMES DAILY
Status: DISCONTINUED | OUTPATIENT
Start: 2019-10-17 | End: 2019-10-21

## 2019-10-17 RX ORDER — FUROSEMIDE 10 MG/ML
80 INJECTION INTRAMUSCULAR; INTRAVENOUS ONCE
Status: COMPLETED | OUTPATIENT
Start: 2019-10-17 | End: 2019-10-17

## 2019-10-17 RX ORDER — DEXTROSE MONOHYDRATE 25 G/50ML
25-50 INJECTION, SOLUTION INTRAVENOUS
Status: DISCONTINUED | OUTPATIENT
Start: 2019-10-17 | End: 2019-11-05 | Stop reason: HOSPADM

## 2019-10-17 RX ORDER — LEVETIRACETAM 15 MG/ML
1500 INJECTION INTRAVASCULAR ONCE
Status: COMPLETED | OUTPATIENT
Start: 2019-10-17 | End: 2019-10-17

## 2019-10-17 RX ADMIN — HEPARIN SODIUM 700 UNITS/HR: 10000 INJECTION, SOLUTION INTRAVENOUS at 04:03

## 2019-10-17 RX ADMIN — VANCOMYCIN HYDROCHLORIDE 1500 MG: 10 INJECTION, POWDER, LYOPHILIZED, FOR SOLUTION INTRAVENOUS at 08:24

## 2019-10-17 RX ADMIN — SENNOSIDES AND DOCUSATE SODIUM 2 TABLET: 8.6; 5 TABLET ORAL at 19:41

## 2019-10-17 RX ADMIN — CALCIUM GLUCONATE 1 G: 98 INJECTION, SOLUTION INTRAVENOUS at 06:22

## 2019-10-17 RX ADMIN — MULTIVITAMIN 15 ML: LIQUID ORAL at 07:29

## 2019-10-17 RX ADMIN — MIDAZOLAM 2 MG/HR: 5 INJECTION INTRAMUSCULAR; INTRAVENOUS at 19:39

## 2019-10-17 RX ADMIN — POLYETHYLENE GLYCOL 3350 17 G: 17 POWDER, FOR SOLUTION ORAL at 16:42

## 2019-10-17 RX ADMIN — FENTANYL CITRATE 50 MCG: 50 INJECTION, SOLUTION INTRAMUSCULAR; INTRAVENOUS at 22:26

## 2019-10-17 RX ADMIN — FOLIC ACID 1 MG: 1 TABLET ORAL at 07:29

## 2019-10-17 RX ADMIN — Medication 50 MCG/HR: at 16:40

## 2019-10-17 RX ADMIN — Medication 1 PACKET: at 07:31

## 2019-10-17 RX ADMIN — PIPERACILLIN SODIUM AND TAZOBACTAM SODIUM 3.38 G: 3; .375 INJECTION, POWDER, LYOPHILIZED, FOR SOLUTION INTRAVENOUS at 14:30

## 2019-10-17 RX ADMIN — POTASSIUM CHLORIDE 20 MEQ: 29.8 INJECTION, SOLUTION INTRAVENOUS at 17:33

## 2019-10-17 RX ADMIN — ALBUMIN HUMAN 25 G: 0.05 INJECTION, SOLUTION INTRAVENOUS at 08:23

## 2019-10-17 RX ADMIN — ASPIRIN 81 MG CHEWABLE TABLET 81 MG: 81 TABLET CHEWABLE at 07:29

## 2019-10-17 RX ADMIN — FUROSEMIDE 80 MG: 10 INJECTION, SOLUTION INTRAVENOUS at 01:59

## 2019-10-17 RX ADMIN — PANTOPRAZOLE SODIUM 40 MG: 40 INJECTION, POWDER, FOR SOLUTION INTRAVENOUS at 16:42

## 2019-10-17 RX ADMIN — POTASSIUM CHLORIDE 20 MEQ: 29.8 INJECTION, SOLUTION INTRAVENOUS at 05:11

## 2019-10-17 RX ADMIN — PIPERACILLIN SODIUM AND TAZOBACTAM SODIUM 3.38 G: 3; .375 INJECTION, POWDER, LYOPHILIZED, FOR SOLUTION INTRAVENOUS at 19:40

## 2019-10-17 RX ADMIN — POTASSIUM CHLORIDE 20 MEQ: 29.8 INJECTION, SOLUTION INTRAVENOUS at 12:21

## 2019-10-17 RX ADMIN — LEVETIRACETAM 1500 MG: 15 INJECTION INTRAVENOUS at 12:11

## 2019-10-17 RX ADMIN — HEPARIN SODIUM 1500 UNITS/HR: 10000 INJECTION, SOLUTION INTRAVENOUS at 23:34

## 2019-10-17 RX ADMIN — POTASSIUM CHLORIDE 20 MEQ: 29.8 INJECTION, SOLUTION INTRAVENOUS at 11:18

## 2019-10-17 RX ADMIN — PANTOPRAZOLE SODIUM 8 MG/HR: 40 INJECTION, POWDER, FOR SOLUTION INTRAVENOUS at 05:32

## 2019-10-17 RX ADMIN — THIAMINE HCL TAB 100 MG 100 MG: 100 TAB at 07:29

## 2019-10-17 RX ADMIN — MIDAZOLAM 1 MG: 1 INJECTION INTRAMUSCULAR; INTRAVENOUS at 22:25

## 2019-10-17 RX ADMIN — PIPERACILLIN SODIUM AND TAZOBACTAM SODIUM 3.38 G: 3; .375 INJECTION, POWDER, LYOPHILIZED, FOR SOLUTION INTRAVENOUS at 07:29

## 2019-10-17 RX ADMIN — PIPERACILLIN SODIUM AND TAZOBACTAM SODIUM 3.38 G: 3; .375 INJECTION, POWDER, LYOPHILIZED, FOR SOLUTION INTRAVENOUS at 01:59

## 2019-10-17 RX ADMIN — POTASSIUM CHLORIDE 20 MEQ: 29.8 INJECTION, SOLUTION INTRAVENOUS at 22:51

## 2019-10-17 RX ADMIN — SENNOSIDES AND DOCUSATE SODIUM 1 TABLET: 8.6; 5 TABLET ORAL at 07:29

## 2019-10-17 RX ADMIN — TICAGRELOR 90 MG: 90 TABLET ORAL at 07:29

## 2019-10-17 RX ADMIN — TICAGRELOR 90 MG: 90 TABLET ORAL at 19:41

## 2019-10-17 ASSESSMENT — MIFFLIN-ST. JEOR: SCORE: 1961.24

## 2019-10-17 ASSESSMENT — ACTIVITIES OF DAILY LIVING (ADL)
ADLS_ACUITY_SCORE: 19

## 2019-10-17 NOTE — PROCEDURES
ECMO [place] STUDY    Inotropes/Pressors:  N/A    Flow(L)  HR    BP                SVO2     3.5          65        100/52(73)    100%  2.5          64        96/50(72)      100%  1.5          66        82/48(68)       98%  1             68        98/48(72)       97%   - with Epi bolus at 1L, HR continued to be 66-69, but BP increased to 121/61(88)    Prelim review on the TTE shows LVEF mildly improved from 3.5 L to 1 L flow. ABG on 1 L 7.49/49/66 on 40% FiO2 on the vent. Pt appeared to tolerate the turndown well. Plan to continue with decannulation tomorrow AM.    Reviewed with Dr. Roberts.        Paradise Villareal MD  Cardiology Fellow

## 2019-10-17 NOTE — ANESTHESIA PREPROCEDURE EVALUATION
Anesthesia Pre-Procedure Evaluation    Patient: Joel Campbell   MRN:     [id number] Gender:   male   Age:    53 year old :      1965        Preoperative Diagnosis: Ventricular tachycardia (H) [I47.2]  Cardiac arrest (H) [I46.9]   Procedure(s):  ECMO DECANNULATION     No past medical history on file.   Past Surgical History:   Procedure Laterality Date     CV EXTRACORPERAL MEMBRANE OXYGENATION N/A 10/13/2019    Procedure: Placement of RLE reperfusion cannula, placement of cooling catheter;  Surgeon: Anshu Roberts MD;  Location:  [facility]          Anesthesia Evaluation     .             ROS/MED HX    ENT/Pulmonary:       Neurologic: Comment: Concern for seizure activity and started on keppra      Cardiovascular: Comment: 10/13/19 r refractory VT/VF arrest s/p PCI to LAD    On  VA ECMO.   right chest wall hematoma and right hemothorax  10/17 TTE  VA ECMO turndown.  Severely (EF 10-20%) reduced left ventricular function is present. Apical wall  akinesis is present.  With turndown there is mild improvement in LV fxn.  No significant change from yesterday    (+) hypertension----. : . . . :. .       METS/Exercise Tolerance:     Hematologic:         Musculoskeletal:         GI/Hepatic:     (+) GERD       Renal/Genitourinary:         Endo:         Psychiatric:         Infectious Disease:         Malignancy:         Other:                   Procedure: Procedure(s):  ECMO DECANNULATION    HPI: Joel Campbell is a 53 year old male who presents for the above procedure.     PMHx/PSHx:  No past medical history on file.    Past Surgical History:   Procedure Laterality Date     CV EXTRACORPERAL MEMBRANE OXYGENATION N/A 10/13/2019    Procedure: Placement of RLE reperfusion cannula, placement of cooling catheter;  Surgeon: Anshu Roberts MD;  Location:  [facility]       No current facility-administered medications on file prior to encounter.   No current outpatient medications on  file prior to encounter.      Social Hx:   Social History     Tobacco Use     Smoking status: Not on file   Substance Use Topics     Alcohol use: Not on file       Allergies: No Known Allergies      NPO Status: Per ASA Guidelines    Labs:    Blood Bank:  Lab Results   Component Value Date    ABO A 10/16/2019    RH Pos 10/16/2019    AS Neg 10/16/2019     BMP:  Recent Labs   Lab Test 10/17/19  1537 10/17/19  1020   NA  --  139   POTASSIUM  --  3.1*   CHLORIDE  --  99   CO2  --  34*   BUN  --  20   CR  --  0.99   * 95   HEATHER  --  8.5     CBC:   Recent Labs   Lab Test 10/17/19  1020   WBC 6.3   RBC 2.66*   HGB 8.2*   HCT 25.3*   MCV 95   MCH 30.8   MCHC 32.4   RDW 17.4*   PLT 91*     Coags:  Recent Labs   Lab Test 10/17/19  1020 10/17/19  0346   INR 1.12 1.08   PTT 48* 44*   FIBR  --  766*               PHYSICAL EXAM:   Mental Status/Neuro:    Airway: Facies: Feasible  Mallampati: Not Assessed  Mouth/Opening: Not Assessed  TM distance: Not Assessed  Neck ROM: Not Assessed  Airway Device: ETT   Respiratory:    CV: Rhythm: Regular   Comments: Multiple chipped and broken and missing teeth. Very poor dentition     Dental: Details                LABS:  CBC:   Lab Results   Component Value Date    WBC 6.3 10/17/2019    WBC 6.2 10/17/2019    HGB 8.2 (L) 10/17/2019    HGB 8.9 (L) 10/17/2019    HCT 25.3 (L) 10/17/2019    HCT 27.4 (L) 10/17/2019    PLT 91 (L) 10/17/2019     (L) 10/17/2019     BMP:   Lab Results   Component Value Date     10/17/2019     10/17/2019    POTASSIUM 3.1 (L) 10/17/2019    POTASSIUM 3.4 10/17/2019    CHLORIDE 99 10/17/2019    CHLORIDE 102 10/17/2019    CO2 34 (H) 10/17/2019    CO2 33 (H) 10/17/2019    BUN 20 10/17/2019    BUN 21 10/17/2019    CR 0.99 10/17/2019    CR 1.10 10/17/2019    GLC 95 10/17/2019     (H) 10/17/2019     COAGS:   Lab Results   Component Value Date    PTT 48 (H) 10/17/2019    INR 1.12 10/17/2019    FIBR 766 (H) 10/17/2019     POC:   Lab Results  "  Component Value Date     (H) 10/17/2019     OTHER:   Lab Results   Component Value Date    PH 7.47 (H) 10/17/2019    LACT 0.8 10/17/2019    A1C 5.4 10/13/2019    HEATHER 8.5 10/17/2019    PHOS 2.8 10/17/2019    MAG 2.0 10/17/2019    ALBUMIN 3.4 10/17/2019    PROTTOTAL 6.6 (L) 10/17/2019    ALT 45 10/17/2019     (H) 10/17/2019    ALKPHOS 70 10/17/2019    BILITOTAL 3.8 (H) 10/17/2019    .0 (H) 10/17/2019    SED 83 (H) 10/17/2019        Preop Vitals    BP Readings from Last 3 Encounters:   No data found for BP    Pulse Readings from Last 3 Encounters:   No data found for Pulse      Resp Readings from Last 3 Encounters:   10/17/19 14    SpO2 Readings from Last 3 Encounters:   10/17/19 99%      Temp Readings from Last 1 Encounters:   10/17/19 36.6  C (97.9  F)    Ht Readings from Last 1 Encounters:   10/13/19 1.89 m (6' 2.41\")      Wt Readings from Last 1 Encounters:   10/17/19 104 kg (229 lb 4.5 oz)    Estimated body mass index is 29.11 kg/m  as calculated from the following:    Height as of this encounter: 1.89 m (6' 2.41\").    Weight as of this encounter: 104 kg (229 lb 4.5 oz).     LDA:  Peripheral IV 10/13/19 Right Wrist (Active)   Site Assessment [facility] 10/17/2019 12:00 PM   Line Status Infusing 10/17/2019 12:00 PM   Phlebitis Scale 0-->no symptoms 10/17/2019 12:00 PM   Infiltration Scale 0 10/17/2019 12:00 PM   Extravasation? No 10/17/2019 12:00 PM   Number of days: 4       Peripheral IV 10/16/19 Left Lower forearm (Active)   Site Assessment [facility] 10/17/2019 12:00 PM   Line Status Infusing 10/17/2019 12:00 PM   Phlebitis Scale 0-->no symptoms 10/17/2019 12:00 PM   Infiltration Scale 0 10/17/2019 12:00 PM   Extravasation? No 10/17/2019 12:00 PM   Number of days: 1       Arterial Line Radial (Active)   Site Assessment [facility] 10/17/2019 12:00 PM   Line Status Pulsatile blood flow 10/17/2019 12:00 PM   Arterine Line Cap Change Due 10/18/19 10/17/2019 12:00 PM   Art Line Waveform Appropriate 10/17/2019 12:00 " PM   Art Line Interventions Leveled 10/17/2019 12:00 PM   Color/Movement/Sensation Capillary refill less than 3 sec 10/17/2019 12:00 PM   Line Necessity Yes, meets criteria 10/17/2019 12:00 PM   Dressing Type Transparent 10/17/2019 12:00 PM   Dressing Status Clean, dry, intact 10/17/2019 12:00 PM   Dressing Change Due 10/20/19 10/17/2019 12:00 PM   Number of days:        CVC Triple Lumen 10/13/19 Left Subclavian (Active)   Site Assessment [facility] 10/17/2019 12:00 PM   Dressing Intervention Chlorhexidine sponge;Transparent 10/17/2019 12:00 PM   Dressing Change Due 10/20/19 10/17/2019 12:00 PM   Lumen A - Color WHITE 10/17/2019 12:00 PM   Lumen A - Status infusing 10/17/2019 12:00 PM   Lumen A - Cap Change Due 10/18/19 10/17/2019 12:00 PM   Lumen B - Color BROWN 10/17/2019 12:00 PM   Lumen B - Status infusing 10/17/2019 12:00 PM   Lumen B - Cap Change Due 10/18/19 10/17/2019 12:00 PM   Lumen C - Color BLUE 10/17/2019 12:00 PM   Lumen C - Status infusing 10/17/2019 12:00 PM   Lumen C - Cap Change Due 10/18/19 10/17/2019 12:00 PM   Extravasation? No 10/17/2019 12:00 PM   Number of days: 4       CVC Triple Lumen 10/13/19 Right Femoral (Active)   Site Assessment [facility] 10/17/2019 12:00 PM   Dressing Intervention Chlorhexidine sponge;Transparent 10/17/2019 12:00 PM   Dressing Change Due 10/20/19 10/17/2019 12:00 PM   Lumen A - Color BLUE 10/17/2019 12:00 PM   Lumen A - Status saline locked 10/17/2019 12:00 PM   Lumen A - Cap Change Due 10/18/19 10/17/2019 12:00 PM   Lumen B - Color WHITE 10/17/2019 12:00 PM   Lumen B - Status infusing 10/17/2019 12:00 PM   Lumen B - Cap Change Due 10/18/19 10/17/2019 12:00 PM   Lumen C - Color BROWN 10/17/2019 12:00 PM   Lumen C - Status infusing 10/17/2019 12:00 PM   Lumen C - Cap Change Due 10/18/19 10/17/2019 12:00 PM   Extravasation? No 10/17/2019 12:00 PM   Number of days: 4       Arterial Sheath  (Active)   Specific Qualities Sutured 10/17/2019 12:00 PM   Site Assessment [facility] 10/17/2019  12:00 PM   Dressing Type Transparent 10/17/2019 12:00 PM   Dressing Intervention Dressing changed/new dressing 10/16/2019  4:00 PM   Arterial Sheath Comment L reperfusion 10/17/2019 12:00 PM   Number of days:        Arterial Sheath  (Active)   Specific Qualities Sutured 10/17/2019 12:00 PM   Site Assessment [facility] 10/17/2019 12:00 PM   Dressing Type Transparent 10/17/2019 12:00 PM   Dressing Intervention Dressing changed/new dressing 10/16/2019  4:00 PM   Arterial Sheath Comment R reperfusion 10/17/2019 12:00 PM   Number of days:        Arterial Sheath  (Active)   Specific Qualities Sutured 10/17/2019 12:00 PM   Site Assessment [facility] 10/17/2019 12:00 PM   Dressing Type Transparent 10/17/2019 12:00 PM   Dressing Intervention Dressing changed/new dressing 10/16/2019  4:00 PM   Arterial Sheath Comment ECMO 10/17/2019 12:00 PM   Number of days: 4       Arterial Sheath  (Active)   Specific Qualities Sutured 10/17/2019 12:00 PM   Site Assessment [facility] 10/17/2019 12:00 PM   Dressing Type Transparent 10/17/2019 12:00 PM   Dressing Intervention Dressing changed/new dressing 10/16/2019  4:00 PM   Arterial Sheath Comment IABP 10/17/2019 12:00 PM   Number of days: 4       Venous Sheath (Active)   Specific Qualities Sutured 10/17/2019 12:00 PM   Site Assessment [facility] 10/17/2019 12:00 PM   Dressing Type Transparent 10/17/2019 12:00 PM   Dressing Intervention Dressing changed/new dressing 10/16/2019  4:00 PM   Venous Sheath Comment ECMO 10/17/2019 12:00 PM   Number of days: 4       ETT (adult) 7 (Active)   Secured By Commercial tube [email or web address] 10/17/2019 12:05 PM   Site Appearance Clean and dry 10/17/2019 12:05 PM   Secured at (cm) to lip 26 cm 10/17/2019 12:00 PM   Site of ET Tube Securement At lip 10/17/2019 12:00 PM   Cuff Pressure - Type minimal leak technique 10/17/2019  4:00 AM   Tube Care/Reposition repositioned tube right side of mouth 10/17/2019 12:00 PM   Bite Block Secure and Patent 10/17/2019 12:00 PM   Safety Measures manual  resuscitator at bedside 10/17/2019 12:00 PM   Number of days: 4       Chest Tube 1 Right (Active)   Site Assessment [facility] 10/17/2019 12:00 PM   Suction -30 cm H2O 10/17/2019 12:00 PM   Chest Tube Airleak No 10/17/2019 12:00 PM   Drainage Description Serosanguinous 10/17/2019 12:00 PM   Dressing Status Normal: Clean, Dry & Intact 10/17/2019 12:00 PM   Dressing Change Due 10/18/19 10/17/2019 12:00 PM   Dressing Intervention Gauze 10/17/2019 12:00 PM   Patency Intervention Tip/Tilt 10/17/2019 12:00 PM   Chest Tube Clamps at Bedside present 10/17/2019 12:00 PM   Container Amount 1160 10/17/2019  3:00 PM   Output (ml) 0 ml 10/17/2019  3:00 PM   Number of days: 3       NG/OG Tube Nasogastric Right nostril (Active)   Site Description [facility] 10/17/2019 12:00 PM   Status Suction-low intermittent 10/17/2019 12:00 PM   Drainage Appearance Brown;Green 10/17/2019 12:00 PM   Placement Confirmation Wilburn unchanged 10/17/2019 12:00 PM   Wilburn (cm marking) at nare/mouth 75 cm 10/17/2019 12:00 PM   Intake (ml) 15 ml 10/15/2019  8:00 PM   Flush/Free Water (mL) 30 mL 10/17/2019 12:00 PM   Container Amount 150 mL 10/17/2019  3:00 PM   Output (ml) 0 ml 10/17/2019  3:00 PM   Number of days: 4       NG/OG Tube Nasogastric 10 fr Left nostril (Active)   Site Description [facility] 10/17/2019 12:00 PM   Status Enteral Feedings 10/17/2019 12:00 PM   Placement Confirmation Wilburn unchanged 10/17/2019 12:00 PM   Wilburn (cm marking) at nare/mouth 111 cm 10/17/2019 12:00 PM   Intake (ml) 60 ml 10/17/2019  8:00 AM   Flush/Free Water (mL) 30 mL 10/17/2019 12:00 PM   Number of days: 1       Urethral Catheter Non-latex;Temperature probe (Active)   Tube Description Positional 10/17/2019 12:00 PM   Catheter Care Done 10/17/2019 12:00 PM   Collection Container Standard 10/17/2019 12:00 PM   Securement Method Securing device (Describe) 10/17/2019 12:00 PM   Rationale for Continued Use Strict 1-2 Hour I&O 10/17/2019 12:00 PM   Urine Output 130 mL 10/17/2019   3:00 PM   Number of days: 4        Assessment:   ASA SCORE: 4    H&P: History and physical reviewed and following examination; no interval change.   Smoking Status:  Non-Smoker/Unknown        Plan:   Anes. Type:  General   Pre-Medication: None   Induction:  IV (Standard)   Airway: ETT; Oral   Access/Monitoring: PIV; A-Line; Central Access/Port present   Maintenance: Balanced     Postop Plan:   Postop Pain: Opioids  Postop Sedation/Airway: Sedation likely  Disposition: ICU     PONV Management:   Adult Risk Factors:, Non-Smoker, Postop Opioids     CONSENT: Direct conversation   Plan and risks discussed with: Spouse   Blood Products: Consented (ALL Blood Products)       Comments for Plan/Consent:  Spoke with wife (Jackelyn) and answered all the questions about KAVON and anesthesia.                  Peyton Triplett MD

## 2019-10-17 NOTE — PROGRESS NOTES
Neuro- Withdraws from pain. PERRL, eyes deviate upward when eyes open. Does not follow commands. Midazolam for sedation. Fentanyl for pain control. Did increase same d/t s/s of discomfort accompanied by decreasing SVO2 and shivering. Pt appeared more comfortable and fentanyl decreased back to 50mcg/hr. No further s/s of discomfort.  CV- SR with frequent PACs/PVCs and occasional runs of SVT up to 130bpm. MAPs maintained>65 without need for pressors. Afebrile; warm blankets placed for shivering. IABP 1:1, 100% augmentation; no issues. ECMO without issues this shift.  Pulm- Remains on vent, CMV settings. FiO2 titrated up for low pO2 on ABG. Lasix given x1 for same as well.   GI- TF advanced to 25, as ordered. No BM this shift. NG with moderate amount of OP.  - Adequate UOP most of shift, with large response to lasix. See I/O flowsheet.  Skin- Tolerates Q2hour turns. No new skin concerns.  See flow sheets for further interventions and assessments. Continuing to monitor.

## 2019-10-17 NOTE — PHARMACY-VANCOMYCIN DOSING SERVICE
Pharmacy Vancomycin Note  Date of Service 2019  Patient's  1965   53 year old, male    Indication: Aspiration Pneumonia  Goal Trough Level: 15-20 mg/L  Day of Therapy: 5  Current Vancomycin regimen:  1500 mg IV q24h    Current estimated CrCl = Estimated Creatinine Clearance: 100.5 mL/min (based on SCr of 1.1 mg/dL).    Creatinine for last 3 days  10/14/2019:  9:43 AM Creatinine 0.68 mg/dL;  4:12 PM Creatinine 0.77 mg/dL;  6:12 PM Creatinine 0.84 mg/dL;  9:59 PM Creatinine 0.77 mg/dL  10/15/2019:  3:41 AM Creatinine 0.84 mg/dL;  9:59 AM Creatinine 0.87 mg/dL;  4:14 PM Creatinine 1.08 mg/dL;  9:58 PM Creatinine 1.14 mg/dL  10/16/2019:  3:51 AM Creatinine 1.09 mg/dL;  8:56 AM Creatinine 1.03 mg/dL;  1:30 PM Creatinine 1.04 mg/dL;  4:06 PM Creatinine 1.13 mg/dL; 10:10 PM Creatinine 0.99 mg/dL  10/17/2019:  3:46 AM Creatinine 1.10 mg/dL    Recent Vancomycin Levels (past 3 days)  10/14/2019:  8:36 PM Vancomycin Level 13.4 mg/L  10/17/2019:  5:55 AM Vancomycin Level 9.6 mg/L    Vancomycin IV Administrations (past 72 hours)                   vancomycin 1500 mg in 0.9% NaCl 250 ml intermittent infusion 1,500 mg (mg) 1,500 mg Given 10/16/19 0658     1,500 mg Given 10/15/19 0639                Nephrotoxins and other renal medications (From now, onward)    Start     Dose/Rate Route Frequency Ordered Stop    10/15/19 0700  vancomycin 1500 mg in 0.9% NaCl 250 ml intermittent infusion 1,500 mg      1,500 mg  over 90 Minutes Intravenous EVERY 24 HOURS 10/14/19 2208      10/13/19 2000  piperacillin-tazobactam (ZOSYN) 3.375 g vial to attach to  mL bag      3.375 g  over 30 Minutes Intravenous EVERY 6 HOURS 10/13/19 1902 10/18/19 1959    10/13/19 1915  norepinephrine (LEVOPHED) 16 mg in  mL infusion      0.03-0.4 mcg/kg/min × 75 kg (Dosing Weight)  2.1-28.1 mL/hr  Intravenous CONTINUOUS 10/13/19 1902               Contrast Orders - past 72 hours (72h ago, onward)    None          Interpretation of  levels and current regimen:  Trough level is  Subtherapeutic, however after only 2 doses of this regimen and not at steady state    Has serum creatinine changed > 50% in last 72 hours: yes    Urine output:  good urine output    Renal Function: Stable    Plan:  1.  Continue Current Dose. Level is subtherapeutic but not at steady state and expect further accumulation  2.  Pharmacy will check trough levels as appropriate in 1-3 Days.    3. Serum creatinine levels will be ordered daily for the first week of therapy and at least twice weekly for subsequent weeks.      Odilon Gallego, PharmD        .

## 2019-10-17 NOTE — PLAN OF CARE
Condition stable, turndown performed with good results    Neuro: ELIO, PAYAN, intermittently blinks to commands, EEG in place  CV: A fib with frequent ectopy converted to NSR at 1232, IABP 1:1 100%, ECMO: flow: 3.6, RPM: 3100, sweep: 2 lpm, FiO2: 80%, 2+ pulses upper, doppler lower extremities, unable to doppler R dorsalis pedis, unchanged from yesterday, vascular following.  Pulm: LS coarse on vent CMV 14/500/40/10, saturating well  GI: OG to LIS, PPFT with TF advancing toward goal, hypoactive bowel sounds, no stool  : voiding with bahena  Skin: WDL  Pain: WDL    Continue to monitor, plan for potential decannulation tomorrow.

## 2019-10-17 NOTE — PROGRESS NOTES
ECMO Shift Summary:    Patient remains on VA ECMO, all equipment is functioning and alarms are appropriately set. RPM's 3100 with flow range 3.46-3.58 L/min. Sweep gas is at 2 LPM and FiO2 80%. Circuit remains free of air and clot; fibrin beginning to form in corners of oxy. Cannulas are secure with no bleeding from site. Extremities are warm. Suctioned ETT for small amount clear-white secretions.    Significant Shift Events: none    Vent settings:  Ventilation Mode: CMV/AC  (Continuous Mandatory Ventilation/ Assist Control)  FiO2 (%): 40 %  Rate Set (breaths/minute): 14 breaths/min  Tidal Volume Set (mL): 500 mL  PEEP (cm H2O): 10 cmH2O  Oxygen Concentration (%): (S) 50 %  Resp: 14  .    Heparin is running at 800 u/hr, ACT range 138-155.    Urine output is good, blood loss was none. Product given included none.      Intake/Output Summary (Last 24 hours) at 10/17/2019 0612  Last data filed at 10/17/2019 0600  Gross per 24 hour   Intake 3554.42 ml   Output 86934 ml   Net -6490.58 ml       ECHO:  No results found for this or any previous visit.No results found for this or any previous visit.    CXR:  Recent Results (from the past 24 hour(s))   CT Head w/o Contrast   Result Value    Radiologist flags (Urgent)     Right paracentral-frontal small infarct versus tiny    Narrative    CT HEAD W/O CONTRAST 10/16/2019 11:15 AM    History: ECMO  ICD-10:  Comparison: 10/13/2019.    Technique: Using multidetector thin collimation helical acquisition  technique, axial, coronal and sagittal CT images from the skull base  to the vertex were obtained without intravenous contrast.     Findings:    On the noncontrast images of the brain, there is better visualization  of a possible hypodense abnormality in the right paracentral lobule at  the junction of the right frontal and parietal lobes (series 2 image  27) which is nonspecific and without adjacent mass effect. Right new  cerebellar linear focus is suspicious for infarct as it  is  wedge-shaped. The ventricles are not enlarged. The gray to white  matter differentiation of the cerebral hemispheres is preserved. The  basal cisterns are patent.  Increasing diffuse pansinus debris and fluid. The mastoid air cells  are clear.       Impression    Impression:   1. Better visualized nonspecific hypodensity in the right paracentral  lobule, which is indeterminate for a small infarct versus small mass  lesion is better evaluated by MRI (although may not be feasible), or  postcontrast CT could help to visualize further.   2. A new right hemicerebellum lesion as well is more suspicious for  infarct.  3. Diffuse debris and fluid throughout the paranasal sinuses could  relate to intubation, but increased, and could also represent  sinusitis.    [Access Center: Right paracentral-frontal small infarct versus tiny  mass, and right cerebellar lesion that is new.]    This report will be copied to the [facility] to ensure a  provider acknowledges the finding. [facility] Center is available Monday  through Friday 8am-3:30 pm.     DICKSON LUNSFORD MD   US Lower Extremity Arterial Duplex Right   Result Value    Radiologist flags (Urgent)     Absent flow in the distal anterior tibial artery and    Narrative    Exam: US LOWER EXTREMITY ARTERIAL DUPLEX RIGHT, 10/16/2019 1:56 PM    Indication: absent DP pulse on right, on VA ECMO    Comparison: Ultrasound dated 10/14/2019, CT CAP dated 10/14/2019.    TECHNIQUE: Duplex arterial ultrasound evaluation of the right lower  extremity    Technical note: Proximal arteries obscured by ECMO cannula    Findings:   Peak systolic velocities in the right lower extremity as follows:    CFA: Obscured by ECMO  PFA origin: Obscured by ECMO  SFA origin: Obscured by ECMO  SFA mid thigh: 53 cm/s  SFA distal thigh 11 cm/s  Popliteal artery: 17 cm/s  PTA at the ankle: 28 cm/s  JAMEY in the calf: 6 cm/s  JAMEY at the ankle: No flow  Dorsal pedal artery: Not visualized.    Waveforms  are diffusely monophasic and with poor phasicity, likely  related to ECMO.      Impression    Impression:    1. In the right lower extremity the dorsal pedal artery was  nonvisualized and there was absent flow in the distal anterior tibial  artery.  2. Remaining visualized arteries are patent with velocities as  detailed above.    [Urgent Result: Absent flow in the distal anterior tibial artery and  nonvisualization of the dorsal pedal artery]    Finding was identified on 10/16/2019 2:29 PM.     Paradise Villareal was contacted by Dr. Ariel Melo DO ([facility])  at 10/16/2019 2:37 PM and verbalized understanding of the urgent  finding.     I have personally reviewed the examination and initial interpretation  and I agree with the findings.    DWAIN GOSS MD   XR Abdomen Port 1 View    Narrative    Exam: XR ABDOMEN PORT 1 VW, 10/16/2019 5:09 PM    Indication: NJ placement    Comparison: 10/14/2019, 10/13/2019.    Findings:   A single supine AP view of the abdomen was obtained. A feeding tube  tip projects over the proximal jejunum. The gastric tube tip projects  over the expected location of the pylorus with the sidehole projecting  over the gastric antrum. An ECMO cannula extends below the  field-of-view. Nonobstructive bowel gas pattern without pneumatosis or  portal venous gas. Stable retrocardiac opacities, likely atelectasis.      Impression    Impression:   The feeding tube tip projects over the proximal jejunum. The gastric  tube tip projects over the expected location of the pylorus, consider  slight retraction.    JAKE STRINGER MD   XR Chest Port 1 View    Narrative    Exam: XR CHEST PORT 1 VW, 10/17/2019 1:30 AM    Indication: Check endotracheal tube placement and ECLS cannula  placement. DO NOT log-roll patient.  Place film under patient using  patient safety handling process.    Comparison: 10/16/2019.    Findings:   Portable AP view of the chest. ET tube tip 7.7 cm above the adiel.  Enteric  tubes and esophageal temperature probe. A new feeding tube  with tip collimated outside the field-of-view. AIBP marker in stable  position. ECMO cannula is stable. Left subclavian central venous  catheter tip projects over mid SVC.  Right apical chest tube with  sidehole outside the pleural space, stable. Unchanged low lung volumes  with layering bilateral pleural effusions, right greater than left.  Improved aeration left lung with stable diffuse opacities in the  right. No significant pneumothorax.      Impression    Impression:   1. Bilateral layering pleural effusions with slightly improved  aeration in the left lung. Stable mixed opacities in the right lung.  2. Endotracheal tube tip 7.7 cm from the adiel, consider advancing.  New feeding tube is noted.   3.  Multiple additional support devices are stable, including  right  apical chest tube with sidehole appearing outside the pleural cavity.    I have personally reviewed the examination and initial interpretation  and I agree with the findings.    SUKHJINDER SLATER MD       Labs:  Recent Labs   Lab 10/17/19  0600 10/17/19  0354 10/17/19  0232 10/17/19  0108   PH 7.52* 7.49* 7.50* 7.46*   PCO2 45 43 39 43   PO2 78* 83 96 63*   HCO3 37* 32* 30* 30*   O2PER 50 50 50% 50       Lab Results   Component Value Date    HGB 8.9 (L) 10/17/2019    PHGB 30 (H) 10/17/2019     (L) 10/17/2019    FIBR 766 (H) 10/17/2019    INR 1.08 10/17/2019    PTT 44 (H) 10/17/2019    DD 6.2 (H) 10/17/2019    AXA <0.10 10/17/2019    ANTCH 49 (L) 10/16/2019         Plan is to continue ECMO support.      Jodie Carolina, RT  10/17/2019 6:12 AM

## 2019-10-17 NOTE — PROGRESS NOTES
Neuroscience Intensive Care Progress Note  10/17/2019    REASON FOR CRITICAL CARE ADMISSION: Ventricular tachycardia     ADMITTING PHYSICIAN: Anshu Roberts MD     Date of Service (when I saw the patient): 10/17/2019    ASSESSMENT: Joel Campbell is a 53 year old year old male admitted on 10/13/2019 w/no PMH who was a transfer from an OSH for refractory VT/VF arrest s/p placement on peripheral VA ECMO.    Problem List  1. Ventricular Tachycardia    24 hour events:  Concern for seizure per Epilepsy team.    24 Hour Vital Signs Summary:  Temperatures:  Current - Temp: 98.1  F (36.7  C); Max - Temp  Av.2  F (36.8  C)  Min: 97.5  F (36.4  C)  Max: 98.6  F (37  C)  Respiration range: Resp  Av  Min: 14  Max: 14  Pulse range: No data recorded  Blood pressure range: No data recorded.  ; No data recorded.    Pulse oximetry range: SpO2  Av.5 %  Min: 88 %  Max: 100 %    Ventilator Settings  Ventilation Mode: CMV/AC  (Continuous Mandatory Ventilation/ Assist Control)  FiO2 (%): 50 %  Rate Set (breaths/minute): 14 breaths/min  Tidal Volume Set (mL): 500 mL  PEEP (cm H2O): 10 cmH2O  Oxygen Concentration (%): 50 %  Resp: 14    Intake/Output Summary (Last 24 hours) at 10/17/2019 1221  Last data filed at 10/17/2019 1200  Gross per 24 hour   Intake 5741.02 ml   Output 8965 ml   Net -3223.98 ml       Arterial Line BP: ()/(34-70) 105/58  MAP:  [64 mmHg-110 mmHg] 80 mmHg    Current Medications:    aspirin  81 mg Per Feeding Tube Daily     EPINEPHrine PF         folic acid  1 mg Oral Daily     [START ON 10/18/2019] levETIRAcetam  750 mg Intravenous Q12H     levETIRAcetam  1,500 mg Intravenous Once     multivitamins w/minerals  15 mL Per Feeding Tube Daily     piperacillin-tazobactam  3.375 g Intravenous Q6H     protein modular  1 packet Per Feeding Tube Daily     senna-docusate  1 tablet Oral BID     sodium chloride (PF)  3 mL Intracatheter Q8H     ticagrelor  90 mg Oral or Feeding Tube BID     vancomycin  "(VANCOCIN) IV  1,500 mg Intravenous Q24H     vitamin B1  100 mg Oral Daily       PRN Medications:  albumin human, artificial tears, calcium gluconate, IV fluid REPLACEMENT ONLY, glucose **OR** dextrose **OR** glucagon, fentaNYL, ipratropium - albuterol 0.5 mg/2.5 mg/3 mL, lidocaine 4%, lidocaine (buffered or not buffered), magnesium sulfate, magnesium sulfate, midazolam, naloxone, potassium chloride, potassium chloride with lidocaine, potassium chloride, potassium chloride, potassium chloride, sodium chloride (PF)    Infusions:    amiodarone 0.5 mg/min (10/17/19 1200)     IV fluid REPLACEMENT ONLY       dextrose 50 mL/hr at 10/17/19 1200     fentaNYL 50 mcg/hr (10/17/19 1200)     heparin 2 unit/mL in 0.9% NaCl 3 mL/hr (10/17/19 0700)     HEParin 1,000 Units/hr (10/17/19 1200)     insulin (regular) Stopped (10/14/19 2300)     midazolam 2 mg/hr (10/17/19 1200)     pantoprazole (PROTONIX) infusion ADULT/PEDS GREATER than or EQUAL to 45 kg 8 mg/hr (10/17/19 1200)     sodium chloride         No Known Allergies    Physical Examination:  Vitals: Temp 98.1  F (36.7  C)   Resp 14   Ht 1.89 m (6' 2.41\")   Wt 104 kg (229 lb 4.5 oz)   SpO2 100%   BMI 29.11 kg/m    EXAM: Examined while sedated to prevent movement while on ECMO  - Spontaneous eye opening to verbal and tactile stimuli  - Pupils symmetric, +2mm, and reactive bilaterally  - Corneal response intact  - No clear facial asymmetry  - Cough present with deep suctioning  - Withdraws to noxious stimuli x 4 extremities     Labs/Studies:  Recent Labs   Lab Test 10/17/19  1020 10/17/19  0950 10/17/19  0354 10/17/19  0346  10/16/19  2210     --   --  140  --  140   POTASSIUM 3.1*  --   --  3.4  --  3.6   CHLORIDE 99  --   --  102  --  102   CO2 34*  --   --  33*  --  33*   ANIONGAP 7  --   --  5  --  5   GLC 95 104* 94 102*   < > 90  95   BUN 20  --   --  21  --  22   CR 0.99  --   --  1.10  --  0.99   HEATHER 8.5  --   --  8.8  --  8.5   WBC 6.3  --   --  6.2  --  6.4 "   RBC 2.66*  --   --  2.89*  --  2.84*   HGB 8.2*  --   --  8.9*  --  8.8*   PLT 91*  --   --  100*  --  107*    < > = values in this interval not displayed.       Recent Labs   Lab Test 10/17/19  1020 10/17/19  0346 10/16/19  2210   INR 1.12 1.08 1.11   PTT 48* 44* 45*       Recent Labs   Lab 10/17/19  1151 10/17/19  0950 10/17/19  0741 10/17/19  0600   PH 7.50* 7.48* 7.51* 7.52*   PCO2 46* 49* 48* 45   PO2 157* 139* 88 78*   HCO3 37* 37* 38* 37*   O2PER 50 50 50.0 50     Imagin. CT Head w/o contrast on 10/16 at 1115  Impression:   1. Better visualized nonspecific hypodensity in the right paracentral  lobule, which is indeterminate for a small infarct versus small mass  lesion is better evaluated by MRI (although may not be feasible), or post-contrast CT could help to visualize further.   2. A new right hemicerebellum lesion as well is more suspicious for  infarct.  3. Diffuse debris and fluid throughout the paranasal sinuses could  relate to intubation, but increased, and could also represent  Sinusitis.    [Access Center: Right paracentral-frontal small infarct versus tiny  mass, and right cerebellar lesion that is new.]    2. EEG  Impression:  Video EEG day 3 is abnormal due to the presence of diffuse generalized delta slowing with superimposed generalized periodic pattern that is concerning for recurrent pseudoperiodic generalized epileptiform discharges and in some instances, these discharges accelerate up to 3 Hz, raising the concern for seizures. These generalized periodic epileptiform discharges occupy nearly 50% of the record as the patient has been rewarmed.      Assessment/Plan  Mr. Campbell is a 54yo male currently admitted for refractory VT/VF arrest s/p placement of VA ECMO.      Plan  Neuro:  #Hypodenisty in the right paracentral lobule  #Small infarct vs small mass lesion  #New right hemicerebellum lesion  - Obtain MRI Brain when patient is more stable  - Will continue to follow every other  day    #Abnormal EEG  - Levetiracetam 1,500 mg load x 1  - Start Levetiracetam 750 mg BID    Code Status: Full    Dispo: Remain in CVICU while on ECMO    The patient was seen and discussed with the attending, Dr. Leigh.    Daphne NEW, CNP  NeuroCritical Care Nurse Practitioner  [phone]

## 2019-10-17 NOTE — PROGRESS NOTES
Continuous EEG Monitoring  CPT Code:87896  [facility]/FACUNDO PADILLA: Spike    Date Continuous EEG monitoring initiated 10/14/19  Hair braided: no-male minimal hair  Leads O1 and O2 changed: n/a  Optifoam around O1 and O2: replaced as it was not resting in the correct place  Skin breakdown/concerns: none noted  Asked about continuing EEG monitoring past Day 5(Day 3): not sure at this point  Due for hygiene break (day 5): as planned  Removed electrodes: not today

## 2019-10-17 NOTE — CONSULTS
THORACIC SURGERY CONSULT NOTE    Consult Reason: Hemothorax    HPI: The patient is a 53 year old male who was transferred from an OSH after refractory VF arrest and CPR on 10/12/19. He underwent PCI to the LAD and was placed on peripheral VA ECMO. He developed a right chest wall hematoma and right hemothorax, presumably from CPR. A chest tube was placed into the apex of the right pleural space on 10/14 which has put  out 1160 ml of sanguinous output since then, ~300/day. The tube on CXR is positioned at the apex through the 2nd rib space with the proximal side hole in subcutaneous tissue. He has had increasing O2 requirements since then with no improvement on CXR. He is currently anticoagulated on a heparin gtt and aspirin/ticragelor. The primary team is hoping to decannulate tomorrow.     A/P: Patient is a 53 year old male on VA ECMO with a R hemothorax secondary to CPR. A second chest tube directed towards the base of the pleural space would likely benefit this patient.   - If the chest tube placement can wait until after ECMO decannulation, we would recommend IR would place it to be able to direct it towards the base of the lungs. We would also be willing to place a tube tomorrow after ECMO decannulation.  - If the primary team feels that placement is more urgent, we would be willing to place it at bedside if they were willing to hold heparin for 2 hours beforehand.    Patient and plan discussed with attending.    Thank you for the opportunity to participate in the care of this patient.    PMH:  No past medical history on file.      PSH:  Past Surgical History:   Procedure Laterality Date     CV EXTRACORPERAL MEMBRANE OXYGENATION N/A 10/13/2019    Procedure: Placement of RLE reperfusion cannula, placement of cooling catheter;  Surgeon: Anshu Roberts MD;  Location:  HE[facility] CARDI[facility] CATH LAB       FH:  family history is not on file.      Allergies:  No Known Allergies    Home Meds:  No medications prior to  admission.       ROS: Unable to obtain due to sedation    Physical Exam:  Temp:  [97.5  F (36.4  C)-98.6  F (37  C)] 97.9  F (36.6  C)  Heart Rate:  [] 70  Resp:  [14] 14  MAP:  [64 mmHg-110 mmHg] 95 mmHg  Arterial Line BP: ()/(34-70) 131/64  FiO2 (%):  [40 %-60 %] 40 %  SpO2:  [88 %-100 %] 99 %    Gen: Sedated, intubated  Lungs: Mechanically ventilated  CV: regular rhythm, normal rate  Abd: soft, nondistended  Neuro: AOx3    Chest tube with serosanguinous output    Labs:  ABG   Recent Labs   Lab 10/17/19  1342 10/17/19  1151 10/17/19  0950 10/17/19  0741   PH 7.49* 7.50* 7.48* 7.51*   PCO2 49* 46* 49* 48*   PO2 66* 157* 139* 88   HCO3 37* 37* 37* 38*     CBC  Recent Labs   Lab 10/17/19  1020 10/17/19  0346 10/16/19  2210 10/16/19  1606   WBC 6.3 6.2 6.4 5.9   HGB 8.2* 8.9* 8.8* 8.4*   PLT 91* 100* 107* 110*     BMP  Recent Labs   Lab 10/17/19  1020 10/17/19  0950 10/17/19  0354 10/17/19  0346  10/16/19  2210  10/16/19  1606     --   --  140  --  140  --  141   POTASSIUM 3.1*  --   --  3.4  --  3.6  --  3.8   CHLORIDE 99  --   --  102  --  102  --  103   CO2 34*  --   --  33*  --  33*  --  32   BUN 20  --   --  21  --  22  --  19   CR 0.99  --   --  1.10  --  0.99  --  1.13   GLC 95 104* 94 102*   < > 90  95   < > 90  95    < > = values in this interval not displayed.     LFT  Recent Labs   Lab 10/17/19  1020 10/17/19  0346 10/16/19  2210 10/16/19  1606   * 241* 228* 212*   ALT 45 49 47 44   ALKPHOS 70 78 66 62   BILITOTAL 3.8* 3.9* 4.0* 3.6*   ALBUMIN 3.4 3.0* 2.9* 2.8*   INR 1.12 1.08 1.11 1.10     PancreasNo lab results found in last 7 days.    Imaging:  CXR 10/17/19:  Impression:   1. Bilateral layering pleural effusions with slightly improved  aeration in the left lung. Stable mixed opacities in the right lung.  2. Endotracheal tube tip 7.7 cm from the adile, consider advancing.  New feeding tube is noted.   3.  Multiple additional support devices are stable, including  right  apical  chest tube with sidehole appearing outside the pleural cavity.      CT 10/14/19:  IMPRESSION:   1. Increased right lower and upper lobe confluent consolidation which  may represent worsening aspiration or infection.  2. New left lower lobe aspiration, infection or atelectasis.   3. Probable bilateral pleural effusions  4. Unchanged right retropectoral hematoma, small anterior mediastinal  hematoma, hemopericardium and small right groin hematoma.  5. Redemonstration of displaced fractures of the bilateral second  through fourth ribs anteriorly and nondisplaced fracture of the first  ribs posteriorly.  6. Similar appearing mildly displaced sternal fracture with associated  small retrosternal hematoma and hemopericardium.  7. Endotracheal tube projects near the thoracic inlet. Consider  Advancing.      Chente Pineda MD, PhD, PGY-1  General Surgery

## 2019-10-17 NOTE — PROCEDURES
Procedure Date: 10/15/2019      EEG #-2       DATE OF RECORDING/SERVICE DATE:  10/15/2019, video EEG day #2.       SOURCE FILE DURATION:  23 hours and 56 minutes.      PATIENT INFORMATION:  A 53-year-old male who had an episode of seizure-like activity and became unresponsive.  When EMS arrived, the patient had no pulse and required cardiac resuscitation.  EEG is being done to evaluate for seizures.     TECHNICAL SUMMARY: This video EEG monitoring procedure was performed with 23 scalp electrodes in 10-20 system placements, and additional scalp, precordial and other surface electrodes used for electrical referencing and artifact detection. Video was reviewed intermittently by EEG technologist and physician for electroclinical seizures.     MEDICATIONS:  Fentanyl and Versed.        VITAL SIGNS:   On this day of recording, patient's temperature is 98 degrees Fahrenheit.        MEDICATIONS:      1.  Midazolam 2 mg per hour.   2.  Fentanyl 50 mcg per hour.      EEG FINDINGS:  The patient has diffuse generalized delta-theta slowing.  There is not a well-formed parieto-occipital rhythm that is noted.  There are segments of generalized sharply contoured discharges that are up to 50 microvolts in amplitude, maximum in the bifrontal region.  These discharges are seen in less than 5% of the record intermittently.  Examples of these are at 10:11, 13:57 and 19:12.  Of note, these discharges are stimulus sensitive and they occur at 0.25 to 1 Hz in frequency.      ACTIVATION PROCEDURES:  EEG during auditory and sensory stimuli is reactive and during stimulation, there is an emergence of generalized epileptiform discharges.        The patient has rare generalized epileptiform discharges that are seen in less than 5% of the record.      ICTAL:  None.      IMPRESSION:  Video EEG day #2 is abnormal due to the presence of diffuse generalized delta-theta slowing and generalized epileptiform discharges suggestive of severe diffuse  encephalopathy with diffuse cortical irritability.  No electrographic seizures are seen.  Clinical correlation is advised.         DUSTY SCHWARTZ MD             D: 10/16/2019   T: 10/16/2019   MT: ESTEPHANIE      Name:     JF COLLAZO   MRN:      [id number]        Account:        [id number]   :      1965           Procedure Date: 10/15/2019      Document: [id number]

## 2019-10-17 NOTE — PROGRESS NOTES
[facility]   Cardiology Progress Note    Assessment & Plan   Joel Campbell is a 53 year old male who was admitted on 10/13/19 as a transfer for an OSH for refractory VT/VF arrest s/p PCI to LAD and placement on peripheral VA ECMO.     Changes Today:  - ECMO decannulation tomorrow  - repeat turndown today with small epi push to evaluate for chronotropy  - R pleural effusion thoracentesis  - Keppra started, per neurocrit  - Diuresis as able  - Wean sedation as able  - Wean ventilator as able  - Monitor for s/s of bleeding  - ACT goal 140-160     Neurology: Intubated, sedated, paralyzed. Cooled to 34 degrees, slowly rewarming on 10/15  - NeuroCrit consulted  -- EEG with small amount of epileptiform activity  -- started keppra 750mg BID  - Continue versed gtt and fent gtt, wean as able  - CT Head 10/16 with smal right paracentral lesion, new right hemicerebellum lesion concerning for stroke.   Cardiovascular / Hemodynamics: Refractory VF arrest s/p peripheral V-A ECMO at OSH on 10/13/19.  TTE: LVEF 5-10%, increase to 15% on 1L  - Wean pressors/inotropes as able, currently not on any  - Turndown 10/16, repeat today with a small dose of epi at 1L/min flow to evaluate for chronotropic competence  - decannulation tomorrow  - Continue ASA 81mg and ticagrelor 90mg BID  - Held temp at 34 degrees for 24 hours, now rewarmed  - ACT goal 140-160  - Hold lipitor for now given likely hepatic injury during arrest  - Hold ACE/ARB for now given likely reduced renal fxn after arrest  - Holding beta blocker given shock    Pulmonary: Now weaning vent requirements. Large hemothorax on 10/14 s/p right-sided chest tube.  CXR: stable devices, worsening RLL pleural effusion  - Wean vent as able  - R pleural effusion thoracentesis  - Monitor CT output  - Daily CXR  - Q2H ABGs for now  - Consider scheduled duonebs if signs of lung dz, currently PRN     GI and Nutrition: Per Pt's wife, he is an alcoholic. Concern for possible GIB given black OG  output.  - Monitor BID LFTs  - NPO while cooled - nutrition consult pending feeding tube placement once he is warmed   - Bowel regimen - started  - GI Prophylaxis: PPI gtt   Renal, Fluid and Electrolytes: - Monitor urine output  - Maintain K>3 and Mg>2    Infectious Disease: No signs of infection. Leukocytosis c/w arrest. Blood cultures collected.   - Vancomycin/zosyn x5 days for presumed aspiration  - Daily blood cultures  - Monitor for signs of infection given cooling, lines, and leukocytosis   Hematology and Oncology: Receiving heparin for ECMO and ASA/ticagrelor for KAMRON. Large intramuscular hematoma of right pec and hemothorax on 10/14 with possible GIB as above.  - Cryo PRN fibrinogen < 200; FFP for INR >2  - Transfuse for Hgb<10, Plts<70  - Heparin gtt for ECMO with ACT goal 140-160 given bleeding concerns as above  - US LE w/ arterial duplex per ECMO protocol   - DVT PPX: Heparin as above  - Right chest wall hematoma: monitor exam and Hgb closely   Endocrinology: No known medical history. BG elevated.  - Insulin gtt PRN   Lines: Restraint: needed    Current lines are required for patient management       Family updated today: Yes  Code Status: FULL    Pt was seen and examined with Dr. Roberts, who agrees with the assessment and plan.    Jaguar Benton MD  Cardiology Fellow      Interval History    NAEO. Turndown yesterday with good pulsatility. Decannulation planned for 10/18 due to surgery/OR schedule. Chugging this morning, required some albumin back.    ROS: Unable to obtain as Pt is intubated and sedated.    Data reviewed today: I reviewed all new labs and imaging results over the last 24 hours. I personally reviewed:    Physical Exam   Temp: 98.6  F (37  C) Temp src: Bladder     Heart Rate: 104 Resp: 14 SpO2: 100 % O2 Device: Mechanical Ventilator    Vitals:    10/15/19 0200 10/16/19 0000 10/17/19 0000   Weight: 109.2 kg (240 lb 11.9 oz) 110.2 kg (242 lb 15.2 oz) 104 kg (229 lb 4.5 oz)     Vital  "Signs with Ranges  Temp:  [97.5  F (36.4  C)-98.6  F (37  C)] 98.6  F (37  C)  Heart Rate:  [] 104  Resp:  [14] 14  MAP:  [64 mmHg-110 mmHg] 72 mmHg  Arterial Line BP: ()/(34-70) 98/56  FiO2 (%):  [40 %-60 %] 50 %  SpO2:  [88 %-100 %] 100 %  I/O last 3 completed shifts:  In: 3554.42 [I.V.:3079.42; NG/GT:180]  Out: 71179 [Urine:9015; Emesis/NG output:850; Chest Tube:180]    Heart Rate: 104, Temperature 98.6  F (37  C), resp. rate 14, height 1.89 m (6' 2.41\"), weight 104 kg (229 lb 4.5 oz), SpO2 100 %.  229 lbs 4.45 oz  GEN:  Intubated, sedated  CV:  RRR, IABP augmentation  LUNGS: Mechanical breath sounds, diminished along right side, right-sided chest tube in place  ABD: Soft BS, soft, mildly distended  EXT: warm, trace-1+ edema, dopplerable pulses  SKIN: Large hematoma along chest wall    Medications     amiodarone 0.5 mg/min (10/17/19 0800)     IV fluid REPLACEMENT ONLY       dextrose 50 mL/hr at 10/17/19 0800     fentaNYL 50 mcg/hr (10/17/19 0800)     heparin 2 unit/mL in 0.9% NaCl 3 mL/hr (10/17/19 0700)     HEParin 800 Units/hr (10/17/19 0800)     insulin (regular) Stopped (10/14/19 2300)     midazolam 2 mg/hr (10/17/19 0800)     pantoprazole (PROTONIX) infusion ADULT/PEDS GREATER than or EQUAL to 45 kg 8 mg/hr (10/17/19 0800)     sodium chloride         aspirin  81 mg Per Feeding Tube Daily     folic acid  1 mg Oral Daily     multivitamins w/minerals  15 mL Per Feeding Tube Daily     piperacillin-tazobactam  3.375 g Intravenous Q6H     protein modular  1 packet Per Feeding Tube Daily     senna-docusate  1 tablet Oral BID     sodium chloride (PF)  3 mL Intracatheter Q8H     ticagrelor  90 mg Oral or Feeding Tube BID     vancomycin (VANCOCIN) IV  1,500 mg Intravenous Q24H     vitamin B1  100 mg Oral Daily       Data   Recent Labs   Lab 10/17/19  0354 10/17/19  0346 10/16/19  2351 10/16/19  2210  10/16/19  1606   WBC  --  6.2  --  6.4  --  5.9   HGB  --  8.9*  --  8.8*  --  8.4*   MCV  --  95  --  95  " --  95   PLT  --  100*  --  107*  --  110*   INR  --  1.08  --  1.11  --  1.10   NA  --  140  --  140  --  141   POTASSIUM  --  3.4  --  3.6  --  3.8   CHLORIDE  --  102  --  102  --  103   CO2  --  33*  --  33*  --  32   BUN  --  21  --  22  --  19   CR  --  1.10  --  0.99  --  1.13   ANIONGAP  --  5  --  5  --  6   HEATHER  --  8.8  --  8.5  --  8.5   GLC 94 102* 94 90  95   < > 90  95   ALBUMIN  --  3.0*  --  2.9*  --  2.8*   PROTTOTAL  --  6.4*  --  6.3*  --  6.0*   BILITOTAL  --  3.9*  --  4.0*  --  3.6*   ALKPHOS  --  78  --  66  --  62   ALT  --  49  --  47  --  44   AST  --  241*  --  228*  --  212*   TROPI  --  75.625*  --  81.305*  --  78.992*    < > = values in this interval not displayed.       Recent Results (from the past 24 hour(s))   CT Head w/o Contrast   Result Value    Radiologist flags (Urgent)     Right paracentral-frontal small infarct versus tiny    Narrative    CT HEAD W/O CONTRAST 10/16/2019 11:15 AM    History: ECMO  ICD-10:  Comparison: 10/13/2019.    Technique: Using multidetector thin collimation helical acquisition  technique, axial, coronal and sagittal CT images from the skull base  to the vertex were obtained without intravenous contrast.     Findings:    On the noncontrast images of the brain, there is better visualization  of a possible hypodense abnormality in the right paracentral lobule at  the junction of the right frontal and parietal lobes (series 2 image  27) which is nonspecific and without adjacent mass effect. Right new  cerebellar linear focus is suspicious for infarct as it is  wedge-shaped. The ventricles are not enlarged. The gray to white  matter differentiation of the cerebral hemispheres is preserved. The  basal cisterns are patent.  Increasing diffuse pansinus debris and fluid. The mastoid air cells  are clear.       Impression    Impression:   1. Better visualized nonspecific hypodensity in the right paracentral  lobule, which is indeterminate for a small infarct  versus small mass  lesion is better evaluated by MRI (although may not be feasible), or  postcontrast CT could help to visualize further.   2. A new right hemicerebellum lesion as well is more suspicious for  infarct.  3. Diffuse debris and fluid throughout the paranasal sinuses could  relate to intubation, but increased, and could also represent  sinusitis.    [Access Center: Right paracentral-frontal small infarct versus tiny  mass, and right cerebellar lesion that is new.]    This report will be copied to the [facility] to ensure a  provider acknowledges the finding. Access Center is available Monday  through Friday 8am-3:30 pm.     DICKSON LUNSFORD MD   US Lower Extremity Arterial Duplex Right   Result Value    Radiologist flags (Urgent)     Absent flow in the distal anterior tibial artery and    Narrative    Exam: US LOWER EXTREMITY ARTERIAL DUPLEX RIGHT, 10/16/2019 1:56 PM    Indication: absent DP pulse on right, on VA ECMO    Comparison: Ultrasound dated 10/14/2019, CT CAP dated 10/14/2019.    TECHNIQUE: Duplex arterial ultrasound evaluation of the right lower  extremity    Technical note: Proximal arteries obscured by ECMO cannula    Findings:   Peak systolic velocities in the right lower extremity as follows:    CFA: Obscured by ECMO  PFA origin: Obscured by ECMO  SFA origin: Obscured by ECMO  SFA mid thigh: 53 cm/s  SFA distal thigh 11 cm/s  Popliteal artery: 17 cm/s  PTA at the ankle: 28 cm/s  JAMEY in the calf: 6 cm/s  JAMEY at the ankle: No flow  Dorsal pedal artery: Not visualized.    Waveforms are diffusely monophasic and with poor phasicity, likely  related to ECMO.      Impression    Impression:    1. In the right lower extremity the dorsal pedal artery was  nonvisualized and there was absent flow in the distal anterior tibial  artery.  2. Remaining visualized arteries are patent with velocities as  detailed above.    [Urgent Result: Absent flow in the distal anterior tibial artery  and  nonvisualization of the dorsal pedal artery]    Finding was identified on 10/16/2019 2:29 PM.     Paradise Villareal was contacted by Dr. Ariel Melo DO (Radiology R3)  at 10/16/2019 2:37 PM and verbalized understanding of the urgent  finding.     I have personally reviewed the examination and initial interpretation  and I agree with the findings.    DWAIN GOSS MD   XR Abdomen Port 1 View    Narrative    Exam: XR ABDOMEN PORT 1 VW, 10/16/2019 5:09 PM    Indication: NJ placement    Comparison: 10/14/2019, 10/13/2019.    Findings:   A single supine AP view of the abdomen was obtained. A feeding tube  tip projects over the proximal jejunum. The gastric tube tip projects  over the expected location of the pylorus with the sidehole projecting  over the gastric antrum. An ECMO cannula extends below the  field-of-view. Nonobstructive bowel gas pattern without pneumatosis or  portal venous gas. Stable retrocardiac opacities, likely atelectasis.      Impression    Impression:   The feeding tube tip projects over the proximal jejunum. The gastric  tube tip projects over the expected location of the pylorus, consider  slight retraction.    JAKE STRINGER MD   XR Chest Port 1 View    Narrative    Exam: XR CHEST PORT 1 VW, 10/17/2019 1:30 AM    Indication: Check endotracheal tube placement and ECLS cannula  placement. DO NOT log-roll patient.  Place film under patient using  patient safety handling process.    Comparison: 10/16/2019.    Findings:   Portable AP view of the chest. ET tube tip 7.7 cm above the adiel.  Enteric tubes and esophageal temperature probe. A new feeding tube  with tip collimated outside the field-of-view. AIBP marker in stable  position. ECMO cannula is stable. Left subclavian central venous  catheter tip projects over mid SVC.  Right apical chest tube with  sidehole outside the pleural space, stable. Unchanged low lung volumes  with layering bilateral pleural effusions, right greater than  left.  Improved aeration left lung with stable diffuse opacities in the  right. No significant pneumothorax.      Impression    Impression:   1. Bilateral layering pleural effusions with slightly improved  aeration in the left lung. Stable mixed opacities in the right lung.  2. Endotracheal tube tip 7.7 cm from the adiel, consider advancing.  New feeding tube is noted.   3.  Multiple additional support devices are stable, including  right  apical chest tube with sidehole appearing outside the pleural cavity.    I have personally reviewed the examination and initial interpretation  and I agree with the findings.    SUKHJINDER SLATER MD     I have seen and examined the patient with the [facility] team. I agree with the assessment and plan of the note above.I have reviewed pertinent labs.     Anshu Roberts MD  Interventional Cardiology  Pager: [phone]

## 2019-10-17 NOTE — PROGRESS NOTES
ECMO Shift Summary:    Patient remains on VA ECMO, all equipment is functioning and alarms are appropriately set. RPM's 3250 with flow range 3.73 L/min. Sweep gas is at 3 LPM and FiO2 80%. Circuit remains free of air, clot and fibrin. Cannulas are secure with no bleeding from site. Extremities are warm. Suctioned ETT for tan secretions.    Significant Shift Events: turndown with ECHO done at bedside. Plan to decann    Vent settings:  Ventilation Mode: CMV/AC  (Continuous Mandatory Ventilation/ Assist Control)  FiO2 (%): 40 %  Rate Set (breaths/minute): 14 breaths/min  Tidal Volume Set (mL): 500 mL  PEEP (cm H2O): 10 cmH2O  Oxygen Concentration (%): 50 %  Resp: 14  .    Heparin is running at 1500 u/hr, ACT range 140-160.    Urine output is as charted, blood loss was none. Product given included 1 unit(s) PRBCs.      Intake/Output Summary (Last 24 hours) at 10/17/2019 1847  Last data filed at 10/17/2019 1800  Gross per 24 hour   Intake 5927.19 ml   Output 8020 ml   Net -2092.81 ml       ECHO:  No results found for this or any previous visit.No results found for this or any previous visit.    CXR:  Recent Results (from the past 24 hour(s))   XR Chest Port 1 View    Narrative    Exam: XR CHEST PORT 1 VW, 10/17/2019 1:30 AM    Indication: Check endotracheal tube placement and ECLS cannula  placement. DO NOT log-roll patient.  Place film under patient using  patient safety handling process.    Comparison: 10/16/2019.    Findings:   Portable AP view of the chest. ET tube tip 7.7 cm above the adiel.  Enteric tubes and esophageal temperature probe. A new feeding tube  with tip collimated outside the field-of-view. AIBP marker in stable  position. ECMO cannula is stable. Left subclavian central venous  catheter tip projects over mid SVC.  Right apical chest tube with  sidehole outside the pleural space, stable. Unchanged low lung volumes  with layering bilateral pleural effusions, right greater than left.  Improved aeration  left lung with stable diffuse opacities in the  right. No significant pneumothorax.      Impression    Impression:   1. Bilateral layering pleural effusions with slightly improved  aeration in the left lung. Stable mixed opacities in the right lung.  2. Endotracheal tube tip 7.7 cm from the adiel, consider advancing.  New feeding tube is noted.   3.  Multiple additional support devices are stable, including  right  apical chest tube with sidehole appearing outside the pleural cavity.    I have personally reviewed the examination and initial interpretation  and I agree with the findings.    SUKHJINDER SLATER MD       Labs:  Recent Labs   Lab 10/17/19  1802 10/17/19  1537 10/17/19  1444 10/17/19  1342   PH 7.47* 7.47* 7.47* 7.49*   PCO2 51* 51* 50* 49*   PO2 98 93 100 66*   HCO3 38* 37* 37* 37*   O2PER 40 40 40 40       Lab Results   Component Value Date    HGB 8.6 (L) 10/17/2019    PHGB 30 (H) 10/17/2019    PLT 77 (L) 10/17/2019    FIBR 682 (H) 10/17/2019    INR 1.12 10/17/2019    PTT 52 (H) 10/17/2019    DD 11.5 (H) 10/17/2019    AXA <0.10 10/17/2019    ANTCH 64 (L) 10/17/2019         [facility]      Nicky Campbell, RT  10/17/2019 6:47 PM

## 2019-10-17 NOTE — PROGRESS NOTES
Social Work Services Progress Note    Hospital Day: 5  Date of Initial Social Work Evaluation:  N/A  Collaborated with:  Pt's wife Jackelyn    Data:  Joel Campbell is a 53 year old male who was cannulated for ECMO 10/13/19 due to refractory VF arrest    Intervention:  Pt's wife asked SW to meet with her in the family room today. Wife says she's been asked to provide proof that  is unable to sign his own papers at this time. SW offered to draft a letter of hospitalization for wife, which wife agreed should work. SW will provide letter to wife tomorrow morning.    SW checked in on how wife is feeling. She states that she feels a bit better because she believes pt is improving. Wife was realistic in expecting that pt's hospital course will have ups and downs. She is appreciative of team and appears in good spirits overall. SW provided emotional support and will follow up tomorrow.    Assessment:  Pt remains critically ill    Plan:    Anticipated Disposition:  TBD    Barriers to d/c plan:  Pt remains critically ill    Follow Up:  SW will continue to remain available for patient and family support, discharge planning, other resources and support PRN.    Lucia Reynaga, JERRY, [facility]  ICU    P: [phone]  Pager: [phone]

## 2019-10-17 NOTE — PROGRESS NOTES
Cardiology Critical Care Note - Cardiology  Anshu Roberts M.D.  The patient remains unstable in the ICU with on-going need for ventilator support, parenteral medications for the adjustment of blood pressure and cardiac output and maintenance of renal function.      The patient is seen for prolonged ventilator management requiring oxygen and/or pressure modulation; shock requiring vasopressor and or inotropic agents; low cardiac output necessitating inotropic agents, vasopressors and afterload reducing agents; VA ECMO; acute renal failure requiring fluid and diuretic management; sepsis requiring antibiotic selection and monitoring, vasopressors, fluid management to maintain blood pressure and secondary organ function. Thoracic wall bleeding and right sided hemothorax: draining     I personally reviewed:  Arterial and venous blood gases to assess acid base balance, oxygenation, and ventilator settings.    Hemodynamic parameters were assessed such as  RAP, estimated LVEDP cardiac output and vascular resistances in order to adjust fluids and infused medications for blood pressure and cardiac output maintenance.  Ventilatory settings and/or supplement oxygen needs in order to obtain optimal oxygenation and electrolyte balance at low possible pressure support and inspired oxygen tension      .  ECMO Attending Progress Note  10/17/2019    Joel Campbell is a 53 year old male who was admitted on 10/13/19 as a transfer for an OSH for refractory VT/VF arrest s/p PCI to LAD and placement on peripheral VA ECMO.     Interval events: bleeding is improved     Physical Exam:  Temp:  [97.5  F (36.4  C)-98.6  F (37  C)] 98.1  F (36.7  C)  Heart Rate:  [] 97  Resp:  [14] 14  MAP:  [64 mmHg-110 mmHg] 93 mmHg  Arterial Line BP: ()/(34-70) 125/60  FiO2 (%):  [40 %-60 %] 50 %  SpO2:  [88 %-100 %] 100 %      Intake/Output Summary (Last 24 hours) at 10/17/2019 1057  Last data filed at 10/17/2019 1000  Gross per 24 hour    Intake 4705.42 ml   Output 9550 ml   Net -4844.58 ml        Ventilation Mode: CMV/AC  (Continuous Mandatory Ventilation/ Assist Control)  FiO2 (%): 50 %  Rate Set (breaths/minute): 14 breaths/min  Tidal Volume Set (mL): 500 mL  PEEP (cm H2O): 10 cmH2O  Oxygen Concentration (%): 50 %  Resp: 14     General: no acute distress, intubated and sedated  HEENT: PERRLA, conjunctiva clear, without icterus or pallor, oropharyx clear  Cardiac: RRR nl S1S2   Lungs: clear to auscultation and percussion bilaterally anterior  Abdomen: soft, nontender, non distended, no hepatosplenomegaly or masses  Extremities: without edema or cyanosis; pulses are palpable    Skin: normal skin appearance without worrisome lesions.   Neurologic:intubated and sedated,     Labs:  Recent Labs   Lab 10/17/19  0950 10/17/19  0741 10/17/19  0600 10/17/19  0354   PH 7.48* 7.51* 7.52* 7.49*   PCO2 49* 48* 45 43   PO2 139* 88 78* 83   HCO3 37* 38* 37* 32*   O2PER 50 50.0 50 50      Recent Labs   Lab 10/17/19  1020 10/17/19  0346 10/16/19  2210 10/16/19  1606   WBC 6.3 6.2 6.4 5.9   HGB 8.2* 8.9* 8.8* 8.4*     Creatinine   Date Value Ref Range Status   10/17/2019 0.99 0.66 - 1.25 mg/dL Final   10/17/2019 1.10 0.66 - 1.25 mg/dL Final   10/16/2019 0.99 0.66 - 1.25 mg/dL Final   10/16/2019 1.13 0.66 - 1.25 mg/dL Final       ECMO Issues including assessments and plan on DOS 10/17/2019:  Neuro: Sedated for mechanical ventilation and ECMO.  NIRS stable  b/l  RASS goal: -4  CV: Cardiogenic shock.  Hemodynamically stable on ECMO  Pulm: Flows unchanged at 4 Sweep unchanged at 3, Keep vent settings at rest settings: stable  FEN/Renal: Electrolytes stable w/ replacement protocols in place, Cr stable, UOP stable  Heme: ACT goal: 140-160, Hemoglobin 9 .  Goals: if O2 sat >85% Hgb >8.  If O2 Sat <85% keep Hgb 10-12.  Minimal oozing around the ECMO cannulas.  ID: Receiving empiric antibiotics  Cannulae: Position is acceptable on exam and the available imaging.  Distal  perfusion cannula is in place and patent.     I have personally reviewed the ECMO flows, oxygenation and CO2 clearance, anticoagulation, and cannula position.  I have also personally assessed the patient's systemic response with hemodynamics, oxygenation, ventilation, and bleeding.       The patient requires continued ECMO support and management in the ICU.  I have discussed patient care and treatment plan with the primary team.        Anshu Roberts MD  Interventional Cardiology  Pager: [phone]  October 17, 2019

## 2019-10-18 ENCOUNTER — APPOINTMENT (OUTPATIENT)
Dept: GENERAL RADIOLOGY | Facility: CLINIC | Age: 54
DRG: 003 | End: 2019-10-18
Attending: STUDENT IN AN ORGANIZED HEALTH CARE EDUCATION/TRAINING PROGRAM
Payer: COMMERCIAL

## 2019-10-18 ENCOUNTER — ANESTHESIA (OUTPATIENT)
Dept: SURGERY | Facility: CLINIC | Age: 54
DRG: 003 | End: 2019-10-18
Payer: COMMERCIAL

## 2019-10-18 ENCOUNTER — APPOINTMENT (OUTPATIENT)
Dept: ULTRASOUND IMAGING | Facility: CLINIC | Age: 54
DRG: 003 | End: 2019-10-18
Attending: RADIOLOGY
Payer: COMMERCIAL

## 2019-10-18 ENCOUNTER — APPOINTMENT (OUTPATIENT)
Dept: INTERVENTIONAL RADIOLOGY/VASCULAR | Facility: CLINIC | Age: 54
DRG: 003 | End: 2019-10-18
Attending: RADIOLOGY
Payer: COMMERCIAL

## 2019-10-18 ENCOUNTER — ALLIED HEALTH/NURSE VISIT (OUTPATIENT)
Dept: NEUROLOGY | Facility: CLINIC | Age: 54
DRG: 003 | End: 2019-10-18
Attending: PSYCHIATRY & NEUROLOGY
Payer: COMMERCIAL

## 2019-10-18 DIAGNOSIS — I46.9 CARDIAC ARREST (H): Primary | ICD-10-CM

## 2019-10-18 LAB
ABO + RH BLD: NORMAL
ABO + RH BLD: NORMAL
ALBUMIN SERPL-MCNC: 3 G/DL (ref 3.4–5)
ALBUMIN SERPL-MCNC: 3.1 G/DL (ref 3.4–5)
ALBUMIN SERPL-MCNC: 3.2 G/DL (ref 3.4–5)
ALP SERPL-CCNC: 107 U/L (ref 40–150)
ALP SERPL-CCNC: 115 U/L (ref 40–150)
ALP SERPL-CCNC: 120 U/L (ref 40–150)
ALT SERPL W P-5'-P-CCNC: 44 U/L (ref 0–70)
ALT SERPL W P-5'-P-CCNC: 45 U/L (ref 0–70)
ALT SERPL W P-5'-P-CCNC: 45 U/L (ref 0–70)
ANION GAP SERPL CALCULATED.3IONS-SCNC: 6 MMOL/L (ref 3–14)
APTT PPP: 56 SEC (ref 22–37)
AST SERPL W P-5'-P-CCNC: 156 U/L (ref 0–45)
AST SERPL W P-5'-P-CCNC: 181 U/L (ref 0–45)
AST SERPL W P-5'-P-CCNC: 192 U/L (ref 0–45)
AT III ACT/NOR PPP CHRO: 54 % (ref 85–135)
BASE EXCESS BLDA CALC-SCNC: 10.2 MMOL/L
BASE EXCESS BLDA CALC-SCNC: 5 MMOL/L
BASE EXCESS BLDA CALC-SCNC: 5.9 MMOL/L
BASE EXCESS BLDA CALC-SCNC: 6.2 MMOL/L
BASE EXCESS BLDA CALC-SCNC: 6.2 MMOL/L
BASE EXCESS BLDA CALC-SCNC: 6.3 MMOL/L
BASE EXCESS BLDA CALC-SCNC: 6.6 MMOL/L
BASE EXCESS BLDA CALC-SCNC: 6.9 MMOL/L
BASE EXCESS BLDA CALC-SCNC: 7.3 MMOL/L
BASE EXCESS BLDA CALC-SCNC: 7.3 MMOL/L
BASE EXCESS BLDA CALC-SCNC: 7.5 MMOL/L
BASE EXCESS BLDA CALC-SCNC: 8.6 MMOL/L
BASE EXCESS BLDA CALC-SCNC: 9.3 MMOL/L
BASE EXCESS BLDA CALC-SCNC: 9.7 MMOL/L
BASE EXCESS BLDA CALC-SCNC: 9.8 MMOL/L
BASE EXCESS BLDV CALC-SCNC: 9.8 MMOL/L
BILIRUB SERPL-MCNC: 3.3 MG/DL (ref 0.2–1.3)
BILIRUB SERPL-MCNC: 3.5 MG/DL (ref 0.2–1.3)
BILIRUB SERPL-MCNC: 3.7 MG/DL (ref 0.2–1.3)
BLD GP AB SCN SERPL QL: NORMAL
BLD PROD TYP BPU: NORMAL
BLD UNIT ID BPU: 0
BLOOD BANK CMNT PATIENT-IMP: NORMAL
BLOOD PRODUCT CODE: NORMAL
BPU ID: NORMAL
BUN SERPL-MCNC: 18 MG/DL (ref 7–30)
BUN SERPL-MCNC: 19 MG/DL (ref 7–30)
BUN SERPL-MCNC: 22 MG/DL (ref 7–30)
CA-I BLD-MCNC: 4.4 MG/DL (ref 4.4–5.2)
CA-I BLD-MCNC: 4.5 MG/DL (ref 4.4–5.2)
CALCIUM SERPL-MCNC: 8.2 MG/DL (ref 8.5–10.1)
CALCIUM SERPL-MCNC: 8.4 MG/DL (ref 8.5–10.1)
CALCIUM SERPL-MCNC: 8.7 MG/DL (ref 8.5–10.1)
CHLORIDE SERPL-SCNC: 102 MMOL/L (ref 94–109)
CO2 SERPL-SCNC: 30 MMOL/L (ref 20–32)
CO2 SERPL-SCNC: 31 MMOL/L (ref 20–32)
CO2 SERPL-SCNC: 32 MMOL/L (ref 20–32)
CREAT SERPL-MCNC: 0.74 MG/DL (ref 0.66–1.25)
CREAT SERPL-MCNC: 0.78 MG/DL (ref 0.66–1.25)
CREAT SERPL-MCNC: 0.89 MG/DL (ref 0.66–1.25)
CRP SERPL-MCNC: 190 MG/L (ref 0–8)
D DIMER PPP FEU-MCNC: >20 UG/ML FEU (ref 0–0.5)
ERYTHROCYTE [DISTWIDTH] IN BLOOD BY AUTOMATED COUNT: 16.9 % (ref 10–15)
ERYTHROCYTE [DISTWIDTH] IN BLOOD BY AUTOMATED COUNT: 17 % (ref 10–15)
ERYTHROCYTE [DISTWIDTH] IN BLOOD BY AUTOMATED COUNT: 17 % (ref 10–15)
ERYTHROCYTE [SEDIMENTATION RATE] IN BLOOD BY WESTERGREN METHOD: 82 MM/H (ref 0–20)
FIBRINOGEN PPP-MCNC: 701 MG/DL (ref 200–420)
GFR SERPL CREATININE-BSD FRML MDRD: >90 ML/MIN/{1.73_M2}
GLUCOSE BLD-MCNC: 112 MG/DL (ref 70–99)
GLUCOSE BLD-MCNC: 119 MG/DL (ref 70–99)
GLUCOSE BLD-MCNC: 119 MG/DL (ref 70–99)
GLUCOSE BLD-MCNC: 121 MG/DL (ref 70–99)
GLUCOSE BLD-MCNC: 131 MG/DL (ref 70–99)
GLUCOSE BLD-MCNC: 133 MG/DL (ref 70–99)
GLUCOSE BLD-MCNC: 88 MG/DL (ref 70–99)
GLUCOSE BLDC GLUCOMTR-MCNC: 109 MG/DL (ref 70–99)
GLUCOSE BLDC GLUCOMTR-MCNC: 110 MG/DL (ref 70–99)
GLUCOSE BLDC GLUCOMTR-MCNC: 115 MG/DL (ref 70–99)
GLUCOSE BLDC GLUCOMTR-MCNC: 116 MG/DL (ref 70–99)
GLUCOSE BLDC GLUCOMTR-MCNC: 116 MG/DL (ref 70–99)
GLUCOSE BLDC GLUCOMTR-MCNC: 118 MG/DL (ref 70–99)
GLUCOSE BLDC GLUCOMTR-MCNC: 122 MG/DL (ref 70–99)
GLUCOSE BLDC GLUCOMTR-MCNC: 143 MG/DL (ref 70–99)
GLUCOSE BLDC GLUCOMTR-MCNC: 147 MG/DL (ref 70–99)
GLUCOSE BLDC GLUCOMTR-MCNC: 158 MG/DL (ref 70–99)
GLUCOSE SERPL-MCNC: 114 MG/DL (ref 70–99)
GLUCOSE SERPL-MCNC: 114 MG/DL (ref 70–99)
GLUCOSE SERPL-MCNC: 137 MG/DL (ref 70–99)
HCO3 BLD-SCNC: 31 MMOL/L (ref 21–28)
HCO3 BLD-SCNC: 32 MMOL/L (ref 21–28)
HCO3 BLD-SCNC: 33 MMOL/L (ref 21–28)
HCO3 BLD-SCNC: 34 MMOL/L (ref 21–28)
HCO3 BLD-SCNC: 34 MMOL/L (ref 21–28)
HCO3 BLD-SCNC: 35 MMOL/L (ref 21–28)
HCO3 BLD-SCNC: 35 MMOL/L (ref 21–28)
HCO3 BLDA-SCNC: 35 MMOL/L (ref 21–28)
HCO3 BLDV-SCNC: 36 MMOL/L (ref 21–28)
HCT VFR BLD AUTO: 25.1 % (ref 40–53)
HCT VFR BLD AUTO: 27.5 % (ref 40–53)
HCT VFR BLD AUTO: 28.2 % (ref 40–53)
HGB BLD-MCNC: 8 G/DL (ref 13.3–17.7)
HGB BLD-MCNC: 8.7 G/DL (ref 13.3–17.7)
HGB BLD-MCNC: 8.9 G/DL (ref 13.3–17.7)
HGB BLD-MCNC: 8.9 G/DL (ref 13.3–17.7)
HGB BLD-MCNC: 9 G/DL (ref 13.3–17.7)
HGB FREE PLAS-MCNC: 60 MG/DL
INR PPP: 1.12 (ref 0.86–1.14)
KCT BLD-ACNC: 134 SEC (ref 75–150)
KCT BLD-ACNC: 134 SEC (ref 75–150)
KCT BLD-ACNC: 142 SEC (ref 75–150)
KCT BLD-ACNC: 142 SEC (ref 75–150)
KCT BLD-ACNC: 150 SEC (ref 75–150)
LACTATE BLD-SCNC: 0.8 MMOL/L (ref 0.7–2)
LACTATE BLD-SCNC: 0.9 MMOL/L (ref 0.7–2)
LACTATE BLD-SCNC: 0.9 MMOL/L (ref 0.7–2)
LACTATE BLD-SCNC: 1.1 MMOL/L (ref 0.7–2)
LACTATE BLD-SCNC: 1.1 MMOL/L (ref 0.7–2)
LACTATE BLD-SCNC: 1.2 MMOL/L (ref 0.7–2)
LACTATE BLD-SCNC: 1.2 MMOL/L (ref 0.7–2)
LACTATE BLD-SCNC: 1.4 MMOL/L (ref 0.7–2)
LACTATE BLD-SCNC: 1.6 MMOL/L (ref 0.7–2)
LDH SERPL L TO P-CCNC: 575 U/L (ref 85–227)
LMWH PPP CHRO-ACNC: 0.21 IU/ML
MAGNESIUM SERPL-MCNC: 2.3 MG/DL (ref 1.6–2.3)
MAGNESIUM SERPL-MCNC: 2.4 MG/DL (ref 1.6–2.3)
MAGNESIUM SERPL-MCNC: 2.4 MG/DL (ref 1.6–2.3)
MCH RBC QN AUTO: 30.4 PG (ref 26.5–33)
MCH RBC QN AUTO: 30.9 PG (ref 26.5–33)
MCH RBC QN AUTO: 31.1 PG (ref 26.5–33)
MCHC RBC AUTO-ENTMCNC: 31.6 G/DL (ref 31.5–36.5)
MCHC RBC AUTO-ENTMCNC: 31.9 G/DL (ref 31.5–36.5)
MCHC RBC AUTO-ENTMCNC: 31.9 G/DL (ref 31.5–36.5)
MCV RBC AUTO: 96 FL (ref 78–100)
MCV RBC AUTO: 97 FL (ref 78–100)
MCV RBC AUTO: 98 FL (ref 78–100)
NUM BPU REQUESTED: 2
NUM BPU REQUESTED: 6
O2/TOTAL GAS SETTING VFR VENT: 100 %
O2/TOTAL GAS SETTING VFR VENT: 40 %
O2/TOTAL GAS SETTING VFR VENT: 60 %
O2/TOTAL GAS SETTING VFR VENT: 70 %
O2/TOTAL GAS SETTING VFR VENT: 80 %
O2/TOTAL GAS SETTING VFR VENT: 80 %
O2/TOTAL GAS SETTING VFR VENT: ABNORMAL %
OXYHGB MFR BLD: 91 % (ref 92–100)
OXYHGB MFR BLD: 95 % (ref 92–100)
OXYHGB MFR BLD: 96 % (ref 92–100)
OXYHGB MFR BLD: 97 % (ref 92–100)
OXYHGB MFR BLD: 97 % (ref 92–100)
OXYHGB MFR BLD: 99 % (ref 92–100)
OXYHGB MFR BLDA: 98 % (ref 75–100)
OXYHGB MFR BLDV: 61 %
PCO2 BLD: 45 MM HG (ref 35–45)
PCO2 BLD: 45 MM HG (ref 35–45)
PCO2 BLD: 46 MM HG (ref 35–45)
PCO2 BLD: 47 MM HG (ref 35–45)
PCO2 BLD: 48 MM HG (ref 35–45)
PCO2 BLD: 48 MM HG (ref 35–45)
PCO2 BLD: 49 MM HG (ref 35–45)
PCO2 BLD: 50 MM HG (ref 35–45)
PCO2 BLD: 51 MM HG (ref 35–45)
PCO2 BLD: 51 MM HG (ref 35–45)
PCO2 BLD: 52 MM HG (ref 35–45)
PCO2 BLDA: 57 MM HG (ref 35–45)
PCO2 BLDV: 61 MM HG (ref 40–50)
PH BLD: 7.39 PH (ref 7.35–7.45)
PH BLD: 7.4 PH (ref 7.35–7.45)
PH BLD: 7.4 PH (ref 7.35–7.45)
PH BLD: 7.41 PH (ref 7.35–7.45)
PH BLD: 7.41 PH (ref 7.35–7.45)
PH BLD: 7.42 PH (ref 7.35–7.45)
PH BLD: 7.43 PH (ref 7.35–7.45)
PH BLD: 7.44 PH (ref 7.35–7.45)
PH BLD: 7.45 PH (ref 7.35–7.45)
PH BLD: 7.45 PH (ref 7.35–7.45)
PH BLD: 7.46 PH (ref 7.35–7.45)
PH BLD: 7.47 PH (ref 7.35–7.45)
PH BLDA: 7.4 PH (ref 7.35–7.45)
PH BLDV: 7.38 PH (ref 7.32–7.43)
PHOSPHATE SERPL-MCNC: 2.7 MG/DL (ref 2.5–4.5)
PLATELET # BLD AUTO: 64 10E9/L (ref 150–450)
PLATELET # BLD AUTO: 66 10E9/L (ref 150–450)
PLATELET # BLD AUTO: 73 10E9/L (ref 150–450)
PO2 BLD: 104 MM HG (ref 80–105)
PO2 BLD: 109 MM HG (ref 80–105)
PO2 BLD: 159 MM HG (ref 80–105)
PO2 BLD: 212 MM HG (ref 80–105)
PO2 BLD: 64 MM HG (ref 80–105)
PO2 BLD: 74 MM HG (ref 80–105)
PO2 BLD: 79 MM HG (ref 80–105)
PO2 BLD: 79 MM HG (ref 80–105)
PO2 BLD: 83 MM HG (ref 80–105)
PO2 BLD: 90 MM HG (ref 80–105)
PO2 BLD: 91 MM HG (ref 80–105)
PO2 BLD: 92 MM HG (ref 80–105)
PO2 BLD: 98 MM HG (ref 80–105)
PO2 BLD: 98 MM HG (ref 80–105)
PO2 BLDA: 215 MM HG (ref 80–105)
PO2 BLDV: 34 MM HG (ref 25–47)
POTASSIUM BLD-SCNC: 3.3 MMOL/L (ref 3.4–5.3)
POTASSIUM BLD-SCNC: 3.4 MMOL/L (ref 3.4–5.3)
POTASSIUM BLD-SCNC: 3.5 MMOL/L (ref 3.4–5.3)
POTASSIUM BLD-SCNC: 3.6 MMOL/L (ref 3.4–5.3)
POTASSIUM BLD-SCNC: 3.6 MMOL/L (ref 3.4–5.3)
POTASSIUM SERPL-SCNC: 3.6 MMOL/L (ref 3.4–5.3)
POTASSIUM SERPL-SCNC: 3.6 MMOL/L (ref 3.4–5.3)
POTASSIUM SERPL-SCNC: 3.9 MMOL/L (ref 3.4–5.3)
PROT SERPL-MCNC: 6.3 G/DL (ref 6.8–8.8)
PROT SERPL-MCNC: 6.4 G/DL (ref 6.8–8.8)
PROT SERPL-MCNC: 6.5 G/DL (ref 6.8–8.8)
RADIOLOGIST FLAGS: ABNORMAL
RBC # BLD AUTO: 2.59 10E12/L (ref 4.4–5.9)
RBC # BLD AUTO: 2.86 10E12/L (ref 4.4–5.9)
RBC # BLD AUTO: 2.89 10E12/L (ref 4.4–5.9)
SODIUM BLD-SCNC: 141 MMOL/L (ref 133–144)
SODIUM BLD-SCNC: 142 MMOL/L (ref 133–144)
SODIUM SERPL-SCNC: 138 MMOL/L (ref 133–144)
SODIUM SERPL-SCNC: 140 MMOL/L (ref 133–144)
SODIUM SERPL-SCNC: 141 MMOL/L (ref 133–144)
SPECIMEN EXP DATE BLD: NORMAL
TRANSFUSION STATUS PATIENT QL: NORMAL
TROPONIN I SERPL-MCNC: 33.41 UG/L (ref 0–0.04)
TROPONIN I SERPL-MCNC: 53.75 UG/L (ref 0–0.04)
WBC # BLD AUTO: 5.8 10E9/L (ref 4–11)
WBC # BLD AUTO: 6.2 10E9/L (ref 4–11)
WBC # BLD AUTO: 8 10E9/L (ref 4–11)

## 2019-10-18 PROCEDURE — 83051 HEMOGLOBIN PLASMA: CPT | Performed by: STUDENT IN AN ORGANIZED HEALTH CARE EDUCATION/TRAINING PROGRAM

## 2019-10-18 PROCEDURE — 85730 THROMBOPLASTIN TIME PARTIAL: CPT | Performed by: STUDENT IN AN ORGANIZED HEALTH CARE EDUCATION/TRAINING PROGRAM

## 2019-10-18 PROCEDURE — 25000128 H RX IP 250 OP 636: Performed by: STUDENT IN AN ORGANIZED HEALTH CARE EDUCATION/TRAINING PROGRAM

## 2019-10-18 PROCEDURE — 99233 SBSQ HOSP IP/OBS HIGH 50: CPT | Mod: GC | Performed by: INTERNAL MEDICINE

## 2019-10-18 PROCEDURE — 82947 ASSAY GLUCOSE BLOOD QUANT: CPT | Performed by: ANESTHESIOLOGY

## 2019-10-18 PROCEDURE — 31624 DX BRONCHOSCOPE/LAVAGE: CPT

## 2019-10-18 PROCEDURE — 85652 RBC SED RATE AUTOMATED: CPT | Performed by: STUDENT IN AN ORGANIZED HEALTH CARE EDUCATION/TRAINING PROGRAM

## 2019-10-18 PROCEDURE — 31645 BRNCHSC W/THER ASPIR 1ST: CPT | Mod: RT | Performed by: ANESTHESIOLOGY

## 2019-10-18 PROCEDURE — 25800030 ZZH RX IP 258 OP 636: Performed by: INTERNAL MEDICINE

## 2019-10-18 PROCEDURE — P9041 ALBUMIN (HUMAN),5%, 50ML: HCPCS

## 2019-10-18 PROCEDURE — 82803 BLOOD GASES ANY COMBINATION: CPT | Performed by: ANESTHESIOLOGY

## 2019-10-18 PROCEDURE — 25800025 ZZH RX 258: Performed by: STUDENT IN AN ORGANIZED HEALTH CARE EDUCATION/TRAINING PROGRAM

## 2019-10-18 PROCEDURE — C9113 INJ PANTOPRAZOLE SODIUM, VIA: HCPCS | Performed by: STUDENT IN AN ORGANIZED HEALTH CARE EDUCATION/TRAINING PROGRAM

## 2019-10-18 PROCEDURE — 94003 VENT MGMT INPAT SUBQ DAY: CPT

## 2019-10-18 PROCEDURE — 40000076 ZZH STATISTIC IABP MONITORING

## 2019-10-18 PROCEDURE — 04QK0ZZ REPAIR RIGHT FEMORAL ARTERY, OPEN APPROACH: ICD-10-PCS | Performed by: THORACIC SURGERY (CARDIOTHORACIC VASCULAR SURGERY)

## 2019-10-18 PROCEDURE — 25000132 ZZH RX MED GY IP 250 OP 250 PS 637: Performed by: STUDENT IN AN ORGANIZED HEALTH CARE EDUCATION/TRAINING PROGRAM

## 2019-10-18 PROCEDURE — 83615 LACTATE (LD) (LDH) ENZYME: CPT | Performed by: STUDENT IN AN ORGANIZED HEALTH CARE EDUCATION/TRAINING PROGRAM

## 2019-10-18 PROCEDURE — 80053 COMPREHEN METABOLIC PANEL: CPT | Performed by: STUDENT IN AN ORGANIZED HEALTH CARE EDUCATION/TRAINING PROGRAM

## 2019-10-18 PROCEDURE — P9016 RBC LEUKOCYTES REDUCED: HCPCS | Performed by: INTERNAL MEDICINE

## 2019-10-18 PROCEDURE — 85347 COAGULATION TIME ACTIVATED: CPT

## 2019-10-18 PROCEDURE — 40000986 XR ABDOMEN PORT 1 VW

## 2019-10-18 PROCEDURE — 25000565 ZZH ISOFLURANE, EA 15 MIN: Performed by: THORACIC SURGERY (CARDIOTHORACIC VASCULAR SURGERY)

## 2019-10-18 PROCEDURE — 00000146 ZZHCL STATISTIC GLUCOSE BY METER IP

## 2019-10-18 PROCEDURE — 71045 X-RAY EXAM CHEST 1 VIEW: CPT

## 2019-10-18 PROCEDURE — 25000128 H RX IP 250 OP 636: Performed by: INTERNAL MEDICINE

## 2019-10-18 PROCEDURE — 85384 FIBRINOGEN ACTIVITY: CPT | Performed by: STUDENT IN AN ORGANIZED HEALTH CARE EDUCATION/TRAINING PROGRAM

## 2019-10-18 PROCEDURE — 40000275 ZZH STATISTIC RCP TIME EA 10 MIN

## 2019-10-18 PROCEDURE — 85027 COMPLETE CBC AUTOMATED: CPT | Performed by: STUDENT IN AN ORGANIZED HEALTH CARE EDUCATION/TRAINING PROGRAM

## 2019-10-18 PROCEDURE — C1758 CATHETER, URETERAL: HCPCS | Performed by: THORACIC SURGERY (CARDIOTHORACIC VASCULAR SURGERY)

## 2019-10-18 PROCEDURE — 36000076 ZZH SURGERY LEVEL 6 EA 15 ADDTL MIN - UMMC: Performed by: THORACIC SURGERY (CARDIOTHORACIC VASCULAR SURGERY)

## 2019-10-18 PROCEDURE — 84295 ASSAY OF SERUM SODIUM: CPT | Performed by: ANESTHESIOLOGY

## 2019-10-18 PROCEDURE — 95951 ZZHC EEG VIDEO EACH 24 HR: CPT | Mod: ZF

## 2019-10-18 PROCEDURE — 82330 ASSAY OF CALCIUM: CPT | Performed by: ANESTHESIOLOGY

## 2019-10-18 PROCEDURE — 87070 CULTURE OTHR SPECIMN AEROBIC: CPT | Performed by: STUDENT IN AN ORGANIZED HEALTH CARE EDUCATION/TRAINING PROGRAM

## 2019-10-18 PROCEDURE — 25000128 H RX IP 250 OP 636: Performed by: THORACIC SURGERY (CARDIOTHORACIC VASCULAR SURGERY)

## 2019-10-18 PROCEDURE — 33949 ECMO/ECLS DAILY MGMT ARTERY: CPT

## 2019-10-18 PROCEDURE — 25000125 ZZHC RX 250: Performed by: THORACIC SURGERY (CARDIOTHORACIC VASCULAR SURGERY)

## 2019-10-18 PROCEDURE — 84100 ASSAY OF PHOSPHORUS: CPT | Performed by: STUDENT IN AN ORGANIZED HEALTH CARE EDUCATION/TRAINING PROGRAM

## 2019-10-18 PROCEDURE — 83735 ASSAY OF MAGNESIUM: CPT | Performed by: STUDENT IN AN ORGANIZED HEALTH CARE EDUCATION/TRAINING PROGRAM

## 2019-10-18 PROCEDURE — 84484 ASSAY OF TROPONIN QUANT: CPT | Performed by: STUDENT IN AN ORGANIZED HEALTH CARE EDUCATION/TRAINING PROGRAM

## 2019-10-18 PROCEDURE — 85300 ANTITHROMBIN III ACTIVITY: CPT | Performed by: STUDENT IN AN ORGANIZED HEALTH CARE EDUCATION/TRAINING PROGRAM

## 2019-10-18 PROCEDURE — 37000008 ZZH ANESTHESIA TECHNICAL FEE, 1ST 30 MIN: Performed by: THORACIC SURGERY (CARDIOTHORACIC VASCULAR SURGERY)

## 2019-10-18 PROCEDURE — 85520 HEPARIN ASSAY: CPT | Performed by: STUDENT IN AN ORGANIZED HEALTH CARE EDUCATION/TRAINING PROGRAM

## 2019-10-18 PROCEDURE — 84132 ASSAY OF SERUM POTASSIUM: CPT | Performed by: ANESTHESIOLOGY

## 2019-10-18 PROCEDURE — 27210429 ZZH NUTRITION PRODUCT INTERMEDIATE LITER

## 2019-10-18 PROCEDURE — 0B9K7ZX DRAINAGE OF RIGHT LUNG, VIA NATURAL OR ARTIFICIAL OPENING, DIAGNOSTIC: ICD-10-PCS | Performed by: ANESTHESIOLOGY

## 2019-10-18 PROCEDURE — 25000128 H RX IP 250 OP 636: Performed by: RADIOLOGY

## 2019-10-18 PROCEDURE — 25800030 ZZH RX IP 258 OP 636: Performed by: STUDENT IN AN ORGANIZED HEALTH CARE EDUCATION/TRAINING PROGRAM

## 2019-10-18 PROCEDURE — 0WJ80ZZ INSPECTION OF CHEST WALL, OPEN APPROACH: ICD-10-PCS | Performed by: THORACIC SURGERY (CARDIOTHORACIC VASCULAR SURGERY)

## 2019-10-18 PROCEDURE — 36000074 ZZH SURGERY LEVEL 6 1ST 30 MIN - UMMC: Performed by: THORACIC SURGERY (CARDIOTHORACIC VASCULAR SURGERY)

## 2019-10-18 PROCEDURE — 85379 FIBRIN DEGRADATION QUANT: CPT | Performed by: STUDENT IN AN ORGANIZED HEALTH CARE EDUCATION/TRAINING PROGRAM

## 2019-10-18 PROCEDURE — 85610 PROTHROMBIN TIME: CPT | Performed by: STUDENT IN AN ORGANIZED HEALTH CARE EDUCATION/TRAINING PROGRAM

## 2019-10-18 PROCEDURE — 82947 ASSAY GLUCOSE BLOOD QUANT: CPT | Performed by: STUDENT IN AN ORGANIZED HEALTH CARE EDUCATION/TRAINING PROGRAM

## 2019-10-18 PROCEDURE — 82803 BLOOD GASES ANY COMBINATION: CPT | Performed by: STUDENT IN AN ORGANIZED HEALTH CARE EDUCATION/TRAINING PROGRAM

## 2019-10-18 PROCEDURE — 71275 CT ANGIOGRAPHY CHEST: CPT

## 2019-10-18 PROCEDURE — 25000128 H RX IP 250 OP 636

## 2019-10-18 PROCEDURE — 25000125 ZZHC RX 250: Performed by: STUDENT IN AN ORGANIZED HEALTH CARE EDUCATION/TRAINING PROGRAM

## 2019-10-18 PROCEDURE — 5A1522G EXTRACORPOREAL OXYGENATION, MEMBRANE, PERIPHERAL VENO-ARTERIAL: ICD-10-PCS | Performed by: THORACIC SURGERY (CARDIOTHORACIC VASCULAR SURGERY)

## 2019-10-18 PROCEDURE — 82330 ASSAY OF CALCIUM: CPT | Performed by: STUDENT IN AN ORGANIZED HEALTH CARE EDUCATION/TRAINING PROGRAM

## 2019-10-18 PROCEDURE — 86140 C-REACTIVE PROTEIN: CPT | Performed by: STUDENT IN AN ORGANIZED HEALTH CARE EDUCATION/TRAINING PROGRAM

## 2019-10-18 PROCEDURE — 40000344 ZZHCL STATISTIC THAWING COMPONENT: Performed by: INTERNAL MEDICINE

## 2019-10-18 PROCEDURE — 27210794 ZZH OR GENERAL SUPPLY STERILE: Performed by: THORACIC SURGERY (CARDIOTHORACIC VASCULAR SURGERY)

## 2019-10-18 PROCEDURE — 40000986 XR CHEST PORT 1 VW

## 2019-10-18 PROCEDURE — 76604 US EXAM CHEST: CPT

## 2019-10-18 PROCEDURE — 20000004 ZZH R&B ICU UMMC

## 2019-10-18 PROCEDURE — 83605 ASSAY OF LACTIC ACID: CPT | Performed by: STUDENT IN AN ORGANIZED HEALTH CARE EDUCATION/TRAINING PROGRAM

## 2019-10-18 PROCEDURE — 41000018 ZZH PER-PERFUSION 1ST 30 MIN: Performed by: THORACIC SURGERY (CARDIOTHORACIC VASCULAR SURGERY)

## 2019-10-18 PROCEDURE — 83605 ASSAY OF LACTIC ACID: CPT | Performed by: ANESTHESIOLOGY

## 2019-10-18 PROCEDURE — 40000014 ZZH STATISTIC ARTERIAL MONITORING DAILY

## 2019-10-18 PROCEDURE — 37000009 ZZH ANESTHESIA TECHNICAL FEE, EACH ADDTL 15 MIN: Performed by: THORACIC SURGERY (CARDIOTHORACIC VASCULAR SURGERY)

## 2019-10-18 PROCEDURE — 82805 BLOOD GASES W/O2 SATURATION: CPT | Performed by: STUDENT IN AN ORGANIZED HEALTH CARE EDUCATION/TRAINING PROGRAM

## 2019-10-18 PROCEDURE — 25000128 H RX IP 250 OP 636: Performed by: ANESTHESIOLOGY

## 2019-10-18 RX ORDER — NOREPINEPHRINE BITARTRATE 0.06 MG/ML
0.03-0.4 INJECTION, SOLUTION INTRAVENOUS CONTINUOUS
Status: DISCONTINUED | OUTPATIENT
Start: 2019-10-18 | End: 2019-10-18 | Stop reason: HOSPADM

## 2019-10-18 RX ORDER — PROPOFOL 10 MG/ML
5-75 INJECTION, EMULSION INTRAVENOUS CONTINUOUS
Status: DISCONTINUED | OUTPATIENT
Start: 2019-10-18 | End: 2019-10-20

## 2019-10-18 RX ORDER — ALBUMIN, HUMAN INJ 5% 5 %
SOLUTION INTRAVENOUS
Status: COMPLETED
Start: 2019-10-18 | End: 2019-10-18

## 2019-10-18 RX ORDER — HEPARIN SODIUM 1000 [USP'U]/ML
INJECTION, SOLUTION INTRAVENOUS; SUBCUTANEOUS PRN
Status: DISCONTINUED | OUTPATIENT
Start: 2019-10-18 | End: 2019-10-18

## 2019-10-18 RX ORDER — FENTANYL CITRATE 50 UG/ML
INJECTION, SOLUTION INTRAMUSCULAR; INTRAVENOUS PRN
Status: DISCONTINUED | OUTPATIENT
Start: 2019-10-18 | End: 2019-10-18

## 2019-10-18 RX ORDER — ALBUMIN, HUMAN INJ 5% 5 %
25 SOLUTION INTRAVENOUS ONCE
Status: COMPLETED | OUTPATIENT
Start: 2019-10-18 | End: 2019-10-18

## 2019-10-18 RX ORDER — FENTANYL CITRATE 50 UG/ML
INJECTION, SOLUTION INTRAMUSCULAR; INTRAVENOUS PRN
Status: COMPLETED | OUTPATIENT
Start: 2019-10-18 | End: 2019-10-18

## 2019-10-18 RX ORDER — CEFAZOLIN SODIUM 1 G/3ML
1 INJECTION, POWDER, FOR SOLUTION INTRAMUSCULAR; INTRAVENOUS SEE ADMIN INSTRUCTIONS
Status: DISCONTINUED | OUTPATIENT
Start: 2019-10-18 | End: 2019-10-18 | Stop reason: HOSPADM

## 2019-10-18 RX ORDER — CEFAZOLIN SODIUM 2 G/100ML
2 INJECTION, SOLUTION INTRAVENOUS
Status: COMPLETED | OUTPATIENT
Start: 2019-10-18 | End: 2019-10-18

## 2019-10-18 RX ORDER — LIDOCAINE HYDROCHLORIDE 10 MG/ML
INJECTION, SOLUTION EPIDURAL; INFILTRATION; INTRACAUDAL; PERINEURAL
Status: DISCONTINUED
Start: 2019-10-18 | End: 2019-10-18 | Stop reason: HOSPADM

## 2019-10-18 RX ORDER — PROTAMINE SULFATE 10 MG/ML
INJECTION, SOLUTION INTRAVENOUS PRN
Status: DISCONTINUED | OUTPATIENT
Start: 2019-10-18 | End: 2019-10-18

## 2019-10-18 RX ORDER — IOPAMIDOL 755 MG/ML
100 INJECTION, SOLUTION INTRAVASCULAR ONCE
Status: COMPLETED | OUTPATIENT
Start: 2019-10-18 | End: 2019-10-18

## 2019-10-18 RX ORDER — ACETAMINOPHEN 325 MG/1
650 TABLET ORAL EVERY 4 HOURS PRN
Status: DISCONTINUED | OUTPATIENT
Start: 2019-10-18 | End: 2019-11-05 | Stop reason: HOSPADM

## 2019-10-18 RX ORDER — PROPOFOL 10 MG/ML
INJECTION, EMULSION INTRAVENOUS PRN
Status: COMPLETED | OUTPATIENT
Start: 2019-10-18 | End: 2019-10-18

## 2019-10-18 RX ORDER — PROPOFOL 10 MG/ML
INJECTION, EMULSION INTRAVENOUS CONTINUOUS PRN
Status: DISCONTINUED | OUTPATIENT
Start: 2019-10-18 | End: 2019-10-18

## 2019-10-18 RX ADMIN — FENTANYL CITRATE 50 MCG: 50 INJECTION, SOLUTION INTRAMUSCULAR; INTRAVENOUS at 12:17

## 2019-10-18 RX ADMIN — ALBUMIN HUMAN 25 G: 0.05 INJECTION, SOLUTION INTRAVENOUS at 13:56

## 2019-10-18 RX ADMIN — FENTANYL CITRATE 50 MCG: 50 INJECTION, SOLUTION INTRAMUSCULAR; INTRAVENOUS at 03:15

## 2019-10-18 RX ADMIN — SUGAMMADEX 200 MG: 100 INJECTION, SOLUTION INTRAVENOUS at 10:14

## 2019-10-18 RX ADMIN — MIDAZOLAM 2 MG: 1 INJECTION INTRAMUSCULAR; INTRAVENOUS at 05:25

## 2019-10-18 RX ADMIN — ALBUMIN HUMAN 25 G: 50 SOLUTION INTRAVENOUS at 13:56

## 2019-10-18 RX ADMIN — MIDAZOLAM 1 MG: 1 INJECTION INTRAMUSCULAR; INTRAVENOUS at 00:11

## 2019-10-18 RX ADMIN — IOPAMIDOL 50 ML: 755 INJECTION, SOLUTION INTRAVENOUS at 16:00

## 2019-10-18 RX ADMIN — FENTANYL CITRATE 250 MCG: 50 INJECTION, SOLUTION INTRAMUSCULAR; INTRAVENOUS at 10:00

## 2019-10-18 RX ADMIN — PIPERACILLIN SODIUM AND TAZOBACTAM SODIUM 3.38 G: 3; .375 INJECTION, POWDER, LYOPHILIZED, FOR SOLUTION INTRAVENOUS at 13:57

## 2019-10-18 RX ADMIN — MIDAZOLAM 2 MG: 1 INJECTION INTRAMUSCULAR; INTRAVENOUS at 02:33

## 2019-10-18 RX ADMIN — ROCURONIUM BROMIDE 50 MG: 10 INJECTION INTRAVENOUS at 07:36

## 2019-10-18 RX ADMIN — PROTAMINE SULFATE 50 MG: 10 INJECTION, SOLUTION INTRAVENOUS at 09:39

## 2019-10-18 RX ADMIN — FENTANYL CITRATE 50 MCG: 50 INJECTION, SOLUTION INTRAMUSCULAR; INTRAVENOUS at 06:09

## 2019-10-18 RX ADMIN — ROCURONIUM BROMIDE 50 MG: 10 INJECTION INTRAVENOUS at 08:04

## 2019-10-18 RX ADMIN — PROPOFOL 20 MG: 10 INJECTION, EMULSION INTRAVENOUS at 12:15

## 2019-10-18 RX ADMIN — PANTOPRAZOLE SODIUM 40 MG: 40 INJECTION, POWDER, FOR SOLUTION INTRAVENOUS at 16:30

## 2019-10-18 RX ADMIN — PIPERACILLIN SODIUM AND TAZOBACTAM SODIUM 3.38 G: 3; .375 INJECTION, POWDER, LYOPHILIZED, FOR SOLUTION INTRAVENOUS at 02:31

## 2019-10-18 RX ADMIN — TICAGRELOR 90 MG: 90 TABLET ORAL at 20:04

## 2019-10-18 RX ADMIN — VANCOMYCIN HYDROCHLORIDE 1500 MG: 10 INJECTION, POWDER, LYOPHILIZED, FOR SOLUTION INTRAVENOUS at 06:29

## 2019-10-18 RX ADMIN — FENTANYL CITRATE 100 MCG: 50 INJECTION, SOLUTION INTRAMUSCULAR; INTRAVENOUS at 07:58

## 2019-10-18 RX ADMIN — FENTANYL CITRATE 50 MCG: 50 INJECTION, SOLUTION INTRAMUSCULAR; INTRAVENOUS at 15:00

## 2019-10-18 RX ADMIN — HEPARIN SODIUM 5000 UNITS: 1000 INJECTION, SOLUTION INTRAVENOUS; SUBCUTANEOUS at 08:20

## 2019-10-18 RX ADMIN — POTASSIUM CHLORIDE 20 MEQ: 29.8 INJECTION, SOLUTION INTRAVENOUS at 18:11

## 2019-10-18 RX ADMIN — DEXTROSE MONOHYDRATE: 100 INJECTION, SOLUTION INTRAVENOUS at 19:29

## 2019-10-18 RX ADMIN — LEVETIRACETAM 750 MG: 100 INJECTION, SOLUTION INTRAVENOUS at 00:09

## 2019-10-18 RX ADMIN — PIPERACILLIN SODIUM AND TAZOBACTAM SODIUM 3.38 G: 3; .375 INJECTION, POWDER, LYOPHILIZED, FOR SOLUTION INTRAVENOUS at 08:01

## 2019-10-18 RX ADMIN — CEFAZOLIN SODIUM 2 G: 2 INJECTION, SOLUTION INTRAVENOUS at 08:08

## 2019-10-18 RX ADMIN — LEVETIRACETAM 750 MG: 100 INJECTION, SOLUTION INTRAVENOUS at 12:02

## 2019-10-18 RX ADMIN — MIDAZOLAM 2 MG: 1 INJECTION INTRAMUSCULAR; INTRAVENOUS at 14:45

## 2019-10-18 RX ADMIN — PROTAMINE SULFATE 50 MG: 10 INJECTION, SOLUTION INTRAVENOUS at 09:56

## 2019-10-18 RX ADMIN — MIDAZOLAM 2 MG: 1 INJECTION INTRAMUSCULAR; INTRAVENOUS at 15:45

## 2019-10-18 RX ADMIN — SENNOSIDES AND DOCUSATE SODIUM 2 TABLET: 8.6; 5 TABLET ORAL at 20:04

## 2019-10-18 RX ADMIN — MIDAZOLAM 2 MG: 1 INJECTION INTRAMUSCULAR; INTRAVENOUS at 11:02

## 2019-10-18 RX ADMIN — MIDAZOLAM 3 MG/HR: 5 INJECTION INTRAMUSCULAR; INTRAVENOUS at 16:27

## 2019-10-18 RX ADMIN — FENTANYL CITRATE 50 MCG: 50 INJECTION, SOLUTION INTRAMUSCULAR; INTRAVENOUS at 15:45

## 2019-10-18 RX ADMIN — AMIODARONE HYDROCHLORIDE 0.5 MG/MIN: 50 INJECTION, SOLUTION INTRAVENOUS at 12:02

## 2019-10-18 RX ADMIN — EPINEPHRINE 0.03 MCG/KG/MIN: 1 INJECTION PARENTERAL at 08:22

## 2019-10-18 RX ADMIN — POTASSIUM CHLORIDE 20 MEQ: 29.8 INJECTION, SOLUTION INTRAVENOUS at 04:53

## 2019-10-18 RX ADMIN — DEXTROSE MONOHYDRATE: 100 INJECTION, SOLUTION INTRAVENOUS at 05:03

## 2019-10-18 RX ADMIN — PROPOFOL 80 MCG/KG/MIN: 10 INJECTION, EMULSION INTRAVENOUS at 10:00

## 2019-10-18 RX ADMIN — FENTANYL CITRATE 50 MCG: 50 INJECTION, SOLUTION INTRAMUSCULAR; INTRAVENOUS at 05:25

## 2019-10-18 RX ADMIN — FENTANYL CITRATE 50 MCG: 50 INJECTION, SOLUTION INTRAMUSCULAR; INTRAVENOUS at 02:33

## 2019-10-18 RX ADMIN — FENTANYL CITRATE 50 MCG: 50 INJECTION, SOLUTION INTRAMUSCULAR; INTRAVENOUS at 00:54

## 2019-10-18 RX ADMIN — ACETAMINOPHEN 650 MG: 325 TABLET, FILM COATED ORAL at 23:30

## 2019-10-18 RX ADMIN — BISACODYL 10 MG: 10 SUPPOSITORY RECTAL at 16:28

## 2019-10-18 RX ADMIN — PANTOPRAZOLE SODIUM 40 MG: 40 INJECTION, POWDER, FOR SOLUTION INTRAVENOUS at 03:37

## 2019-10-18 RX ADMIN — PROTAMINE SULFATE 50 MG: 10 INJECTION, SOLUTION INTRAVENOUS at 09:21

## 2019-10-18 RX ADMIN — FENTANYL CITRATE 150 MCG: 50 INJECTION, SOLUTION INTRAMUSCULAR; INTRAVENOUS at 07:33

## 2019-10-18 RX ADMIN — POTASSIUM CHLORIDE 20 MEQ: 29.8 INJECTION, SOLUTION INTRAVENOUS at 12:53

## 2019-10-18 ASSESSMENT — ACTIVITIES OF DAILY LIVING (ADL)
ADLS_ACUITY_SCORE: 19

## 2019-10-18 ASSESSMENT — MIFFLIN-ST. JEOR: SCORE: 1970.24

## 2019-10-18 NOTE — ANESTHESIA POSTPROCEDURE EVALUATION
Anesthesia POST Procedure Evaluation    Patient: Joel Campbell   MRN:     [id number] Gender:   male   Age:    53 year old :      1965        Preoperative Diagnosis: Ventricular tachycardia (H) [I47.2]  Cardiac arrest (H) [I46.9]   Procedure(s):  Extracorporeal Membrane Oxygenator Decannulation, Repair of Right Femoral Artery,  Attempted right thoracostomy tube placement.   Postop Comments: No value filed.       Anesthesia Type:  Not documented  General    Reportable Event: NO     PAIN:        Neuro/Psych: Uneventful perioperative course   Sign Out Status: Planned Postop Sedation     Airway/Resp.: Uneventful perioperative course   Sign Out Status: Airway Device resent     Airway Device: ETT                 Reason: Planned (pre-op)     CV: Uneventful perioperative course   Sign Out status: CV Support within EXPECTED Parameters     Disposition:   Sign Out in:  ICU  Disposition:  ICU  Recovery Course: Recovery in ICU  Follow-Up: Not required           Last Anesthesia Record Vitals:  CRNA VITALS  10/18/2019 0938 - 10/18/2019 1038      10/18/2019             Resp Rate (observed):  (!) 2          Last PACU Vitals:  Vitals Value Taken Time   BP     Temp 37.3  C (99.14  F) 10/18/2019  4:49 PM   Pulse     Resp     SpO2 96 % 10/18/2019  4:49 PM   Temp src     NIBP     Pulse     SpO2     Resp     Temp     Ht Rate     Temp 2     Vitals shown include unvalidated device data.      Electronically Signed By: Jt Brower MD, 2019, 4:50 PM

## 2019-10-18 NOTE — PLAN OF CARE
Admitted/transferred from: OR   Reason for admission/transfer: continuing care   2 RN skin assessment: completed by Gladys Barney & Star Ritter  Result of skin assessment and interventions/actions: noted L lip ulcer- woc consult placed. Sacral mepilex in place, no other skin issues noted. Sand bag placed on R groin for hematoma.  Height, weight, drug calc weight: done  Patient belongings: none    Upon arrival from OR O2 sats in mid 80s. Anesthesia performed recruitment maneuvers. Plan for CT or bronch. Bronched at bedside without issues. Peep up to 14 & 100% FiO2, weaning now as able. Went to IR for CT guided chest tube placement- aborted after CT results. Continues to cough up copious thick creamy and red streaked secretions. Temp up to 99.5 this evening- Dr. Valenzuela notified. Opening eyes to voice this evening but not following commands. Discussed no BM since prior to admission with Dr. Baltazar, suppository given. For full assessments and vital signs please see documentation flowsheets.

## 2019-10-18 NOTE — PROCEDURES
Procedure Date: 10/17/2019      EEG #-4 VIDEO EEG DAY #4.      DATE OF RECORDING/SERVICE DATE:  10/17/2019.      SOURCE FILE DURATION:  23 hours 11 minutes.      PATIENT INFORMATION:  A 53-year-old male who had an episode of seizure-like activity and became unresponsive.  When EMS arrived, the patient had no pulse and required cardiac resuscitation.  EEG is being done to evaluate for seizures.      TECHNICAL SUMMARY: This video EEG monitoring procedure was performed with 23 scalp electrodes in 10-20 system placements, and additional scalp, precordial and other surface electrodes used for electrical referencing and artifact detection. Video was reviewed intermittently by EEG technologist and physician for electroclinical seizures  On this day, the patient's temperature is 97 degrees.      MEDICATIONS:  Midazolam 2 mg an hour.  Propofol 50 mcg per hour.      EEG FINDINGS:  The patient has diffuse generalized delta slowing that is 2-4 Hz in frequency.  At times, there are pseudoperiodic generalized discharges that are 1-2 Hz in frequency, maximum in the bifrontal region.  A well-formed parietooccipital rhythm is not seen.  No parietooccipital rhythm, no sleep architecture, no sleep-wake distinction is noted.      ACTIVATION PROCEDURE:  With activation, patient's EEG is reactive.      ICTAL:  No electrographic seizures.  Nurse did press the event button at 15:43, due to change in EEG, but there was not a significant change at that time.  EEG continued to have diffuse generalized delta slowing with superimposed periodic pattern.      IMPRESSION:  Video EEG day 4 is abnormal due to presence of diffuse generalized delta slowing with a superimposed generalized periodic pattern.  At times, these discharges are sharply contoured, consistent with pseudoperiodic generalized epileptiform discharges.  These findings are suggestive of severe encephalopathy and diffuse cortical irritability.  The patient undergoing  hypothermia treatment on this day and on anesthetic drip medications.  Clinical correlation is advised.         DUSTY SCHWARTZ MD             D: 10/18/2019   T: 10/18/2019   MT: AIDEN      Name:     JF COLLAZO   MRN:      [id number]        Account:        [id number]   :      1965           Procedure Date: 10/17/2019      Document: [id number]

## 2019-10-18 NOTE — PROGRESS NOTES
ECMO Shift Summary:    Patient remains on VA ECMO, all equipment is functioning and alarms are appropriately set. RPM's 3250 with flow range 3.76-3.95 L/min. Sweep gas is at 1.5 LPM and FiO2 80%. Circuit remains free of visible air and clot.  Fibrin is seen at 3 and 9 o'clock on the post side of the oxygenator. Cannulas are secure with minimal bleeding from the sites. Extremities are warm to the touch.     Significant Shift Events:   n/a    Vent settings:  CMV 14/500/+10/40%.  BS coarse, diminished.  Suctioned for small thick tan, bloody-streaked secretions from pts ETT.    Heparin is running at 1600 unit(s)/hr (19.25 u/kg/hr), ACT range 134-142, goal 140-160.    Urine output is kathy and ~110 mls/hr.  CTs put out ~10 mls/hr of serosanguinous fluid. Product was not given.      Intake/Output Summary (Last 24 hours) at 10/18/2019 0622  Last data filed at 10/18/2019 0600  Gross per 24 hour   Intake 6209.56 ml   Output 4715 ml   Net 1494.56 ml       ECHO:  No results found for this or any previous visit.No results found for this or any previous visit.    CXR:  Recent Results (from the past 24 hour(s))   XR Chest Port 1 View    Narrative    Exam: XR CHEST PORT 1 VW, 10/18/2019 1:15 AM    Indication: Check endotracheal tube placement and ECLS cannula  placement. DO NOT log-roll patient.  Place film under patient using  patient safety handling process.    Comparison: 10/17/2019.    Findings:   AP views of the chest. ET tube tip 6.5 cm from the adiel. Enteric  tubes course midline with tips outside the field-of-view. IABP  superior marker at the midthoracic trachea just above level of adiel,  stable. ECMO cannula and right apical chest tube are again noted.  Stable low lung volumes. Layering right-sided pleural effusion with  increase haziness throughout the right lung. Diffuse mixed opacities,  right lung predominant are relatively stable. Small residual layering  left pleural effusion.   Left subclavian central venous  catheter tip  projects over mid SVC.        Impression    Impression:   1. Slightly increased layering right-sided pleural effusion with  increased hazy opacification of the right lung. Small layering left  pleural effusion.  2. Support devices are stable.    I have personally reviewed the examination and initial interpretation  and I agree with the findings.    SUKHJINDER SLATER MD       Labs:  Recent Labs   Lab 10/18/19  0543 10/18/19  0336 10/18/19  0149 10/17/19  2359   PH 7.46* 7.44 7.47* 7.46*   PCO2 48* 51* 47* 49*   PO2 104 83 98 104   HCO3 34* 35* 34* 35*   O2PER 40.0 40 40 40.0       Lab Results   Component Value Date    HGB 8.7 (L) 10/18/2019    PHGB 60 (H) 10/18/2019    PLT 73 (L) 10/18/2019    FIBR 701 (H) 10/18/2019    INR 1.12 10/18/2019    PTT 56 (H) 10/18/2019    DD >20.0 (H) 10/18/2019    AXA 0.21 10/18/2019    ANTCH 64 (L) 10/17/2019         Plan is to go to OR to decanulate today.      Mela Castro, RT  10/18/2019 6:22 AM

## 2019-10-18 NOTE — PLAN OF CARE
Sedated on versed drip and pain controlled with fentanyl drip, GCS E3VTM4, ELIO.  Continue EEG monitoring.  MAP >65mmHg.  Continue IABP 1:1 support and ECMO support.  HR 70-80 SR with occ PACs.  Ventilated on A/C mode with good SpO2.  D10 in progress, BS 120s.  Urine out adequate 100-130cc per hour.  Tube feeding in progress.  Afebrile.  Right chest tube to -30cm H2O suction 100cc of serosanguineous output overnight.  To OR for decannulation at 0730.

## 2019-10-18 NOTE — PROCEDURES
PROCEDURE NOTE - FLEXIBLE BRONCHOSCOPY  NAME: Joel Campbell   MRN: [id number]  : 1965     Date: 10/18/2019       Performed by: Lilly Banegas MD     Attending physician: Ismael Morales DO    Procedure: flexible bronchoscopy     Indications: respiratory failure    Findings: Copious thick secretions in right lung    Complications: none     Procedure:  A timeout was called to review the case and patient information. The patient was positioned supine. Bilateral tracheobronchial trees were inspected closely to the level of the subsegmental bronchi. Secretions were found to be thick, copious, tan, blood-streaked on the right. Secretions were lavaged and evacuated without difficulty. The procedure was completed and the patient tolerated the procedure well and without complications. Dr. Morales was available for assistance throughout the entire procedure.    Plan: Continues cares. Re-bronch PRN.    Lilly Banegas MD, PGY-3

## 2019-10-18 NOTE — PROGRESS NOTES
Social Work: Assessment with Discharge Plan    Patient Name:  Joel Campbell  :  1965  Age:  53 year old  MRN:  [id number]  Risk/Complexity Score:  Filed Complexity Screen Score: 7  Completed assessment with:  Pt's wife, chart review    Presenting Information   Reason for Referral:  Lodging needs and caregiver support  Date of Intake:  2019  Referral Source:  Family  Decision Maker:  Patient at baseline  Alternate Decision Maker:  Pt's wife Jackelyn  Health Care Directive:  No health care directive in chart, and pt not able to complete one at this time  Living Situation:  House  Previous Functional Status:  Independent  Patient and family understanding of hospitalization:  Wife appears to have a solid understanding of pt's condition and plan of care  Cultural/Language/Spiritual Considerations:  None identified  Adjustment to Illness:  Wife appropriately concerned about pt's serious condition    Physical Health  Reason for Admission:    1. Ventricular tachycardia (H)    2. Cardiac arrest (H)    3. Cardiac arrest (H)      Services Needed/Recommended:  Other:  TBD    Mental Health/Chemical Dependency  Diagnosis:  Long-term tobacco use, none other identified  Support/Services in Place:  N/A  Services Needed/Recommended:  N/A    Support System  Significant relationship at present time:  Spouse Jackelyn  Family of origin is available for support:  Yes  Other support available:  Pt has 2 adult daughters. One is supportive, the other he has a strained relationship with.  Gaps in support system:  None identified  Patient is caregiver to:  None     Provider Information   Primary Care Physician:  No primary care provider on file.   None   Clinic:  No primary physician on file.      :  N/A    Financial   Income Source:  Employment  Financial Concerns:  None identified  Insurance:    Payor/Plan Subscriber Name Rel Member # Group #   [employer]CIAL - OTHER JACKELYN WALKER  [id number]       [id number], [place] [address]       Discharge Plan   Patient and family discharge goal:  TBD  Provided education on discharge plan:  NO  Patient agreeable to discharge plan:  NO  A list of Medicare Certified Facilities was provided to the patient and/or family to encourage patient choice. Patient's choices for facility are:  Not appropriate for discharge discussion at this time  Will NH provide Skilled rehabilitation or complex medical:  NO  General information regarding anticipated insurance coverage and possible out of pocket cost was discussed. Patient and patient's family are aware patient may incur the cost of transportation to the facility, pending insurance payment: NO  Barriers to discharge:  Pt remains critically ill    Discharge Recommendations   Anticipated Disposition:  TBD  Transportation Needs:  Other:  TBD  Name of Transportation Company and Phone:  N/A    Additional comments   SW provided letter of hospitalization to wife at bedside. Wife denied additional concerns at this time and knows how to reach SW as needed. SW will continue to remain available for patient and family support, discharge planning, other resources and support PRN.    JERRY Anderson, [facility]  ICU    P: [phone]  Pager: [phone]

## 2019-10-18 NOTE — ANESTHESIA PROCEDURE NOTES
KAVON Probe Insertion Note:    Inserted by:  Jt Brower MD (Responsible Anesthesiologist)  Probe Number: 14  Probe Status PRE Insertion: NO obvious damage  Probe type:  Adult 3D    Bite block used:   Yes  Insertion Technique: Easy, no oropharyngeal manipulation  Insertion complications: None obvious    Billing Report:KAVON report by Anesthesiologist (See Separate Report note)    Probe Status POST Removal: NO obvious damage

## 2019-10-18 NOTE — PROGRESS NOTES
Pt to IR for CT guided chest tube placement.  After CT done, no chest tube placed, see MD note.  Pt back to 4E.

## 2019-10-18 NOTE — ANESTHESIA PROCEDURE NOTES
Perioperative KAVON Report  Anesthesia Information  KAVON probe placed and report generated by:   Esteban Oropeza MD in the presence of a teaching physician :  Jt Brower MD    I personally reviewed the images and the fellow/resident interpretation.  I agree with the fellow/resident interpretation as amended by myself. Jt Brower MD  Images for this study have been archived.   Surgeon:  Max Brown MD      Preanesthesia Checklist:  Patient identified, IV assessed, risks and benefits discussed, monitors and equipment assessed, procedure being performed at surgeon's request and anesthesia consent obtained.  General Procedure Information  Modalities:  3D, 2D and CW Doppler    ECMO turn down with decannulation.   Echocardiographic and Doppler Measurements      Other Ventricular Findings:  EF's described with turn down below. FS, FAC, and Farnsworth's EF were all used to gather data.   Ventricular Regional Function:  1- Basal Anteroseptal:  akinetic  2- Basal Anterior:  hypokinetic  3- Basal Anterolateral:  hypokinetic  4- Basal Inferolateral:  hypokinetic  5- Basal Inferior:  akinetic  6- Basal Inferoseptal:  akinetic  7- Mid Anteroseptal:  akinetic  8- Mid Anterior:  hypokinetic  9- Mid Anterolateral:  hypokinetic  10- Mid Inferolateral:  hypokinetic  11- Mid Inferior:  akinetic  12- Mid Inferoseptal:  akinetic  13- Apical Anterior:  hypokinetic  14- Apical Lateral:  hypokinetic  15- Apical Inferior:  hypokinetic  16- Apical Septal:  akinetic  17- [place]:  akinetic    Valves  Aortic Valve: Annulus normal.  Stenosis not present.  Regurgitation absent.  Leaflets normal.  Leaflet motions normal.    Mitral Valve: Annulus normal.  Stenosis not present.  Regurgitation +2.  Leaflets normal.  Leaflet motions normal.    Tricuspid Valve: Annulus normal.  Stenosis not present.  Regurgitation absent.  Leaflets normal.  Leaflet motions normal.    Pulmonic Valve: Annulus normal.  Stenosis not present.   Regurgitation absent.      Aorta: Ascending Aorta: Size normal.  Dissection not present.  Plaque thickness less than 3 mm.  Mobile plaque not present.    Aortic Arch: Size normal.   Dissection not present.   Plaque thickness less than 3 mm.   Mobile plaque not present.    Descending Aorta: Size normal.   Dissection not present.   Plaque thickness less than 3 mm.   Mobile plaque not present.        Right Atrium:  Size normal.   Spontaneous echo contrast not present.   Thrombus not present.   Tumor not present.   Device not present.     Left Atrium: Size normal.  Spontaneous echo contrast not present.  Thrombus not present.  Tumor not present.  Device not present.    Left atrial appendage normal.     Atrial Septum: Intra-atrial septal morphology normal.     Ventricular Septum: Intra-ventricular septum morphology normal.       Other Findings:   Pericardium:  pericardial effusion.  . .  Pulmonary Venous Flow:  normal.  No coronary sinus catheter present.  .  .  Post Intervention Findings  . Global function:  Improved (With decreasing flows and epi. ).   Regional wall motion:  Improved   Surgeon(s) notified of all postintervention findings:  Yes  .  .  .   .  .  .  .  .  .  .    ECMO flows started at 3.5LPM with EF 10-20%, at 2LPM EF improved to 25% roughly, then down to 1 LPM patients EF improved to 30-35%. Epi at 0.05mcg/kg/min until 1 LPM and then decreased to 0.03 mcg/kg/min with echo findings stable and consistent. All images saved to PACS.     Echocardiogram Comments

## 2019-10-18 NOTE — PROGRESS NOTES
Preliminary Video EEG impression on October 17-18, 2019 until 6am    Full report to follow. Please look in inpatient chart, under procedure section.     This video EEG is abnormal due to the presences of continuous generalized polymorphic delta/theta slowing. Continues to have pseudo-periodic generalized epileptiform discharges that are up to 2.5 Hz in frequency with maximum negativity in the left and right parietal region and at times bifrontal region.  The generalized periodic epileptiform discharges that have occupied nearly 25-50% of the record.  Overnight there is less than 25% of the record that has low voltage generalized epileptiform discharges.  In many segments overnight this pattern appears to be more of a sharply contoured delta discharge. In the last 24 hours the EEG has improved.      Event button was pressed by nurse at 15: 43 10/17.  During this time patient had diffuse generalized delta theta slowing and an electrographic seizure was not seen.      EEG is consistent with diffuse cortical irritability in the setting of severe encephalopathy encephalopathy.  If events of concern are noted, please press the event button. Clinical correlation is advised.     Viri Lala MD  Staff Epilepsy Neurologist   [phone]

## 2019-10-18 NOTE — BRIEF OP NOTE
[facility], [place]    Brief Operative Note    Pre-operative diagnosis: Ventricular tachycardia (H) [I47.2]  Cardiac arrest (H) [I46.9]  Post-operative diagnosis Same as pre-operative diagnosis    Procedure: Procedure(s):  Extracorporeal Membrane Oxygenator Decannulation, Repair of Right Femoral Artery,  Attempted right thoracostomy tube placement.  Surgeon: Surgeon(s) and Role:     * Max Brown MD - Primary     * Mike Santamaria MD - Fellow - Assisting  Anesthesia: General   Estimated blood loss: Less than 50 ml  Drains: Same as preop  Specimens: * No specimens in log *  Findings:   Peripheral VA ECMO successfully decannulated. Attempted right chest tube, lung densely adherent to chest wall, unable to place tube.  Complications: None.  Implants: * No implants in log *

## 2019-10-18 NOTE — PROGRESS NOTES
Cardiology Critical Care Note - Cardiology  Anshu Roberts M.D.  The patient remains unstable in the ICU with on-going need for ventilator support, parenteral medications for the adjustment of blood pressure and cardiac output and maintenance of renal function.      The patient is seen for prolonged ventilator management requiring oxygen and/or pressure modulation; low cardiac output necessitating afterload reducing agents; IABP. sepsis requiring antibiotic selection and monitoring,  fluid management to maintain blood pressure and secondary organ function. Thoracic wall bleeding and right sided hemothorax: draining threw IR today. GI bleed : contained.    I personally reviewed:  Arterial and venous blood gases to assess acid base balance, oxygenation, and ventilator settings.    Hemodynamic parameters were assessed such as  RAP, estimated LVEDP cardiac output and vascular resistances in order to adjust fluids and infused medications for blood pressure and cardiac output maintenance.  Ventilatory settings and/or supplement oxygen needs in order to obtain optimal oxygenation and electrolyte balance at low possible pressure support and inspired oxygen tension

## 2019-10-18 NOTE — PROGRESS NOTES
Continuous EEG Monitoring  CPT Code 52886  [facility]/VEEG-hyg break/OR  MD: Spike    Date Continuous EEG monitoring initiated 10/14/19  Hair braided: no male; short hair  Leads O1 and O2 changed:hyg break  Optifoam around O1 and O2: reapplied after hyg break  Skin breakdown/concerns: small sore on Fp1; red on T3 (left temporal)  Asked about continuing EEG monitoring past Day 5(Day 3):n/a  Due for hygiene break (day 5): done today  Removed electrodes: removed for hyg break

## 2019-10-18 NOTE — CONSULTS
Patient is on IR schedule 10/18/2019 for a Right chest tube placement for pleural effusion .   Labs WNL for procedure.    No NPO required.   Consent will be done prior to procedure.Needs right-sided chest tube placed, has one along right upper lung, but persistent effusion at lower lung ECMO decannulated  Please contact the IR [place] RN at [phone] for estimated time of procedure.     Discussed with Dr. Carole Johnson IR RPA  [phone] Call pager  [phone] pager

## 2019-10-18 NOTE — PROGRESS NOTES
[facility]   Cardiology Progress Note    Assessment & Plan   Joel Campbell is a 53 year old male who was admitted on 10/13/19 as a transfer for an OSH for refractory VT/VF arrest s/p PCI to LAD and placement on peripheral VA ECMO.     Changes Today:  - ECMO decannulation today  - Another right-sided chest tube will be placed intra-op  - Diuresis as able  - Wean sedation as able  - Wean ventilator as able  - Monitor for s/s of bleeding  - ACT goal 140-160 while on ECMO     Neurology: Intubated, sedated, paralyzed. Cooled to 34 degrees, now rewarmed. Grimacing to pain and blinking eyes on command intermittently.  - NeuroCrit consulted  - EEG with small amount of epileptiform activity  - Loaded and started keppra 750 mg BID on 10/17  - Continue versed gtt and fent gtt, wean as able  - CT Head 10/16 with smal right paracentral lesion, new right hemicerebellum lesion concerning for stroke.   Cardiovascular / Hemodynamics: Refractory VF arrest s/p peripheral V-A ECMO at OSH on 10/13/19.  TTE: LVEF 5-10%, increase to 15% on 1L  - Wean pressors/inotropes as able, currently not on any  - Decannulation today, will continue IABP for now  - Continue ASA 81mg and ticagrelor 90 mg BID  - Held temp at 34 degrees for 24 hours, now rewarmed  - ACT goal 140-160  - Hold lipitor for now given likely hepatic injury during arrest  - Hold ACE/ARB for now given likely reduced renal fxn after arrest  - Holding beta blocker given shock    Pulmonary: Now weaning vent requirements. Large hemothorax on 10/14 s/p right-sided chest tube.  CXR: stable devices, worsening RLL pleural effusion  - Wean vent as able  - Will have another chest tube placed during decannulation on 10/18  - Monitor CT output  - Daily CXR  - Q2H ABGs for now  - Consider scheduled duonebs if signs of lung dz, currently PRN     GI and Nutrition: Per Pt's wife, he is an alcoholic. Concern for possible GIB given black OG output, improved.  - Monitor BID LFTs  - NJ placed at bedside  on 10/16, started TFs  - Bowel regimen - started  - GI Prophylaxis: PPI IV BID   Renal, Fluid and Electrolytes: - Monitor urine output  - Maintain K>3 and Mg>2    Infectious Disease: No signs of infection. Leukocytosis c/w arrest. Blood cultures collected.   - Vancomycin/zosyn x5 days for presumed aspiration  - Daily blood cultures  - Monitor for signs of infection given cooling, lines, and leukocytosis   Hematology and Oncology: Receiving heparin for ECMO and ASA/ticagrelor for KAMRON. Large intramuscular hematoma of right pec and hemothorax on 10/14 with possible GIB as above.  - Cryo PRN fibrinogen < 200; FFP for INR >2  - Transfuse for Hgb<10, Plts<70  - Heparin gtt for ECMO with ACT goal 140-160 given bleeding concerns as above  - US LE w/ arterial duplex per ECMO protocol   - DVT PPX: Heparin as above  - Right chest wall hematoma: monitor exam and Hgb closely   Endocrinology: No known medical history. BG elevated.  - Insulin gtt PRN   Lines: Restraint: needed    Current lines are required for patient management       Family updated today: Yes  Code Status: FULL    Pt was seen and examined with Dr. Roberts, who agrees with the assessment and plan.    Paradise Villareal MD  Cardiology Fellow      Interval History    NAEO. Turndown yesterday with good pulsatility. Decannulation planned for this AM.     ROS: Unable to obtain as Pt is intubated and sedated.    Data reviewed today: I reviewed all new labs and imaging results over the last 24 hours. I personally reviewed:    Physical Exam   Temp: 98.1  F (36.7  C) Temp src: Bladder     Heart Rate: 68 Resp: 14 SpO2: 99 % O2 Device: Mechanical Ventilator    Vitals:    10/16/19 0000 10/17/19 0000 10/18/19 0400   Weight: 110.2 kg (242 lb 15.2 oz) 104 kg (229 lb 4.5 oz) 104.9 kg (231 lb 4.2 oz)     Vital Signs with Ranges  Temp:  [94.5  F (34.7  C)-98.6  F (37  C)] 98.1  F (36.7  C)  Heart Rate:  [] 68  Resp:  [14-16] 14  MAP:  [67 mmHg-129 mmHg] 95  "mmHg  Arterial Line BP: ()/() 124/63  FiO2 (%):  [40 %-50 %] 40 %  SpO2:  [95 %-100 %] 99 %  I/O last 3 completed shifts:  In: 6036.44 [I.V.:4296.44; NG/GT:330]  Out: 7980 [Urine:6450; Emesis/NG output:1400; Chest Tube:130]    Heart Rate: 68, Temperature 98.1  F (36.7  C), resp. rate 14, height 1.89 m (6' 2.41\"), weight 104.9 kg (231 lb 4.2 oz), SpO2 99 %.  231 lbs 4.2 oz  GEN:  Intubated, sedated  CV:  RRR, IABP augmentation  LUNGS: Mechanical breath sounds, diminished along right side, right-sided chest tube in place  ABD: Soft BS, soft, mildly distended  EXT: warm, trace-1+ edema, dopplerable pulses  SKIN: Large hematoma along chest wall    Medications     amiodarone 0.5 mg/min (10/18/19 0600)     BETA BLOCKER NOT PRESCRIBED       IV fluid REPLACEMENT ONLY       dextrose 50 mL/hr at 10/18/19 0700     EPINEPHrine IV infusion ADULT       fentaNYL 50 mcg/hr (10/18/19 0600)     heparin 2 unit/mL in 0.9% NaCl 3 mL/hr (10/18/19 0600)     HEParin 1,600 Units/hr (10/18/19 0600)     midazolam 3 mg/hr (10/18/19 0600)     norepinephrine       sodium chloride         aspirin  81 mg Per Feeding Tube Daily     ceFAZolin  1 g Intravenous See Admin Instructions     ceFAZolin  2 g Intravenous Pre-Op/Pre-procedure x 1 dose     folic acid  1 mg Oral Daily     insulin aspart  1-4 Units Subcutaneous Q4H     levETIRAcetam  750 mg Intravenous Q12H     multivitamins w/minerals  15 mL Per Feeding Tube Daily     pantoprazole (PROTONIX) IV  40 mg Intravenous Q12H     piperacillin-tazobactam  3.375 g Intravenous Q6H     polyethylene glycol  17 g Oral Daily     protein modular  1 packet Per Feeding Tube Daily     senna-docusate  2 tablet Oral BID     sodium chloride (PF)  3 mL Intracatheter Q8H     ticagrelor  90 mg Oral or Feeding Tube BID     vancomycin (VANCOCIN) IV  1,500 mg Intravenous Q24H     vitamin B1  100 mg Oral Daily       Data   Recent Labs   Lab 10/18/19  0336 10/17/19  4259 10/17/19  2121 10/17/19  1537   WBC 5.8  " --  5.9 5.5   HGB 8.7*  --  8.9* 8.6*   MCV 96  --  96 95   PLT 73*  --  79* 77*   INR 1.12  --  1.11 1.12     --  140 139   POTASSIUM 3.6  --  3.6 3.1*   CHLORIDE 102  --  100 98   CO2 32  --  31 34*   BUN 22  --  21 22   CR 0.89  --  1.00 0.95   ANIONGAP 6  --  8 6   HEATHER 8.7  --  8.8 8.6   *  121* 119* 113* 103*  107*   ALBUMIN 3.2*  --  3.3* 3.4   PROTTOTAL 6.5*  --  6.7* 6.5*   BILITOTAL 3.7*  --  4.2* 4.1*   ALKPHOS 115  --  110 78   ALT 45  --  45 47   *  --  203* 203*   TROPI 53.750*  --  56.087* 60.221*       Recent Results (from the past 24 hour(s))   XR Chest Port 1 View    Narrative    Exam: XR CHEST PORT 1 VW, 10/18/2019 1:15 AM    Indication: Check endotracheal tube placement and ECLS cannula  placement. DO NOT log-roll patient.  Place film under patient using  patient safety handling process.    Comparison: 10/17/2019.    Findings:   AP views of the chest. ET tube tip 6.5 cm from the adiel. Enteric  tubes course midline with tips outside the field-of-view. IABP  superior marker at the midthoracic trachea just above level of adiel,  stable. ECMO cannula and right apical chest tube are again noted.  Stable low lung volumes. Layering right-sided pleural effusion with  increase haziness throughout the right lung. Diffuse mixed opacities,  right lung predominant are relatively stable. Small residual layering  left pleural effusion.   Left subclavian central venous catheter tip  projects over mid SVC.        Impression    Impression:   1. Slightly increased layering right-sided pleural effusion with  increased hazy opacification of the right lung. Small layering left  pleural effusion.  2. Support devices are stable.    I have personally reviewed the examination and initial interpretation  and I agree with the findings.    SUKHJINDER SLATER MD     I have seen and examined the patient with the [facility] team. I agree with the assessment and plan of the note above.I have reviewed pertinent labs.      Anshu Roberts MD  Interventional Cardiology  Pager: [phone]

## 2019-10-18 NOTE — PROGRESS NOTES
PERFUSION ECMO DECANNULATION  Patient off ECMO at 0930.. Circuit blood volume back flushed to patient with 600ml Plasmalyte.  See pump sheet for labs and vitals.

## 2019-10-18 NOTE — OP NOTE
OPERATIVE DATE: 10/18/2019    PRE-OPERATIVE DIAGNOSIS:  1) VT/VF cardiac arrest  2) Peripheral veno-arterial ecmo  3) Right hemothorax  4) Coronary artery disease s/p PCI/KAMRON to LAD    POST-OPERATIVE DIAGNOSIS:  1) VT/VF cardiac arrest  2) Peripheral veno-arterial ecmo  3) Right hemothorax  4) Coronary artery disease s/p PCI/KAMRON to LAD    PROCEDURE:  1) Transesophageal echo by anesthesia  2) ECMO turndown study  3) peripheral ECMO decannulation  4) primary repair of right common femoral artery  5) Attempted right tube thoracostomy     SURGEON: Max Brown MD    FELLOW SURGEON: Mike Santamaria MD    ANESTHESIA: GETA    ESTIMATED BLOOD LOSS: 50 mL    INDICATIONS:  Mr. JF COLLAZO is a 53 year old male admitted with VT/VF arrest and was cannulated for peripheral veno-arterial ECMO. I was asked to evaluate for ECMO decannulation.  Risks and benefits of the operation were explained to their family including, but not limited to, bleeding, infection, stroke and even death.  They understood these risks and agreed to proceed electively.    OPERATIVE REPORT:  The patient was transferred to the operating room and positioned supine on the OR table.  General anesthesia was initiated by the anesthesia team.  Endotracheal intubation and IV access was already in place. The patients chest, abdomen and bilateral lower extremities were clipped, prepped and draped in sterile fashion.  A pre-procedure time-out was performed confirming the correct patient, correct site and correct procedure.    A transesophageal echocardiogram was performed.  The patient was given 5000 units IV heparin.  Flow on the circuit was decreased to 1L.  An ABG after 5 minutes was stable.    A longitudinal skin incision was made in the right groin crease.  The femoral artery was isolated.  Vessel loops were placed around the femoral artery proximal and distal to the access site. Flow on the circuit was stopped.  The venous cannula was removed from the left  groin after placing a 0 Ethibond pursestring.  The arterial cannula was removed.  The site was repaired with interrupted 5-0 prolene stitches.    The wound was made hemostatic.  The incision was closed in layers using vicryl stitches. A Proveena skin vac was applied.       We made a 2 cm incision in the right chest at the level of the 5th rib in the anterior axillary line. The intercostal space was dissected and the pleura entered. The lung was frozen to the chest wall at this site and a chest tube was unable to be inserted. The wound was closed in layers with absorbable sutures.     The patient was then transferred from the operating bed to an ICU bed and transferred to the ICU in critical, but stable, condition.    All needle, sponge and instrument counts were correct at the end of the case.    Max Brown MD  Cardiothoracic Surgery Staff  [phone]

## 2019-10-18 NOTE — PROGRESS NOTES
"Bronchoscopy Risk Assessment Guidelines      A. Patient symptoms to consider when assessing pulmonary TB risk are:    I. Cough greater than 3 weeks; and fever, hemoptysis, pleuritic chest    pain, weight loss greater than 10 lbs, night sweats, fatigue, infiltrates on    upper lobes or superior segments of lower lobes, cavitation on chest    x-ray.   B. Patient risk factors to consider when assessing pulmonary TB risk are:    I. Exposure to known TB case, foreign-born persons (within 5 years of    arrival to [place]), residence in a crowded setting (correctional facility,     long-term care center, etc.), persons with HIV or immunosuppression.    Patients with symptoms and risk factors should generally be considered \"suspect risk\" and bronchoscopies should be performed in airborne precautions.    This patient has NO KNOWN RISK of Tuberculosis (proceed with bronchoscopy)    Specimens sent: yes  Complications: None  Scope used: #[id number]  Slim  Attending Physician: Dr. Andrew Durand, RT on 10/18/2019 at 12:47 PM  "

## 2019-10-18 NOTE — ANESTHESIA CARE TRANSFER NOTE
Patient: Joel Campbell    Procedure(s):  Extracorporeal Membrane Oxygenator Decannulation, Repair of Right Femoral Artery,  Attempted right thoracostomy tube placement.    Diagnosis: Ventricular tachycardia (H) [I47.2]  Cardiac arrest (H) [I46.9]  Diagnosis Additional Information: No value filed.    Anesthesia Type:   General     Note:  Airway :ETT  Patient transferred to:ICU  Comments: The patient was transported to the icu intubated and sedated. No adverse anesthesia events. Bedside report was given. ICU Handoff: Call for PAUSE to initiate/utilize ICU HANDOFF, Identified Patient, Identified Responsible Provider, Reviewed the Pertinent Medical History, Discussed Surgical Course, Reviewed Intra-OP Anesthesia Management and Issues during Anesthesia, Set Expectations for Post Procedure Period and Allowed Opportunity for Questions and Acknowledgement of Understanding      Vitals: (Last set prior to Anesthesia Care Transfer)    CRNA VITALS  10/18/2019 0938 - 10/18/2019 1038      10/18/2019             Resp Rate (observed):  (!) 2                Electronically Signed By: Peyton Triplett MD  October 18, 2019  10:42 AM

## 2019-10-19 ENCOUNTER — APPOINTMENT (OUTPATIENT)
Dept: GENERAL RADIOLOGY | Facility: CLINIC | Age: 54
DRG: 003 | End: 2019-10-19
Attending: STUDENT IN AN ORGANIZED HEALTH CARE EDUCATION/TRAINING PROGRAM
Payer: COMMERCIAL

## 2019-10-19 ENCOUNTER — ALLIED HEALTH/NURSE VISIT (OUTPATIENT)
Dept: NEUROLOGY | Facility: CLINIC | Age: 54
DRG: 003 | End: 2019-10-19
Attending: PSYCHIATRY & NEUROLOGY
Payer: COMMERCIAL

## 2019-10-19 DIAGNOSIS — I46.9 CARDIAC ARREST (H): Primary | ICD-10-CM

## 2019-10-19 LAB
ALBUMIN SERPL-MCNC: 2.9 G/DL (ref 3.4–5)
ALBUMIN SERPL-MCNC: 3.1 G/DL (ref 3.4–5)
ALP SERPL-CCNC: 144 U/L (ref 40–150)
ALP SERPL-CCNC: 146 U/L (ref 40–150)
ALT SERPL W P-5'-P-CCNC: 41 U/L (ref 0–70)
ALT SERPL W P-5'-P-CCNC: 44 U/L (ref 0–70)
ANION GAP SERPL CALCULATED.3IONS-SCNC: 5 MMOL/L (ref 3–14)
ANION GAP SERPL CALCULATED.3IONS-SCNC: 5 MMOL/L (ref 3–14)
APPEARANCE FLD: NORMAL
AST SERPL W P-5'-P-CCNC: 115 U/L (ref 0–45)
AST SERPL W P-5'-P-CCNC: 138 U/L (ref 0–45)
BACTERIA SPEC CULT: NO GROWTH
BASE EXCESS BLDA CALC-SCNC: 4.8 MMOL/L
BASE EXCESS BLDA CALC-SCNC: 4.9 MMOL/L
BASE EXCESS BLDA CALC-SCNC: 5.5 MMOL/L
BASE EXCESS BLDA CALC-SCNC: 5.8 MMOL/L
BASE EXCESS BLDA CALC-SCNC: 6.9 MMOL/L
BILIRUB SERPL-MCNC: 2.7 MG/DL (ref 0.2–1.3)
BILIRUB SERPL-MCNC: 3 MG/DL (ref 0.2–1.3)
BUN SERPL-MCNC: 20 MG/DL (ref 7–30)
BUN SERPL-MCNC: 22 MG/DL (ref 7–30)
CA-I BLD-MCNC: 4.5 MG/DL (ref 4.4–5.2)
CA-I BLD-MCNC: 4.6 MG/DL (ref 4.4–5.2)
CALCIUM SERPL-MCNC: 8.3 MG/DL (ref 8.5–10.1)
CALCIUM SERPL-MCNC: 8.4 MG/DL (ref 8.5–10.1)
CHLORIDE SERPL-SCNC: 104 MMOL/L (ref 94–109)
CHLORIDE SERPL-SCNC: 105 MMOL/L (ref 94–109)
CO2 SERPL-SCNC: 30 MMOL/L (ref 20–32)
CO2 SERPL-SCNC: 31 MMOL/L (ref 20–32)
COLOR FLD: NORMAL
CREAT SERPL-MCNC: 0.62 MG/DL (ref 0.66–1.25)
CREAT SERPL-MCNC: 0.77 MG/DL (ref 0.66–1.25)
ERYTHROCYTE [DISTWIDTH] IN BLOOD BY AUTOMATED COUNT: 16.6 % (ref 10–15)
ERYTHROCYTE [DISTWIDTH] IN BLOOD BY AUTOMATED COUNT: 16.9 % (ref 10–15)
GFR SERPL CREATININE-BSD FRML MDRD: >90 ML/MIN/{1.73_M2}
GFR SERPL CREATININE-BSD FRML MDRD: >90 ML/MIN/{1.73_M2}
GLUCOSE BLDC GLUCOMTR-MCNC: 108 MG/DL (ref 70–99)
GLUCOSE BLDC GLUCOMTR-MCNC: 111 MG/DL (ref 70–99)
GLUCOSE BLDC GLUCOMTR-MCNC: 112 MG/DL (ref 70–99)
GLUCOSE BLDC GLUCOMTR-MCNC: 113 MG/DL (ref 70–99)
GLUCOSE BLDC GLUCOMTR-MCNC: 117 MG/DL (ref 70–99)
GLUCOSE BLDC GLUCOMTR-MCNC: 129 MG/DL (ref 70–99)
GLUCOSE SERPL-MCNC: 106 MG/DL (ref 70–99)
GLUCOSE SERPL-MCNC: 114 MG/DL (ref 70–99)
GRAM STN SPEC: NORMAL
GRAM STN SPEC: NORMAL
HCO3 BLD-SCNC: 30 MMOL/L (ref 21–28)
HCO3 BLD-SCNC: 30 MMOL/L (ref 21–28)
HCO3 BLD-SCNC: 31 MMOL/L (ref 21–28)
HCO3 BLD-SCNC: 31 MMOL/L (ref 21–28)
HCO3 BLD-SCNC: 32 MMOL/L (ref 21–28)
HCT VFR BLD AUTO: 26.8 % (ref 40–53)
HCT VFR BLD AUTO: 27.4 % (ref 40–53)
HGB BLD-MCNC: 8.4 G/DL (ref 13.3–17.7)
HGB BLD-MCNC: 8.5 G/DL (ref 13.3–17.7)
INR PPP: 1.12 (ref 0.86–1.14)
LACTATE BLD-SCNC: 1.2 MMOL/L (ref 0.7–2)
LACTATE BLD-SCNC: 1.3 MMOL/L (ref 0.7–2)
LDH SERPL L TO P-CCNC: 565 U/L (ref 85–227)
LMWH PPP CHRO-ACNC: <0.1 IU/ML
LYMPHOCYTES NFR FLD MANUAL: 5 %
MAGNESIUM SERPL-MCNC: 2.6 MG/DL (ref 1.6–2.3)
MAGNESIUM SERPL-MCNC: 2.6 MG/DL (ref 1.6–2.3)
MCH RBC QN AUTO: 30.4 PG (ref 26.5–33)
MCH RBC QN AUTO: 30.5 PG (ref 26.5–33)
MCHC RBC AUTO-ENTMCNC: 31 G/DL (ref 31.5–36.5)
MCHC RBC AUTO-ENTMCNC: 31.3 G/DL (ref 31.5–36.5)
MCV RBC AUTO: 97 FL (ref 78–100)
MCV RBC AUTO: 98 FL (ref 78–100)
NEUTS BAND NFR FLD MANUAL: 78 %
O2/TOTAL GAS SETTING VFR VENT: 60 %
O2/TOTAL GAS SETTING VFR VENT: ABNORMAL %
OTHER CELLS FLD MANUAL: 17 %
OXYHGB MFR BLD: 96 % (ref 92–100)
OXYHGB MFR BLD: 97 % (ref 92–100)
OXYHGB MFR BLD: 98 % (ref 92–100)
PCO2 BLD: 48 MM HG (ref 35–45)
PCO2 BLD: 48 MM HG (ref 35–45)
PCO2 BLD: 49 MM HG (ref 35–45)
PCO2 BLD: 49 MM HG (ref 35–45)
PCO2 BLD: 51 MM HG (ref 35–45)
PH BLD: 7.4 PH (ref 7.35–7.45)
PH BLD: 7.41 PH (ref 7.35–7.45)
PH BLD: 7.44 PH (ref 7.35–7.45)
PHOSPHATE SERPL-MCNC: 2.4 MG/DL (ref 2.5–4.5)
PHOSPHATE SERPL-MCNC: 3.2 MG/DL (ref 2.5–4.5)
PLATELET # BLD AUTO: 78 10E9/L (ref 150–450)
PLATELET # BLD AUTO: 85 10E9/L (ref 150–450)
PO2 BLD: 102 MM HG (ref 80–105)
PO2 BLD: 112 MM HG (ref 80–105)
PO2 BLD: 117 MM HG (ref 80–105)
PO2 BLD: 142 MM HG (ref 80–105)
PO2 BLD: 94 MM HG (ref 80–105)
POTASSIUM SERPL-SCNC: 3.8 MMOL/L (ref 3.4–5.3)
POTASSIUM SERPL-SCNC: 3.9 MMOL/L (ref 3.4–5.3)
PROT SERPL-MCNC: 6.4 G/DL (ref 6.8–8.8)
PROT SERPL-MCNC: 6.4 G/DL (ref 6.8–8.8)
RBC # BLD AUTO: 2.76 10E12/L (ref 4.4–5.9)
RBC # BLD AUTO: 2.79 10E12/L (ref 4.4–5.9)
SODIUM SERPL-SCNC: 140 MMOL/L (ref 133–144)
SODIUM SERPL-SCNC: 140 MMOL/L (ref 133–144)
SPECIMEN SOURCE FLD: NORMAL
SPECIMEN SOURCE: NORMAL
SPECIMEN SOURCE: NORMAL
TROPONIN I SERPL-MCNC: 16.85 UG/L (ref 0–0.04)
TROPONIN I SERPL-MCNC: 25.44 UG/L (ref 0–0.04)
VANCOMYCIN SERPL-MCNC: 11.5 MG/L
WBC # BLD AUTO: 6.7 10E9/L (ref 4–11)
WBC # BLD AUTO: 7.7 10E9/L (ref 4–11)
WBC # FLD AUTO: 1420 /UL

## 2019-10-19 PROCEDURE — 27210429 ZZH NUTRITION PRODUCT INTERMEDIATE LITER

## 2019-10-19 PROCEDURE — 25000128 H RX IP 250 OP 636: Performed by: STUDENT IN AN ORGANIZED HEALTH CARE EDUCATION/TRAINING PROGRAM

## 2019-10-19 PROCEDURE — 84484 ASSAY OF TROPONIN QUANT: CPT | Performed by: STUDENT IN AN ORGANIZED HEALTH CARE EDUCATION/TRAINING PROGRAM

## 2019-10-19 PROCEDURE — 95951 ZZHC EEG VIDEO EACH 24 HR: CPT | Mod: ZF

## 2019-10-19 PROCEDURE — 0BJ08ZZ INSPECTION OF TRACHEOBRONCHIAL TREE, VIA NATURAL OR ARTIFICIAL OPENING ENDOSCOPIC: ICD-10-PCS | Performed by: INTERNAL MEDICINE

## 2019-10-19 PROCEDURE — 82805 BLOOD GASES W/O2 SATURATION: CPT | Performed by: INTERNAL MEDICINE

## 2019-10-19 PROCEDURE — 00000146 ZZHCL STATISTIC GLUCOSE BY METER IP

## 2019-10-19 PROCEDURE — 93005 ELECTROCARDIOGRAM TRACING: CPT

## 2019-10-19 PROCEDURE — 85027 COMPLETE CBC AUTOMATED: CPT | Performed by: INTERNAL MEDICINE

## 2019-10-19 PROCEDURE — 82330 ASSAY OF CALCIUM: CPT | Performed by: STUDENT IN AN ORGANIZED HEALTH CARE EDUCATION/TRAINING PROGRAM

## 2019-10-19 PROCEDURE — 71045 X-RAY EXAM CHEST 1 VIEW: CPT

## 2019-10-19 PROCEDURE — 85610 PROTHROMBIN TIME: CPT | Performed by: STUDENT IN AN ORGANIZED HEALTH CARE EDUCATION/TRAINING PROGRAM

## 2019-10-19 PROCEDURE — 36415 COLL VENOUS BLD VENIPUNCTURE: CPT | Performed by: STUDENT IN AN ORGANIZED HEALTH CARE EDUCATION/TRAINING PROGRAM

## 2019-10-19 PROCEDURE — 87205 SMEAR GRAM STAIN: CPT | Performed by: INTERNAL MEDICINE

## 2019-10-19 PROCEDURE — 83605 ASSAY OF LACTIC ACID: CPT | Performed by: INTERNAL MEDICINE

## 2019-10-19 PROCEDURE — 40000275 ZZH STATISTIC RCP TIME EA 10 MIN

## 2019-10-19 PROCEDURE — 83615 LACTATE (LD) (LDH) ENZYME: CPT | Performed by: STUDENT IN AN ORGANIZED HEALTH CARE EDUCATION/TRAINING PROGRAM

## 2019-10-19 PROCEDURE — 80202 ASSAY OF VANCOMYCIN: CPT | Performed by: INTERNAL MEDICINE

## 2019-10-19 PROCEDURE — 83735 ASSAY OF MAGNESIUM: CPT | Performed by: INTERNAL MEDICINE

## 2019-10-19 PROCEDURE — 20000004 ZZH R&B ICU UMMC

## 2019-10-19 PROCEDURE — 25800030 ZZH RX IP 258 OP 636: Performed by: STUDENT IN AN ORGANIZED HEALTH CARE EDUCATION/TRAINING PROGRAM

## 2019-10-19 PROCEDURE — 86316 IMMUNOASSAY TUMOR OTHER: CPT | Performed by: INTERNAL MEDICINE

## 2019-10-19 PROCEDURE — 89051 BODY FLUID CELL COUNT: CPT | Performed by: INTERNAL MEDICINE

## 2019-10-19 PROCEDURE — 25000128 H RX IP 250 OP 636: Performed by: INTERNAL MEDICINE

## 2019-10-19 PROCEDURE — 84100 ASSAY OF PHOSPHORUS: CPT | Performed by: STUDENT IN AN ORGANIZED HEALTH CARE EDUCATION/TRAINING PROGRAM

## 2019-10-19 PROCEDURE — 87633 RESP VIRUS 12-25 TARGETS: CPT | Performed by: INTERNAL MEDICINE

## 2019-10-19 PROCEDURE — 25000132 ZZH RX MED GY IP 250 OP 250 PS 637: Performed by: STUDENT IN AN ORGANIZED HEALTH CARE EDUCATION/TRAINING PROGRAM

## 2019-10-19 PROCEDURE — 94003 VENT MGMT INPAT SUBQ DAY: CPT

## 2019-10-19 PROCEDURE — 25000125 ZZHC RX 250: Performed by: STUDENT IN AN ORGANIZED HEALTH CARE EDUCATION/TRAINING PROGRAM

## 2019-10-19 PROCEDURE — 87070 CULTURE OTHR SPECIMN AEROBIC: CPT | Performed by: INTERNAL MEDICINE

## 2019-10-19 PROCEDURE — 80053 COMPREHEN METABOLIC PANEL: CPT | Performed by: INTERNAL MEDICINE

## 2019-10-19 PROCEDURE — 82330 ASSAY OF CALCIUM: CPT | Performed by: INTERNAL MEDICINE

## 2019-10-19 PROCEDURE — 93010 ELECTROCARDIOGRAM REPORT: CPT | Performed by: INTERNAL MEDICINE

## 2019-10-19 PROCEDURE — 84484 ASSAY OF TROPONIN QUANT: CPT | Performed by: INTERNAL MEDICINE

## 2019-10-19 PROCEDURE — C9113 INJ PANTOPRAZOLE SODIUM, VIA: HCPCS | Performed by: STUDENT IN AN ORGANIZED HEALTH CARE EDUCATION/TRAINING PROGRAM

## 2019-10-19 PROCEDURE — 82805 BLOOD GASES W/O2 SATURATION: CPT | Performed by: STUDENT IN AN ORGANIZED HEALTH CARE EDUCATION/TRAINING PROGRAM

## 2019-10-19 PROCEDURE — 83735 ASSAY OF MAGNESIUM: CPT | Performed by: STUDENT IN AN ORGANIZED HEALTH CARE EDUCATION/TRAINING PROGRAM

## 2019-10-19 PROCEDURE — 40000076 ZZH STATISTIC IABP MONITORING

## 2019-10-19 PROCEDURE — 99291 CRITICAL CARE FIRST HOUR: CPT | Mod: GC | Performed by: INTERNAL MEDICINE

## 2019-10-19 PROCEDURE — 80053 COMPREHEN METABOLIC PANEL: CPT | Performed by: STUDENT IN AN ORGANIZED HEALTH CARE EDUCATION/TRAINING PROGRAM

## 2019-10-19 PROCEDURE — 85027 COMPLETE CBC AUTOMATED: CPT | Performed by: STUDENT IN AN ORGANIZED HEALTH CARE EDUCATION/TRAINING PROGRAM

## 2019-10-19 PROCEDURE — 40000014 ZZH STATISTIC ARTERIAL MONITORING DAILY

## 2019-10-19 PROCEDURE — 87081 CULTURE SCREEN ONLY: CPT | Performed by: INTERNAL MEDICINE

## 2019-10-19 PROCEDURE — 25000125 ZZHC RX 250

## 2019-10-19 PROCEDURE — 31645 BRNCHSC W/THER ASPIR 1ST: CPT | Performed by: INTERNAL MEDICINE

## 2019-10-19 PROCEDURE — 84100 ASSAY OF PHOSPHORUS: CPT | Performed by: INTERNAL MEDICINE

## 2019-10-19 PROCEDURE — 87040 BLOOD CULTURE FOR BACTERIA: CPT | Performed by: STUDENT IN AN ORGANIZED HEALTH CARE EDUCATION/TRAINING PROGRAM

## 2019-10-19 PROCEDURE — 83605 ASSAY OF LACTIC ACID: CPT | Performed by: STUDENT IN AN ORGANIZED HEALTH CARE EDUCATION/TRAINING PROGRAM

## 2019-10-19 PROCEDURE — 25800030 ZZH RX IP 258 OP 636: Performed by: INTERNAL MEDICINE

## 2019-10-19 PROCEDURE — 94640 AIRWAY INHALATION TREATMENT: CPT

## 2019-10-19 PROCEDURE — 31624 DX BRONCHOSCOPE/LAVAGE: CPT

## 2019-10-19 PROCEDURE — 85520 HEPARIN ASSAY: CPT | Performed by: STUDENT IN AN ORGANIZED HEALTH CARE EDUCATION/TRAINING PROGRAM

## 2019-10-19 RX ORDER — POTASSIUM CHLORIDE 750 MG/1
20-40 TABLET, EXTENDED RELEASE ORAL
Status: DISCONTINUED | OUTPATIENT
Start: 2019-10-19 | End: 2019-10-19

## 2019-10-19 RX ORDER — PIPERACILLIN SODIUM, TAZOBACTAM SODIUM 3; .375 G/15ML; G/15ML
3.38 INJECTION, POWDER, LYOPHILIZED, FOR SOLUTION INTRAVENOUS EVERY 6 HOURS
Status: COMPLETED | OUTPATIENT
Start: 2019-10-19 | End: 2019-10-24

## 2019-10-19 RX ORDER — POTASSIUM CL/LIDO/0.9 % NACL 10MEQ/0.1L
10 INTRAVENOUS SOLUTION, PIGGYBACK (ML) INTRAVENOUS
Status: DISCONTINUED | OUTPATIENT
Start: 2019-10-19 | End: 2019-10-19

## 2019-10-19 RX ORDER — NOREPINEPHRINE BITARTRATE 0.06 MG/ML
INJECTION, SOLUTION INTRAVENOUS
Status: COMPLETED
Start: 2019-10-19 | End: 2019-10-19

## 2019-10-19 RX ORDER — POTASSIUM CHLORIDE 1.5 G/1.58G
20-40 POWDER, FOR SOLUTION ORAL
Status: DISCONTINUED | OUTPATIENT
Start: 2019-10-19 | End: 2019-10-19

## 2019-10-19 RX ORDER — POTASSIUM CHLORIDE 7.45 MG/ML
10 INJECTION INTRAVENOUS
Status: DISCONTINUED | OUTPATIENT
Start: 2019-10-19 | End: 2019-10-19

## 2019-10-19 RX ORDER — POTASSIUM CHLORIDE 29.8 MG/ML
20 INJECTION INTRAVENOUS
Status: DISCONTINUED | OUTPATIENT
Start: 2019-10-19 | End: 2019-10-19

## 2019-10-19 RX ORDER — NOREPINEPHRINE BITARTRATE 0.06 MG/ML
0.03-0.4 INJECTION, SOLUTION INTRAVENOUS CONTINUOUS
Status: DISCONTINUED | OUTPATIENT
Start: 2019-10-19 | End: 2019-10-27

## 2019-10-19 RX ADMIN — FENTANYL CITRATE 50 MCG: 50 INJECTION, SOLUTION INTRAMUSCULAR; INTRAVENOUS at 13:43

## 2019-10-19 RX ADMIN — MIDAZOLAM 2 MG: 1 INJECTION INTRAMUSCULAR; INTRAVENOUS at 16:00

## 2019-10-19 RX ADMIN — POTASSIUM PHOSPHATE, MONOBASIC AND POTASSIUM PHOSPHATE, DIBASIC 15 MMOL: 224; 236 INJECTION, SOLUTION INTRAVENOUS at 08:48

## 2019-10-19 RX ADMIN — Medication 0.03 MCG/KG/MIN: at 09:27

## 2019-10-19 RX ADMIN — MIDAZOLAM 2 MG: 1 INJECTION INTRAMUSCULAR; INTRAVENOUS at 13:42

## 2019-10-19 RX ADMIN — FENTANYL CITRATE 50 MCG: 50 INJECTION, SOLUTION INTRAMUSCULAR; INTRAVENOUS at 08:30

## 2019-10-19 RX ADMIN — LEVETIRACETAM 750 MG: 100 INJECTION, SOLUTION INTRAVENOUS at 12:36

## 2019-10-19 RX ADMIN — PANTOPRAZOLE SODIUM 40 MG: 40 INJECTION, POWDER, FOR SOLUTION INTRAVENOUS at 03:52

## 2019-10-19 RX ADMIN — MIDAZOLAM 3 MG/HR: 5 INJECTION INTRAMUSCULAR; INTRAVENOUS at 22:56

## 2019-10-19 RX ADMIN — FOLIC ACID 1 MG: 1 TABLET ORAL at 07:42

## 2019-10-19 RX ADMIN — FENTANYL CITRATE 50 MCG: 50 INJECTION, SOLUTION INTRAMUSCULAR; INTRAVENOUS at 16:00

## 2019-10-19 RX ADMIN — LEVETIRACETAM 750 MG: 100 INJECTION, SOLUTION INTRAVENOUS at 23:51

## 2019-10-19 RX ADMIN — TICAGRELOR 90 MG: 90 TABLET ORAL at 19:40

## 2019-10-19 RX ADMIN — IPRATROPIUM BROMIDE AND ALBUTEROL SULFATE 3 ML: .5; 3 SOLUTION RESPIRATORY (INHALATION) at 14:57

## 2019-10-19 RX ADMIN — THIAMINE HCL TAB 100 MG 100 MG: 100 TAB at 07:41

## 2019-10-19 RX ADMIN — Medication 50 MCG/HR: at 03:59

## 2019-10-19 RX ADMIN — MIDAZOLAM 1 MG: 1 INJECTION INTRAMUSCULAR; INTRAVENOUS at 08:19

## 2019-10-19 RX ADMIN — TICAGRELOR 90 MG: 90 TABLET ORAL at 07:42

## 2019-10-19 RX ADMIN — MIDAZOLAM 6 MG: 1 INJECTION INTRAMUSCULAR; INTRAVENOUS at 13:57

## 2019-10-19 RX ADMIN — POTASSIUM CHLORIDE 20 MEQ: 29.8 INJECTION, SOLUTION INTRAVENOUS at 17:07

## 2019-10-19 RX ADMIN — VANCOMYCIN HYDROCHLORIDE 1500 MG: 10 INJECTION, POWDER, LYOPHILIZED, FOR SOLUTION INTRAVENOUS at 06:28

## 2019-10-19 RX ADMIN — ACETAMINOPHEN 650 MG: 325 TABLET, FILM COATED ORAL at 05:05

## 2019-10-19 RX ADMIN — ACETAMINOPHEN 650 MG: 325 TABLET, FILM COATED ORAL at 21:58

## 2019-10-19 RX ADMIN — PIPERACILLIN SODIUM AND TAZOBACTAM SODIUM 3.38 G: 3; .375 INJECTION, POWDER, LYOPHILIZED, FOR SOLUTION INTRAVENOUS at 14:02

## 2019-10-19 RX ADMIN — POTASSIUM CHLORIDE 20 MEQ: 1.5 POWDER, FOR SOLUTION ORAL at 05:05

## 2019-10-19 RX ADMIN — VANCOMYCIN HYDROCHLORIDE 2000 MG: 10 INJECTION, POWDER, LYOPHILIZED, FOR SOLUTION INTRAVENOUS at 19:37

## 2019-10-19 RX ADMIN — POLYETHYLENE GLYCOL 3350 17 G: 17 POWDER, FOR SOLUTION ORAL at 07:41

## 2019-10-19 RX ADMIN — PIPERACILLIN SODIUM AND TAZOBACTAM SODIUM 3.38 G: 3; .375 INJECTION, POWDER, LYOPHILIZED, FOR SOLUTION INTRAVENOUS at 19:36

## 2019-10-19 RX ADMIN — MIDAZOLAM 3 MG: 1 INJECTION INTRAMUSCULAR; INTRAVENOUS at 22:00

## 2019-10-19 RX ADMIN — LEVETIRACETAM 750 MG: 100 INJECTION, SOLUTION INTRAVENOUS at 00:00

## 2019-10-19 RX ADMIN — ALTEPLASE 2 MG: 2.2 INJECTION, POWDER, LYOPHILIZED, FOR SOLUTION INTRAVENOUS at 09:40

## 2019-10-19 RX ADMIN — MULTIVITAMIN 15 ML: LIQUID ORAL at 07:42

## 2019-10-19 RX ADMIN — PANTOPRAZOLE SODIUM 40 MG: 40 INJECTION, POWDER, FOR SOLUTION INTRAVENOUS at 15:39

## 2019-10-19 RX ADMIN — Medication 1 PACKET: at 07:42

## 2019-10-19 RX ADMIN — ASPIRIN 81 MG CHEWABLE TABLET 81 MG: 81 TABLET CHEWABLE at 07:42

## 2019-10-19 ASSESSMENT — ACTIVITIES OF DAILY LIVING (ADL)
ADLS_ACUITY_SCORE: 21
ADLS_ACUITY_SCORE: 17
ADLS_ACUITY_SCORE: 21
ADLS_ACUITY_SCORE: 17
ADLS_ACUITY_SCORE: 15
ADLS_ACUITY_SCORE: 17

## 2019-10-19 ASSESSMENT — MIFFLIN-ST. JEOR: SCORE: 1972.24

## 2019-10-19 NOTE — PLAN OF CARE
Pt nodding appropriately, intermittently able to follow commands and wiggle all four extremities.  Bronched this afternoon, after bronch would not stop coughing, prns given as well as neb to control cough. Large liquid bm this afternoon, rectal pouch placed. Tmax 100.6, Dr. Dsouza notified. Wife updated per Dr. Santillan.     Plan to pull IABP tomorrow. Continue to wean ventilator as able.      For vital signs and complete assessments, please see documentation flowsheets.  Will continue to monitor and follow plan of care.

## 2019-10-19 NOTE — PLAN OF CARE
53 y.o. M admitted with VT/VF arrest, Decannulated 10/18  Neuro: Pt remains intubated and on sedation. Follows commands, PAYAN, Pupils PERRL. Clonus noted in BLE @ 0000 assessment, not present during 2000 or 0400 assessment.   CV: Converted to A.Fib/Flutter @ 0100, BP stable, rate 's. 12 lead EKG completed, MD updated, continue to monitor. MAP >65, epi titrated off. IABP 1:1 100%.   Resp: FiO2 decreased to 60%, other vent settings continued. LS coarse/diminished, R<L. Frequent suctioning, thin orange secretions noted.   GI: x3 BMs overnight, + BS.   : Adequate UOP, >60 mL/hr.   Plan: Continue to monitor. Address concerns with CSI.

## 2019-10-19 NOTE — BRIEF OP NOTE
[facility], [place]    Brief Operative Note    Pre-operative diagnosis: right hemothorax  Post-operative diagnosis: atelectatic right lower lobe, consolidative opacities of likely alveolar hemorrhage, probable superimposed mucous plugging; no significant right pleural fluid    Procedure: CT of chest with and without contrast, aborted right chest tube placement  Staff radiologist: Bronson Lam MD  Fellow: Carlton Holland MD   Anesthesia: none   Estimated blood loss: none  Drains: none  Specimens: none  Findings: atelectatic right lower lobe, consolidative opacities of likely alveolar hemorrhage, probable superimposed mucous plugging; no significant right pleural fluid    Complications: none  Implants: none

## 2019-10-19 NOTE — PROCEDURES
Cardiac ICU Procedure Note  Procedure:  Bronchoscopy  Indication:  Pneumonia  Attending Provider:  Dr. Juan Pablo Santillan  Medications:   Versed and fentanyl  Time Out:  Performed    The patient's medical record has been reviewed.  The necessary history and physical examination was performed.  The risks, benefits and alternatives of the procedure were discussed with the the patient in detail and shehad the opportunity to ask questions.  All questions were answered and verbal and written informed consent was obtained.  The proposed procedure and the patient's identification were verified prior to the procedure by the physician and the nurse.      The patient was assessed for the adequacy for the procedure and to receive medications.   Mental Status:  Intubated and sedated  Airway examination: ETT in place  Pulmonary:  Diminished breath sounds b/l  CV:  Irregularly irregular  ASA thGthrthathdtheth:th th5th After clinical evaluation and reviewing the indication, risks, alternatives and benefits of the procedure the patient was deemed to be in satisfactory condition to undergo the procedure.  However, the patient was too unstable to move to a negative pressure room for the procedure.  The bronchoscopy was performed at the bedside.     Immediately before administration of medications the patient was re-assessed for adequacy to receive sedatives including the heart rate, respiratory rate, mental status, oxygen saturation, blood pressure and adequacy of pulmonary ventilation. These same parameters were continuously monitored throughout the procedure.     Maneuvers / Procedure:   The bronchoscope was inserted through the mouth via ETT.  The cords were anesthetized with lidocaine. Upper airway structures, vocal cords (anatomy and function) appear to be normal.  A complete airway examination was performed from the distal trachea to the subsegmental level in each lobe of both lungs. There were no endobronchial lesions, there were moderate thin  secretions, and the mucosa was normal appearing.      Aspirations were sent off for testing.    Juan Pablo Santillan MD, PhD  Interventional/Critical Care Cardiology  [phone]    October 19, 2019

## 2019-10-19 NOTE — PROGRESS NOTES
[facility]   Cardiology Progress Note    Assessment & Plan   53 year old male who was admitted on 10/13/19 as a transfer for an OSH for refractory VT/VF arrest s/p PCI to LAD and placement on peripheral [facility] ECMO.     Changes Today:  - repeat bronchoscopy today with some serosanguinous fluid  - weaning versed as allows, will start precedex tomorrow as adjunct  - continue IABP today, consider wean tomorrow  - continuing vanc/zosyn beyond initial 5d course  - repeat TTE tomorrow to eval LV function     Neurology: Intubated, sedated, paralyzed. Cooled to 34 degrees, now rewarmed. Grimacing to pain and blinking eyes on command intermittently.  - NeuroCrit consulted  - EEG with small amount of epileptiform activity  - Loaded and started keppra 750 mg BID on 10/17  - Continue versed gtt and fent gtt, wean as able  - CT Head 10/16 with smal right paracentral lesion, new right hemicerebellum lesion concerning for stroke.   Cardiovascular / Hemodynamics: Refractory VF arrest s/p peripheral V-A ECMO at OSH on 10/13/19.  TTE: LVEF 5-10%, increase to 15% on 1L  - Wean pressors/inotropes as able, currently not on any  - Decannulation today, will continue IABP for now  - Continue ASA 81mg and ticagrelor 90 mg BID  - Held temp at 34 degrees for 24 hours, now rewarmed  - ACT goal 140-160  - Hold lipitor for now given likely hepatic injury during arrest  - Hold ACE/ARB for now given likely reduced renal fxn after arrest  - Holding beta blocker given shock    Pulmonary: Now weaning vent requirements. Large hemothorax on 10/14 s/p right-sided chest tube.  CXR: stable devices, worsening RLL pleural effusion  - Wean vent as able  - Will have another chest tube placed during decannulation on 10/18  - Monitor CT output  - Daily CXR  - Q2H ABGs for now  - Consider scheduled duonebs if signs of lung dz, currently PRN     GI and Nutrition: Per Pt's wife, he is an alcoholic. Concern for possible GIB given black OG output, improved.  - Monitor BID  LFTs  - NJ placed at bedside on 10/16, started TFs  - Bowel regimen - started  - GI Prophylaxis: PPI IV BID   Renal, Fluid and Electrolytes: - Monitor urine output  - Maintain K>3 and Mg>2    Infectious Disease: No signs of infection. Leukocytosis c/w arrest. Blood cultures collected.   - Initially vancomycin/zosyn x5 days for presumed aspiration - then extended given multiple invasive procedures  - Daily blood cultures  - Monitor for signs of infection   Hematology and Oncology: Receiving heparin for ECMO and ASA/ticagrelor for KAMRON. Large intramuscular hematoma of right pec and hemothorax on 10/14 with possible GIB as above.  - Cryo PRN fibrinogen < 200; FFP for INR >2  - Transfuse for Hgb<10, Plts<70  - Heparin gtt for ECMO with ACT goal 140-160 given bleeding concerns as above  - US LE w/ arterial duplex per ECMO protocol   - DVT PPX: Heparin as above  - Right chest wall hematoma: monitor exam and Hgb closely   Endocrinology: No known medical history. BG elevated.  - Insulin gtt PRN   Lines: Restraint: needed    Current lines are required for patient management       Family updated today: Yes  Code Status: FULL    Pt was seen and examined with Dr. Juan Pablo Santillan MD, PhD, who agrees with the assessment and plan.    Bronson Dsouza MD. PhD  Cardiology Fellow      Interval History    Febrile last night. Care team notes last 24 hours reviewed. Wife at bedside this morning. Repeat bronchoscopy today.     ROS: Unable to obtain as Pt is intubated and sedated.    Data reviewed today: I reviewed all new labs and imaging results over the last 24 hours. I personally reviewed:    Physical Exam   Temp: 99.5  F (37.5  C) Temp src: Esophageal BP: 110/62 Pulse: 84 Heart Rate: 92 Resp: 18 SpO2: 99 % O2 Device: Mechanical Ventilator    Vitals:    10/17/19 0000 10/18/19 0400 10/19/19 0000   Weight: 104 kg (229 lb 4.5 oz) 104.9 kg (231 lb 4.2 oz) 105.1 kg (231 lb 11.3 oz)     Vital Signs with Ranges  Temp:  [96.6  F (35.9  C)-100.6  " F (38.1  C)] 99.5  F (37.5  C)  Pulse:  [84-85] 84  Heart Rate:  [] 92  Resp:  [14-19] 18  BP: ()/(51-68) 110/62  MAP:  [55 mmHg-117 mmHg] 66 mmHg  Arterial Line BP: ()/(37-92) 82/49  FiO2 (%):  [60 %-100 %] 60 %  SpO2:  [93 %-100 %] 99 %  I/O last 3 completed shifts:  In: 3329.62 [I.V.:2264.62; NG/GT:195]  Out: 3155 [Urine:2350; Emesis/NG output:550; Chest Tube:255]    Heart Rate: 92, Blood pressure 110/62, pulse 84, temperature 99.5  F (37.5  C), resp. rate 18, height 1.89 m (6' 2.41\"), weight 105.1 kg (231 lb 11.3 oz), SpO2 99 %.  231 lbs 11.26 oz  GEN:  Intubated, sedated  CV:  RRR, IABP augmentation  LUNGS: Mechanical breath sounds, diminished along right side, right-sided chest tube in place  ABD: Soft BS, soft, mildly distended  EXT: warm, trace-1+ edema, dopplerable pulses  SKIN: Large hematoma along chest wall    Medications     amiodarone 0.5 mg/min (10/19/19 0700)     IV fluid REPLACEMENT ONLY 25 mL/hr at 10/18/19 1929     dextrose 10 mL/hr at 10/18/19 2016     EPINEPHrine IV infusion ADULT Stopped (10/18/19 2245)     fentaNYL 50 mcg/hr (10/19/19 0700)     midazolam 3 mg/hr (10/19/19 0700)     propofol (DIPRIVAN) infusion Stopped (10/18/19 1315)     sodium chloride         aspirin  81 mg Per Feeding Tube Daily     folic acid  1 mg Oral Daily     insulin aspart  1-4 Units Subcutaneous Q4H     levETIRAcetam  750 mg Intravenous Q12H     multivitamins w/minerals  15 mL Per Feeding Tube Daily     pantoprazole (PROTONIX) IV  40 mg Intravenous Q12H     polyethylene glycol  17 g Oral Daily     protein modular  1 packet Per Feeding Tube Daily     senna-docusate  2 tablet Oral BID     sodium chloride (PF)  3 mL Intracatheter Q8H     ticagrelor  90 mg Oral or Feeding Tube BID     vancomycin (VANCOCIN) IV  1,500 mg Intravenous Q24H     vitamin B1  100 mg Oral Daily       Data   Recent Labs   Lab 10/19/19  0344 10/18/19  1635 10/18/19  1055  10/18/19  0336  10/17/19  2121   WBC 6.7 6.2 8.0  --  5.8  " --  5.9   HGB 8.4* 8.0* 9.0*   < > 8.7*  --  8.9*   MCV 97 97 98  --  96  --  96   PLT 78* 66* 64*  --  73*  --  79*   INR 1.12  --   --   --  1.12  --  1.11    138 140   < > 141  --  140   POTASSIUM 3.8 3.9 3.6   < > 3.6  --  3.6   CHLORIDE 104 102 102  --  102  --  100   CO2 31 30 31  --  32  --  31   BUN 20 18 19  --  22  --  21   CR 0.77 0.74 0.78  --  0.89  --  1.00   ANIONGAP 5 6 6  --  6  --  8   HEATHER 8.4* 8.2* 8.4*  --  8.7  --  8.8   * 114* 137*   < > 114*  121*   < > 113*   ALBUMIN 3.1* 3.1* 3.0*  --  3.2*  --  3.3*   PROTTOTAL 6.4* 6.3* 6.4*  --  6.5*  --  6.7*   BILITOTAL 3.0* 3.3* 3.5*  --  3.7*  --  4.2*   ALKPHOS 144 107 120  --  115  --  110   ALT 41 44 45  --  45  --  45   * 156* 181*  --  192*  --  203*   TROPI 25.438* 33.413*  --   --  53.750*  --  56.087*    < > = values in this interval not displayed.       Recent Results (from the past 24 hour(s))   XR Abdomen Port 1 View    Narrative    Examination:  XR ABDOMEN PORT 1 VW    Date:  10/18/2019 12:01 PM     Clinical Information: feeding tube placement     Comparison: 10/16/2019, CT chest abdomen and pelvis 10/14/2019.    Findings:     Supine radiograph of the abdomen. Feeding tube visualized coursing  through the stomach with the tip in the proximal jejunum, appropriate  position. Partial visualization of gastric tube with the tip overlying  the distal esophagus, recommend advancing approximately 12 cm.  Nonobstructive bowel gas pattern. No pneumatosis or portal venous gas.  No acute osseous abnormality.      Impression    Impression:    1.  Feeding tube visualized coursing through the stomach with the tip  in the proximal jejunum, appropriate position.  2.  Partial visualization of gastric tube with tip overlying the  distal esophagus.    I have personally reviewed the examination and initial interpretation  and I agree with the findings.    JOSE SANCHEZ MD   XR Chest Port 1 View   Result Value    Radiologist flags (Urgent)      Right apical chest tube with sidehole with appearance    Narrative    Exam: XR CHEST PORT 1 VW, 10/18/2019 12:01 PM    Indication: s/p ECMO    Comparison: Chest x-ray 1:05 AM    Findings:   ET tube tip 8 cm from the adiel. Left subclavian CVC terminates at  the mid SVC. Enteric tubes course midline with tips and side hole  terminating in the thoracic esophagus. Feeding tube extends beyond the  lateral margin film. IABP superior marker at the midthoracic trachea  just above level of adiel, stable. ECMO cannula removed. Stable low  lung volumes. Complete opacification right lung. Small residual  layering left pleural effusion. Right apical chest tube with sidehole  with appearance outside the chest wall.      Impression    Impression:   1. Complete opacification right lung likely represents increased  pleural effusion and superimposed consolidation and/or atelectasis.  2. ECMO cannula is removed.  3. Enteric tube appears to be coiled in the mouth sidehole terminating  in lower thoracic esophagus. Recommend readjusting.  4. Right apical chest tube with sidehole with appearance outside the  chest wall.      [Urgent Result: Right apical chest tube with sidehole with appearance  outside the chest wall.    Finding was identified on 10/18/2019 4:08 PM.     Dr. Carr was contacted by Dr. Wren at 10/18/2019 4:43 PM and  verbalized understanding of the urgent finding.      I have personally reviewed the examination and initial interpretation  and I agree with the findings.    AGNIESZKA GRANADO MD   XR Chest Port 1 View    Narrative    Exam: XR CHEST PORT 1 VW, 10/18/2019 12:53 PM    Indication: s/p bronch    Comparison: Chest x-ray 11:12 AM    Findings:   ET tube tip 8 cm from the adiel. Enteric tubes with sidehole  terminating in the lower thoracic esophagus. IABP superior marker at  the midthoracic trachea just above level of adiel, stable. ECMO  cannula removed. Right apical chest tube in place with appearance  of  side hole outside chest wall. Left subclavian CVC terminating over the  mid SVC. Stable low lung volumes.     Near-complete opacification of right lung with some improved upper  lung aeration.       Impression    Impression:   1. Slightly improved aeration of the right lung with near-complete  opacification which likely represents atelectasis and pleural  effusion.  2. Enteric tube sidehole terminating in the lower thoracic esophagus,  recommend repositioning.   3. Right apical chest tube with appearance of side hole outside chest  wall.    I have personally reviewed the examination and initial interpretation  and I agree with the findings.    AGNIESZKA GRANADO MD   US Chest Pleural Effusion Imaging    Narrative    EXAMINATION: US CHEST/PLEURAL EFFUSION IMAGING, 10/18/2019 1:37 PM     COMPARISON: Chest x-ray 10/18/2019, 10/17/2019, CT chest abdomen and  pelvis 10/14/2019.    HISTORY: Right chest tube placement for pleural effusion.    FINDINGS: Sonographic examination of the RUQ demonstrates echogenic  pleural fluid consistent with right-sided hemothorax. No definite  fluid visualized in the left pleural cavity or left upper quadrant. No  ascites in the right or left upper quadrants.      Impression    IMPRESSION:    Right-sided echogenic pleural fluid consistent with known hemothorax.  No fluid visualized in the left pleural cavity.    I have personally reviewed the examination and initial interpretation  and I agree with the findings.    YANETH MAXWELL MD   XR Abdomen Port 1 View    Narrative    Exam: XR ABDOMEN PORT 1 VW, 10/18/2019 6:09 PM    Indication: NJ placement    Comparison: Earlier today    Findings:   Feeding tube tip in the proximal jejunum. Enteric tube has been  advanced the tip is in the fundus of the stomach and sidehole is also  in the fundus of the stomach. Nonobstructive bowel gas pattern.  Opacification of the right thorax. See CT same date. Right femoral  line seen at the very edge of the film.  Tip in the expected location  of the external iliac vein.      Impression    Impression:   1. Feeding tube tip in the proximal jejunum.  2. Enteric tube tip and sidehole in the fundus of the stomach.  3. Nonobstructive bowel gas pattern.    JOSE SANCHEZ MD

## 2019-10-19 NOTE — PROGRESS NOTES
Preliminary Video EEG impression on October 18-19 2019 until 6am    Full report to follow. Please look in inpatient chart, under procedure section.     This video EEG is abnormal due to the presences of continuous generalized polymorphic delta/theta slowing. In the last 24 hours as anesthetic medications have been decreased and patient has become normothermic at 98  F's.  Overnight, Generalized periodic epileptiform discharges have resolved. EEG is consistent severe encephalopathy encephalopathy.  No EEG Seizures.     Viri Lala MD  Staff Epilepsy Neurologist   [phone]

## 2019-10-19 NOTE — PROGRESS NOTES
"Bronchoscopy Risk Assessment Guidelines      A. Patient symptoms to consider when assessing pulmonary TB risk are:    I. Cough greater than 3 weeks; and fever, hemoptysis, pleuritic chest    pain, weight loss greater than 10 lbs, night sweats, fatigue, infiltrates on    upper lobes or superior segments of lower lobes, cavitation on chest    x-ray.   B. Patient risk factors to consider when assessing pulmonary TB risk are:    I. Exposure to known TB case, foreign-born persons (within 5 years of    arrival to [place]), residence in a crowded setting (correctional facility,     long-term care center, etc.), persons with HIV or immunosuppression.    Patients with symptoms and risk factors should generally be considered \"suspect risk\" and bronchoscopies should be performed in airborne precautions.    This patient has NO KNOWN RISK of Tuberculosis (proceed with bronchoscopy)    Specimens sent: yes  Complications: None  Scope used: #76  Attending Physician: Dr. Jacque Durand, RT on 10/19/2019 at 3:27 PM  "

## 2019-10-19 NOTE — PHARMACY-VANCOMYCIN DOSING SERVICE
Pharmacy Vancomycin Note  Date of Service 2019  Patient's  1965   53 year old, male    Indication: Aspiration Pneumonia and Ventilator-Associated Pneumonia  Goal Trough Level: 15-20 mg/L  Day of Therapy: 1  Was on Vancomycin 1500 mg iv q24h x 5 days with last dose given this morning @ 0628    Current estimated CrCl = Estimated Creatinine Clearance: 179.1 mL/min (A) (based on SCr of 0.62 mg/dL (L)).    Creatinine for last 3 days  10/16/2019: 10:10 PM Creatinine 0.99 mg/dL  10/17/2019:  3:46 AM Creatinine 1.10 mg/dL; 10:20 AM Creatinine 0.99 mg/dL;  3:37 PM Creatinine 0.95 mg/dL;  9:21 PM Creatinine 1.00 mg/dL  10/18/2019:  3:36 AM Creatinine 0.89 mg/dL; 10:55 AM Creatinine 0.78 mg/dL;  4:35 PM Creatinine 0.74 mg/dL  10/19/2019:  3:44 AM Creatinine 0.77 mg/dL;  3:41 PM Creatinine 0.62 mg/dL    Recent Vancomycin Levels (past 3 days)  10/17/2019:  5:55 AM Vancomycin Level 9.6 mg/L  10/19/2019:  3:41 PM Vancomycin Level 11.5 mg/L    Vancomycin IV Administrations (past 72 hours)                   vancomycin 1500 mg in 0.9% NaCl 250 ml intermittent infusion 1,500 mg (mg) 1,500 mg Given 10/19/19 0628     1,500 mg Given 10/18/19 0629     1,500 mg Given 10/17/19 0824                Nephrotoxins and other renal medications (From now, onward)    Start     Dose/Rate Route Frequency Ordered Stop    10/19/19 1830  vancomycin (VANCOCIN) 2,000 mg in sodium chloride 0.9 % 500 mL intermittent infusion      2,000 mg  over 2 Hours Intravenous EVERY 12 HOURS 10/19/19 1721      10/19/19 1245  piperacillin-tazobactam (ZOSYN) 3.375 g vial to attach to  mL bag      3.375 g  over 30 Minutes Intravenous EVERY 6 HOURS 10/19/19 1244 10/24/19 1359    10/19/19 0915  norepinephrine (LEVOPHED) 16 mg in  mL infusion      0.03-0.4 mcg/kg/min × 75 kg (Dosing Weight)  2.1-28.1 mL/hr  Intravenous CONTINUOUS 10/19/19 0911               Contrast Orders - past 72 hours (72h ago, onward)    Start     Dose/Rate Route  Frequency Ordered Stop    10/18/19 1600  iopamidol (ISOVUE-370) solution 100 mL      100 mL Intravenous ONCE 10/18/19 1557 10/18/19 1600          Interpretation of levels and current regimen:  Trough level is  Subtherapeutic, and represents a 10 hour level drawn after the fifth dose of the following regimen: Vancomycin     Has serum creatinine changed > 50% in last 72 hours: No    Urine output:  good urine output    Renal Function: Stable    Plan:  1.  Increase Dose to Vancomycin 2000 mg iv q12h  2.  Pharmacy will check trough levels as appropriate in 1-3 Days.    3. Serum creatinine levels will be ordered daily for the first week of therapy and at least twice weekly for subsequent weeks.      Renee Eddy, MisbahD        .

## 2019-10-20 ENCOUNTER — ALLIED HEALTH/NURSE VISIT (OUTPATIENT)
Dept: NEUROLOGY | Facility: CLINIC | Age: 54
DRG: 003 | End: 2019-10-20
Attending: PSYCHIATRY & NEUROLOGY
Payer: COMMERCIAL

## 2019-10-20 ENCOUNTER — APPOINTMENT (OUTPATIENT)
Dept: CARDIOLOGY | Facility: CLINIC | Age: 54
DRG: 003 | End: 2019-10-20
Attending: STUDENT IN AN ORGANIZED HEALTH CARE EDUCATION/TRAINING PROGRAM
Payer: COMMERCIAL

## 2019-10-20 ENCOUNTER — APPOINTMENT (OUTPATIENT)
Dept: GENERAL RADIOLOGY | Facility: CLINIC | Age: 54
DRG: 003 | End: 2019-10-20
Attending: STUDENT IN AN ORGANIZED HEALTH CARE EDUCATION/TRAINING PROGRAM
Payer: COMMERCIAL

## 2019-10-20 DIAGNOSIS — I46.9 CARDIAC ARREST (H): Primary | ICD-10-CM

## 2019-10-20 LAB
ALBUMIN SERPL-MCNC: 2.8 G/DL (ref 3.4–5)
ALBUMIN SERPL-MCNC: 2.9 G/DL (ref 3.4–5)
ALP SERPL-CCNC: 148 U/L (ref 40–150)
ALP SERPL-CCNC: 161 U/L (ref 40–150)
ALT SERPL W P-5'-P-CCNC: 40 U/L (ref 0–70)
ALT SERPL W P-5'-P-CCNC: 42 U/L (ref 0–70)
ANION GAP SERPL CALCULATED.3IONS-SCNC: 5 MMOL/L (ref 3–14)
ANION GAP SERPL CALCULATED.3IONS-SCNC: 7 MMOL/L (ref 3–14)
AST SERPL W P-5'-P-CCNC: 77 U/L (ref 0–45)
AST SERPL W P-5'-P-CCNC: 93 U/L (ref 0–45)
BACTERIA SPEC CULT: NO GROWTH
BASE EXCESS BLDA CALC-SCNC: 4.1 MMOL/L
BASE EXCESS BLDA CALC-SCNC: 4.6 MMOL/L
BASE EXCESS BLDA CALC-SCNC: 5.4 MMOL/L
BASE EXCESS BLDA CALC-SCNC: 5.4 MMOL/L
BASE EXCESS BLDA CALC-SCNC: 6.4 MMOL/L
BILIRUB SERPL-MCNC: 2.3 MG/DL (ref 0.2–1.3)
BILIRUB SERPL-MCNC: 2.4 MG/DL (ref 0.2–1.3)
BLD PROD TYP BPU: NORMAL
BLD PROD TYP BPU: NORMAL
BLD UNIT ID BPU: 0
BLD UNIT ID BPU: 0
BLOOD PRODUCT CODE: NORMAL
BLOOD PRODUCT CODE: NORMAL
BPU ID: NORMAL
BPU ID: NORMAL
BUN SERPL-MCNC: 24 MG/DL (ref 7–30)
BUN SERPL-MCNC: 26 MG/DL (ref 7–30)
CA-I BLD-MCNC: 4.4 MG/DL (ref 4.4–5.2)
CA-I BLD-MCNC: 4.6 MG/DL (ref 4.4–5.2)
CALCIUM SERPL-MCNC: 8.5 MG/DL (ref 8.5–10.1)
CALCIUM SERPL-MCNC: 8.5 MG/DL (ref 8.5–10.1)
CHLORIDE SERPL-SCNC: 105 MMOL/L (ref 94–109)
CHLORIDE SERPL-SCNC: 108 MMOL/L (ref 94–109)
CO2 SERPL-SCNC: 28 MMOL/L (ref 20–32)
CO2 SERPL-SCNC: 30 MMOL/L (ref 20–32)
CREAT SERPL-MCNC: 0.72 MG/DL (ref 0.66–1.25)
CREAT SERPL-MCNC: 0.72 MG/DL (ref 0.66–1.25)
ERYTHROCYTE [DISTWIDTH] IN BLOOD BY AUTOMATED COUNT: 16.5 % (ref 10–15)
ERYTHROCYTE [DISTWIDTH] IN BLOOD BY AUTOMATED COUNT: 16.6 % (ref 10–15)
FLUAV H1 2009 PAND RNA SPEC QL NAA+PROBE: NEGATIVE
FLUAV H1 RNA SPEC QL NAA+PROBE: NEGATIVE
FLUAV H3 RNA SPEC QL NAA+PROBE: NEGATIVE
FLUAV RNA SPEC QL NAA+PROBE: NEGATIVE
FLUBV RNA SPEC QL NAA+PROBE: NEGATIVE
GFR SERPL CREATININE-BSD FRML MDRD: >90 ML/MIN/{1.73_M2}
GFR SERPL CREATININE-BSD FRML MDRD: >90 ML/MIN/{1.73_M2}
GLUCOSE BLDC GLUCOMTR-MCNC: 110 MG/DL (ref 70–99)
GLUCOSE BLDC GLUCOMTR-MCNC: 113 MG/DL (ref 70–99)
GLUCOSE BLDC GLUCOMTR-MCNC: 115 MG/DL (ref 70–99)
GLUCOSE BLDC GLUCOMTR-MCNC: 116 MG/DL (ref 70–99)
GLUCOSE BLDC GLUCOMTR-MCNC: 120 MG/DL (ref 70–99)
GLUCOSE BLDC GLUCOMTR-MCNC: 126 MG/DL (ref 70–99)
GLUCOSE SERPL-MCNC: 112 MG/DL (ref 70–99)
GLUCOSE SERPL-MCNC: 143 MG/DL (ref 70–99)
HADV DNA SPEC QL NAA+PROBE: NEGATIVE
HADV DNA SPEC QL NAA+PROBE: NEGATIVE
HCO3 BLD-SCNC: 30 MMOL/L (ref 21–28)
HCO3 BLD-SCNC: 30 MMOL/L (ref 21–28)
HCO3 BLD-SCNC: 31 MMOL/L (ref 21–28)
HCO3 BLD-SCNC: 31 MMOL/L (ref 21–28)
HCO3 BLD-SCNC: 32 MMOL/L (ref 21–28)
HCT VFR BLD AUTO: 27.6 % (ref 40–53)
HCT VFR BLD AUTO: 28.9 % (ref 40–53)
HGB BLD-MCNC: 8.7 G/DL (ref 13.3–17.7)
HGB BLD-MCNC: 9 G/DL (ref 13.3–17.7)
HMPV RNA SPEC QL NAA+PROBE: NEGATIVE
HPIV1 RNA SPEC QL NAA+PROBE: NEGATIVE
HPIV2 RNA SPEC QL NAA+PROBE: NEGATIVE
HPIV3 RNA SPEC QL NAA+PROBE: NEGATIVE
INR PPP: 1.23 (ref 0.86–1.14)
LACTATE BLD-SCNC: 1.2 MMOL/L (ref 0.7–2)
LACTATE BLD-SCNC: 1.3 MMOL/L (ref 0.7–2)
LDH SERPL L TO P-CCNC: 516 U/L (ref 85–227)
LMWH PPP CHRO-ACNC: <0.1 IU/ML
MAGNESIUM SERPL-MCNC: 2.4 MG/DL (ref 1.6–2.3)
MAGNESIUM SERPL-MCNC: 2.5 MG/DL (ref 1.6–2.3)
MAGNESIUM SERPL-MCNC: 2.6 MG/DL (ref 1.6–2.3)
MCH RBC QN AUTO: 30.6 PG (ref 26.5–33)
MCH RBC QN AUTO: 31.1 PG (ref 26.5–33)
MCHC RBC AUTO-ENTMCNC: 31.1 G/DL (ref 31.5–36.5)
MCHC RBC AUTO-ENTMCNC: 31.5 G/DL (ref 31.5–36.5)
MCV RBC AUTO: 98 FL (ref 78–100)
MCV RBC AUTO: 99 FL (ref 78–100)
MICROBIOLOGIST REVIEW: NORMAL
O2/TOTAL GAS SETTING VFR VENT: 50 %
O2/TOTAL GAS SETTING VFR VENT: 60 %
O2/TOTAL GAS SETTING VFR VENT: 70 %
OXYHGB MFR BLD: 94 % (ref 92–100)
OXYHGB MFR BLD: 96 % (ref 92–100)
OXYHGB MFR BLD: 97 % (ref 92–100)
OXYHGB MFR BLD: 97 % (ref 92–100)
OXYHGB MFR BLD: 98 % (ref 92–100)
PCO2 BLD: 45 MM HG (ref 35–45)
PCO2 BLD: 46 MM HG (ref 35–45)
PCO2 BLD: 47 MM HG (ref 35–45)
PCO2 BLD: 48 MM HG (ref 35–45)
PCO2 BLD: 48 MM HG (ref 35–45)
PH BLD: 7.41 PH (ref 7.35–7.45)
PH BLD: 7.41 PH (ref 7.35–7.45)
PH BLD: 7.43 PH (ref 7.35–7.45)
PHOSPHATE SERPL-MCNC: 2.5 MG/DL (ref 2.5–4.5)
PHOSPHATE SERPL-MCNC: 3 MG/DL (ref 2.5–4.5)
PLATELET # BLD AUTO: 109 10E9/L (ref 150–450)
PLATELET # BLD AUTO: 96 10E9/L (ref 150–450)
PO2 BLD: 100 MM HG (ref 80–105)
PO2 BLD: 105 MM HG (ref 80–105)
PO2 BLD: 171 MM HG (ref 80–105)
PO2 BLD: 74 MM HG (ref 80–105)
PO2 BLD: 99 MM HG (ref 80–105)
POTASSIUM SERPL-SCNC: 4 MMOL/L (ref 3.4–5.3)
POTASSIUM SERPL-SCNC: 4 MMOL/L (ref 3.4–5.3)
POTASSIUM SERPL-SCNC: 4.3 MMOL/L (ref 3.4–5.3)
PROT SERPL-MCNC: 6.4 G/DL (ref 6.8–8.8)
PROT SERPL-MCNC: 6.7 G/DL (ref 6.8–8.8)
RBC # BLD AUTO: 2.8 10E12/L (ref 4.4–5.9)
RBC # BLD AUTO: 2.94 10E12/L (ref 4.4–5.9)
RHINOVIRUS RNA SPEC QL NAA+PROBE: NEGATIVE
RSV RNA SPEC QL NAA+PROBE: NEGATIVE
RSV RNA SPEC QL NAA+PROBE: NEGATIVE
SODIUM SERPL-SCNC: 140 MMOL/L (ref 133–144)
SODIUM SERPL-SCNC: 142 MMOL/L (ref 133–144)
SPECIMEN SOURCE: NORMAL
SPECIMEN SOURCE: NORMAL
TRANSFUSION STATUS PATIENT QL: NORMAL
TROPONIN I SERPL-MCNC: 13.1 UG/L (ref 0–0.04)
TROPONIN I SERPL-MCNC: 9.31 UG/L (ref 0–0.04)
WBC # BLD AUTO: 8 10E9/L (ref 4–11)
WBC # BLD AUTO: 8.7 10E9/L (ref 4–11)

## 2019-10-20 PROCEDURE — 27210429 ZZH NUTRITION PRODUCT INTERMEDIATE LITER

## 2019-10-20 PROCEDURE — 25500064 ZZH RX 255 OP 636: Performed by: INTERNAL MEDICINE

## 2019-10-20 PROCEDURE — 83605 ASSAY OF LACTIC ACID: CPT | Performed by: INTERNAL MEDICINE

## 2019-10-20 PROCEDURE — 93308 TTE F-UP OR LMTD: CPT | Mod: 26 | Performed by: INTERNAL MEDICINE

## 2019-10-20 PROCEDURE — 25000125 ZZHC RX 250: Performed by: STUDENT IN AN ORGANIZED HEALTH CARE EDUCATION/TRAINING PROGRAM

## 2019-10-20 PROCEDURE — C9113 INJ PANTOPRAZOLE SODIUM, VIA: HCPCS | Performed by: STUDENT IN AN ORGANIZED HEALTH CARE EDUCATION/TRAINING PROGRAM

## 2019-10-20 PROCEDURE — 40000275 ZZH STATISTIC RCP TIME EA 10 MIN

## 2019-10-20 PROCEDURE — 25800030 ZZH RX IP 258 OP 636: Performed by: STUDENT IN AN ORGANIZED HEALTH CARE EDUCATION/TRAINING PROGRAM

## 2019-10-20 PROCEDURE — 84100 ASSAY OF PHOSPHORUS: CPT | Performed by: STUDENT IN AN ORGANIZED HEALTH CARE EDUCATION/TRAINING PROGRAM

## 2019-10-20 PROCEDURE — 25800030 ZZH RX IP 258 OP 636: Performed by: INTERNAL MEDICINE

## 2019-10-20 PROCEDURE — 25000128 H RX IP 250 OP 636: Performed by: STUDENT IN AN ORGANIZED HEALTH CARE EDUCATION/TRAINING PROGRAM

## 2019-10-20 PROCEDURE — 94003 VENT MGMT INPAT SUBQ DAY: CPT

## 2019-10-20 PROCEDURE — 00000146 ZZHCL STATISTIC GLUCOSE BY METER IP

## 2019-10-20 PROCEDURE — 84484 ASSAY OF TROPONIN QUANT: CPT | Performed by: STUDENT IN AN ORGANIZED HEALTH CARE EDUCATION/TRAINING PROGRAM

## 2019-10-20 PROCEDURE — 82330 ASSAY OF CALCIUM: CPT | Performed by: INTERNAL MEDICINE

## 2019-10-20 PROCEDURE — 71045 X-RAY EXAM CHEST 1 VIEW: CPT

## 2019-10-20 PROCEDURE — 25000132 ZZH RX MED GY IP 250 OP 250 PS 637: Performed by: STUDENT IN AN ORGANIZED HEALTH CARE EDUCATION/TRAINING PROGRAM

## 2019-10-20 PROCEDURE — 20000004 ZZH R&B ICU UMMC

## 2019-10-20 PROCEDURE — 93325 DOPPLER ECHO COLOR FLOW MAPG: CPT | Mod: 26 | Performed by: INTERNAL MEDICINE

## 2019-10-20 PROCEDURE — 84132 ASSAY OF SERUM POTASSIUM: CPT | Performed by: STUDENT IN AN ORGANIZED HEALTH CARE EDUCATION/TRAINING PROGRAM

## 2019-10-20 PROCEDURE — 85027 COMPLETE CBC AUTOMATED: CPT | Performed by: STUDENT IN AN ORGANIZED HEALTH CARE EDUCATION/TRAINING PROGRAM

## 2019-10-20 PROCEDURE — 25000128 H RX IP 250 OP 636: Performed by: INTERNAL MEDICINE

## 2019-10-20 PROCEDURE — 83735 ASSAY OF MAGNESIUM: CPT | Performed by: STUDENT IN AN ORGANIZED HEALTH CARE EDUCATION/TRAINING PROGRAM

## 2019-10-20 PROCEDURE — 83615 LACTATE (LD) (LDH) ENZYME: CPT | Performed by: STUDENT IN AN ORGANIZED HEALTH CARE EDUCATION/TRAINING PROGRAM

## 2019-10-20 PROCEDURE — 80053 COMPREHEN METABOLIC PANEL: CPT | Performed by: STUDENT IN AN ORGANIZED HEALTH CARE EDUCATION/TRAINING PROGRAM

## 2019-10-20 PROCEDURE — 87040 BLOOD CULTURE FOR BACTERIA: CPT | Performed by: STUDENT IN AN ORGANIZED HEALTH CARE EDUCATION/TRAINING PROGRAM

## 2019-10-20 PROCEDURE — 85520 HEPARIN ASSAY: CPT | Performed by: STUDENT IN AN ORGANIZED HEALTH CARE EDUCATION/TRAINING PROGRAM

## 2019-10-20 PROCEDURE — 36415 COLL VENOUS BLD VENIPUNCTURE: CPT | Performed by: STUDENT IN AN ORGANIZED HEALTH CARE EDUCATION/TRAINING PROGRAM

## 2019-10-20 PROCEDURE — 93321 DOPPLER ECHO F-UP/LMTD STD: CPT | Mod: 26 | Performed by: INTERNAL MEDICINE

## 2019-10-20 PROCEDURE — 40000076 ZZH STATISTIC IABP MONITORING

## 2019-10-20 PROCEDURE — 40000014 ZZH STATISTIC ARTERIAL MONITORING DAILY

## 2019-10-20 PROCEDURE — 95951 ZZHC EEG VIDEO EACH 24 HR: CPT | Mod: ZF

## 2019-10-20 PROCEDURE — 33968 REMOVE AORTIC ASSIST DEVICE: CPT | Performed by: INTERNAL MEDICINE

## 2019-10-20 PROCEDURE — 82805 BLOOD GASES W/O2 SATURATION: CPT | Performed by: INTERNAL MEDICINE

## 2019-10-20 PROCEDURE — 40000264 ECHOCARDIOGRAM LIMITED

## 2019-10-20 PROCEDURE — 85610 PROTHROMBIN TIME: CPT | Performed by: STUDENT IN AN ORGANIZED HEALTH CARE EDUCATION/TRAINING PROGRAM

## 2019-10-20 PROCEDURE — 99291 CRITICAL CARE FIRST HOUR: CPT | Mod: 25 | Performed by: INTERNAL MEDICINE

## 2019-10-20 RX ORDER — ALLOPURINOL 100 MG/1
200 TABLET ORAL DAILY
Status: DISCONTINUED | OUTPATIENT
Start: 2019-10-20 | End: 2019-11-05 | Stop reason: HOSPADM

## 2019-10-20 RX ORDER — DEXMEDETOMIDINE HYDROCHLORIDE 4 UG/ML
.2-1.5 INJECTION, SOLUTION INTRAVENOUS CONTINUOUS
Status: DISCONTINUED | OUTPATIENT
Start: 2019-10-20 | End: 2019-10-28

## 2019-10-20 RX ORDER — FUROSEMIDE 10 MG/ML
80 INJECTION INTRAMUSCULAR; INTRAVENOUS ONCE
Status: COMPLETED | OUTPATIENT
Start: 2019-10-20 | End: 2019-10-20

## 2019-10-20 RX ORDER — FENTANYL CITRATE 50 UG/ML
100 INJECTION, SOLUTION INTRAMUSCULAR; INTRAVENOUS ONCE
Status: COMPLETED | OUTPATIENT
Start: 2019-10-20 | End: 2019-10-20

## 2019-10-20 RX ORDER — POLYETHYLENE GLYCOL 3350 17 G/17G
17 POWDER, FOR SOLUTION ORAL DAILY PRN
Status: DISCONTINUED | OUTPATIENT
Start: 2019-10-20 | End: 2019-11-05 | Stop reason: HOSPADM

## 2019-10-20 RX ORDER — MIDAZOLAM (PF) 1 MG/ML IN 0.9 % SODIUM CHLORIDE INTRAVENOUS SOLUTION
1-8 CONTINUOUS
Status: DISCONTINUED | OUTPATIENT
Start: 2019-10-20 | End: 2019-10-21

## 2019-10-20 RX ADMIN — PIPERACILLIN SODIUM AND TAZOBACTAM SODIUM 3.38 G: 3; .375 INJECTION, POWDER, LYOPHILIZED, FOR SOLUTION INTRAVENOUS at 14:13

## 2019-10-20 RX ADMIN — MIDAZOLAM HYDROCHLORIDE 3 MG: 2 INJECTION, SOLUTION INTRAMUSCULAR; INTRAVENOUS at 21:34

## 2019-10-20 RX ADMIN — FENTANYL CITRATE 200 MCG: 50 INJECTION, SOLUTION INTRAMUSCULAR; INTRAVENOUS at 17:41

## 2019-10-20 RX ADMIN — FENTANYL CITRATE 50 MCG: 50 INJECTION, SOLUTION INTRAMUSCULAR; INTRAVENOUS at 00:00

## 2019-10-20 RX ADMIN — POTASSIUM CHLORIDE 20 MEQ: 1.5 POWDER, FOR SOLUTION ORAL at 05:32

## 2019-10-20 RX ADMIN — PIPERACILLIN SODIUM AND TAZOBACTAM SODIUM 3.38 G: 3; .375 INJECTION, POWDER, LYOPHILIZED, FOR SOLUTION INTRAVENOUS at 01:49

## 2019-10-20 RX ADMIN — AMIODARONE HYDROCHLORIDE 0.5 MG/MIN: 50 INJECTION, SOLUTION INTRAVENOUS at 06:13

## 2019-10-20 RX ADMIN — POTASSIUM PHOSPHATE, MONOBASIC AND POTASSIUM PHOSPHATE, DIBASIC 10 MMOL: 224; 236 INJECTION, SOLUTION INTRAVENOUS at 06:08

## 2019-10-20 RX ADMIN — VANCOMYCIN HYDROCHLORIDE 2000 MG: 10 INJECTION, POWDER, LYOPHILIZED, FOR SOLUTION INTRAVENOUS at 08:17

## 2019-10-20 RX ADMIN — PIPERACILLIN SODIUM AND TAZOBACTAM SODIUM 3.38 G: 3; .375 INJECTION, POWDER, LYOPHILIZED, FOR SOLUTION INTRAVENOUS at 20:02

## 2019-10-20 RX ADMIN — MIDAZOLAM HYDROCHLORIDE 3 MG: 2 INJECTION, SOLUTION INTRAMUSCULAR; INTRAVENOUS at 19:16

## 2019-10-20 RX ADMIN — MULTIVITAMIN 15 ML: LIQUID ORAL at 07:51

## 2019-10-20 RX ADMIN — PANTOPRAZOLE SODIUM 40 MG: 40 INJECTION, POWDER, FOR SOLUTION INTRAVENOUS at 16:41

## 2019-10-20 RX ADMIN — ASPIRIN 81 MG CHEWABLE TABLET 81 MG: 81 TABLET CHEWABLE at 07:52

## 2019-10-20 RX ADMIN — FUROSEMIDE 80 MG: 10 INJECTION, SOLUTION INTRAVENOUS at 13:44

## 2019-10-20 RX ADMIN — ALLOPURINOL 200 MG: 100 TABLET ORAL at 07:54

## 2019-10-20 RX ADMIN — LEVETIRACETAM 750 MG: 100 INJECTION, SOLUTION INTRAVENOUS at 13:29

## 2019-10-20 RX ADMIN — THIAMINE HCL TAB 100 MG 100 MG: 100 TAB at 07:52

## 2019-10-20 RX ADMIN — PANTOPRAZOLE SODIUM 40 MG: 40 INJECTION, POWDER, FOR SOLUTION INTRAVENOUS at 03:52

## 2019-10-20 RX ADMIN — HUMAN ALBUMIN MICROSPHERES AND PERFLUTREN 5 ML: 10; .22 INJECTION, SOLUTION INTRAVENOUS at 08:30

## 2019-10-20 RX ADMIN — DEXMEDETOMIDINE 0.7 MCG/KG/HR: 100 INJECTION, SOLUTION, CONCENTRATE INTRAVENOUS at 16:44

## 2019-10-20 RX ADMIN — VANCOMYCIN HYDROCHLORIDE 2000 MG: 10 INJECTION, POWDER, LYOPHILIZED, FOR SOLUTION INTRAVENOUS at 20:12

## 2019-10-20 RX ADMIN — PIPERACILLIN SODIUM AND TAZOBACTAM SODIUM 3.38 G: 3; .375 INJECTION, POWDER, LYOPHILIZED, FOR SOLUTION INTRAVENOUS at 07:40

## 2019-10-20 RX ADMIN — FUROSEMIDE 80 MG: 10 INJECTION, SOLUTION INTRAVENOUS at 20:02

## 2019-10-20 RX ADMIN — TICAGRELOR 90 MG: 90 TABLET ORAL at 07:52

## 2019-10-20 RX ADMIN — FOLIC ACID 1 MG: 1 TABLET ORAL at 07:52

## 2019-10-20 RX ADMIN — Medication 50 MCG/HR: at 17:42

## 2019-10-20 RX ADMIN — DEXMEDETOMIDINE 0.2 MCG/KG/HR: 100 INJECTION, SOLUTION, CONCENTRATE INTRAVENOUS at 07:34

## 2019-10-20 RX ADMIN — POTASSIUM CHLORIDE 20 MEQ: 1.5 POWDER, FOR SOLUTION ORAL at 18:32

## 2019-10-20 RX ADMIN — TICAGRELOR 90 MG: 90 TABLET ORAL at 20:02

## 2019-10-20 RX ADMIN — Medication 400 MG: at 20:02

## 2019-10-20 RX ADMIN — MIDAZOLAM 4 MG: 1 INJECTION INTRAMUSCULAR; INTRAVENOUS at 17:41

## 2019-10-20 RX ADMIN — MIDAZOLAM 3 MG: 1 INJECTION INTRAMUSCULAR; INTRAVENOUS at 00:00

## 2019-10-20 RX ADMIN — Medication 1 PACKET: at 07:52

## 2019-10-20 RX ADMIN — Medication 0.03 MCG/KG/MIN: at 09:14

## 2019-10-20 ASSESSMENT — ACTIVITIES OF DAILY LIVING (ADL)
ADLS_ACUITY_SCORE: 19

## 2019-10-20 ASSESSMENT — MIFFLIN-ST. JEOR: SCORE: 1974.24

## 2019-10-20 NOTE — PROGRESS NOTES
Continuous EEG Monitoring  CPT Code 24127  [facility]/VEEG-hyg break/OR  MD: Lala     Date Continuous EEG monitoring initiated 10/14/19  Hair braided: no male; short hair  Leads O1 and O2 changed:no  Optifoam around O1 and O2: intact  Skin breakdown/concerns: small sore on Fp1; red on T3 (left temporal)  Asked about continuing EEG monitoring past Day 5(Day 3):n/a  Due for hygiene break (day 5): done today  Removed electrodes: removed for hyg break

## 2019-10-20 NOTE — PROCEDURES
Procedure Date: 10/18/2019      EEG #:  -5 (VIDEO-EEG DAY 5).      DATE OF RECORDING/SERVICE DATE AND DATE:  10/18/2019.      SOURCE FILE DURATION:  12 hours and 38 minutes.  The patient had a hygiene break today and a surgery scheduled, therefore was taken off EEG.      PATIENT INFORMATION:  A 53-year-old male who was admitted on 10/13/2019 for cardiac arrest and placed on ECMO. The patient's EEG is being done to monitor for seizures.     MEDICATIONS:  The patient's temperature is currently  degrees Fahrenheit, and the patient is on fentanyl 50 mcg per hour, midazolam 3 mg per hour.      TECHNICAL SUMMARY: This video EEG monitoring procedure was performed with 23 scalp electrodes in 10-20 system placements, and additional scalp, precordial and other surface electrodes used for electrical referencing and artifact detection. Video was reviewed intermittently by EEG technologist and physician for electroclinical seizures.      EEG FINDINGS:  The patient has diffuse generalized delta slowing with electrocerebral potentials up to 60 microvolts.  There is no parietooccipital rhythm, no sleep-wake distinction, and no sleep architecture appreciated.  EEG does have also electromyogenic artifact when staff is working with patient.      EPILEPTIFORM DISCHARGES:  None.      ICTAL:  None.      IMPRESSION:  Video EEG day 5 is abnormal due to the presence of diffuse generalized delta-theta slowing, consistent with a severe encephalopathy.  No electrographic seizures or epileptiform discharges seen.         DUSTY SCHWARTZ MD             D: 10/19/2019   T: 10/20/2019   MT: IMELDA      Name:     JF COLLAZO   MRN:      [id number]        Account:        [id number]   :      1965           Procedure Date: 10/18/2019      Document: [id number]

## 2019-10-20 NOTE — PROGRESS NOTES
Continuous EEG Monitoring  CPT Code 41340  [facility]/FACUNDO PADILLA: Spike Lopez was not present at time of d/c

## 2019-10-20 NOTE — PROGRESS NOTES
[facility]   Cardiology Progress Note    Assessment & Plan   53 year old male who was admitted on 10/13/19 as a transfer for an OSH for refractory VT/VF arrest s/p PCI to LAD and placement on peripheral [facility] ECMO.     Changes Today:  - removing IABP  - weaning sedation - off versed, on precedex  - CT chest noncontrast tomorrow for persistent R lung opacities  - diuresis goal net -1L, giving lasix  - continuing vanc/zosyn beyond initial 5d course  - allopurinol 200 qday for h/o gout     Neurology: Intubated, sedated, paralyzed. Cooled to 34 degrees, now rewarmed. Grimacing to pain and blinking eyes on command intermittently.  - NeuroCrit consulted  - EEG with small amount of epileptiform activity, due to pt becoming more alert, stopped EEG 10/063024  - Loaded and started keppra 750 mg BID on 10/17  - Continue versed gtt and fent gtt, weaning as able with precedex  - CT Head 10/16 with smal right paracentral lesion, new right hemicerebellum lesion concerning for stroke.   Cardiovascular / Hemodynamics: Refractory VF arrest s/p peripheral V-A ECMO at OSH on 10/13/19.  TTE: LVEF 5-10%, increase to 15% on 1L  - Wean pressors/inotropes as able, currently not on any  - Decannulation today, will continue IABP for now  - Continue ASA 81mg and ticagrelor 90 mg BID  - Held temp at 34 degrees for 24 hours, now rewarmed  - Hold lipitor for now given likely hepatic injury during arrest  - Hold ACE/ARB for now given likely reduced renal fxn after arrest  - Holding beta blocker given shock    Pulmonary: Now weaning vent requirements. Large hemothorax on 10/14 s/p right-sided chest tube.  CXR: stable devices, worsening RLL pleural effusion  - Wean vent as able  - Will have another chest tube placed during decannulation on 10/18  - Monitor CT output  - Daily CXR  - Q2H ABGs for now  - Consider scheduled duonebs if signs of lung dz, currently PRN     GI and Nutrition: Per Pt's wife, he is an alcoholic. Concern for possible GIB given black OG  output, improved.  - Monitor BID LFTs  - NJ placed at bedside on 10/16, started TFs  - Bowel regimen - started  - GI Prophylaxis: PPI IV BID   Renal, Fluid and Electrolytes: - Monitor urine output  - Maintain K>3 and Mg>2    Infectious Disease: No signs of infection. Leukocytosis c/w arrest. Blood cultures collected.   - Initially vancomycin/zosyn x5 days for presumed aspiration - then extended given multiple invasive procedures  - Daily blood cultures  - Monitor for signs of infection   Hematology and Oncology: Receiving heparin for ECMO and ASA/ticagrelor for KAMRON. Large intramuscular hematoma of right pec and hemothorax on 10/14 with possible GIB as above.  - Cryo PRN fibrinogen < 200; FFP for INR >2  - Transfuse for Hgb<10, Plts<70  - Heparin gtt for ECMO with ACT goal 140-160 given bleeding concerns as above  - US LE w/ arterial duplex per ECMO protocol   - DVT PPX: Heparin as above  - Right chest wall hematoma: monitor exam and Hgb closely   Endocrinology: No known medical history. BG elevated.  - Insulin gtt PRN   Lines: Restraint: needed  Peripheral IV 10/13/19 Right Wrist (Active)   Number of days: 7       Peripheral IV 10/16/19 Left Lower forearm (Active)   Number of days: 4       Left Arterial Line Radial (Active)   Number of days:        CVC Triple Lumen 10/13/19 Left Subclavian (Active)   Number of days: 7       LR femoral artery Arterial Sheath  (Active)   Number of days: 7       Current lines are required for patient management       Family updated today: Yes  Code Status: FULL    Pt was seen and examined with Dr. Juan Pablo Santillan MD, PhD, who agrees with the assessment and plan.    Bronson Dsouza MD. PhD  Cardiology Fellow      Interval History    Afebrile last night. Care team notes last 24 hours reviewed. More interactive today. Removing IABP    ROS: Unable to obtain as Pt is intubated and sedated.    Data reviewed today: I reviewed all new labs and imaging results over the last 24 hours. I  "personally reviewed:    Physical Exam   Temp: 99.5  F (37.5  C) Temp src: Bladder     Heart Rate: 82 Resp: 18 SpO2: 99 % O2 Device: Mechanical Ventilator    Vitals:    10/18/19 0400 10/19/19 0000 10/20/19 0400   Weight: 104.9 kg (231 lb 4.2 oz) 105.1 kg (231 lb 11.3 oz) 105.3 kg (232 lb 2.3 oz)     Vital Signs with Ranges  Temp:  [99.5  F (37.5  C)-100.9  F (38.3  C)] 99.5  F (37.5  C)  Heart Rate:  [] 82  Resp:  [14-23] 18  MAP:  [60 mmHg-112 mmHg] 75 mmHg  Arterial Line BP: ()/(34-87) 97/55  FiO2 (%):  [60 %] 60 %  SpO2:  [93 %-100 %] 99 %  I/O last 3 completed shifts:  In: 3948.16 [I.V.:2068.16; NG/GT:560]  Out: 3343 [Urine:1808; Emesis/NG output:700; Stool:600; Chest Tube:235]    Heart Rate: 82, Blood pressure 110/62, pulse 84, temperature 99.5  F (37.5  C), resp. rate 18, height 1.89 m (6' 2.41\"), weight 105.3 kg (232 lb 2.3 oz), SpO2 99 %.  232 lbs 2.31 oz  GEN:  Intubated, sedated  CV:  RRR, IABP augmentation  LUNGS: Mechanical breath sounds, diminished along right side, right-sided chest tube in place  ABD: Soft BS, soft, mildly distended  EXT: warm, trace-1+ edema, dopplerable pulses  SKIN: Large hematoma along chest wall    Medications     amiodarone 0.5 mg/min (10/20/19 0700)     IV fluid REPLACEMENT ONLY 25 mL/hr at 10/18/19 1929     dextrose 10 mL/hr at 10/18/19 2016     EPINEPHrine IV infusion ADULT Stopped (10/18/19 2245)     fentaNYL 50 mcg/hr (10/20/19 0700)     midazolam 3 mg/hr (10/20/19 0700)     norepinephrine Stopped (10/19/19 1035)     propofol (DIPRIVAN) infusion Stopped (10/18/19 1315)     sodium chloride         aspirin  81 mg Per Feeding Tube Daily     folic acid  1 mg Oral Daily     insulin aspart  1-4 Units Subcutaneous Q4H     levETIRAcetam  750 mg Intravenous Q12H     multivitamins w/minerals  15 mL Per Feeding Tube Daily     pantoprazole (PROTONIX) IV  40 mg Intravenous Q12H     piperacillin-tazobactam  3.375 g Intravenous Q6H     polyethylene glycol  17 g Oral Daily     " protein modular  1 packet Per Feeding Tube Daily     senna-docusate  2 tablet Oral BID     sodium chloride (PF)  3 mL Intracatheter Q8H     ticagrelor  90 mg Oral or Feeding Tube BID     vancomycin (VANCOCIN) IV  2,000 mg Intravenous Q12H     vitamin B1  100 mg Oral Daily       Data   Recent Labs   Lab 10/20/19  0345 10/19/19  1541 10/19/19  0344  10/18/19  0336   WBC 8.0 7.7 6.7   < > 5.8   HGB 8.7* 8.5* 8.4*   < > 8.7*   MCV 99 98 97   < > 96   PLT 96* 85* 78*   < > 73*   INR 1.23*  --  1.12  --  1.12    140 140   < > 141   POTASSIUM 4.0 3.9 3.8   < > 3.6   CHLORIDE 108 105 104   < > 102   CO2 28 30 31   < > 32   BUN 24 22 20   < > 22   CR 0.72 0.62* 0.77   < > 0.89   ANIONGAP 7 5 5   < > 6   HEATHER 8.5 8.3* 8.4*   < > 8.7   * 106* 114*   < > 114*  121*   ALBUMIN 2.9* 2.9* 3.1*   < > 3.2*   PROTTOTAL 6.4* 6.4* 6.4*   < > 6.5*   BILITOTAL 2.4* 2.7* 3.0*   < > 3.7*   ALKPHOS 148 146 144   < > 115   ALT 40 44 41   < > 45   AST 93* 115* 138*   < > 192*   TROPI 13.095* 16.849* 25.438*   < > 53.750*    < > = values in this interval not displayed.       No results found for this or any previous visit (from the past 24 hour(s)).

## 2019-10-20 NOTE — PROCEDURES
Procedure Date: 10/19/2019      EEG #-6 (Video EEG day 6)       DATE OF RECORDING/SERVICE DATE:  10/19/2019       SOURCE FILE DURATION:  23 hours and 52 minutes.      PATIENT INFORMATION:  A 53-year-old male who was admitted on 10/13/2019 for cardiac arrest and placed on ECMO. The patient's EEG is being done to monitor for seizures.     TECHNICAL SUMMARY: This video EEG monitoring procedure was performed with 23 scalp electrodes in 10-20 system placements, and additional scalp, precordial and other surface electrodes used for electrical referencing and artifact detection. Video was reviewed intermittently by EEG technologist and physician for electroclinical seizures.      MEDICATIONS:  Keppra, thiamine.  The patient is on midazolam 3 mg per hour drip and fentanyl 50 mcg per hour drip.      EEG FINDINGS:  The patient has diffuse generalized delta slowing that is 2-4 Hz in frequency.  Electrocerebral potentials are up to 60 microvolts.  There is no parietooccipital rhythm, frontal derivations do have beta activity noted.  At times, the patient does have superimposed rhythmic sharply contoured delta activity that is 1-2 Hz in frequency.  This does not appear to evolve and appears to be a more encephalopathic process.      ACTIVATION PROCEDURES:  Sensory stimuli is applied, and patient's EEG is reactive when the patient is asked to track.  He shakes his head up and down when he is asked to follow with finger.  He is responsive at that time.  EEG is reactive at that time.      EPILEPTIFORM DISCHARGES:  None.      ICTAL:  None.      IMPRESSION:  Video EEG day 6 is abnormal due to presence of diffuse generalized delta-theta slowing consistent with a severe encephalopathy.  No electrographic seizures or epileptiform discharges seen.         DUSTY SCHWARTZ MD             D: 10/20/2019   T: 10/20/2019   MT: WT      Name:     JF COLLAZO   MRN:      [id number]        Account:        [id number]   :       1965           Procedure Date: 10/19/2019      Document: [id number]

## 2019-10-20 NOTE — PLAN OF CARE
Assessment:   Cardiac: SR with frequent PVCs, Amiodarone gtt infusing, see MAR/doc flow sheet. IABP 1:1 via EKG.  Resp: CMV 60%/600/14/8.  Neuro: Fentanyl and Versed providing adequate sedation, follows commands. Video EEG ongoing.   : Lawrence patent, UO  cc/hr.  GI: Tube feedings via NJ @ 55 cc/hr. OG to LIS.  Skin: Pleural CT to -20 cc, no air leak present.   Endocrine: No supplemental insulin required.     Interventions: No significant events overnight.     Plan: Will continue to monitor/assess and update MD as needed.

## 2019-10-20 NOTE — PROGRESS NOTES
Per Dr. Dsouza switched off versed onto precedex. Bps dropped with change- Dr. Dsouza notified, norepi started.  Sats dropped this am, increased to 70% fio2. Continues to have copious amounts of sputum/coughing.

## 2019-10-21 ENCOUNTER — APPOINTMENT (OUTPATIENT)
Dept: GENERAL RADIOLOGY | Facility: CLINIC | Age: 54
DRG: 003 | End: 2019-10-21
Attending: STUDENT IN AN ORGANIZED HEALTH CARE EDUCATION/TRAINING PROGRAM
Payer: COMMERCIAL

## 2019-10-21 ENCOUNTER — APPOINTMENT (OUTPATIENT)
Dept: CT IMAGING | Facility: CLINIC | Age: 54
DRG: 003 | End: 2019-10-21
Attending: STUDENT IN AN ORGANIZED HEALTH CARE EDUCATION/TRAINING PROGRAM
Payer: COMMERCIAL

## 2019-10-21 LAB
ALBUMIN SERPL-MCNC: 2.4 G/DL (ref 3.4–5)
ALBUMIN SERPL-MCNC: 2.6 G/DL (ref 3.4–5)
ALP SERPL-CCNC: 135 U/L (ref 40–150)
ALP SERPL-CCNC: 154 U/L (ref 40–150)
ALT SERPL W P-5'-P-CCNC: 33 U/L (ref 0–70)
ALT SERPL W P-5'-P-CCNC: 37 U/L (ref 0–70)
ANION GAP SERPL CALCULATED.3IONS-SCNC: 6 MMOL/L (ref 3–14)
ANION GAP SERPL CALCULATED.3IONS-SCNC: 6 MMOL/L (ref 3–14)
AST SERPL W P-5'-P-CCNC: 49 U/L (ref 0–45)
AST SERPL W P-5'-P-CCNC: 56 U/L (ref 0–45)
BACTERIA SPEC CULT: NO GROWTH
BASE EXCESS BLDA CALC-SCNC: 6.2 MMOL/L
BASE EXCESS BLDA CALC-SCNC: 6.9 MMOL/L
BASE EXCESS BLDA CALC-SCNC: 7.8 MMOL/L
BASE EXCESS BLDA CALC-SCNC: 8 MMOL/L
BASE EXCESS BLDA CALC-SCNC: 8.7 MMOL/L
BASE EXCESS BLDA CALC-SCNC: 8.8 MMOL/L
BILIRUB SERPL-MCNC: 2 MG/DL (ref 0.2–1.3)
BILIRUB SERPL-MCNC: 2.1 MG/DL (ref 0.2–1.3)
BUN SERPL-MCNC: 30 MG/DL (ref 7–30)
BUN SERPL-MCNC: 32 MG/DL (ref 7–30)
CA-I BLD-MCNC: 4.5 MG/DL (ref 4.4–5.2)
CA-I BLD-MCNC: 4.6 MG/DL (ref 4.4–5.2)
CALCIUM SERPL-MCNC: 8.3 MG/DL (ref 8.5–10.1)
CALCIUM SERPL-MCNC: 8.4 MG/DL (ref 8.5–10.1)
CHLORIDE SERPL-SCNC: 106 MMOL/L (ref 94–109)
CHLORIDE SERPL-SCNC: 107 MMOL/L (ref 94–109)
CO2 SERPL-SCNC: 31 MMOL/L (ref 20–32)
CO2 SERPL-SCNC: 32 MMOL/L (ref 20–32)
CREAT SERPL-MCNC: 0.76 MG/DL (ref 0.66–1.25)
CREAT SERPL-MCNC: 0.77 MG/DL (ref 0.66–1.25)
ERYTHROCYTE [DISTWIDTH] IN BLOOD BY AUTOMATED COUNT: 16.2 % (ref 10–15)
ERYTHROCYTE [DISTWIDTH] IN BLOOD BY AUTOMATED COUNT: 16.3 % (ref 10–15)
GFR SERPL CREATININE-BSD FRML MDRD: >90 ML/MIN/{1.73_M2}
GFR SERPL CREATININE-BSD FRML MDRD: >90 ML/MIN/{1.73_M2}
GLUCOSE BLDC GLUCOMTR-MCNC: 118 MG/DL (ref 70–99)
GLUCOSE BLDC GLUCOMTR-MCNC: 121 MG/DL (ref 70–99)
GLUCOSE BLDC GLUCOMTR-MCNC: 134 MG/DL (ref 70–99)
GLUCOSE BLDC GLUCOMTR-MCNC: 160 MG/DL (ref 70–99)
GLUCOSE BLDC GLUCOMTR-MCNC: 168 MG/DL (ref 70–99)
GLUCOSE SERPL-MCNC: 137 MG/DL (ref 70–99)
GLUCOSE SERPL-MCNC: 140 MG/DL (ref 70–99)
HCO3 BLD-SCNC: 31 MMOL/L (ref 21–28)
HCO3 BLD-SCNC: 31 MMOL/L (ref 21–28)
HCO3 BLD-SCNC: 33 MMOL/L (ref 21–28)
HCO3 BLD-SCNC: 34 MMOL/L (ref 21–28)
HCT VFR BLD AUTO: 28.5 % (ref 40–53)
HCT VFR BLD AUTO: 29 % (ref 40–53)
HGB BLD-MCNC: 9 G/DL (ref 13.3–17.7)
HGB BLD-MCNC: 9 G/DL (ref 13.3–17.7)
INR PPP: 1.15 (ref 0.86–1.14)
INTERPRETATION ECG - MUSE: NORMAL
LACTATE BLD-SCNC: 1.4 MMOL/L (ref 0.7–2)
LACTATE BLD-SCNC: 1.5 MMOL/L (ref 0.7–2)
LDH SERPL L TO P-CCNC: 468 U/L (ref 85–227)
LMWH PPP CHRO-ACNC: <0.1 IU/ML
MAGNESIUM SERPL-MCNC: 2.3 MG/DL (ref 1.6–2.3)
MAGNESIUM SERPL-MCNC: 2.4 MG/DL (ref 1.6–2.3)
MAGNESIUM SERPL-MCNC: 2.4 MG/DL (ref 1.6–2.3)
MCH RBC QN AUTO: 30.4 PG (ref 26.5–33)
MCH RBC QN AUTO: 30.8 PG (ref 26.5–33)
MCHC RBC AUTO-ENTMCNC: 31 G/DL (ref 31.5–36.5)
MCHC RBC AUTO-ENTMCNC: 31.6 G/DL (ref 31.5–36.5)
MCV RBC AUTO: 98 FL (ref 78–100)
MCV RBC AUTO: 98 FL (ref 78–100)
O2/TOTAL GAS SETTING VFR VENT: 45 %
O2/TOTAL GAS SETTING VFR VENT: 50 %
O2/TOTAL GAS SETTING VFR VENT: 60 %
OXYHGB MFR BLD: 92 % (ref 92–100)
OXYHGB MFR BLD: 94 % (ref 92–100)
OXYHGB MFR BLD: 95 % (ref 92–100)
OXYHGB MFR BLD: 96 % (ref 92–100)
OXYHGB MFR BLD: 97 % (ref 92–100)
OXYHGB MFR BLD: 97 % (ref 92–100)
PCO2 BLD: 43 MM HG (ref 35–45)
PCO2 BLD: 44 MM HG (ref 35–45)
PCO2 BLD: 44 MM HG (ref 35–45)
PCO2 BLD: 47 MM HG (ref 35–45)
PCO2 BLD: 47 MM HG (ref 35–45)
PCO2 BLD: 49 MM HG (ref 35–45)
PH BLD: 7.44 PH (ref 7.35–7.45)
PH BLD: 7.45 PH (ref 7.35–7.45)
PH BLD: 7.45 PH (ref 7.35–7.45)
PH BLD: 7.46 PH (ref 7.35–7.45)
PH BLD: 7.47 PH (ref 7.35–7.45)
PH BLD: 7.48 PH (ref 7.35–7.45)
PHOSPHATE SERPL-MCNC: 3.1 MG/DL (ref 2.5–4.5)
PHOSPHATE SERPL-MCNC: 3.1 MG/DL (ref 2.5–4.5)
PLATELET # BLD AUTO: 145 10E9/L (ref 150–450)
PLATELET # BLD AUTO: 169 10E9/L (ref 150–450)
PO2 BLD: 100 MM HG (ref 80–105)
PO2 BLD: 108 MM HG (ref 80–105)
PO2 BLD: 69 MM HG (ref 80–105)
PO2 BLD: 79 MM HG (ref 80–105)
PO2 BLD: 81 MM HG (ref 80–105)
PO2 BLD: 91 MM HG (ref 80–105)
POTASSIUM SERPL-SCNC: 3.6 MMOL/L (ref 3.4–5.3)
POTASSIUM SERPL-SCNC: 3.9 MMOL/L (ref 3.4–5.3)
POTASSIUM SERPL-SCNC: 3.9 MMOL/L (ref 3.4–5.3)
PROT SERPL-MCNC: 6.4 G/DL (ref 6.8–8.8)
PROT SERPL-MCNC: 6.4 G/DL (ref 6.8–8.8)
RBC # BLD AUTO: 2.92 10E12/L (ref 4.4–5.9)
RBC # BLD AUTO: 2.96 10E12/L (ref 4.4–5.9)
SODIUM SERPL-SCNC: 142 MMOL/L (ref 133–144)
SODIUM SERPL-SCNC: 144 MMOL/L (ref 133–144)
SPECIMEN SOURCE: NORMAL
TROPONIN I SERPL-MCNC: 4.8 UG/L (ref 0–0.04)
TROPONIN I SERPL-MCNC: 6.94 UG/L (ref 0–0.04)
VANCOMYCIN SERPL-MCNC: 23.5 MG/L
WBC # BLD AUTO: 10.3 10E9/L (ref 4–11)
WBC # BLD AUTO: 9.9 10E9/L (ref 4–11)

## 2019-10-21 PROCEDURE — 83735 ASSAY OF MAGNESIUM: CPT | Performed by: STUDENT IN AN ORGANIZED HEALTH CARE EDUCATION/TRAINING PROGRAM

## 2019-10-21 PROCEDURE — 71250 CT THORAX DX C-: CPT

## 2019-10-21 PROCEDURE — 20000004 ZZH R&B ICU UMMC

## 2019-10-21 PROCEDURE — 40000281 ZZH STATISTIC TRANSPORT TIME EA 15 MIN

## 2019-10-21 PROCEDURE — 84100 ASSAY OF PHOSPHORUS: CPT | Performed by: STUDENT IN AN ORGANIZED HEALTH CARE EDUCATION/TRAINING PROGRAM

## 2019-10-21 PROCEDURE — 93010 ELECTROCARDIOGRAM REPORT: CPT | Performed by: INTERNAL MEDICINE

## 2019-10-21 PROCEDURE — 40000275 ZZH STATISTIC RCP TIME EA 10 MIN

## 2019-10-21 PROCEDURE — 25000128 H RX IP 250 OP 636: Performed by: INTERNAL MEDICINE

## 2019-10-21 PROCEDURE — 71045 X-RAY EXAM CHEST 1 VIEW: CPT

## 2019-10-21 PROCEDURE — 80053 COMPREHEN METABOLIC PANEL: CPT | Performed by: INTERNAL MEDICINE

## 2019-10-21 PROCEDURE — 99233 SBSQ HOSP IP/OBS HIGH 50: CPT | Mod: GC | Performed by: INTERNAL MEDICINE

## 2019-10-21 PROCEDURE — 94003 VENT MGMT INPAT SUBQ DAY: CPT

## 2019-10-21 PROCEDURE — 00000146 ZZHCL STATISTIC GLUCOSE BY METER IP

## 2019-10-21 PROCEDURE — 25000128 H RX IP 250 OP 636: Performed by: STUDENT IN AN ORGANIZED HEALTH CARE EDUCATION/TRAINING PROGRAM

## 2019-10-21 PROCEDURE — 83735 ASSAY OF MAGNESIUM: CPT | Performed by: INTERNAL MEDICINE

## 2019-10-21 PROCEDURE — 25000132 ZZH RX MED GY IP 250 OP 250 PS 637: Performed by: STUDENT IN AN ORGANIZED HEALTH CARE EDUCATION/TRAINING PROGRAM

## 2019-10-21 PROCEDURE — 84484 ASSAY OF TROPONIN QUANT: CPT | Performed by: STUDENT IN AN ORGANIZED HEALTH CARE EDUCATION/TRAINING PROGRAM

## 2019-10-21 PROCEDURE — 85027 COMPLETE CBC AUTOMATED: CPT | Performed by: STUDENT IN AN ORGANIZED HEALTH CARE EDUCATION/TRAINING PROGRAM

## 2019-10-21 PROCEDURE — 80053 COMPREHEN METABOLIC PANEL: CPT | Performed by: STUDENT IN AN ORGANIZED HEALTH CARE EDUCATION/TRAINING PROGRAM

## 2019-10-21 PROCEDURE — 25800030 ZZH RX IP 258 OP 636: Performed by: INTERNAL MEDICINE

## 2019-10-21 PROCEDURE — 25800030 ZZH RX IP 258 OP 636: Performed by: STUDENT IN AN ORGANIZED HEALTH CARE EDUCATION/TRAINING PROGRAM

## 2019-10-21 PROCEDURE — 85027 COMPLETE CBC AUTOMATED: CPT | Performed by: INTERNAL MEDICINE

## 2019-10-21 PROCEDURE — 25000125 ZZHC RX 250: Performed by: STUDENT IN AN ORGANIZED HEALTH CARE EDUCATION/TRAINING PROGRAM

## 2019-10-21 PROCEDURE — 82330 ASSAY OF CALCIUM: CPT | Performed by: INTERNAL MEDICINE

## 2019-10-21 PROCEDURE — 36415 COLL VENOUS BLD VENIPUNCTURE: CPT | Performed by: STUDENT IN AN ORGANIZED HEALTH CARE EDUCATION/TRAINING PROGRAM

## 2019-10-21 PROCEDURE — 87040 BLOOD CULTURE FOR BACTERIA: CPT | Performed by: STUDENT IN AN ORGANIZED HEALTH CARE EDUCATION/TRAINING PROGRAM

## 2019-10-21 PROCEDURE — 40000014 ZZH STATISTIC ARTERIAL MONITORING DAILY

## 2019-10-21 PROCEDURE — G0463 HOSPITAL OUTPT CLINIC VISIT: HCPCS

## 2019-10-21 PROCEDURE — 80202 ASSAY OF VANCOMYCIN: CPT | Performed by: INTERNAL MEDICINE

## 2019-10-21 PROCEDURE — 85520 HEPARIN ASSAY: CPT | Performed by: STUDENT IN AN ORGANIZED HEALTH CARE EDUCATION/TRAINING PROGRAM

## 2019-10-21 PROCEDURE — 85610 PROTHROMBIN TIME: CPT | Performed by: STUDENT IN AN ORGANIZED HEALTH CARE EDUCATION/TRAINING PROGRAM

## 2019-10-21 PROCEDURE — 83605 ASSAY OF LACTIC ACID: CPT | Performed by: INTERNAL MEDICINE

## 2019-10-21 PROCEDURE — 82805 BLOOD GASES W/O2 SATURATION: CPT | Performed by: INTERNAL MEDICINE

## 2019-10-21 PROCEDURE — 83615 LACTATE (LD) (LDH) ENZYME: CPT | Performed by: STUDENT IN AN ORGANIZED HEALTH CARE EDUCATION/TRAINING PROGRAM

## 2019-10-21 PROCEDURE — C9113 INJ PANTOPRAZOLE SODIUM, VIA: HCPCS | Performed by: STUDENT IN AN ORGANIZED HEALTH CARE EDUCATION/TRAINING PROGRAM

## 2019-10-21 PROCEDURE — 84484 ASSAY OF TROPONIN QUANT: CPT | Performed by: INTERNAL MEDICINE

## 2019-10-21 PROCEDURE — 25000131 ZZH RX MED GY IP 250 OP 636 PS 637: Performed by: STUDENT IN AN ORGANIZED HEALTH CARE EDUCATION/TRAINING PROGRAM

## 2019-10-21 RX ORDER — QUETIAPINE FUMARATE 50 MG/1
50 TABLET, FILM COATED ORAL EVERY EVENING
Status: DISCONTINUED | OUTPATIENT
Start: 2019-10-21 | End: 2019-10-22

## 2019-10-21 RX ORDER — QUETIAPINE FUMARATE 25 MG/1
25 TABLET, FILM COATED ORAL ONCE
Status: COMPLETED | OUTPATIENT
Start: 2019-10-21 | End: 2019-10-21

## 2019-10-21 RX ORDER — AMINO AC/PROTEIN HYDR/WHEY PRO 10G-100/30
1 LIQUID (ML) ORAL 3 TIMES DAILY
Status: DISCONTINUED | OUTPATIENT
Start: 2019-10-21 | End: 2019-11-03

## 2019-10-21 RX ORDER — LEVETIRACETAM 100 MG/ML
750 SOLUTION ORAL 2 TIMES DAILY
Status: DISCONTINUED | OUTPATIENT
Start: 2019-10-21 | End: 2019-11-03

## 2019-10-21 RX ORDER — FUROSEMIDE 10 MG/ML
40 INJECTION INTRAMUSCULAR; INTRAVENOUS EVERY 12 HOURS
Status: DISCONTINUED | OUTPATIENT
Start: 2019-10-21 | End: 2019-10-22

## 2019-10-21 RX ORDER — AMOXICILLIN 250 MG
2 CAPSULE ORAL 2 TIMES DAILY PRN
Status: DISCONTINUED | OUTPATIENT
Start: 2019-10-21 | End: 2019-11-05 | Stop reason: HOSPADM

## 2019-10-21 RX ADMIN — MIDAZOLAM HYDROCHLORIDE 2 MG: 2 INJECTION, SOLUTION INTRAMUSCULAR; INTRAVENOUS at 02:54

## 2019-10-21 RX ADMIN — MIDAZOLAM HYDROCHLORIDE 2 MG: 2 INJECTION, SOLUTION INTRAMUSCULAR; INTRAVENOUS at 04:44

## 2019-10-21 RX ADMIN — LEVETIRACETAM 750 MG: 100 INJECTION, SOLUTION INTRAVENOUS at 00:18

## 2019-10-21 RX ADMIN — DEXMEDETOMIDINE 1 MCG/KG/HR: 100 INJECTION, SOLUTION, CONCENTRATE INTRAVENOUS at 13:56

## 2019-10-21 RX ADMIN — Medication 400 MG: at 20:28

## 2019-10-21 RX ADMIN — Medication 1 PACKET: at 20:27

## 2019-10-21 RX ADMIN — INSULIN ASPART 1 UNITS: 100 INJECTION, SOLUTION INTRAVENOUS; SUBCUTANEOUS at 21:06

## 2019-10-21 RX ADMIN — FOLIC ACID 1 MG: 1 TABLET ORAL at 09:23

## 2019-10-21 RX ADMIN — ACETAMINOPHEN 650 MG: 325 TABLET, FILM COATED ORAL at 06:09

## 2019-10-21 RX ADMIN — DEXMEDETOMIDINE 1 MCG/KG/HR: 100 INJECTION, SOLUTION, CONCENTRATE INTRAVENOUS at 08:03

## 2019-10-21 RX ADMIN — LEVETIRACETAM 750 MG: 100 INJECTION, SOLUTION INTRAVENOUS at 12:54

## 2019-10-21 RX ADMIN — PANTOPRAZOLE SODIUM 40 MG: 40 INJECTION, POWDER, FOR SOLUTION INTRAVENOUS at 04:18

## 2019-10-21 RX ADMIN — FENTANYL CITRATE 50 MCG: 50 INJECTION, SOLUTION INTRAMUSCULAR; INTRAVENOUS at 18:27

## 2019-10-21 RX ADMIN — FENTANYL CITRATE 50 MCG: 50 INJECTION, SOLUTION INTRAMUSCULAR; INTRAVENOUS at 16:40

## 2019-10-21 RX ADMIN — ASPIRIN 81 MG CHEWABLE TABLET 81 MG: 81 TABLET CHEWABLE at 09:19

## 2019-10-21 RX ADMIN — FUROSEMIDE 40 MG: 10 INJECTION, SOLUTION INTRAVENOUS at 09:24

## 2019-10-21 RX ADMIN — PIPERACILLIN SODIUM AND TAZOBACTAM SODIUM 3.38 G: 3; .375 INJECTION, POWDER, LYOPHILIZED, FOR SOLUTION INTRAVENOUS at 20:25

## 2019-10-21 RX ADMIN — MIDAZOLAM HYDROCHLORIDE 2 MG: 2 INJECTION, SOLUTION INTRAMUSCULAR; INTRAVENOUS at 22:38

## 2019-10-21 RX ADMIN — MULTIVITAMIN 15 ML: LIQUID ORAL at 09:19

## 2019-10-21 RX ADMIN — TICAGRELOR 90 MG: 90 TABLET ORAL at 09:23

## 2019-10-21 RX ADMIN — MIDAZOLAM 2 MG: 1 INJECTION INTRAMUSCULAR; INTRAVENOUS at 10:10

## 2019-10-21 RX ADMIN — Medication 1 PACKET: at 09:25

## 2019-10-21 RX ADMIN — LEVETIRACETAM 750 MG: 100 SOLUTION ORAL at 20:27

## 2019-10-21 RX ADMIN — FENTANYL CITRATE 50 MCG: 50 INJECTION, SOLUTION INTRAMUSCULAR; INTRAVENOUS at 22:15

## 2019-10-21 RX ADMIN — PIPERACILLIN SODIUM AND TAZOBACTAM SODIUM 3.38 G: 3; .375 INJECTION, POWDER, LYOPHILIZED, FOR SOLUTION INTRAVENOUS at 02:26

## 2019-10-21 RX ADMIN — DEXMEDETOMIDINE 1.2 MCG/KG/HR: 100 INJECTION, SOLUTION, CONCENTRATE INTRAVENOUS at 19:41

## 2019-10-21 RX ADMIN — Medication 400 MG: at 09:24

## 2019-10-21 RX ADMIN — INSULIN ASPART 1 UNITS: 100 INJECTION, SOLUTION INTRAVENOUS; SUBCUTANEOUS at 10:49

## 2019-10-21 RX ADMIN — VANCOMYCIN HYDROCHLORIDE 1500 MG: 10 INJECTION, POWDER, LYOPHILIZED, FOR SOLUTION INTRAVENOUS at 20:53

## 2019-10-21 RX ADMIN — FUROSEMIDE 40 MG: 10 INJECTION, SOLUTION INTRAVENOUS at 20:25

## 2019-10-21 RX ADMIN — QUETIAPINE FUMARATE 25 MG: 25 TABLET ORAL at 13:56

## 2019-10-21 RX ADMIN — POTASSIUM CHLORIDE 20 MEQ: 29.8 INJECTION, SOLUTION INTRAVENOUS at 05:11

## 2019-10-21 RX ADMIN — FENTANYL CITRATE 50 MCG: 50 INJECTION, SOLUTION INTRAMUSCULAR; INTRAVENOUS at 19:44

## 2019-10-21 RX ADMIN — PANTOPRAZOLE SODIUM 40 MG: 40 INJECTION, POWDER, FOR SOLUTION INTRAVENOUS at 16:40

## 2019-10-21 RX ADMIN — DEXMEDETOMIDINE 0.7 MCG/KG/HR: 100 INJECTION, SOLUTION, CONCENTRATE INTRAVENOUS at 00:49

## 2019-10-21 RX ADMIN — ACETAMINOPHEN 650 MG: 325 TABLET, FILM COATED ORAL at 18:28

## 2019-10-21 RX ADMIN — TICAGRELOR 90 MG: 90 TABLET ORAL at 20:25

## 2019-10-21 RX ADMIN — ALLOPURINOL 200 MG: 100 TABLET ORAL at 09:19

## 2019-10-21 RX ADMIN — POTASSIUM CHLORIDE 20 MEQ: 29.8 INJECTION, SOLUTION INTRAVENOUS at 00:58

## 2019-10-21 RX ADMIN — VANCOMYCIN HYDROCHLORIDE 1500 MG: 10 INJECTION, POWDER, LYOPHILIZED, FOR SOLUTION INTRAVENOUS at 10:15

## 2019-10-21 RX ADMIN — MIDAZOLAM 2 MG: 1 INJECTION INTRAMUSCULAR; INTRAVENOUS at 07:35

## 2019-10-21 RX ADMIN — QUETIAPINE FUMARATE 50 MG: 50 TABLET ORAL at 20:25

## 2019-10-21 RX ADMIN — PIPERACILLIN SODIUM AND TAZOBACTAM SODIUM 3.38 G: 3; .375 INJECTION, POWDER, LYOPHILIZED, FOR SOLUTION INTRAVENOUS at 13:54

## 2019-10-21 RX ADMIN — PIPERACILLIN SODIUM AND TAZOBACTAM SODIUM 3.38 G: 3; .375 INJECTION, POWDER, LYOPHILIZED, FOR SOLUTION INTRAVENOUS at 09:17

## 2019-10-21 RX ADMIN — FENTANYL CITRATE 50 MCG: 50 INJECTION, SOLUTION INTRAMUSCULAR; INTRAVENOUS at 20:40

## 2019-10-21 RX ADMIN — THIAMINE HCL TAB 100 MG 100 MG: 100 TAB at 09:19

## 2019-10-21 ASSESSMENT — ACTIVITIES OF DAILY LIVING (ADL)
ADLS_ACUITY_SCORE: 19
ADLS_ACUITY_SCORE: 17
ADLS_ACUITY_SCORE: 19
ADLS_ACUITY_SCORE: 19

## 2019-10-21 ASSESSMENT — MIFFLIN-ST. JEOR
SCORE: 2228.24
SCORE: 1955.24

## 2019-10-21 NOTE — PROGRESS NOTES
Social Work Services Progress Note    Hospital Day: 9  Date of Initial Social Work Evaluation:  10/18/19  Collaborated with:  Bedside RN, pt's wife Jackelyn    Data:  Joel Campbell is a 53 year old male who was cannulated for ECMO 10/13/19 due to refractory VF arrest    Intervention:  SW received call to meet with pt's wife. SW attempted X2 to meet with wife and could not find her in room or in family waiting rooms.    Wife located SW in the umana when SW was coming from another pt's room. Requested assistance with faxing letter of hospitalization that SW had provided last week. SW again provided education that other team members can assist with faxing and it isn't necessary for wife to wait until SW is available to do so. Wife voiced understanding but felt better to talk through the paperwork with someone. SW assisted wife with assessing which paperwork needed to go where and what she should do to follow up, provided emotional support. Wife denies additional psychosocial concerns at this time.    Assessment:  Pt remains critically ill in ICU    Plan:    Anticipated Disposition:  TBD    Barriers to d/c plan:  Pt remains critically ill    Follow Up:  SW will continue to remain available for patient and family support, discharge planning, other resources and support PRN.    Lucia Reynaga, JERRY, [facility]  ICU    P: [phone]  Pager: [phone]

## 2019-10-21 NOTE — PROGRESS NOTES
CLINICAL NUTRITION SERVICES - REASSESSMENT NOTE     Nutrition Prescription    Malnutrition Status:    Non-severe malnutrition in the context of acute on chronic illness     Interventions by Registered Dietitian (RD):  - Adjusted TF regimen to better meet energy and protein needs: TwoCal HN @ 55 mL/hr  + Prosource (3 pkts/day)  This provides 2760 kcals (28 kcal/kg), 144 g PRO (1.5 g/kg), 924 mL H2O, 289 g CHO and 7 g Fiber daily.    Future Recommendations:  - Obtain metabolic cart if/when meets study criteria.   - Follow skin status and need for additional micronutrient supplementation  - May consider Nutren 1.5 @ 75 mL/hr + Prosource (2 pkts/day) to provide 2780 kcals (28 kcal/kg), 144 g PRO (1.5 g/kg), 1368 mL H2O, 317 g CHO and no fiber daily.     EVALUATION OF THE PROGRESS TOWARD GOALS   Diet: NPO  Nutrition Support: Two Devonte @ 55 ml/hr + Prosource (1 pkt/day) via NJT. This provides 27 kcal/kg and 1.2 g protein/kg daily.   Intake: Received ~60% needs from TF over past 5 days (avg of 16 kcal/kg and 1 g protein/kg daily).       NEW FINDINGS   GI: Having BMs without scheduled bowel regimen.   Renal: On lasix. K+ within normal limits.   Skin: Stage mucosal pressure injury on lip. Franck score 12. Skin beneath bridle without skin breakdown or bridle string tension.   Supplementation: On certavite and folic acid 1 mg + thiamine 100 mg for h/o ETOH abuse.        MALNUTRITION  % Intake: < 75% for > 7 days (non-severe)   % Weight Loss: None noted since admit, but difficult to assess with fluctuation in fluid status.   Subcutaneous Fat Loss: None observed  Muscle Loss: None observed, Temporal and Scapular bone: Mild   Fluid Accumulation/Edema: Does not meet criteria  Malnutrition Diagnosis: Non-severe malnutrition in the context of acute on chronic illness     Previous Goals   Diet adv v nutrition support within 1-2days.  Evaluation: Met    Total avg nutritional intake to meet a minimum of 25 kcal/kg and 1.2 g PRO/kg daily  (per dosing wt 98 kg).  Evaluation: Not met    Previous Nutrition Diagnosis  Inadequate protein-energy intake   Evaluation: No change, but improving     CURRENT NUTRITION DIAGNOSIS  Inadequate protein-energy intake related to NPO, reliant on TF to meet nutritional needs as evidenced by received ~60% nutritional needs over the past 5 days (avg of 15 kcal/kg and 1 g protein/kg daily).       INTERVENTIONS  Implementation  Enteral Nutrition - Modify     Goals  Total avg nutritional intake to meet a minimum of 25 kcal/kg and 1.2 g PRO/kg daily (per dosing wt 98 kg).    Monitoring/Evaluation  Progress toward goals will be monitored and evaluated per protocol.    Brittany Caceres RD, LD  [id number]  Pgr: [phone]

## 2019-10-21 NOTE — PROGRESS NOTES
The NeuroCritical care team was in to assess Mr. Campbell this am, his neuro examination has significantly improved and any further imaging is not warrented. He can continue on the Keppra 750 mg BID and follow up with general Neurology outpatient. Thank you for allowing us to care for the patient. We will no longer be following the patient.    Daphne NEW, CNP  NeuroCritical Care Nurse Practitioner  [phone]

## 2019-10-21 NOTE — PLAN OF CARE
Problem: Adult Inpatient Plan of Care  Goal: Plan of Care Review  Outcome: Improving    IABP pulled at around 1745.  Bed-rest for 6 hours.  4 mg of versed and 200 mcg of fentanyl given prior to IABP pull per dr. Santillan in addition to a versed gtt started while pt on bedrest.  Versed to come off when bedrest finished.  Pt. Following commands, new order to discontinue continuous eeg.  Noted lots of orange/pink secreations.  Updated the team, one lasix dose given.  Pt. Having loose stools.  Tried to place a rectal pouch a couple times but seal failed.  New order for a rectal tube.  From 0280-6741 left groin dry and intact, no hematoma.  Left pedal pulse with doppler sluggish.  Updated Dr. Santillan in regards to that.   No further orders noted.

## 2019-10-21 NOTE — PROCEDURES
Intra-aortic Balloon Pump Removal    Attending Physician: Juan Pablo Santillan MD    The patient was hemodynamically stable when the IABP was paused.  He was sedated for comfort.  The dressing was removed and site prepped.  The balloon pump was placed on standby and was disconnected.  The IABP was removed and non-occlusive pressure was held for 30 min.  Pulses were intact and his leg was well-perfused after IABP removal.    Juan Pablo Santillan MD, PhD  Interventional/Critical Care Cardiology  [phone]    October 20, 2019

## 2019-10-21 NOTE — PROGRESS NOTES
Nursing Note    Noted ETT at 24cm at the lip.  RT called, advanced to 26cm at the lip (as previously charted) and chest xray done.  Updated next nurse taking patient.  Pt. Very anxious moving head back and forth and also coughing frequently.  One PRN versed dose given for ETT placement.  Pt sent for CT of chest then transferred to 4c room 409.  Wife updated and report given to Qi CARDOZA.

## 2019-10-21 NOTE — PROGRESS NOTES
"[facility] Unit 4E  Palliative Care Spiritual Health Follow Up    Summary and Recommendations:  Patient Joel \"Claudio\" Shannan's wife Jackelyn is cautiously optimistic for his recovery, wondering how long it will be before they are able to return home. They have support from an extensive network of friends and family.     Jackelyn relies on personal spirituality, including practice of prayer and integrative therapies, for meaning, and describes Claudio has having a private spirituality.     Palliative Care  will follow while Palliative Care is consulted.    Laila Kathleen  Palliative   Pager [phone]  [facility] Inpatient Team Consult pager [phone] (M-F 8-4:30)  After-hours Answering Service [phone]      Assessments:   Visit with patient Jeol \"Claudio\" Shannan's wife Jackelyn outside of his room.     Distress:  Distress in the context of serious illness was assessed today:    Existential/spiritual/emotional distress: Yes; mild - family is coping well, cautiously optimistic, and concerned about the ways Claudio will cope when he is extubated.      Mandaeism distress:  No.      Social/economic/relational distress:  Yes; Jackelyn is on unpaid leave from her employer.    Coping, Meaning, & Spirituality:   Coping, meaning, and/or spirituality in the context of serious illness were assessed today:    Spiritual background and preferences: personal spirituality      Beliefs, rituals, and practices: prayer and integrative therapies (reiki) for wife Jackelyn      Meaning-making: through relationships with family and friends, life experience      Strengths and resources: family, friends, colleagues    Prognosis, Goals, & Planning:     Prognosis, Goals, and/or Advance Care Planning were assessed today: Yes - restorative      Preferred language: English      Patient's decision making preferences: unable to assess      I have concerns about the patient/family's health literacy today: " No      Patient has a completed Health Care Directive: No.       Code status per chart review: full code    Key Palliative Symptom Data:  We are not helping to manage these symptoms currently in this patient.      Interventions:  I offered spiritual and emotional support through reflective listening that affirmed emotions, experience, and coping.

## 2019-10-21 NOTE — PROGRESS NOTES
[facility] Nurse Inpatient Pressure Injury Assessment   Reason for consultation: Evaluate and treat Left upper lip PI      ASSESSMENT  Pressure Injury: on left upper lip , hospital acquired ,   This is a Medical Device Related Pressure Injury (MDRPI) due to ETT  Pressure Injury is Stage Mucosal   Contributing factor of the pressure injury: pressure and immobility  Status: initial assessment  Recommend provider order: NA     TREATMENT PLAN  Left Upper Lip wound: BID continue with Q2H repositioning per unit routine and apply Vaseline to lips BID. AVoid positioing ETT directly over wound.   Orders Written  WO Nurse follow-up plan:twice weekly  Nursing to notify the Provider(s) and re-consult the WO Nurse if wound(s) deteriorates or new skin concern.    Patient History  According to provider note(s):  53 year old male who was admitted on 10/13/19 as a transfer for an OSH for refractory VT/VF arrest s/p PCI to LAD and placement on peripheral VA ECMO.     ECMO decannulation 10/18     Objective Data  Containment of urine/stool: Internal fecal management and Indwelling catheter    Current Diet/ Nutrition:  Orders Placed This Encounter      NPO for Medical/Clinical Reasons Except for: No Exceptions    Output:   I/O last 3 completed shifts:  In: 4379.14 [I.V.:2589.14; NG/GT:470]  Out: 6200 [Urine:5680; Emesis/NG output:250; Stool:60; Chest Tube:210]    Risk Assessment:   Sensory Perception: 2-->very limited  Moisture: 3-->occasionally moist  Activity: 1-->bedfast  Mobility: 2-->very limited  Nutrition: 2-->probably inadequate  Friction and Shear: 2-->potential problem  Franck Score: 12    Labs:   Recent Labs   Lab 10/21/19  0406  10/18/19  0336   ALBUMIN 2.6*   < > 3.2*   HGB 9.0*   < > 8.7*   INR 1.15*   < > 1.12   WBC 9.9   < > 5.8   CRP  --   --  190.0*    < > = values in this interval not displayed.       Physical Exam  Skin inspection: focused Mouth, bilateral groin, occiput.     Wound Location:  Left upper lip    Date of  last Photo 10/21  Wound History: pt has had ETT since 10/13. Wound found when patient arrived back from OR. No documented ETT repositioning from 6154-1065 prior to injury being found.   Measurements (length x width x depth, in cm) 0.2 cm x 0.2 cm  x  0.01 cm   Wound Base:  100 % mucosal  Tunneling N/A  Undermining N/A  Palpation of the wound bed: normal   Periwound skin: intact  Color: normal and consistent with surrounding tissue  Temperature: normal   Drainage:, none  Description of drainage: none  Odor: none  Pain: no grimacing or signs of discomfort, none     Occiput    EEG's removed prior to WOC assessment. Per family report patient constantly rubbed head back and forth against pillow. Pt's hair is matted above and surrounding reddened area. Wound is more consistent with trauma abrasion than pressure.   0.4 cm x 0.4 cm x 0 cm  WOC not following this wound. No plan to replace EEG leads.     Interventions  Current support surface: Standard  Low air loss mattress with pulsation   Current off-loading measures: Q2H ETT repositioning  Repositioning aid: ETAD  Visual inspection of wound(s) completed   Tube Securement: ETAD  Wound Care: was not done per plan of care.  Supplies: discussed with RN  Educated provided: importance of repositioning and plan of care  Education provided to: patient , spouse and nurse  Discussed importance of:repositioning every 2 hours and their role in pressure injury prevention  Discussed plan of care with Patient, Family and Nurse    Mela Colón RN CWOCN

## 2019-10-21 NOTE — PROGRESS NOTES
"Neuroscience Intensive Care Progress Note  10/21/2019    Subjective/24-Hour Interval:  Patient following commands. EEG discontinued.     Objective:  Physical Examination:  Vitals: /62   Pulse 84   Temp 100.6  F (38.1  C)   Resp 21   Ht 1.89 m (6' 2.41\")   Wt 130.7 kg (288 lb 2.3 oz)   SpO2 97%   BMI 36.59 kg/m    General: Adult male patient, lying in bed, NAD  HEENT: NC/AT, no icterus, ETT in place  Cardiac: RRR  Chest: intubated, mechanically ventilated  Extremities: Warm, no edema  Skin: No rash or lesion   Neuro: Awake, alert, attentive. Nods and shakes head \"yes/no\" appropriately in response to questions. Oriented to self and place. Follows commands x4 extremities. PERRL, conjugate gaze, EOMI. Normal bulk and tone. No abnormal movements. Wiggles toes, squeezes fingers bilaterally to command.    Assessment/Plan  Joel Campbell is a 53 year old year old male admitted on 10/13/2019 w/no PMH who was a transfer from an OSH for refractory VT/VF arrest s/p placement on peripheral [facility] ECMO. Neurocritical Care was consulted in the setting of neurologic deficits on exam concerning for hypoxic/anoxic brain injury and/or subclinical seizures.     Mr. Campbell's neurologic exam has now improved such that he is awake, alert, oriented to self and place, and following commands. EEG has been discontinued as likelihood of non-convulsive status epilepticus is low given that he is now interactive and following commands. Likewise, there is no indication for further imaging given his remarkable improvement. Our previous note detailed a possible need to obtain MRI brain to evaluate for ongoing unresponsiveness; however, this is no longer necessary in the setting of clear neurologic recovery. Imaging would be reconsidered in the event that his exam were to decline again; otherwise, we expect that his new neurologic baseline will remain at least good enough to interact meaningfully (follow commands, communicate yes/no, etc). "     Neurocritical Care will sign off at this time. Thank you for the opportunity to participate in Mr. Campbell's care.     The patient was seen and discussed with the attending, Dr. Dowling.    Lexie Valladares MD  Neurology PGY-2  [phone]

## 2019-10-21 NOTE — PHARMACY-VANCOMYCIN DOSING SERVICE
Pharmacy Vancomycin Note  Date of Service 2019  Patient's  1965   53 year old, male    Indication: Aspiration Pneumonia and Ventilator-Associated Pneumonia  Goal Trough Level: 15-20 mg/L  Day of Therapy: 3  Current Vancomycin regimen:  2000 mg IV q12h    Current estimated CrCl = Estimated Creatinine Clearance: 143.1 mL/min (based on SCr of 0.77 mg/dL).    Creatinine for last 3 days  10/18/2019: 10:55 AM Creatinine 0.78 mg/dL;  4:35 PM Creatinine 0.74 mg/dL  10/19/2019:  3:44 AM Creatinine 0.77 mg/dL;  3:41 PM Creatinine 0.62 mg/dL  10/20/2019:  3:45 AM Creatinine 0.72 mg/dL;  4:34 PM Creatinine 0.72 mg/dL  10/21/2019:  4:06 AM Creatinine 0.77 mg/dL    Recent Vancomycin Levels (past 3 days)  10/19/2019:  3:41 PM Vancomycin Level 11.5 mg/L  10/21/2019:  6:16 AM Vancomycin Level 23.5 mg/L    Vancomycin IV Administrations (past 72 hours)                   vancomycin (VANCOCIN) 2,000 mg in sodium chloride 0.9 % 500 mL intermittent infusion (mg) 2,000 mg New Bag 10/20/19 2012     2,000 mg New Bag  817     2,000 mg New Bag 10/19/19 193    vancomycin 1500 mg in 0.9% NaCl 250 ml intermittent infusion 1,500 mg (mg) 1,500 mg Given 10/19/19 0628                Nephrotoxins and other renal medications (From now, onward)    Start     Dose/Rate Route Frequency Ordered Stop    10/21/19 0800  vancomycin 1500 mg in 0.9% NaCl 250 ml intermittent infusion 1,500 mg      1,500 mg  over 90 Minutes Intravenous EVERY 12 HOURS 10/21/19 0722      10/19/19 1245  piperacillin-tazobactam (ZOSYN) 3.375 g vial to attach to  mL bag      3.375 g  over 30 Minutes Intravenous EVERY 6 HOURS 10/19/19 1244 10/24/19 1359    10/19/19 0915  norepinephrine (LEVOPHED) 16 mg in  mL infusion      0.03-0.4 mcg/kg/min × 75 kg (Dosing Weight)  2.1-28.1 mL/hr  Intravenous CONTINUOUS 10/19/19 0922               Contrast Orders - past 72 hours (72h ago, onward)    Start     Dose/Rate Route Frequency Ordered Stop    10/20/19 2373   perflutren diluted 1mL to 2mL with saline (OPTISON) diluted injection 5 mL      5 mL Intravenous ONCE 10/20/19 0829 10/20/19 0830    10/18/19 1600  iopamidol (ISOVUE-370) solution 100 mL      100 mL Intravenous ONCE 10/18/19 1557 10/18/19 1600          Interpretation of levels and current regimen:  ~ 10 hr early trough level is  Supratherapeutic    Has serum creatinine changed > 50% in last 72 hours: No    Urine output:  good urine output    Renal Function: Stable    Plan:  1.  Decrease Dose to 1500 mg IV q12h  2.  Pharmacy will check trough levels as appropriate in 1-3 Days.    3. Serum creatinine levels will be ordered daily for the first week of therapy and at least twice weekly for subsequent weeks.      Thu Bailey RPH        .

## 2019-10-21 NOTE — PROGRESS NOTES
Responsive to commands, restless at times- shaking head side to side/biting ETT- cuff leak-multiple air syringe push/frequent ET/oral suctioning (pink secretions), Tmax 101. CXR/CT chest order. MAP goal maintained by Norepi. Amio/ Versed gtt stopped @midnight. L groin IABP site- no hematoma, old small blood.Net neg 700cc, rectal tube in placed. TF@goal. FiO2 down to 50%, satting %.  Continue POC

## 2019-10-21 NOTE — PLAN OF CARE
ICU End of Shift Summary. See flowsheets for vital signs and detailed assessment.    Changes this shift:     Neuro: follows commands intermittently. Slightly weaker to L) side. PERRLA. Fent @50, Dex @1. Continues to have very agitated/restless episodes, will tongue out/move his ETT.     Resp: FiO2 50%, PEEP 8. LS diminished/coarse. R) chest tube, minimal output. Chest CT today. Large amounts of orage/red secretions, needing suction almost every hour. SBT today 10/8, RR 40s.     Cardiac: SR 70-90s. Intermittent short runs of MD presley aware. Levo 0.06-0.08, MAP >65.     GI/: lasix given this AM, good UOP. Lawrence removed this evening. TF @55. Rectal tube in place.     Plan: orders Richard Recliner, orders to get up into the chair. Attempt SBT again tomorrow.         Problem: Adult Inpatient Plan of Care  Goal: Plan of Care Review  Outcome: No Change     Problem: Adult Inpatient Plan of Care  Goal: Readiness for Transition of Care  Outcome: No Change     Problem: Inability to Wean (Mechanical Ventilation, Invasive)  Goal: Mechanical Ventilation Liberation  Outcome: No Change     Problem: Cardiac Disease Comorbidity  Goal: Cardiac Disease  Description  Patient comorbidity will be monitored for signs and symptoms of Cardiac Disease.  Problems will be absent, minimized or managed by discharge/transition of care.  Outcome: No Change

## 2019-10-21 NOTE — PROGRESS NOTES
[facility]   Cardiology Progress Note    Assessment & Plan   53 year old male who was admitted on 10/13/19 as a transfer for an OSH for refractory VT/VF arrest s/p PCI to LAD and placement on peripheral VA ECMO. IABP removed successfully 10/20/2019.     Changes Today:  - weaning sedation - off versed, on precedex, seroquel to assist with agitation  - up to chair during the day  - diuresis goal net -1L, giving lasix  - still febrile, although without leukocytosis, continuing vanc/zosyn beyond initial 5d course  - SLP advancing diet to clears     Neurology: Intubated, sedated, paralyzed. Cooled to 34 degrees, now rewarmed. Grimacing to pain and blinking eyes on command intermittently.  - NeuroCrit consulted  - EEG with small amount of epileptiform activity, due to pt becoming more alert, stopped EEG [id number]  - Loaded and started keppra 750 mg BID on 10/17  - Continue versed gtt and fent gtt, weaning as able with precedex  - CT Head 10/16 with smal right paracentral lesion, new right hemicerebellum lesion concerning for stroke.   Cardiovascular / Hemodynamics: Refractory VF arrest s/p peripheral V-A ECMO at OSH on 10/13/19.  TTE: LVEF 5-10%, increase to 15% on 1L  - Wean pressors/inotropes as able, currently not on any  - Decannulation today, will continue IABP for now  - Continue ASA 81mg and ticagrelor 90 mg BID  - Held temp at 34 degrees for 24 hours, now rewarmed  - Hold lipitor for now given likely hepatic injury during arrest  - Hold ACE/ARB for now given likely reduced renal fxn after arrest  - Holding beta blocker given shock    Pulmonary: Now weaning vent requirements. Large hemothorax on 10/14 s/p right-sided chest tube.  CXR: stable devices, worsening RLL pleural effusion  - Wean vent as able  - Will have another chest tube placed during decannulation on 10/18  - Monitor CT output  - Daily CXR  - Q2H ABGs for now  - Consider scheduled duonebs if signs of lung dz, currently PRN     GI and Nutrition: Per Pt's  wife, he is an alcoholic. Concern for possible GIB given black OG output, improved.  - Monitor BID LFTs  - NJ placed at bedside on 10/16, started TFs  - Bowel regimen - started  - GI Prophylaxis: PPI IV BID   Renal, Fluid and Electrolytes: - Monitor urine output  - Maintain K>3 and Mg>2    Infectious Disease: No signs of infection. Leukocytosis c/w arrest. Blood cultures collected.   - Initially vancomycin/zosyn x5 days for presumed aspiration - then extended given multiple invasive procedures  - Daily blood cultures  - Monitor for signs of infection   Hematology and Oncology: Receiving heparin for ECMO and ASA/ticagrelor for KAMRON. Large intramuscular hematoma of right pec and hemothorax on 10/14 with possible GIB as above.  - Cryo PRN fibrinogen < 200; FFP for INR >2  - Transfuse for Hgb<10, Plts<70  - Heparin gtt for ECMO with ACT goal 140-160 given bleeding concerns as above  - US LE w/ arterial duplex per ECMO protocol   - DVT PPX: Heparin as above  - Right chest wall hematoma: monitor exam and Hgb closely   Endocrinology: No known medical history. BG elevated.  - Insulin gtt PRN   Lines: Restraint: needed  Peripheral IV 10/13/19 Right Wrist (Active)   Number of days: 7       Peripheral IV 10/16/19 Left Lower forearm (Active)   Number of days: 4       Left Arterial Line Radial (Active)   Number of days:        CVC Triple Lumen 10/13/19 Left Subclavian (Active)   Number of days: 7       LR femoral artery Arterial Sheath  (Active)   Number of days: 7       Current lines are required for patient management       Family updated today: Yes  Code Status: FULL    Pt was seen and examined with Dr. Anshu Roberts MD, who agrees with the assessment and plan.    Bronson Dsouza MD. PhD  Cardiology Fellow      Interval History    Care team notes last 24 hours reviewed. No acute events overnight. Pt able to interact with wife this morning. Somewhat restless    ROS: Unable to obtain as Pt is intubated and  "sedated.    Data reviewed today: I reviewed all new labs and imaging results over the last 24 hours. I personally reviewed:    Physical Exam   Temp: 100.6  F (38.1  C) Temp src: Bladder     Heart Rate: 89 Resp: 22 SpO2: 97 % O2 Device: Mechanical Ventilator    Vitals:    10/20/19 0400 10/21/19 0500 10/21/19 1200   Weight: 105.3 kg (232 lb 2.3 oz) 103.4 kg (227 lb 15.3 oz) 130.7 kg (288 lb 2.3 oz)     Vital Signs with Ranges  Temp:  [100.6  F (38.1  C)-101.3  F (38.5  C)] 100.6  F (38.1  C)  Heart Rate:  [65-89] 89  Resp:  [14-22] 22  MAP:  [60 mmHg-212 mmHg] 72 mmHg  Arterial Line BP: ()/() 98/63  FiO2 (%):  [50 %-70 %] 50 %  SpO2:  [90 %-100 %] 97 %  I/O last 3 completed shifts:  In: 3749.81 [I.V.:2069.81; NG/GT:360]  Out: 5965 [Urine:5605; Emesis/NG output:100; Stool:60; Chest Tube:200]    Heart Rate: 89, Blood pressure 110/62, pulse 84, temperature 100.6  F (38.1  C), resp. rate 22, height 1.89 m (6' 2.41\"), weight 130.7 kg (288 lb 2.3 oz), SpO2 97 %.  288 lbs 2.26 oz  GEN:  Intubated, sedated  CV:  S1/S2 heard  LUNGS: coarse breath sounds, right-sided chest tube in place  ABD: Soft BS, soft, mildly distended  EXT: warm, trace-1+ edema, dopplerable pulses  SKIN: Large hematoma along chest wall    Medications     dexmedetomidine 1 mcg/kg/hr (10/21/19 1356)     IV fluid REPLACEMENT ONLY 25 mL/hr at 10/18/19 1929     dextrose 10 mL/hr at 10/18/19 2016     fentaNYL 50 mcg/hr (10/21/19 1300)     norepinephrine 0.06 mcg/kg/min (10/21/19 1302)     sodium chloride         allopurinol  200 mg Oral Daily     amiodarone  400 mg Oral BID     aspirin  81 mg Per Feeding Tube Daily     folic acid  1 mg Oral Daily     furosemide  40 mg Intravenous Q12H     insulin aspart  1-4 Units Subcutaneous Q4H     levETIRAcetam  750 mg Oral or Feeding Tube BID     multivitamins w/minerals  15 mL Per Feeding Tube Daily     pantoprazole (PROTONIX) IV  40 mg Intravenous Q12H     piperacillin-tazobactam  3.375 g Intravenous Q6H     " protein modular  1 packet Per Feeding Tube TID     QUEtiapine  50 mg Oral or Feeding Tube QPM     sodium chloride (PF)  3 mL Intracatheter Q8H     ticagrelor  90 mg Oral or Feeding Tube BID     vancomycin (VANCOCIN) IV  1,500 mg Intravenous Q12H     vitamin B1  100 mg Oral Daily       Data   Recent Labs   Lab 10/21/19  0406 10/20/19  2349 10/20/19  1943 10/20/19  1634 10/20/19  0345  10/19/19  0344   WBC 9.9  --   --  8.7 8.0   < > 6.7   HGB 9.0*  --   --  9.0* 8.7*   < > 8.4*   MCV 98  --   --  98 99   < > 97   *  --   --  109* 96*   < > 78*   INR 1.15*  --   --   --  1.23*  --  1.12     --   --  140 142   < > 140   POTASSIUM 3.9 3.6 4.3 4.0 4.0   < > 3.8   CHLORIDE 106  --   --  105 108   < > 104   CO2 31  --   --  30 28   < > 31   BUN 30  --   --  26 24   < > 20   CR 0.77  --   --  0.72 0.72   < > 0.77   ANIONGAP 6  --   --  5 7   < > 5   HEATHER 8.4*  --   --  8.5 8.5   < > 8.4*   *  --   --  143* 112*   < > 114*   ALBUMIN 2.6*  --   --  2.8* 2.9*   < > 3.1*   PROTTOTAL 6.4*  --   --  6.7* 6.4*   < > 6.4*   BILITOTAL 2.1*  --   --  2.3* 2.4*   < > 3.0*   ALKPHOS 154*  --   --  161* 148   < > 144   ALT 37  --   --  42 40   < > 41   AST 56*  --   --  77* 93*   < > 138*   TROPI 6.940*  --   --  9.312* 13.095*   < > 25.438*    < > = values in this interval not displayed.       Recent Results (from the past 24 hour(s))   XR Chest Port 1 View    Narrative    XR CHEST PORT 1 VW  10/21/2019 8:35 AM      HISTORY: respiratory failure, s/p ecmo and intubation    COMPARISON: Chest radiograph from 10/20/2019    FINDINGS: AP view of the chest. Endotracheal tube tip is in the  midthoracic trachea 8 cm from the adiel. 2 Enteric tubes with tips  beyond the field-of-view. Left-sided central line with tip in the mid  SVC. Right apical chest tube with sidehole outside the pleural cavity.  The cardiac silhouette size is stable. Costophrenic angles are beyond  the scope of this view. Stable to slightly decreased  diffuse right  greater than left airspace opacities. The visualized upper abdomen is  normal.      Impression    IMPRESSION:   1. Stable right greater than left diffuse airspace opacities.  2. Stable support devices.   XR Chest Port 1 View    Narrative    Exam: XR CHEST PORT 1 VW, 10/21/2019 1:42 PM    Indication: transfered rooms and movement, re-eval ET tube position    Comparison: Earlier today    Findings: Enteric tube and feeding tube are seen coursing through the  mediastinum. There tips project off the film. Endotracheal tube at the  T3 the upper thoracic trachea. Left subclavian venous catheter tip in  the superior high superior vena cava. Right chest tube tip at the apex  but the sidehole is outside the thorax.     Diffuse mixed interstitial alveolar infiltrates throughout both lungs  more prominent on the right than the left is not appreciatively  changed. Heart is within normal limits.      Impression    Impression:   1. Diffuse mixed opacities right greater than left have not changed  appreciatively.  2. Support devices are stable. Of note the sidehole of the right chest  tube is outside the thorax.    JOSE SANCHEZ MD       I have seen and examined the patient with the [facility] team. I agree with the assessment and plan of the note above.I have reviewed pertinent labs.     Anshu Roberts MD  Interventional Cardiology  Pager: [phone]

## 2019-10-22 LAB
ALBUMIN SERPL-MCNC: 2.4 G/DL (ref 3.4–5)
ALBUMIN SERPL-MCNC: 2.4 G/DL (ref 3.4–5)
ALP SERPL-CCNC: 118 U/L (ref 40–150)
ALP SERPL-CCNC: 141 U/L (ref 40–150)
ALT SERPL W P-5'-P-CCNC: 30 U/L (ref 0–70)
ALT SERPL W P-5'-P-CCNC: 32 U/L (ref 0–70)
ANION GAP SERPL CALCULATED.3IONS-SCNC: 10 MMOL/L (ref 3–14)
ANION GAP SERPL CALCULATED.3IONS-SCNC: 4 MMOL/L (ref 3–14)
AST SERPL W P-5'-P-CCNC: 42 U/L (ref 0–45)
AST SERPL W P-5'-P-CCNC: 46 U/L (ref 0–45)
BACTERIA SPEC CULT: NO GROWTH
BASE EXCESS BLDA CALC-SCNC: 7.8 MMOL/L
BASE EXCESS BLDA CALC-SCNC: 8.1 MMOL/L
BASE EXCESS BLDA CALC-SCNC: 8.4 MMOL/L
BILIRUB SERPL-MCNC: 1.7 MG/DL (ref 0.2–1.3)
BILIRUB SERPL-MCNC: 2 MG/DL (ref 0.2–1.3)
BUN SERPL-MCNC: 34 MG/DL (ref 7–30)
BUN SERPL-MCNC: 35 MG/DL (ref 7–30)
CA-I BLD-MCNC: 4.3 MG/DL (ref 4.4–5.2)
CA-I BLD-MCNC: 4.5 MG/DL (ref 4.4–5.2)
CALCIUM SERPL-MCNC: 7.9 MG/DL (ref 8.5–10.1)
CALCIUM SERPL-MCNC: 8.5 MG/DL (ref 8.5–10.1)
CHLORIDE SERPL-SCNC: 108 MMOL/L (ref 94–109)
CHLORIDE SERPL-SCNC: 109 MMOL/L (ref 94–109)
CO2 SERPL-SCNC: 27 MMOL/L (ref 20–32)
CO2 SERPL-SCNC: 31 MMOL/L (ref 20–32)
CREAT SERPL-MCNC: 0.72 MG/DL (ref 0.66–1.25)
CREAT SERPL-MCNC: 0.86 MG/DL (ref 0.66–1.25)
ERYTHROCYTE [DISTWIDTH] IN BLOOD BY AUTOMATED COUNT: 16.2 % (ref 10–15)
ERYTHROCYTE [DISTWIDTH] IN BLOOD BY AUTOMATED COUNT: 16.2 % (ref 10–15)
GFR SERPL CREATININE-BSD FRML MDRD: >90 ML/MIN/{1.73_M2}
GFR SERPL CREATININE-BSD FRML MDRD: >90 ML/MIN/{1.73_M2}
GLUCOSE BLDC GLUCOMTR-MCNC: 126 MG/DL (ref 70–99)
GLUCOSE BLDC GLUCOMTR-MCNC: 133 MG/DL (ref 70–99)
GLUCOSE BLDC GLUCOMTR-MCNC: 133 MG/DL (ref 70–99)
GLUCOSE BLDC GLUCOMTR-MCNC: 138 MG/DL (ref 70–99)
GLUCOSE BLDC GLUCOMTR-MCNC: 139 MG/DL (ref 70–99)
GLUCOSE BLDC GLUCOMTR-MCNC: 141 MG/DL (ref 70–99)
GLUCOSE SERPL-MCNC: 134 MG/DL (ref 70–99)
GLUCOSE SERPL-MCNC: 142 MG/DL (ref 70–99)
GRAM STN SPEC: NORMAL
GRAM STN SPEC: NORMAL
HCO3 BLD-SCNC: 32 MMOL/L (ref 21–28)
HCO3 BLD-SCNC: 32 MMOL/L (ref 21–28)
HCO3 BLD-SCNC: 33 MMOL/L (ref 21–28)
HCT VFR BLD AUTO: 28.9 % (ref 40–53)
HCT VFR BLD AUTO: 29.3 % (ref 40–53)
HGB BLD-MCNC: 9.1 G/DL (ref 13.3–17.7)
HGB BLD-MCNC: 9.1 G/DL (ref 13.3–17.7)
INR PPP: 1.26 (ref 0.86–1.14)
LACTATE BLD-SCNC: 1.3 MMOL/L (ref 0.7–2)
LACTATE BLD-SCNC: 1.5 MMOL/L (ref 0.7–2)
LDH SERPL L TO P-CCNC: 440 U/L (ref 85–227)
LMWH PPP CHRO-ACNC: <0.1 IU/ML
Lab: NORMAL
MAGNESIUM SERPL-MCNC: 2.2 MG/DL (ref 1.6–2.3)
MAGNESIUM SERPL-MCNC: 2.4 MG/DL (ref 1.6–2.3)
MCH RBC QN AUTO: 30.4 PG (ref 26.5–33)
MCH RBC QN AUTO: 30.7 PG (ref 26.5–33)
MCHC RBC AUTO-ENTMCNC: 31.1 G/DL (ref 31.5–36.5)
MCHC RBC AUTO-ENTMCNC: 31.5 G/DL (ref 31.5–36.5)
MCV RBC AUTO: 98 FL (ref 78–100)
MCV RBC AUTO: 98 FL (ref 78–100)
O2/TOTAL GAS SETTING VFR VENT: 50 %
O2/TOTAL GAS SETTING VFR VENT: 50 %
O2/TOTAL GAS SETTING VFR VENT: 60 %
OXYHGB MFR BLD: 89 % (ref 92–100)
OXYHGB MFR BLD: 90 % (ref 92–100)
OXYHGB MFR BLD: 95 % (ref 92–100)
PCO2 BLD: 41 MM HG (ref 35–45)
PCO2 BLD: 42 MM HG (ref 35–45)
PCO2 BLD: 44 MM HG (ref 35–45)
PH BLD: 7.48 PH (ref 7.35–7.45)
PH BLD: 7.49 PH (ref 7.35–7.45)
PH BLD: 7.5 PH (ref 7.35–7.45)
PHOSPHATE SERPL-MCNC: 2.6 MG/DL (ref 2.5–4.5)
PLATELET # BLD AUTO: 205 10E9/L (ref 150–450)
PLATELET # BLD AUTO: 237 10E9/L (ref 150–450)
PO2 BLD: 57 MM HG (ref 80–105)
PO2 BLD: 57 MM HG (ref 80–105)
PO2 BLD: 85 MM HG (ref 80–105)
POTASSIUM SERPL-SCNC: 3.6 MMOL/L (ref 3.4–5.3)
POTASSIUM SERPL-SCNC: 3.7 MMOL/L (ref 3.4–5.3)
PROT SERPL-MCNC: 6.2 G/DL (ref 6.8–8.8)
PROT SERPL-MCNC: 6.4 G/DL (ref 6.8–8.8)
RBC # BLD AUTO: 2.96 10E12/L (ref 4.4–5.9)
RBC # BLD AUTO: 2.99 10E12/L (ref 4.4–5.9)
S100 CA BINDING PROTEIN B SER-MCNC: 200 NG/L (ref 0–96)
SODIUM SERPL-SCNC: 144 MMOL/L (ref 133–144)
SODIUM SERPL-SCNC: 144 MMOL/L (ref 133–144)
SPECIMEN SOURCE: NORMAL
SPECIMEN SOURCE: NORMAL
T4 FREE SERPL-MCNC: 0.73 NG/DL (ref 0.76–1.46)
TROPONIN I SERPL-MCNC: 2.49 UG/L (ref 0–0.04)
TROPONIN I SERPL-MCNC: 4.13 UG/L (ref 0–0.04)
TSH SERPL DL<=0.005 MIU/L-ACNC: 7.44 MU/L (ref 0.4–4)
WBC # BLD AUTO: 10.6 10E9/L (ref 4–11)
WBC # BLD AUTO: 11.4 10E9/L (ref 4–11)

## 2019-10-22 PROCEDURE — 87205 SMEAR GRAM STAIN: CPT | Performed by: STUDENT IN AN ORGANIZED HEALTH CARE EDUCATION/TRAINING PROGRAM

## 2019-10-22 PROCEDURE — 99233 SBSQ HOSP IP/OBS HIGH 50: CPT | Performed by: NURSE PRACTITIONER

## 2019-10-22 PROCEDURE — 94003 VENT MGMT INPAT SUBQ DAY: CPT

## 2019-10-22 PROCEDURE — 85027 COMPLETE CBC AUTOMATED: CPT | Performed by: STUDENT IN AN ORGANIZED HEALTH CARE EDUCATION/TRAINING PROGRAM

## 2019-10-22 PROCEDURE — 84484 ASSAY OF TROPONIN QUANT: CPT | Performed by: STUDENT IN AN ORGANIZED HEALTH CARE EDUCATION/TRAINING PROGRAM

## 2019-10-22 PROCEDURE — 25800030 ZZH RX IP 258 OP 636: Performed by: INTERNAL MEDICINE

## 2019-10-22 PROCEDURE — 83735 ASSAY OF MAGNESIUM: CPT | Performed by: STUDENT IN AN ORGANIZED HEALTH CARE EDUCATION/TRAINING PROGRAM

## 2019-10-22 PROCEDURE — 85520 HEPARIN ASSAY: CPT | Performed by: STUDENT IN AN ORGANIZED HEALTH CARE EDUCATION/TRAINING PROGRAM

## 2019-10-22 PROCEDURE — 80053 COMPREHEN METABOLIC PANEL: CPT | Performed by: INTERNAL MEDICINE

## 2019-10-22 PROCEDURE — 25800030 ZZH RX IP 258 OP 636: Performed by: STUDENT IN AN ORGANIZED HEALTH CARE EDUCATION/TRAINING PROGRAM

## 2019-10-22 PROCEDURE — 84100 ASSAY OF PHOSPHORUS: CPT | Performed by: STUDENT IN AN ORGANIZED HEALTH CARE EDUCATION/TRAINING PROGRAM

## 2019-10-22 PROCEDURE — 25000132 ZZH RX MED GY IP 250 OP 250 PS 637: Performed by: STUDENT IN AN ORGANIZED HEALTH CARE EDUCATION/TRAINING PROGRAM

## 2019-10-22 PROCEDURE — 40000275 ZZH STATISTIC RCP TIME EA 10 MIN

## 2019-10-22 PROCEDURE — 40000014 ZZH STATISTIC ARTERIAL MONITORING DAILY

## 2019-10-22 PROCEDURE — 83615 LACTATE (LD) (LDH) ENZYME: CPT | Performed by: STUDENT IN AN ORGANIZED HEALTH CARE EDUCATION/TRAINING PROGRAM

## 2019-10-22 PROCEDURE — 25000125 ZZHC RX 250: Performed by: STUDENT IN AN ORGANIZED HEALTH CARE EDUCATION/TRAINING PROGRAM

## 2019-10-22 PROCEDURE — 84443 ASSAY THYROID STIM HORMONE: CPT | Performed by: STUDENT IN AN ORGANIZED HEALTH CARE EDUCATION/TRAINING PROGRAM

## 2019-10-22 PROCEDURE — 83735 ASSAY OF MAGNESIUM: CPT | Performed by: INTERNAL MEDICINE

## 2019-10-22 PROCEDURE — 80053 COMPREHEN METABOLIC PANEL: CPT | Performed by: STUDENT IN AN ORGANIZED HEALTH CARE EDUCATION/TRAINING PROGRAM

## 2019-10-22 PROCEDURE — 82805 BLOOD GASES W/O2 SATURATION: CPT | Performed by: INTERNAL MEDICINE

## 2019-10-22 PROCEDURE — 85610 PROTHROMBIN TIME: CPT | Performed by: STUDENT IN AN ORGANIZED HEALTH CARE EDUCATION/TRAINING PROGRAM

## 2019-10-22 PROCEDURE — 25000128 H RX IP 250 OP 636: Performed by: STUDENT IN AN ORGANIZED HEALTH CARE EDUCATION/TRAINING PROGRAM

## 2019-10-22 PROCEDURE — 84439 ASSAY OF FREE THYROXINE: CPT | Performed by: STUDENT IN AN ORGANIZED HEALTH CARE EDUCATION/TRAINING PROGRAM

## 2019-10-22 PROCEDURE — 85027 COMPLETE CBC AUTOMATED: CPT | Performed by: INTERNAL MEDICINE

## 2019-10-22 PROCEDURE — 20000004 ZZH R&B ICU UMMC

## 2019-10-22 PROCEDURE — 99291 CRITICAL CARE FIRST HOUR: CPT | Mod: GC | Performed by: INTERNAL MEDICINE

## 2019-10-22 PROCEDURE — 87070 CULTURE OTHR SPECIMN AEROBIC: CPT | Performed by: STUDENT IN AN ORGANIZED HEALTH CARE EDUCATION/TRAINING PROGRAM

## 2019-10-22 PROCEDURE — C9113 INJ PANTOPRAZOLE SODIUM, VIA: HCPCS | Performed by: STUDENT IN AN ORGANIZED HEALTH CARE EDUCATION/TRAINING PROGRAM

## 2019-10-22 PROCEDURE — 84484 ASSAY OF TROPONIN QUANT: CPT | Performed by: INTERNAL MEDICINE

## 2019-10-22 PROCEDURE — 25000128 H RX IP 250 OP 636: Performed by: INTERNAL MEDICINE

## 2019-10-22 PROCEDURE — 83605 ASSAY OF LACTIC ACID: CPT | Performed by: INTERNAL MEDICINE

## 2019-10-22 PROCEDURE — 00000146 ZZHCL STATISTIC GLUCOSE BY METER IP

## 2019-10-22 PROCEDURE — 82330 ASSAY OF CALCIUM: CPT | Performed by: INTERNAL MEDICINE

## 2019-10-22 PROCEDURE — 27210429 ZZH NUTRITION PRODUCT INTERMEDIATE LITER

## 2019-10-22 RX ORDER — QUETIAPINE FUMARATE 100 MG/1
100 TABLET, FILM COATED ORAL 2 TIMES DAILY
Status: DISCONTINUED | OUTPATIENT
Start: 2019-10-22 | End: 2019-10-24

## 2019-10-22 RX ORDER — QUETIAPINE FUMARATE 50 MG/1
50 TABLET, FILM COATED ORAL 2 TIMES DAILY
Status: DISCONTINUED | OUTPATIENT
Start: 2019-10-22 | End: 2019-10-22

## 2019-10-22 RX ORDER — FUROSEMIDE 10 MG/ML
40 INJECTION INTRAMUSCULAR; INTRAVENOUS DAILY
Status: DISCONTINUED | OUTPATIENT
Start: 2019-10-23 | End: 2019-10-24

## 2019-10-22 RX ADMIN — INSULIN ASPART 1 UNITS: 100 INJECTION, SOLUTION INTRAVENOUS; SUBCUTANEOUS at 11:52

## 2019-10-22 RX ADMIN — POTASSIUM CHLORIDE 20 MEQ: 1.5 POWDER, FOR SOLUTION ORAL at 07:53

## 2019-10-22 RX ADMIN — MIDAZOLAM HYDROCHLORIDE 2 MG: 2 INJECTION, SOLUTION INTRAMUSCULAR; INTRAVENOUS at 15:56

## 2019-10-22 RX ADMIN — TICAGRELOR 90 MG: 90 TABLET ORAL at 07:54

## 2019-10-22 RX ADMIN — MULTIVITAMIN 15 ML: LIQUID ORAL at 07:53

## 2019-10-22 RX ADMIN — DEXMEDETOMIDINE 1.2 MCG/KG/HR: 100 INJECTION, SOLUTION, CONCENTRATE INTRAVENOUS at 00:10

## 2019-10-22 RX ADMIN — ASPIRIN 81 MG CHEWABLE TABLET 81 MG: 81 TABLET CHEWABLE at 07:54

## 2019-10-22 RX ADMIN — FUROSEMIDE 40 MG: 10 INJECTION, SOLUTION INTRAVENOUS at 07:54

## 2019-10-22 RX ADMIN — PANTOPRAZOLE SODIUM 40 MG: 40 INJECTION, POWDER, FOR SOLUTION INTRAVENOUS at 15:19

## 2019-10-22 RX ADMIN — DEXMEDETOMIDINE 1.2 MCG/KG/HR: 100 INJECTION, SOLUTION, CONCENTRATE INTRAVENOUS at 05:14

## 2019-10-22 RX ADMIN — MIDAZOLAM HYDROCHLORIDE 2 MG: 2 INJECTION, SOLUTION INTRAMUSCULAR; INTRAVENOUS at 18:20

## 2019-10-22 RX ADMIN — MIDAZOLAM HYDROCHLORIDE 2 MG: 2 INJECTION, SOLUTION INTRAMUSCULAR; INTRAVENOUS at 08:50

## 2019-10-22 RX ADMIN — PANTOPRAZOLE SODIUM 40 MG: 40 INJECTION, POWDER, FOR SOLUTION INTRAVENOUS at 04:18

## 2019-10-22 RX ADMIN — PIPERACILLIN SODIUM AND TAZOBACTAM SODIUM 3.38 G: 3; .375 INJECTION, POWDER, LYOPHILIZED, FOR SOLUTION INTRAVENOUS at 07:57

## 2019-10-22 RX ADMIN — ALLOPURINOL 200 MG: 100 TABLET ORAL at 07:54

## 2019-10-22 RX ADMIN — DEXMEDETOMIDINE 1.1 MCG/KG/HR: 100 INJECTION, SOLUTION, CONCENTRATE INTRAVENOUS at 13:08

## 2019-10-22 RX ADMIN — LEVETIRACETAM 750 MG: 100 SOLUTION ORAL at 07:56

## 2019-10-22 RX ADMIN — Medication 0.1 MCG/KG/MIN: at 07:51

## 2019-10-22 RX ADMIN — DEXMEDETOMIDINE 1.2 MCG/KG/HR: 100 INJECTION, SOLUTION, CONCENTRATE INTRAVENOUS at 08:47

## 2019-10-22 RX ADMIN — FENTANYL CITRATE 50 MCG: 50 INJECTION, SOLUTION INTRAMUSCULAR; INTRAVENOUS at 08:30

## 2019-10-22 RX ADMIN — FOLIC ACID 1 MG: 1 TABLET ORAL at 07:54

## 2019-10-22 RX ADMIN — QUETIAPINE FUMARATE 50 MG: 50 TABLET ORAL at 11:51

## 2019-10-22 RX ADMIN — TICAGRELOR 90 MG: 90 TABLET ORAL at 20:01

## 2019-10-22 RX ADMIN — LEVETIRACETAM 750 MG: 100 SOLUTION ORAL at 20:03

## 2019-10-22 RX ADMIN — FENTANYL CITRATE 50 MCG: 50 INJECTION, SOLUTION INTRAMUSCULAR; INTRAVENOUS at 18:55

## 2019-10-22 RX ADMIN — Medication 100 MCG/HR: at 01:53

## 2019-10-22 RX ADMIN — FENTANYL CITRATE 50 MCG: 50 INJECTION, SOLUTION INTRAMUSCULAR; INTRAVENOUS at 20:15

## 2019-10-22 RX ADMIN — Medication 1 PACKET: at 20:03

## 2019-10-22 RX ADMIN — PIPERACILLIN SODIUM AND TAZOBACTAM SODIUM 3.38 G: 3; .375 INJECTION, POWDER, LYOPHILIZED, FOR SOLUTION INTRAVENOUS at 02:34

## 2019-10-22 RX ADMIN — POTASSIUM CHLORIDE 20 MEQ: 1.5 POWDER, FOR SOLUTION ORAL at 17:28

## 2019-10-22 RX ADMIN — DEXMEDETOMIDINE 1.2 MCG/KG/HR: 100 INJECTION, SOLUTION, CONCENTRATE INTRAVENOUS at 18:07

## 2019-10-22 RX ADMIN — MIDAZOLAM HYDROCHLORIDE 2 MG: 2 INJECTION, SOLUTION INTRAMUSCULAR; INTRAVENOUS at 13:08

## 2019-10-22 RX ADMIN — FENTANYL CITRATE 50 MCG: 50 INJECTION, SOLUTION INTRAMUSCULAR; INTRAVENOUS at 23:41

## 2019-10-22 RX ADMIN — MIDAZOLAM HYDROCHLORIDE 2 MG: 2 INJECTION, SOLUTION INTRAMUSCULAR; INTRAVENOUS at 17:22

## 2019-10-22 RX ADMIN — Medication 1 PACKET: at 14:05

## 2019-10-22 RX ADMIN — Medication 150 MCG/HR: at 18:12

## 2019-10-22 RX ADMIN — PIPERACILLIN SODIUM AND TAZOBACTAM SODIUM 3.38 G: 3; .375 INJECTION, POWDER, LYOPHILIZED, FOR SOLUTION INTRAVENOUS at 14:05

## 2019-10-22 RX ADMIN — VANCOMYCIN HYDROCHLORIDE 1500 MG: 10 INJECTION, POWDER, LYOPHILIZED, FOR SOLUTION INTRAVENOUS at 08:46

## 2019-10-22 RX ADMIN — PIPERACILLIN SODIUM AND TAZOBACTAM SODIUM 3.38 G: 3; .375 INJECTION, POWDER, LYOPHILIZED, FOR SOLUTION INTRAVENOUS at 20:02

## 2019-10-22 RX ADMIN — FENTANYL CITRATE 50 MCG: 50 INJECTION, SOLUTION INTRAMUSCULAR; INTRAVENOUS at 22:32

## 2019-10-22 RX ADMIN — MIDAZOLAM HYDROCHLORIDE 2 MG: 2 INJECTION, SOLUTION INTRAMUSCULAR; INTRAVENOUS at 03:43

## 2019-10-22 RX ADMIN — FENTANYL CITRATE 50 MCG: 50 INJECTION, SOLUTION INTRAMUSCULAR; INTRAVENOUS at 18:07

## 2019-10-22 RX ADMIN — AMIODARONE HYDROCHLORIDE 0.5 MG/MIN: 50 INJECTION, SOLUTION INTRAVENOUS at 00:44

## 2019-10-22 RX ADMIN — POTASSIUM PHOSPHATE, MONOBASIC AND POTASSIUM PHOSPHATE, DIBASIC 10 MMOL: 224; 236 INJECTION, SOLUTION INTRAVENOUS at 10:38

## 2019-10-22 RX ADMIN — THIAMINE HCL TAB 100 MG 100 MG: 100 TAB at 07:53

## 2019-10-22 RX ADMIN — ACETAMINOPHEN 650 MG: 325 TABLET, FILM COATED ORAL at 07:53

## 2019-10-22 RX ADMIN — QUETIAPINE FUMARATE 100 MG: 100 TABLET ORAL at 20:02

## 2019-10-22 RX ADMIN — Medication 1 PACKET: at 07:56

## 2019-10-22 RX ADMIN — DEXMEDETOMIDINE 1.3 MCG/KG/HR: 100 INJECTION, SOLUTION, CONCENTRATE INTRAVENOUS at 23:09

## 2019-10-22 ASSESSMENT — ACTIVITIES OF DAILY LIVING (ADL)
ADLS_ACUITY_SCORE: 19

## 2019-10-22 ASSESSMENT — MIFFLIN-ST. JEOR: SCORE: 1961.24

## 2019-10-22 NOTE — PLAN OF CARE
ICU End of Shift Summary. See flowsheets for vital signs and detailed assessment.    Changes this shift: Remains on high dose precedex, requiring frequent versed & fentanyl bumps for agitation with thrashing head side to side. Intermittently follows commands but not redirectable. Peep reduced to 6. Failed PST-RR increased to 40's and flipped in AFib. Converted back to SR when put back on CMV. L groin site w/ purulent drainage-culture sent.     Plan: Wean sedation as able. PST & work towards extubation.

## 2019-10-22 NOTE — PROGRESS NOTES
[facility]   Cardiology Progress Note    Assessment & Plan   53 year old male who was admitted on 10/13/19 as a transfer for an OSH for refractory VT/VF arrest s/p PCI to LAD and placement on peripheral [facility] ECMO. IABP removed successfully 10/20/2019. Weaning sedation and progressing towards extubation.    Changes Today:  - weaning sedation - off versed, on precedex, seroquel to assist with agitation  - up to chair during the day  - diuresis goal net -1L, giving lasix  - still febrile, although without leukocytosis, stopped vanc, continue zosyn beyond initial 5d course     Neurology: Intubated, sedated, paralyzed. Cooled to 34 degrees, now rewarmed. Grimacing to pain and blinking eyes on command intermittently.  - NeuroCrit consulted  - EEG with small amount of epileptiform activity, due to pt becoming more alert, stopped EEG [id number]  - Loaded and started keppra 750 mg BID on 10/17  - Continue versed gtt and fent gtt, weaning as able with precedex  - CT Head 10/16 with smal right paracentral lesion, new right hemicerebellum lesion concerning for stroke.   Cardiovascular / Hemodynamics: Refractory VF arrest s/p peripheral V-A ECMO at OSH on 10/13/19.  TTE: LVEF 5-10%, increase to 15% on 1L  - Wean pressors/inotropes as able, currently not on any  - Decannulation today, will continue IABP for now  - Continue ASA 81mg and ticagrelor 90 mg BID  - Held temp at 34 degrees for 24 hours, now rewarmed  - Hold lipitor for now given likely hepatic injury during arrest  - Hold ACE/ARB for now given likely reduced renal fxn after arrest  - Holding beta blocker given shock    Pulmonary: Now weaning vent requirements. Large hemothorax on 10/14 s/p right-sided chest tube.  CXR: stable devices, worsening RLL pleural effusion  - Wean vent as able  - Will have another chest tube placed during decannulation on 10/18  - Monitor CT output  - Daily CXR  - Q2H ABGs for now  - Consider scheduled duonebs if signs of lung dz, currently PRN     GI  and Nutrition: Per Pt's wife, he is an alcoholic. Concern for possible GIB given black OG output, improved.  - Monitor BID LFTs  - NJ placed at bedside on 10/16, started TFs  - Bowel regimen - started  - GI Prophylaxis: PPI IV BID   Renal, Fluid and Electrolytes: - Monitor urine output  - Maintain K>3 and Mg>2    Infectious Disease: No signs of infection. Leukocytosis c/w arrest. Blood cultures without growth.   - Initially vancomycin/zosyn x5 days for presumed aspiration - then extended given multiple invasive procedures  - Daily blood cultures  - Monitor for signs of infection   Hematology and Oncology: Receiving heparin for ECMO and ASA/ticagrelor for KAMRON. Large intramuscular hematoma of right pec and hemothorax on 10/14 with possible GIB as above.  - Cryo PRN fibrinogen < 200; FFP for INR >2  - Transfuse for Hgb<10, Plts<70  - Heparin gtt for ECMO with ACT goal 140-160 given bleeding concerns as above  - US LE w/ arterial duplex per ECMO protocol   - DVT PPX: Heparin as above  - Right chest wall hematoma: monitor exam and Hgb closely   Endocrinology: No known medical history. BG elevated.  - Insulin gtt PRN   Lines: Restraint: needed  Peripheral IV 10/13/19 Right Wrist (Active)   Number of days: 7       Peripheral IV 10/16/19 Left Lower forearm (Active)   Number of days: 4       Left Arterial Line Radial (Active)   Number of days:        CVC Triple Lumen 10/13/19 Left Subclavian (Active)   Number of days: 7           Current lines are required for patient management       Family updated today: Yes  Code Status: FULL    Pt was seen and examined with Dr. Juan Pablo Santillan MD, PhD, who agrees with the assessment and plan.    Bronson Dsouza MD. PhD  Cardiology Fellow      Interval History    Care team notes last 24 hours reviewed. No acute events overnight. Still restless this morning. Wants breathing tube removed.     ROS: Unable to obtain as Pt is intubated and sedated.    Data reviewed today: I reviewed all new  "labs and imaging results over the last 24 hours. I personally reviewed:    Physical Exam   Temp: 100.7  F (38.2  C) Temp src: Axillary BP: 118/73   Heart Rate: 80 Resp: 22 SpO2: 94 % O2 Device: Mechanical Ventilator    Vitals:    10/21/19 0500 10/21/19 1200 10/22/19 0400   Weight: 103.4 kg (227 lb 15.3 oz) 130.7 kg (288 lb 2.3 oz) 104 kg (229 lb 4.5 oz)     Vital Signs with Ranges  Temp:  [99.4  F (37.4  C)-101.4  F (38.6  C)] 100.7  F (38.2  C)  Heart Rate:  [] 80  Resp:  [19-26] 22  BP: ()/(57-73) 118/73  MAP:  [57 mmHg-105 mmHg] 69 mmHg  Arterial Line BP: ()/(22-87) 91/51  FiO2 (%):  [50 %-60 %] 60 %  SpO2:  [88 %-100 %] 94 %  I/O last 3 completed shifts:  In: 3621.38 [I.V.:1961.38; NG/GT:340]  Out: 2730 [Urine:2490; Stool:140; Chest Tube:100]    Heart Rate: 80, Blood pressure 118/73, pulse 84, temperature 100.7  F (38.2  C), temperature source Axillary, resp. rate 22, height 1.89 m (6' 2.41\"), weight 104 kg (229 lb 4.5 oz), SpO2 94 %.  229 lbs 4.45 oz  GEN:  Intubated, sedated  CV:  S1/S2 heard  LUNGS: coarse breath sounds, right-sided chest tube in place  ABD: Soft BS, soft, mildly distended  EXT: warm, trace-1+ edema, dopplerable pulses  SKIN: Large hematoma along chest wall    Medications     amiodarone 0.5 mg/min (10/22/19 1200)     dexmedetomidine 1.2 mcg/kg/hr (10/22/19 1200)     IV fluid REPLACEMENT ONLY 25 mL/hr at 10/18/19 1929     dextrose 10 mL/hr at 10/18/19 2016     fentaNYL 150 mcg/hr (10/22/19 1200)     norepinephrine 0.1 mcg/kg/min (10/22/19 1100)     sodium chloride         allopurinol  200 mg Oral Daily     aspirin  81 mg Per Feeding Tube Daily     folic acid  1 mg Oral Daily     furosemide  40 mg Intravenous Q12H     insulin aspart  1-4 Units Subcutaneous Q4H     levETIRAcetam  750 mg Oral or Feeding Tube BID     multivitamins w/minerals  15 mL Per Feeding Tube Daily     pantoprazole (PROTONIX) IV  40 mg Intravenous Q12H     piperacillin-tazobactam  3.375 g Intravenous Q6H "     protein modular  1 packet Per Feeding Tube TID     QUEtiapine  50 mg Oral or Feeding Tube BID     sodium chloride (PF)  3 mL Intracatheter Q8H     ticagrelor  90 mg Oral or Feeding Tube BID     vitamin B1  100 mg Oral Daily       Data   Recent Labs   Lab 10/22/19  0417 10/21/19  1626 10/21/19  0406  10/20/19  0345   WBC 10.6 10.3 9.9   < > 8.0   HGB 9.1* 9.0* 9.0*   < > 8.7*   MCV 98 98 98   < > 99    169 145*   < > 96*   INR 1.26*  --  1.15*  --  1.23*    144 142   < > 142   POTASSIUM 3.7 3.9 3.9   < > 4.0   CHLORIDE 108 107 106   < > 108   CO2 27 32 31   < > 28   BUN 35* 32* 30   < > 24   CR 0.86 0.76 0.77   < > 0.72   ANIONGAP 10 6 6   < > 7   HEATHER 8.5 8.3* 8.4*   < > 8.5   * 137* 140*   < > 112*   ALBUMIN 2.4* 2.4* 2.6*   < > 2.9*   PROTTOTAL 6.4* 6.4* 6.4*   < > 6.4*   BILITOTAL 2.0* 2.0* 2.1*   < > 2.4*   ALKPHOS 141 135 154*   < > 148   ALT 30 33 37   < > 40   AST 46* 49* 56*   < > 93*   TROPI 4.126* 4.800* 6.940*   < > 13.095*    < > = values in this interval not displayed.       Recent Results (from the past 24 hour(s))   XR Chest Port 1 View    Narrative    Exam: XR CHEST PORT 1 VW, 10/21/2019 1:42 PM    Indication: transfered rooms and movement, re-eval ET tube position    Comparison: Earlier today    Findings: Enteric tube and feeding tube are seen coursing through the  mediastinum. There tips project off the film. Endotracheal tube at the  T3 the upper thoracic trachea. Left subclavian venous catheter tip in  the superior high superior vena cava. Right chest tube tip at the apex  but the sidehole is outside the thorax.     Diffuse mixed interstitial alveolar infiltrates throughout both lungs  more prominent on the right than the left is not appreciatively  changed. Heart is within normal limits.      Impression    Impression:   1. Diffuse mixed opacities right greater than left have not changed  appreciatively.  2. Support devices are stable. Of note the sidehole of the right  chest  tube is outside the thorax.    JOSE SANCHEZ MD

## 2019-10-22 NOTE — PLAN OF CARE
ICU End of Shift Summary. See flowsheets for vital signs and detailed assessment.    Changes this shift: Continues to be restless/agitated, fentanyl bumps given multiple times overnight, versed prn given x2. Flipped into a-fib/flutter around 2100 with rates ranging from 90s-140s. Amio started. Levo at 0.08-0.1 overnight for MAPs >65. Copious bloody secretions from ETT. FiO2 increased to 60% due to PaO2 of 57. Adequate urine output, voiding spontaneously.     Plan: Continue to monitor, wean from vent and levo as able. Notify team of changes.

## 2019-10-23 LAB
7AMINOCLONAZEPAM BLD CFM-MCNC: NEGATIVE NG/ML
ALBUMIN SERPL-MCNC: 2.3 G/DL (ref 3.4–5)
ALBUMIN SERPL-MCNC: 2.3 G/DL (ref 3.4–5)
ALP SERPL-CCNC: 107 U/L (ref 40–150)
ALP SERPL-CCNC: 112 U/L (ref 40–150)
ALPRAZ SERPL-MCNC: NEGATIVE NG/ML
ALT SERPL W P-5'-P-CCNC: 25 U/L (ref 0–70)
ALT SERPL W P-5'-P-CCNC: 26 U/L (ref 0–70)
AMPHETAMINES SPEC-MCNC: NEGATIVE NG/ML
ANION GAP SERPL CALCULATED.3IONS-SCNC: 7 MMOL/L (ref 3–14)
ANION GAP SERPL CALCULATED.3IONS-SCNC: 8 MMOL/L (ref 3–14)
APAP BLD-MCNC: NEGATIVE UG/ML
AST SERPL W P-5'-P-CCNC: 41 U/L (ref 0–45)
AST SERPL W P-5'-P-CCNC: 42 U/L (ref 0–45)
BACTERIA SPEC CULT: NO GROWTH
BARBITURATES SPEC-MCNC: NEGATIVE UG/ML
BASE EXCESS BLDA CALC-SCNC: 6.7 MMOL/L
BENZODIAZ SERPL QL: POSITIVE
BENZODIAZ SPEC-MCNC: POSITIVE NG/ML
BILIRUB SERPL-MCNC: 1.6 MG/DL (ref 0.2–1.3)
BILIRUB SERPL-MCNC: 1.6 MG/DL (ref 0.2–1.3)
BUN SERPL-MCNC: 35 MG/DL (ref 7–30)
BUN SERPL-MCNC: 36 MG/DL (ref 7–30)
BUPRENORPHINE SERPLBLD-MCNC: NEGATIVE NG/ML
CA-I BLD-MCNC: 4.6 MG/DL (ref 4.4–5.2)
CA-I BLD-MCNC: 4.6 MG/DL (ref 4.4–5.2)
CALCIUM SERPL-MCNC: 8.2 MG/DL (ref 8.5–10.1)
CALCIUM SERPL-MCNC: 8.4 MG/DL (ref 8.5–10.1)
CARBOXYTHC BLD-MCNC: NEGATIVE NG/ML
CARISOPRODOL IA: NEGATIVE UG/ML
CHLORDIAZEP SERPL-MCNC: NEGATIVE NG/ML
CHLORIDE SERPL-SCNC: 108 MMOL/L (ref 94–109)
CHLORIDE SERPL-SCNC: 109 MMOL/L (ref 94–109)
CLONAZEPAM SERPL CFM-MCNC: NEGATIVE NG/ML
CO2 SERPL-SCNC: 29 MMOL/L (ref 20–32)
CO2 SERPL-SCNC: 29 MMOL/L (ref 20–32)
COCAINE METABOLITE IA: NEGATIVE NG/ML
CREAT SERPL-MCNC: 0.76 MG/DL (ref 0.66–1.25)
CREAT SERPL-MCNC: 0.96 MG/DL (ref 0.66–1.25)
DECLARED MEDICATIONS: ABNORMAL
DESALKYLFLURAZ SERPL-MCNC: NEGATIVE NG/ML
DIAZEPAM SERPL-MCNC: NEGATIVE NG/ML
ERYTHROCYTE [DISTWIDTH] IN BLOOD BY AUTOMATED COUNT: 16.1 % (ref 10–15)
ERYTHROCYTE [DISTWIDTH] IN BLOOD BY AUTOMATED COUNT: 16.3 % (ref 10–15)
ETHANOL BLD-MCNC: NEGATIVE GM/DL
FENTANYL IA: NEGATIVE NG/ML
FLURAZEPAM SERPL-MCNC: NEGATIVE NG/ML
GABAPENTIN IA: NEGATIVE UG/ML
GFR SERPL CREATININE-BSD FRML MDRD: 89 ML/MIN/{1.73_M2}
GFR SERPL CREATININE-BSD FRML MDRD: >90 ML/MIN/{1.73_M2}
GLUCOSE BLDC GLUCOMTR-MCNC: 121 MG/DL (ref 70–99)
GLUCOSE BLDC GLUCOMTR-MCNC: 128 MG/DL (ref 70–99)
GLUCOSE BLDC GLUCOMTR-MCNC: 133 MG/DL (ref 70–99)
GLUCOSE BLDC GLUCOMTR-MCNC: 136 MG/DL (ref 70–99)
GLUCOSE SERPL-MCNC: 137 MG/DL (ref 70–99)
GLUCOSE SERPL-MCNC: 140 MG/DL (ref 70–99)
HCO3 BLD-SCNC: 32 MMOL/L (ref 21–28)
HCT VFR BLD AUTO: 28.5 % (ref 40–53)
HCT VFR BLD AUTO: 29.6 % (ref 40–53)
HGB BLD-MCNC: 8.8 G/DL (ref 13.3–17.7)
HGB BLD-MCNC: 9.2 G/DL (ref 13.3–17.7)
INR PPP: 1.25 (ref 0.86–1.14)
INTERPRETATION ECG - MUSE: NORMAL
LACTATE BLD-SCNC: 1.3 MMOL/L (ref 0.7–2)
LACTATE BLD-SCNC: 1.4 MMOL/L (ref 0.7–2)
LDH SERPL L TO P-CCNC: 438 U/L (ref 85–227)
LMWH PPP CHRO-ACNC: <0.1 IU/ML
LORAZEPAM BLD CFM-MCNC: NEGATIVE NG/ML
Lab: NORMAL
MAGNESIUM SERPL-MCNC: 2.4 MG/DL (ref 1.6–2.3)
MAGNESIUM SERPL-MCNC: 2.5 MG/DL (ref 1.6–2.3)
MCH RBC QN AUTO: 30.3 PG (ref 26.5–33)
MCH RBC QN AUTO: 30.5 PG (ref 26.5–33)
MCHC RBC AUTO-ENTMCNC: 30.9 G/DL (ref 31.5–36.5)
MCHC RBC AUTO-ENTMCNC: 31.1 G/DL (ref 31.5–36.5)
MCV RBC AUTO: 98 FL (ref 78–100)
MCV RBC AUTO: 98 FL (ref 78–100)
MEPERIDINE SERPLBLD-MCNC: NEGATIVE NG/ML
METHADONE BLD-MCNC: NEGATIVE NG/ML
MIDAZOLAM BLD CFM-MCNC: 59 NG/ML
NORCHLORDIAZEP SERPL-MCNC: NEGATIVE NG/ML
NORDIAZEPAM SERPL-MCNC: NEGATIVE NG/ML
NSE SERPL-MCNC: 9 UG/L (ref 3.7–8.9)
O2/TOTAL GAS SETTING VFR VENT: 60 %
OPIATES SPEC-MCNC: NEGATIVE NG/ML
OTHER DRUGS DETECTED: POSITIVE
OXAZEPAM SERPL-MCNC: NEGATIVE NG/ML
OXYCODONE SERPLBLD SCN-MCNC: NEGATIVE NG/ML
PCO2 BLD: 48 MM HG (ref 35–45)
PCP SPEC-MCNC: NEGATIVE NG/ML
PH BLD: 7.43 PH (ref 7.35–7.45)
PHOSPHATE SERPL-MCNC: 3.2 MG/DL (ref 2.5–4.5)
PLATELET # BLD AUTO: 280 10E9/L (ref 150–450)
PLATELET # BLD AUTO: 313 10E9/L (ref 150–450)
PO2 BLD: 72 MM HG (ref 80–105)
POTASSIUM SERPL-SCNC: 3.9 MMOL/L (ref 3.4–5.3)
POTASSIUM SERPL-SCNC: 4.1 MMOL/L (ref 3.4–5.3)
PROPOXYPH SPEC-MCNC: NEGATIVE NG/ML
PROT SERPL-MCNC: 6.4 G/DL (ref 6.8–8.8)
PROT SERPL-MCNC: 6.5 G/DL (ref 6.8–8.8)
RBC # BLD AUTO: 2.9 10E12/L (ref 4.4–5.9)
RBC # BLD AUTO: 3.02 10E12/L (ref 4.4–5.9)
SODIUM SERPL-SCNC: 145 MMOL/L (ref 133–144)
SODIUM SERPL-SCNC: 146 MMOL/L (ref 133–144)
SPECIMEN SOURCE: NORMAL
TEMAZEPAM SERPL-MCNC: NEGATIVE NG/ML
TRAMADOL BLD-MCNC: NEGATIVE NG/ML
TRIAZOLAM BLD-MCNC: NEGATIVE NG/ML
TROPONIN I SERPL-MCNC: 1.45 UG/L (ref 0–0.04)
TROPONIN I SERPL-MCNC: 1.87 UG/L (ref 0–0.04)
WBC # BLD AUTO: 10.9 10E9/L (ref 4–11)
WBC # BLD AUTO: 11.1 10E9/L (ref 4–11)

## 2019-10-23 PROCEDURE — 82330 ASSAY OF CALCIUM: CPT | Performed by: INTERNAL MEDICINE

## 2019-10-23 PROCEDURE — 25000132 ZZH RX MED GY IP 250 OP 250 PS 637: Performed by: STUDENT IN AN ORGANIZED HEALTH CARE EDUCATION/TRAINING PROGRAM

## 2019-10-23 PROCEDURE — 87040 BLOOD CULTURE FOR BACTERIA: CPT | Performed by: INTERNAL MEDICINE

## 2019-10-23 PROCEDURE — 80053 COMPREHEN METABOLIC PANEL: CPT | Performed by: INTERNAL MEDICINE

## 2019-10-23 PROCEDURE — 25800030 ZZH RX IP 258 OP 636: Performed by: STUDENT IN AN ORGANIZED HEALTH CARE EDUCATION/TRAINING PROGRAM

## 2019-10-23 PROCEDURE — 84484 ASSAY OF TROPONIN QUANT: CPT | Performed by: INTERNAL MEDICINE

## 2019-10-23 PROCEDURE — 27210429 ZZH NUTRITION PRODUCT INTERMEDIATE LITER

## 2019-10-23 PROCEDURE — 85520 HEPARIN ASSAY: CPT | Performed by: STUDENT IN AN ORGANIZED HEALTH CARE EDUCATION/TRAINING PROGRAM

## 2019-10-23 PROCEDURE — 25000132 ZZH RX MED GY IP 250 OP 250 PS 637: Performed by: INTERNAL MEDICINE

## 2019-10-23 PROCEDURE — C9113 INJ PANTOPRAZOLE SODIUM, VIA: HCPCS | Performed by: STUDENT IN AN ORGANIZED HEALTH CARE EDUCATION/TRAINING PROGRAM

## 2019-10-23 PROCEDURE — 20000004 ZZH R&B ICU UMMC

## 2019-10-23 PROCEDURE — 40000275 ZZH STATISTIC RCP TIME EA 10 MIN

## 2019-10-23 PROCEDURE — 85027 COMPLETE CBC AUTOMATED: CPT | Performed by: STUDENT IN AN ORGANIZED HEALTH CARE EDUCATION/TRAINING PROGRAM

## 2019-10-23 PROCEDURE — 82803 BLOOD GASES ANY COMBINATION: CPT | Performed by: INTERNAL MEDICINE

## 2019-10-23 PROCEDURE — 25000128 H RX IP 250 OP 636: Performed by: STUDENT IN AN ORGANIZED HEALTH CARE EDUCATION/TRAINING PROGRAM

## 2019-10-23 PROCEDURE — 00000146 ZZHCL STATISTIC GLUCOSE BY METER IP

## 2019-10-23 PROCEDURE — 84100 ASSAY OF PHOSPHORUS: CPT | Performed by: STUDENT IN AN ORGANIZED HEALTH CARE EDUCATION/TRAINING PROGRAM

## 2019-10-23 PROCEDURE — 25000125 ZZHC RX 250: Performed by: STUDENT IN AN ORGANIZED HEALTH CARE EDUCATION/TRAINING PROGRAM

## 2019-10-23 PROCEDURE — 84484 ASSAY OF TROPONIN QUANT: CPT | Performed by: STUDENT IN AN ORGANIZED HEALTH CARE EDUCATION/TRAINING PROGRAM

## 2019-10-23 PROCEDURE — 85610 PROTHROMBIN TIME: CPT | Performed by: STUDENT IN AN ORGANIZED HEALTH CARE EDUCATION/TRAINING PROGRAM

## 2019-10-23 PROCEDURE — 99291 CRITICAL CARE FIRST HOUR: CPT | Mod: GC | Performed by: INTERNAL MEDICINE

## 2019-10-23 PROCEDURE — 80053 COMPREHEN METABOLIC PANEL: CPT | Performed by: STUDENT IN AN ORGANIZED HEALTH CARE EDUCATION/TRAINING PROGRAM

## 2019-10-23 PROCEDURE — 83735 ASSAY OF MAGNESIUM: CPT | Performed by: INTERNAL MEDICINE

## 2019-10-23 PROCEDURE — 83615 LACTATE (LD) (LDH) ENZYME: CPT | Performed by: STUDENT IN AN ORGANIZED HEALTH CARE EDUCATION/TRAINING PROGRAM

## 2019-10-23 PROCEDURE — 85027 COMPLETE CBC AUTOMATED: CPT | Performed by: INTERNAL MEDICINE

## 2019-10-23 PROCEDURE — 36415 COLL VENOUS BLD VENIPUNCTURE: CPT | Performed by: INTERNAL MEDICINE

## 2019-10-23 PROCEDURE — 83735 ASSAY OF MAGNESIUM: CPT | Performed by: STUDENT IN AN ORGANIZED HEALTH CARE EDUCATION/TRAINING PROGRAM

## 2019-10-23 PROCEDURE — 94003 VENT MGMT INPAT SUBQ DAY: CPT

## 2019-10-23 PROCEDURE — 40000014 ZZH STATISTIC ARTERIAL MONITORING DAILY

## 2019-10-23 PROCEDURE — 83605 ASSAY OF LACTIC ACID: CPT | Performed by: INTERNAL MEDICINE

## 2019-10-23 RX ORDER — GABAPENTIN 300 MG/1
600 CAPSULE ORAL 3 TIMES DAILY
Status: DISCONTINUED | OUTPATIENT
Start: 2019-10-23 | End: 2019-10-29

## 2019-10-23 RX ORDER — GABAPENTIN 300 MG/1
300 CAPSULE ORAL 3 TIMES DAILY
Status: DISCONTINUED | OUTPATIENT
Start: 2019-10-23 | End: 2019-10-23

## 2019-10-23 RX ADMIN — FENTANYL CITRATE 50 MCG: 50 INJECTION, SOLUTION INTRAMUSCULAR; INTRAVENOUS at 01:17

## 2019-10-23 RX ADMIN — PIPERACILLIN SODIUM AND TAZOBACTAM SODIUM 3.38 G: 3; .375 INJECTION, POWDER, LYOPHILIZED, FOR SOLUTION INTRAVENOUS at 14:02

## 2019-10-23 RX ADMIN — FENTANYL CITRATE 50 MCG: 50 INJECTION, SOLUTION INTRAMUSCULAR; INTRAVENOUS at 06:13

## 2019-10-23 RX ADMIN — PANTOPRAZOLE SODIUM 40 MG: 40 INJECTION, POWDER, FOR SOLUTION INTRAVENOUS at 04:12

## 2019-10-23 RX ADMIN — Medication 1 PACKET: at 10:10

## 2019-10-23 RX ADMIN — ASPIRIN 81 MG CHEWABLE TABLET 81 MG: 81 TABLET CHEWABLE at 10:08

## 2019-10-23 RX ADMIN — PIPERACILLIN SODIUM AND TAZOBACTAM SODIUM 3.38 G: 3; .375 INJECTION, POWDER, LYOPHILIZED, FOR SOLUTION INTRAVENOUS at 01:52

## 2019-10-23 RX ADMIN — PIPERACILLIN SODIUM AND TAZOBACTAM SODIUM 3.38 G: 3; .375 INJECTION, POWDER, LYOPHILIZED, FOR SOLUTION INTRAVENOUS at 20:55

## 2019-10-23 RX ADMIN — TICAGRELOR 90 MG: 90 TABLET ORAL at 10:08

## 2019-10-23 RX ADMIN — POTASSIUM CHLORIDE 20 MEQ: 1.5 POWDER, FOR SOLUTION ORAL at 05:23

## 2019-10-23 RX ADMIN — Medication 200 MCG/HR: at 16:31

## 2019-10-23 RX ADMIN — DEXMEDETOMIDINE 1.5 MCG/KG/HR: 100 INJECTION, SOLUTION, CONCENTRATE INTRAVENOUS at 11:07

## 2019-10-23 RX ADMIN — GABAPENTIN 600 MG: 300 CAPSULE ORAL at 20:55

## 2019-10-23 RX ADMIN — LEVETIRACETAM 750 MG: 100 SOLUTION ORAL at 10:10

## 2019-10-23 RX ADMIN — MULTIVITAMIN 15 ML: LIQUID ORAL at 10:07

## 2019-10-23 RX ADMIN — ALLOPURINOL 200 MG: 100 TABLET ORAL at 10:08

## 2019-10-23 RX ADMIN — FOLIC ACID 1 MG: 1 TABLET ORAL at 10:08

## 2019-10-23 RX ADMIN — Medication 200 MCG/HR: at 06:09

## 2019-10-23 RX ADMIN — PANTOPRAZOLE SODIUM 40 MG: 40 INJECTION, POWDER, FOR SOLUTION INTRAVENOUS at 16:40

## 2019-10-23 RX ADMIN — TICAGRELOR 90 MG: 90 TABLET ORAL at 20:55

## 2019-10-23 RX ADMIN — QUETIAPINE FUMARATE 100 MG: 100 TABLET ORAL at 20:55

## 2019-10-23 RX ADMIN — FENTANYL CITRATE 50 MCG: 50 INJECTION, SOLUTION INTRAMUSCULAR; INTRAVENOUS at 01:47

## 2019-10-23 RX ADMIN — LEVETIRACETAM 750 MG: 100 SOLUTION ORAL at 20:56

## 2019-10-23 RX ADMIN — PIPERACILLIN SODIUM AND TAZOBACTAM SODIUM 3.38 G: 3; .375 INJECTION, POWDER, LYOPHILIZED, FOR SOLUTION INTRAVENOUS at 10:09

## 2019-10-23 RX ADMIN — FENTANYL CITRATE 50 MCG: 50 INJECTION, SOLUTION INTRAMUSCULAR; INTRAVENOUS at 03:55

## 2019-10-23 RX ADMIN — FUROSEMIDE 40 MG: 10 INJECTION, SOLUTION INTRAVENOUS at 10:09

## 2019-10-23 RX ADMIN — AMIODARONE HYDROCHLORIDE 0.5 MG/MIN: 50 INJECTION, SOLUTION INTRAVENOUS at 22:40

## 2019-10-23 RX ADMIN — ACETAMINOPHEN 650 MG: 325 TABLET, FILM COATED ORAL at 20:55

## 2019-10-23 RX ADMIN — DEXMEDETOMIDINE 1.2 MCG/KG/HR: 100 INJECTION, SOLUTION, CONCENTRATE INTRAVENOUS at 19:37

## 2019-10-23 RX ADMIN — Medication 1 PACKET: at 20:56

## 2019-10-23 RX ADMIN — Medication 0.1 MCG/KG/MIN: at 12:54

## 2019-10-23 RX ADMIN — MIDAZOLAM HYDROCHLORIDE 2 MG: 2 INJECTION, SOLUTION INTRAMUSCULAR; INTRAVENOUS at 01:53

## 2019-10-23 RX ADMIN — THIAMINE HCL TAB 100 MG 100 MG: 100 TAB at 10:08

## 2019-10-23 RX ADMIN — GABAPENTIN 300 MG: 300 CAPSULE ORAL at 14:02

## 2019-10-23 RX ADMIN — DEXMEDETOMIDINE 1.2 MCG/KG/HR: 100 INJECTION, SOLUTION, CONCENTRATE INTRAVENOUS at 23:41

## 2019-10-23 RX ADMIN — VASOPRESSIN 0.5 UNITS/HR: 20 INJECTION INTRAVENOUS at 22:04

## 2019-10-23 RX ADMIN — DEXMEDETOMIDINE 1.5 MCG/KG/HR: 100 INJECTION, SOLUTION, CONCENTRATE INTRAVENOUS at 02:46

## 2019-10-23 RX ADMIN — QUETIAPINE FUMARATE 100 MG: 100 TABLET ORAL at 10:08

## 2019-10-23 RX ADMIN — Medication 1 PACKET: at 14:03

## 2019-10-23 RX ADMIN — DEXMEDETOMIDINE 1.5 MCG/KG/HR: 100 INJECTION, SOLUTION, CONCENTRATE INTRAVENOUS at 06:36

## 2019-10-23 ASSESSMENT — ACTIVITIES OF DAILY LIVING (ADL)
ADLS_ACUITY_SCORE: 19

## 2019-10-23 NOTE — PROGRESS NOTES
"PALLIATIVE CARE INTEGRATIVE THERAPIES Progress Note   [facility] ([place]) 4C     REFERRAL SOURCE: Integrative therapies referral.     Visit with Jackelyn, wife of patient Joel \"Claudio\" Chazester. Jackelyn requested energy work (Reiki) for herself.     Prior to reiki session, Jackelyn and I spoke about how she is feeling distressed. She feels that Claudio gets agitated when he sees her and does not want to cause him any more discomfort. She stated she feels overwhelmed. I offered emotional support through reflective listening.     Jackelyn named goals for reiki session for:    releasing    Jackelyn reported feeling calm and hopeful after a 50 minute reiki session. She stated that she finds much comfort in integrative therapies (reiki).     Jackelyn requested follow up for reiki 1 to 2 times per week if possible during Claudio's hospitalization.     Plan: I will follow for energy work while Palliative Care is consulted.     Casandra Ring  Integrative Therapies Intern    [facility] Inpatient Palliative Care Team Consult pager [phone] (M-F 8-4:30)  After-hours Answering Service [phone]  "

## 2019-10-23 NOTE — PROGRESS NOTES
Cardiology - [facility]    Interval events: 53 year old male who was admitted on 10/13/19 as a transfer for an OSH for refractory VT/VF arrest s/p PCI to LAD and placement on peripheral [facility] ECMO. IABP removed successfully 10/20/2019. Weaning sedation and progressing towards extubation. Agitated overnight. No longer febrile.     Temp:  [98.1  F (36.7  C)-100.7  F (38.2  C)] 98.8  F (37.1  C)  Heart Rate:  [] 87  Resp:  [19-44] 22  BP: (118)/(73) 118/73  MAP:  [56 mmHg-100 mmHg] 76 mmHg  Arterial Line BP: ()/(8-74) 105/60  FiO2 (%):  [50 %-60 %] 60 %  SpO2:  [89 %-100 %] 91 %  Tele: no events.      I/O last 3 completed shifts:  In: 3727.38 [I.V.:1937.38; NG/GT:470]  Out: 2760 [Urine:2470; Stool:140; Chest Tube:150]    Hemodynamic drips: Amiodarone 0.5, precedex 1.5, fentanyl 200.   Selected medications: allopurinol, aspirin, lasix 40 daily, keppra 750 BID, PPI, zosyn, quetiapine, ticagrelor.     Physical examination:  Vitals:    10/19/19 0000 10/20/19 0400 10/21/19 0500 10/21/19 1200   Weight: 105.1 kg (231 lb 11.3 oz) 105.3 kg (232 lb 2.3 oz) 103.4 kg (227 lb 15.3 oz) 130.7 kg (288 lb 2.3 oz)    10/22/19 0400   Weight: 104 kg (229 lb 4.5 oz)     GEN:  Intubated, sedated  CV:  S1/S2 heard  LUNGS: coarse breath sounds, right-sided chest tube in place  ABD: Soft BS, soft, mildly distended  EXT: warm, trace-1+ edema, dopplerable pulses  SKIN: Large hematoma along chest wall    Labs were reviewed.  BMP  Recent Labs   Lab 10/23/19  0401 10/22/19  1522 10/22/19  0417 10/21/19  1626   * 144 144 144   POTASSIUM 3.9 3.6 3.7 3.9   CHLORIDE 109 109 108 107   HEATHER 8.4* 7.9* 8.5 8.3*   CO2 29 31 27 32   BUN 35* 34* 35* 32*   CR 0.76 0.72 0.86 0.76   * 142* 134* 137*     LFTs  Recent Labs   Lab 10/23/19  0401 10/22/19  1522 10/22/19  0417 10/21/19  1626   ALKPHOS 112 118 141 135   AST 41 42 46* 49*   ALT 26 32 30 33   BILITOTAL 1.6* 1.7* 2.0* 2.0*   PROTTOTAL 6.4* 6.2* 6.4* 6.4*   ALBUMIN 2.3* 2.4* 2.4* 2.4*       CBC  Recent Labs   Lab 10/23/19  0401 10/22/19  1522 10/22/19  0417 10/21/19  1626   WBC 11.1* 11.4* 10.6 10.3   RBC 3.02* 2.99* 2.96* 2.96*   HGB 9.2* 9.1* 9.1* 9.0*   HCT 29.6* 29.3* 28.9* 29.0*   MCV 98 98 98 98   MCH 30.5 30.4 30.7 30.4   MCHC 31.1* 31.1* 31.5 31.0*   RDW 16.1* 16.2* 16.2* 16.3*    237 205 169     INR  Recent Labs   Lab 10/23/19  0401 10/22/19  0417 10/21/19  0406 10/20/19  0345   INR 1.25* 1.26* 1.15* 1.23*       Radiology chest xray from 10/21    Impression:   1. Diffuse mixed opacities right greater than left have not changed  appreciatively.  2. Support devices are stable. Of note the sidehole of the right chest  tube is outside the thorax.    Cardiology 10/21    The Ejection Fraction is estimated at 20-25%. Severe diffuse hypokinesis to akinesis in segmental pattern of mid-apical anterior wall, mid anteroseptum and apical septum, consistent with ischemic cardiomyopathy. LV function best preserved at basal inferior, inferolateral and anterolateral wall segments.         IMPRESSION & PLAN  53 year old male who was admitted on 10/13/19 as a transfer for an OSH for refractory VT/VF arrest s/p PCI to LAD and placement on peripheral [facility] ECMO. IABP removed successfully 10/20/2019. Weaning sedation and progressing towards extubation.     Changes Today:  - weaning sedation   - added gabapentin  - up to chair during the day  - continue zosyn beyond initial 5d course  - SICU trach consult  - Chest tube out today.      Neurology: Intubated, sedated, paralyzed. Cooled to 34 degrees, now rewarmed. Grimacing to pain and blinking eyes on command intermittently.  - NeuroCrit consulted  - EEG with small amount of epileptiform activity, due to pt becoming more alert, stopped EEG [id number]  - Loaded and started keppra 750 mg BID on 10/17  - Continue versed gtt and fent gtt, weaning as able with precedex  - CT Head 10/16 with smal right paracentral lesion, new right hemicerebellum lesion concerning for  stroke.   Cardiovascular / Hemodynamics: Refractory VF arrest s/p peripheral V-A ECMO at OSH on 10/13/19.  TTE: LVEF 5-10%, increase to 15% on 1L  - Wean pressors/inotropes as able, currently not on any  - Decannulation today, will continue IABP for now  - Continue ASA 81mg and ticagrelor 90 mg BID  - Held temp at 34 degrees for 24 hours, now rewarmed  - Hold lipitor for now given likely hepatic injury during arrest  - Hold ACE/ARB for now given likely reduced renal fxn after arrest  - Holding beta blocker given shock    Pulmonary: Now weaning vent requirements. Large hemothorax on 10/14 s/p right-sided chest tube.  CXR: stable devices, worsening RLL pleural effusion  - Wean vent as able  - Will have another chest tube placed during decannulation on 10/18  - Monitor CT output  - Daily CXR  - Q2H ABGs for now  - Consider scheduled duonebs if signs of lung dz, currently PRN     GI and Nutrition: Per Pt's wife, he is an alcoholic. Concern for possible GIB given black OG output, improved.  - Monitor BID LFTs  - NJ placed at bedside on 10/16, started TFs  - Bowel regimen - started  - GI Prophylaxis: PPI IV BID   Renal, Fluid and Electrolytes: - Monitor urine output  - Maintain K>3 and Mg>2    Infectious Disease: No signs of infection. Leukocytosis c/w arrest. Blood cultures without growth.   - Initially vancomycin/zosyn x5 days for presumed aspiration - then extended given multiple invasive procedures  - Daily blood cultures  - Monitor for signs of infection   Hematology and Oncology: Receiving heparin for ECMO and ASA/ticagrelor for KAMRON. Large intramuscular hematoma of right pec and hemothorax on 10/14 with possible GIB as above.  - Cryo PRN fibrinogen < 200; FFP for INR >2  - Transfuse for Hgb<10, Plts<70  - Heparin gtt for ECMO with ACT goal 140-160 given bleeding concerns as above  - US LE w/ arterial duplex per ECMO protocol   - DVT PPX: Heparin as above  - Right chest wall hematoma: monitor exam and Hgb closely    Endocrinology: No known medical history. BG elevated.  - Insulin gtt PRN   Lines: Restraint: needed  Peripheral IV 10/13/19 Right Wrist (Active)   Number of days: 7       Peripheral IV 10/16/19 Left Lower forearm (Active)   Number of days: 4       Left Arterial Line Radial (Active)   Number of days:        CVC Triple Lumen 10/13/19 Left Subclavian (Active)   Number of days: 7        I discussed the patient with Dr. Connor.    Thank you for allowing me to care for this patient. This has been discussed with the attending physician.    Kyleigh Connor MD  Cardiology Fellow, PGY-6

## 2019-10-23 NOTE — PLAN OF CARE
Delirium medications adjusted this morning and patient much more calm this afternoon. No longer flailing arms/pulling at tubes or drains. Redirectable with verbal cues. Bilateral wrist restraints removed. Mitts remain in place. Wife notified.       Problem: Restraint for Non-Violent/Non-Self-Destructive Behavior  Goal: Prevent/Manage Potential Problems  Description  Maintain safety of patient and others during period of restraint.  Promote psychological and physical wellbeing.  Prevent injury to skin and involved body parts.  Promote nutrition, hydration, and elimination.  10/23/2019 1528 by Gin Huber, RN  Outcome: Completed  10/23/2019 0743 by Eduard Mack, RN  Outcome: No Change

## 2019-10-23 NOTE — PLAN OF CARE
ICU End of Shift Summary. See flowsheets for vital signs and detailed assessment.    Changes this shift: Patient experienced increasing agitation and confusion over the shift. Patient's fentanyl and precedex gtt increased, see MAR. Patient requiring hourly fentanyl boluses. Thrashing in bed. Not redirectable and not following commands. Bilateral wrist restraints initiated for patient safety and protection of the ETT. MDs aware of agitation.     Plan: Continue to monitor for signs of agitation. Continue to wean from vent per plan of care.

## 2019-10-23 NOTE — CONSULTS
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  2. Hazy opacification of the right lung and left lower lobe are  relatively stable and likely represent layering pleural effusions with  overlying atelectasis/consolidations  3. Stable position of right apical chest tube with sidehole appearing  outside the pleural cavity.    I have personally reviewed the examination and initial interpretation  and I agree with the findings.    QAMAR MELGAR MD   CT Head w/o Contrast     Value    Radiologist flags (Urgent)     Right paracentral-frontal small infarct versus tiny    Narrative    CT HEAD W/O CONTRAST 10/16/2019 11:15 AM    History: ECMO  ICD-10:  Comparison: 10/13/2019.    Technique: Using multidetector thin collimation helical acquisition  technique, axial, coronal and sagittal CT images from the skull base  to the vertex were obtained without intravenous contrast.     Findings:    On the noncontrast images of the brain, there is better visualization  of a possible hypodense abnormality in the right paracentral lobule at  the junction of the right frontal and parietal lobes (series 2 image  27) which is nonspecific and without adjacent mass effect. Right new  cerebellar linear focus is suspicious for infarct as it is  wedge-shaped. The ventricles are  not enlarged. The gray to white  matter differentiation of the cerebral hemispheres is preserved. The  basal cisterns are patent.  Increasing diffuse pansinus debris and fluid. The mastoid air cells  are clear.       Impression    Impression:   1. Better visualized nonspecific hypodensity in the right paracentral  lobule, which is indeterminate for a small infarct versus small mass  lesion is better evaluated by MRI (although may not be feasible), or  postcontrast CT could help to visualize further.   2. A new right hemicerebellum lesion as well is more suspicious for  infarct.  3. Diffuse debris and fluid throughout the paranasal sinuses could  relate to intubation, but increased, and could also represent  sinusitis.    [[facility]: Right paracentral-frontal small infarct versus tiny  mass, and right cerebellar lesion that is new.]    This report will be copied to the [facility] to ensure a  provider acknowledges the finding. [facility] is available Monday  through Friday 8am-3:30 pm.     DICKSON LUNSFORD MD   XR Chest Port 1 View    Narrative    Exam: XR CHEST PORT 1 VW, 10/17/2019 1:30 AM    Indication: Check endotracheal tube placement and ECLS cannula  placement. DO NOT log-roll patient.  Place film under patient using  patient safety handling process.    Comparison: 10/16/2019.    Findings:   Portable AP view of the chest. ET tube tip 7.7 cm above the adiel.  Enteric tubes and esophageal temperature probe. A new feeding tube  with tip collimated outside the field-of-view. AIBP marker in stable  position. ECMO cannula is stable. Left subclavian central venous  catheter tip projects over mid SVC.  Right apical chest tube with  sidehole outside the pleural space, stable. Unchanged low lung volumes  with layering bilateral pleural effusions, right greater than left.  Improved aeration left lung with stable diffuse opacities in the  right. No significant pneumothorax.      Impression     Impression:   1. Bilateral layering pleural effusions with slightly improved  aeration in the left lung. Stable mixed opacities in the right lung.  2. Endotracheal tube tip 7.7 cm from the adiel, consider advancing.  New feeding tube is noted.   3.  Multiple additional support devices are stable, including  right  apical chest tube with sidehole appearing outside the pleural cavity.    I have personally reviewed the examination and initial interpretation  and I agree with the findings.    SUKHJINDER SLATER MD   US Lower Extremity Arterial Duplex Right     Value    Radiologist flags (Urgent)     Absent flow in the distal anterior tibial artery and    Narrative    Exam: US LOWER EXTREMITY ARTERIAL DUPLEX RIGHT, 10/16/2019 1:56 PM    Indication: absent DP pulse on right, on [facility] ECMO    Comparison: Ultrasound dated 10/14/2019, CT CAP dated 10/14/2019.    TECHNIQUE: Duplex arterial ultrasound evaluation of the right lower  extremity    Technical note: Proximal arteries obscured by ECMO cannula    Findings:   Peak systolic velocities in the right lower extremity as follows:    CFA: Obscured by ECMO  PFA origin: Obscured by ECMO  SFA origin: Obscured by ECMO  SFA mid thigh: 53 cm/s  SFA distal thigh 11 cm/s  Popliteal artery: 17 cm/s  PTA at the ankle: 28 cm/s  JAMEY in the calf: 6 cm/s  JAMEY at the ankle: No flow  Dorsal pedal artery: Not visualized.    Waveforms are diffusely monophasic and with poor phasicity, likely  related to ECMO.      Impression    Impression:    1. In the right lower extremity the dorsal pedal artery was  nonvisualized and there was absent flow in the distal anterior tibial  artery.  2. Remaining visualized arteries are patent with velocities as  detailed above.    [Urgent Result: Absent flow in the distal anterior tibial artery and  nonvisualization of the dorsal pedal artery]    Finding was identified on 10/16/2019 2:29 PM.     Paradise Villareal was contacted by Dr. Ariel Melo DO ([facility])  at  10/16/2019 2:37 PM and verbalized understanding of the urgent  finding.     I have personally reviewed the examination and initial interpretation  and I agree with the findings.    DWAIN GOSS MD   XR Abdomen Port 1 View    Narrative    Exam: XR ABDOMEN PORT 1 VW, 10/16/2019 5:09 PM    Indication: NJ placement    Comparison: 10/14/2019, 10/13/2019.    Findings:   A single supine AP view of the abdomen was obtained. A feeding tube  tip projects over the proximal jejunum. The gastric tube tip projects  over the expected location of the pylorus with the sidehole projecting  over the gastric antrum. An ECMO cannula extends below the  field-of-view. Nonobstructive bowel gas pattern without pneumatosis or  portal venous gas. Stable retrocardiac opacities, likely atelectasis.      Impression    Impression:   The feeding tube tip projects over the proximal jejunum. The gastric  tube tip projects over the expected location of the pylorus, consider  slight retraction.    JAKE STRINGER MD   XR Chest Port 1 View    Narrative    Exam: XR CHEST PORT 1 VW, 10/18/2019 1:15 AM    Indication: Check endotracheal tube placement and ECLS cannula  placement. DO NOT log-roll patient.  Place film under patient using  patient safety handling process.    Comparison: 10/17/2019.    Findings:   AP views of the chest. ET tube tip 6.5 cm from the adiel. Enteric  tubes course midline with tips outside the field-of-view. IABP  superior marker at the midthoracic trachea just above level of adiel,  stable. ECMO cannula and right apical chest tube are again noted.  Stable low lung volumes. Layering right-sided pleural effusion with  increase haziness throughout the right lung. Diffuse mixed opacities,  right lung predominant are relatively stable. Small residual layering  left pleural effusion.   Left subclavian central venous catheter tip  projects over mid SVC.        Impression    Impression:   1. Slightly increased layering right-sided  pleural effusion with  increased hazy opacification of the right lung. Small layering left  pleural effusion.  2. Support devices are stable.    I have personally reviewed the examination and initial interpretation  and I agree with the findings.    SUKHJINDER SLATER MD   XR Chest Port 1 View    Narrative    Exam: XR CHEST PORT 1 VW, 10/19/2019 3:33 AM    Indication: Check endotracheal tube placement and ECLS cannula  placement.     Comparison: CT and radiograph 10/18/2019.    Findings:   ET tube tip in the high thoracic trachea 5.8 cm from the adiel. Left  IJ CVC with tip at the mid SVC. Enteric tubes course midline with tip  collimated outside the field-of-view. Gastric tube sidehole projects  over the stomach. Temperature probe in the upper esophagus. Right  apical chest tube with sidehole outside the pleural cavity, stable.  Low lung volumes. Improved aeration in the right hemithorax with  decreased pleural effusion and mixed interstitial/airspace opacities.  Small left sided pleural effusion and diffuse mixed  interstitial/airspace opacities in the left lung are stable. Trace  right pneumothorax better appreciated on recent CT. No left  pneumothorax.      Impression    Impression:   1. Decreased opacification of the right hemithorax with decreased  right-sided pleural effusion and hazy opacities, likely combination of  atelectasis and infection/aspiration.  2. Stable left-sided pleural effusion and hazy opacities.  3. Trace right pneumothorax better appreciated on CT 10/18/2019.  Right-sided chest tube in stable position with the sidehole outside  the pleural cavity, unchanged from prior exam.    I have personally reviewed the examination and initial interpretation  and I agree with the findings.    SKYLER MARTINEZ MD   XR Chest Port 1 View     Value    Radiologist flags (Urgent)     Right apical chest tube with sidehole with appearance    Narrative    Exam: XR CHEST PORT 1 VW, 10/18/2019 12:01 PM    Indication:  s/p ECMO    Comparison: Chest x-ray 1:05 AM    Findings:   ET tube tip 8 cm from the adiel. Left subclavian CVC terminates at  the mid SVC. Enteric tubes course midline with tips and side hole  terminating in the thoracic esophagus. Feeding tube extends beyond the  lateral margin film. IABP superior marker at the midthoracic trachea  just above level of adiel, stable. ECMO cannula removed. Stable low  lung volumes. Complete opacification right lung. Small residual  layering left pleural effusion. Right apical chest tube with sidehole  with appearance outside the chest wall.      Impression    Impression:   1. Complete opacification right lung likely represents increased  pleural effusion and superimposed consolidation and/or atelectasis.  2. ECMO cannula is removed.  3. Enteric tube appears to be coiled in the mouth sidehole terminating  in lower thoracic esophagus. Recommend readjusting.  4. Right apical chest tube with sidehole with appearance outside the  chest wall.      [Urgent Result: Right apical chest tube with sidehole with appearance  outside the chest wall.    Finding was identified on 10/18/2019 4:08 PM.     Dr. Carr was contacted by Dr. Wren at 10/18/2019 4:43 PM and  verbalized understanding of the urgent finding.      I have personally reviewed the examination and initial interpretation  and I agree with the findings.    AGNIESZKA GRANADO MD   XR Abdomen Port 1 View    Narrative    Examination:  XR ABDOMEN PORT 1 VW    Date:  10/18/2019 12:01 PM     Clinical Information: feeding tube placement     Comparison: 10/16/2019, CT chest abdomen and pelvis 10/14/2019.    Findings:     Supine radiograph of the abdomen. Feeding tube visualized coursing  through the stomach with the tip in the proximal jejunum, appropriate  position. Partial visualization of gastric tube with the tip overlying  the distal esophagus, recommend advancing approximately 12 cm.  Nonobstructive bowel gas pattern. No pneumatosis  or portal venous gas.  No acute osseous abnormality.      Impression    Impression:    1.  Feeding tube visualized coursing through the stomach with the tip  in the proximal jejunum, appropriate position.  2.  Partial visualization of gastric tube with tip overlying the  distal esophagus.    I have personally reviewed the examination and initial interpretation  and I agree with the findings.    JOSE SANCHEZ MD   XR Chest Port 1 View    Narrative    Exam: XR CHEST PORT 1 VW, 10/18/2019 12:53 PM    Indication: s/p bronch    Comparison: Chest x-ray 11:12 AM    Findings:   ET tube tip 8 cm from the adiel. Enteric tubes with sidehole  terminating in the lower thoracic esophagus. IABP superior marker at  the midthoracic trachea just above level of adiel, stable. ECMO  cannula removed. Right apical chest tube in place with appearance of  side hole outside chest wall. Left subclavian CVC terminating over the  mid SVC. Stable low lung volumes.     Near-complete opacification of right lung with some improved upper  lung aeration.       Impression    Impression:   1. Slightly improved aeration of the right lung with near-complete  opacification which likely represents atelectasis and pleural  effusion.  2. Enteric tube sidehole terminating in the lower thoracic esophagus,  recommend repositioning.   3. Right apical chest tube with appearance of side hole outside chest  wall.    I have personally reviewed the examination and initial interpretation  and I agree with the findings.    AGNIESZKA GRANADO MD   US Chest Pleural Effusion Imaging    Narrative    EXAMINATION: US CHEST/PLEURAL EFFUSION IMAGING, 10/18/2019 1:37 PM     COMPARISON: Chest x-ray 10/18/2019, 10/17/2019, CT chest abdomen and  pelvis 10/14/2019.    HISTORY: Right chest tube placement for pleural effusion.    FINDINGS: Sonographic examination of the RUQ demonstrates echogenic  pleural fluid consistent with right-sided hemothorax. No definite  fluid visualized in  the left pleural cavity or left upper quadrant. No  ascites in the right or left upper quadrants.      Impression    IMPRESSION:    Right-sided echogenic pleural fluid consistent with known hemothorax.  No fluid visualized in the left pleural cavity.    I have personally reviewed the examination and initial interpretation  and I agree with the findings.    YANETH MAXWELL MD   CTA Chest with Contrast    Narrative    Procedures:   1. Noncontrast and contrast-enhanced CT of the chest, 10/18/2019.  2. Aborted chest tube placement.    Clinical indication: Right hemothorax.    Comparison studies: 10/14/2019, 10/13/2019    PROCEDURE:        Interventional radiologists: Bronson Lam MD (IR staff)    Fellow: Carlton Holland MD    Consent: verbal and written informed consent obtained prior to  procedure.    Procedure details: Patient placed in the supine position. The right  chest was prepped and draped in standard sterile fashion. Timeout  performed. Preprocedural CT scan obtained. This was performed, both  without and following injection of 50 mL intravenous contrast. On the  contrast-enhanced CT scan, the lung was significantly atelectatic with  consolidative opacities throughout the right lower lobe. No  significant pleural effusion or pneumothorax noted. Therefore, no  chest tube is placed..    Medications: Please see nursing documentation.     Monitoring: The patient was placed on continuous monitoring. Vital  signs and sedation monitored by nursing staff under my supervision.  The patient remained stable throughout the procedure.    Sedation time: Not applicable, as procedure was aborted.    Complications: None.      Impression    IMPRESSION:     Aborted chest tube placement, due to lack of significant blood  products and fluid within the right pleural space. The right lower  lobe demonstrates consolidative opacities, which favor pneumonia  versus alveolar hemorrhage and, likely superimposed mucous  plugging.    PLAN:    Return to unit.    Examination performed by Dr. Cotto under my supervision.  I, Dr. Carlos Lam, was present for the entire examination.    I have personally reviewed the examination and initial interpretation  and I agree with the findings.    CARLOS LAM MD   XR Abdomen Port 1 View    Narrative    Exam: XR ABDOMEN PORT 1 VW, 10/18/2019 6:09 PM    Indication: NJ placement    Comparison: Earlier today    Findings:   Feeding tube tip in the proximal jejunum. Enteric tube has been  advanced the tip is in the fundus of the stomach and sidehole is also  in the fundus of the stomach. Nonobstructive bowel gas pattern.  Opacification of the right thorax. See CT same date. Right femoral  line seen at the very edge of the film. Tip in the expected location  of the external iliac vein.      Impression    Impression:   1. Feeding tube tip in the proximal jejunum.  2. Enteric tube tip and sidehole in the fundus of the stomach.  3. Nonobstructive bowel gas pattern.    JOSE SANCHEZ MD   XR Chest Port 1 View    Narrative    Exam: XR CHEST PORT 1 VW, 10/20/2019 1:50 AM    Indication: Check endotracheal tube placement and ECLS cannula  placement. DO NOT log-roll patient.  Place film under patient using  patient safety handling process.    Comparison: 10/19/2019.    Findings:   Single portable AP view of the chest. ET tube tip in the midthoracic  trachea 7.5 cm from the adiel. 2 enteric tubes with tips collimated  outside the field-of-view. IABP marker at the level of the adiel,  stable. Right apical chest tube with sidehole outside the pleural  cavity, stable. Stable left IJ CVC with tip at the mid SVC. Unchanged  opacification of the right lung with diffuse airspace opacities in  layering pleural effusion. No cystic change in small sided pleural  effusion and diffuse airspace opacities. No pneumothorax.      Impression    Impression:   1. Stable bilateral pleural effusions and diffuse  airspace opacities,  right lung predominant.  2. Support devices are stable.    I have personally reviewed the examination and initial interpretation  and I agree with the findings.    JOSE SANCHEZ MD   XR Chest Port 1 View    Narrative    XR CHEST PORT 1 VW  10/21/2019 8:35 AM      HISTORY: respiratory failure, s/p ecmo and intubation    COMPARISON: Chest radiograph from 10/20/2019    FINDINGS: AP view of the chest. Endotracheal tube tip is in the  midthoracic trachea 8 cm from the adiel. 2 Enteric tubes with tips  beyond the field-of-view. Left-sided central line with tip in the mid  SVC. Right apical chest tube with sidehole outside the pleural cavity.  The cardiac silhouette size is stable. Costophrenic angles are beyond  the scope of this view. Stable to slightly decreased diffuse right  greater than left airspace opacities. The visualized upper abdomen is  normal.      Impression    IMPRESSION:   1. Stable right greater than left diffuse airspace opacities.  2. Stable support devices.    I have personally reviewed the examination and initial interpretation  and I agree with the findings.    JOSE SANCHEZ MD   CT Chest w/o Contrast    Narrative    EXAMINATION: CT CHEST W/O CONTRAST, 10/21/2019 10:01 AM    CLINICAL HISTORY: Pneumonia, unresolved or complicated; hypoxia, R  hemothorax, CXR opacities    COMPARISON: 10/18/2019, CT CAP 10/14/2019 and 10/13/2019.    TECHNIQUE: CT imaging obtained through the chest without contrast.  Coronal and axial MIP reformatted images obtained.     FINDINGS:  Mediastinum: Endotracheal tube in place. Gastric tubes within the  esophagus extending beyond the field-of-view inferiorly. Right-sided  chest tube terminating apically in similar position to previous.  Prominent pretracheal lymph node measuring 1.2 cm, unchanged (series 2  image 23). Normal size heart and aorta. Prominent pulmonary artery  measuring 2.2 cm (series 2 image 28). Decreased retrosternal hematoma  and  hemopericardium.    Upper abdomen: Limited noncontrast evaluation of the upper abdomen.    Bones and soft tissues: Multiple anterior bilateral rib fractures  present status post resuscitation with right pectoral intramuscular  hematoma, grossly unchanged from previous. Sternal fracture unchanged.  No concerning osseous lesions.    Lungs: Decreased reticular groundglass and solid densities of the  lungs now more prominent in the dependent lung fields, likely  representing decreased pulmonary edema versus improved  aspiration/infection.  Trace pleural effusions. No pneumothorax.      Impression    IMPRESSION:   1. Decreased lung opacities now in a more dependent distribution,  likely representing decreased pulmonary edema and/or  aspiration/infection.  2. Sternal and multiple rib fractures, presumably status post  resuscitation.  3. Decreased retrosternal hematoma and hemopericardium.   XR Chest Port 1 View    Narrative    Exam: XR CHEST PORT 1 VW, 10/21/2019 1:42 PM    Indication: transfered rooms and movement, re-eval ET tube position    Comparison: Earlier today    Findings: Enteric tube and feeding tube are seen coursing through the  mediastinum. There tips project off the film. Endotracheal tube at the  T3 the upper thoracic trachea. Left subclavian venous catheter tip in  the superior high superior vena cava. Right chest tube tip at the apex  but the sidehole is outside the thorax.     Diffuse mixed interstitial alveolar infiltrates throughout both lungs  more prominent on the right than the left is not appreciatively  changed. Heart is within normal limits.      Impression    Impression:   1. Diffuse mixed opacities right greater than left have not changed  appreciatively.  2. Support devices are stable. Of note the sidehole of the right chest  tube is outside the thorax.    JOSE SANCHEZ MD

## 2019-10-23 NOTE — PROGRESS NOTES
Restraints were started at 0300 due to patient trashing in bed and not able to redirect, pulling ventilator tubing apart. Soft bilateral upper extremity wrist restraints initiated. MD order obtained immediately.  Family to be made aware in early morning.

## 2019-10-23 NOTE — PLAN OF CARE
ICU End of Shift Summary. See flowsheets for vital signs and detailed assessment.    Changes this shift: Patient extremely agitated this morning with a RASS of -2 to +5. Was lifting arms to face and disconnected his ETT several times with his trashing. Not redirectable and not following commands. Increased seroquel dose and added gabapentin with some effect. Patient overall more calm, but still does not make eye contact or follow commands. Precedex drip decreased to 1.2. Fentanyl remains at 200mcg/hg. Levophed at 0.1-0.14 to keep MAP> 65. Amio at 0.5mg/hr. Continues in sinus rhythm with no atrial fibrillation this shift. Tmax 102.8 ax. MD notified. Lasix with good response but continues to have high obligate intake (2.5L so far today) Currently net negative 200mL since midnight. Up in chair for 3 hours. Continues to move R > L.     Plan: Continue to monitor overnight. Frequent reorientation and slow wean of sedation as able. Sleep hygiene measures per protocol. Notify team of any changes.

## 2019-10-24 ENCOUNTER — APPOINTMENT (OUTPATIENT)
Dept: OCCUPATIONAL THERAPY | Facility: CLINIC | Age: 54
DRG: 003 | End: 2019-10-24
Attending: STUDENT IN AN ORGANIZED HEALTH CARE EDUCATION/TRAINING PROGRAM
Payer: COMMERCIAL

## 2019-10-24 ENCOUNTER — APPOINTMENT (OUTPATIENT)
Dept: GENERAL RADIOLOGY | Facility: CLINIC | Age: 54
DRG: 003 | End: 2019-10-24
Attending: STUDENT IN AN ORGANIZED HEALTH CARE EDUCATION/TRAINING PROGRAM
Payer: COMMERCIAL

## 2019-10-24 LAB
ALBUMIN SERPL-MCNC: 2.1 G/DL (ref 3.4–5)
ALBUMIN SERPL-MCNC: 2.2 G/DL (ref 3.4–5)
ALP SERPL-CCNC: 87 U/L (ref 40–150)
ALP SERPL-CCNC: 96 U/L (ref 40–150)
ALT SERPL W P-5'-P-CCNC: 23 U/L (ref 0–70)
ALT SERPL W P-5'-P-CCNC: 25 U/L (ref 0–70)
ANION GAP SERPL CALCULATED.3IONS-SCNC: 4 MMOL/L (ref 3–14)
ANION GAP SERPL CALCULATED.3IONS-SCNC: 7 MMOL/L (ref 3–14)
AST SERPL W P-5'-P-CCNC: 40 U/L (ref 0–45)
AST SERPL W P-5'-P-CCNC: 40 U/L (ref 0–45)
BACTERIA SPEC CULT: NO GROWTH
BASE EXCESS BLDA CALC-SCNC: 5.3 MMOL/L
BILIRUB SERPL-MCNC: 1.2 MG/DL (ref 0.2–1.3)
BILIRUB SERPL-MCNC: 1.5 MG/DL (ref 0.2–1.3)
BUN SERPL-MCNC: 38 MG/DL (ref 7–30)
BUN SERPL-MCNC: 38 MG/DL (ref 7–30)
C DIFF TOX B STL QL: NEGATIVE
CA-I BLD-MCNC: 4.3 MG/DL (ref 4.4–5.2)
CA-I BLD-MCNC: 4.5 MG/DL (ref 4.4–5.2)
CALCIUM SERPL-MCNC: 7.9 MG/DL (ref 8.5–10.1)
CALCIUM SERPL-MCNC: 8.2 MG/DL (ref 8.5–10.1)
CHLORIDE SERPL-SCNC: 109 MMOL/L (ref 94–109)
CHLORIDE SERPL-SCNC: 111 MMOL/L (ref 94–109)
CO2 SERPL-SCNC: 28 MMOL/L (ref 20–32)
CO2 SERPL-SCNC: 30 MMOL/L (ref 20–32)
CREAT SERPL-MCNC: 0.84 MG/DL (ref 0.66–1.25)
CREAT SERPL-MCNC: 0.91 MG/DL (ref 0.66–1.25)
ERYTHROCYTE [DISTWIDTH] IN BLOOD BY AUTOMATED COUNT: 16.6 % (ref 10–15)
ERYTHROCYTE [DISTWIDTH] IN BLOOD BY AUTOMATED COUNT: 16.7 % (ref 10–15)
GFR SERPL CREATININE-BSD FRML MDRD: >90 ML/MIN/{1.73_M2}
GFR SERPL CREATININE-BSD FRML MDRD: >90 ML/MIN/{1.73_M2}
GLUCOSE BLDC GLUCOMTR-MCNC: 129 MG/DL (ref 70–99)
GLUCOSE BLDC GLUCOMTR-MCNC: 133 MG/DL (ref 70–99)
GLUCOSE BLDC GLUCOMTR-MCNC: 135 MG/DL (ref 70–99)
GLUCOSE BLDC GLUCOMTR-MCNC: 140 MG/DL (ref 70–99)
GLUCOSE BLDC GLUCOMTR-MCNC: 141 MG/DL (ref 70–99)
GLUCOSE SERPL-MCNC: 144 MG/DL (ref 70–99)
GLUCOSE SERPL-MCNC: 146 MG/DL (ref 70–99)
GRAM STN SPEC: NORMAL
GRAM STN SPEC: NORMAL
HCO3 BLD-SCNC: 31 MMOL/L (ref 21–28)
HCT VFR BLD AUTO: 28.8 % (ref 40–53)
HCT VFR BLD AUTO: 29.3 % (ref 40–53)
HGB BLD-MCNC: 8.7 G/DL (ref 13.3–17.7)
HGB BLD-MCNC: 9 G/DL (ref 13.3–17.7)
INR PPP: 1.28 (ref 0.86–1.14)
LACTATE BLD-SCNC: 1.4 MMOL/L (ref 0.7–2)
LACTATE BLD-SCNC: 1.4 MMOL/L (ref 0.7–2)
LDH SERPL L TO P-CCNC: 426 U/L (ref 85–227)
LMWH PPP CHRO-ACNC: <0.1 IU/ML
Lab: NORMAL
MAGNESIUM SERPL-MCNC: 2.6 MG/DL (ref 1.6–2.3)
MAGNESIUM SERPL-MCNC: 2.7 MG/DL (ref 1.6–2.3)
MAGNESIUM SERPL-MCNC: 2.7 MG/DL (ref 1.6–2.3)
MCH RBC QN AUTO: 30.1 PG (ref 26.5–33)
MCH RBC QN AUTO: 30.3 PG (ref 26.5–33)
MCHC RBC AUTO-ENTMCNC: 30.2 G/DL (ref 31.5–36.5)
MCHC RBC AUTO-ENTMCNC: 30.7 G/DL (ref 31.5–36.5)
MCV RBC AUTO: 100 FL (ref 78–100)
MCV RBC AUTO: 99 FL (ref 78–100)
O2/TOTAL GAS SETTING VFR VENT: 65 %
OXYHGB MFR BLD: 91 % (ref 92–100)
PCO2 BLD: 45 MM HG (ref 35–45)
PH BLD: 7.44 PH (ref 7.35–7.45)
PHOSPHATE SERPL-MCNC: 3.1 MG/DL (ref 2.5–4.5)
PHOSPHATE SERPL-MCNC: 3.2 MG/DL (ref 2.5–4.5)
PLATELET # BLD AUTO: 332 10E9/L (ref 150–450)
PLATELET # BLD AUTO: 347 10E9/L (ref 150–450)
PO2 BLD: 65 MM HG (ref 80–105)
POTASSIUM SERPL-SCNC: 3.9 MMOL/L (ref 3.4–5.3)
POTASSIUM SERPL-SCNC: 4.1 MMOL/L (ref 3.4–5.3)
POTASSIUM SERPL-SCNC: 4.1 MMOL/L (ref 3.4–5.3)
PROT SERPL-MCNC: 6.5 G/DL (ref 6.8–8.8)
PROT SERPL-MCNC: 6.5 G/DL (ref 6.8–8.8)
RADIOLOGIST FLAGS: ABNORMAL
RBC # BLD AUTO: 2.89 10E12/L (ref 4.4–5.9)
RBC # BLD AUTO: 2.97 10E12/L (ref 4.4–5.9)
SODIUM SERPL-SCNC: 142 MMOL/L (ref 133–144)
SODIUM SERPL-SCNC: 146 MMOL/L (ref 133–144)
SPECIMEN SOURCE: NORMAL
TROPONIN I SERPL-MCNC: 0.83 UG/L (ref 0–0.04)
TROPONIN I SERPL-MCNC: 1.07 UG/L (ref 0–0.04)
WBC # BLD AUTO: 10.6 10E9/L (ref 4–11)
WBC # BLD AUTO: 11.4 10E9/L (ref 4–11)

## 2019-10-24 PROCEDURE — 97535 SELF CARE MNGMENT TRAINING: CPT | Mod: GO | Performed by: OCCUPATIONAL THERAPIST

## 2019-10-24 PROCEDURE — 00000146 ZZHCL STATISTIC GLUCOSE BY METER IP

## 2019-10-24 PROCEDURE — 87040 BLOOD CULTURE FOR BACTERIA: CPT | Performed by: STUDENT IN AN ORGANIZED HEALTH CARE EDUCATION/TRAINING PROGRAM

## 2019-10-24 PROCEDURE — 85027 COMPLETE CBC AUTOMATED: CPT | Performed by: STUDENT IN AN ORGANIZED HEALTH CARE EDUCATION/TRAINING PROGRAM

## 2019-10-24 PROCEDURE — 25000132 ZZH RX MED GY IP 250 OP 250 PS 637: Performed by: STUDENT IN AN ORGANIZED HEALTH CARE EDUCATION/TRAINING PROGRAM

## 2019-10-24 PROCEDURE — 83735 ASSAY OF MAGNESIUM: CPT | Performed by: STUDENT IN AN ORGANIZED HEALTH CARE EDUCATION/TRAINING PROGRAM

## 2019-10-24 PROCEDURE — 80053 COMPREHEN METABOLIC PANEL: CPT | Performed by: STUDENT IN AN ORGANIZED HEALTH CARE EDUCATION/TRAINING PROGRAM

## 2019-10-24 PROCEDURE — 82330 ASSAY OF CALCIUM: CPT | Performed by: INTERNAL MEDICINE

## 2019-10-24 PROCEDURE — 25000128 H RX IP 250 OP 636: Performed by: STUDENT IN AN ORGANIZED HEALTH CARE EDUCATION/TRAINING PROGRAM

## 2019-10-24 PROCEDURE — 84132 ASSAY OF SERUM POTASSIUM: CPT | Performed by: STUDENT IN AN ORGANIZED HEALTH CARE EDUCATION/TRAINING PROGRAM

## 2019-10-24 PROCEDURE — 83605 ASSAY OF LACTIC ACID: CPT | Performed by: INTERNAL MEDICINE

## 2019-10-24 PROCEDURE — 84484 ASSAY OF TROPONIN QUANT: CPT | Performed by: STUDENT IN AN ORGANIZED HEALTH CARE EDUCATION/TRAINING PROGRAM

## 2019-10-24 PROCEDURE — 27210429 ZZH NUTRITION PRODUCT INTERMEDIATE LITER

## 2019-10-24 PROCEDURE — 20000004 ZZH R&B ICU UMMC

## 2019-10-24 PROCEDURE — 82805 BLOOD GASES W/O2 SATURATION: CPT | Performed by: INTERNAL MEDICINE

## 2019-10-24 PROCEDURE — 87493 C DIFF AMPLIFIED PROBE: CPT | Performed by: STUDENT IN AN ORGANIZED HEALTH CARE EDUCATION/TRAINING PROGRAM

## 2019-10-24 PROCEDURE — 99233 SBSQ HOSP IP/OBS HIGH 50: CPT | Mod: GC | Performed by: INTERNAL MEDICINE

## 2019-10-24 PROCEDURE — 84100 ASSAY OF PHOSPHORUS: CPT | Performed by: STUDENT IN AN ORGANIZED HEALTH CARE EDUCATION/TRAINING PROGRAM

## 2019-10-24 PROCEDURE — 36415 COLL VENOUS BLD VENIPUNCTURE: CPT | Performed by: STUDENT IN AN ORGANIZED HEALTH CARE EDUCATION/TRAINING PROGRAM

## 2019-10-24 PROCEDURE — 97110 THERAPEUTIC EXERCISES: CPT | Mod: GO | Performed by: OCCUPATIONAL THERAPIST

## 2019-10-24 PROCEDURE — 71045 X-RAY EXAM CHEST 1 VIEW: CPT

## 2019-10-24 PROCEDURE — 83735 ASSAY OF MAGNESIUM: CPT | Performed by: INTERNAL MEDICINE

## 2019-10-24 PROCEDURE — G0463 HOSPITAL OUTPT CLINIC VISIT: HCPCS

## 2019-10-24 PROCEDURE — 40000275 ZZH STATISTIC RCP TIME EA 10 MIN

## 2019-10-24 PROCEDURE — 40000014 ZZH STATISTIC ARTERIAL MONITORING DAILY

## 2019-10-24 PROCEDURE — 85610 PROTHROMBIN TIME: CPT | Performed by: STUDENT IN AN ORGANIZED HEALTH CARE EDUCATION/TRAINING PROGRAM

## 2019-10-24 PROCEDURE — 25000125 ZZHC RX 250: Performed by: STUDENT IN AN ORGANIZED HEALTH CARE EDUCATION/TRAINING PROGRAM

## 2019-10-24 PROCEDURE — 87070 CULTURE OTHR SPECIMN AEROBIC: CPT | Performed by: STUDENT IN AN ORGANIZED HEALTH CARE EDUCATION/TRAINING PROGRAM

## 2019-10-24 PROCEDURE — 84484 ASSAY OF TROPONIN QUANT: CPT | Performed by: INTERNAL MEDICINE

## 2019-10-24 PROCEDURE — C9113 INJ PANTOPRAZOLE SODIUM, VIA: HCPCS | Performed by: STUDENT IN AN ORGANIZED HEALTH CARE EDUCATION/TRAINING PROGRAM

## 2019-10-24 PROCEDURE — 83615 LACTATE (LD) (LDH) ENZYME: CPT | Performed by: STUDENT IN AN ORGANIZED HEALTH CARE EDUCATION/TRAINING PROGRAM

## 2019-10-24 PROCEDURE — 87205 SMEAR GRAM STAIN: CPT | Performed by: STUDENT IN AN ORGANIZED HEALTH CARE EDUCATION/TRAINING PROGRAM

## 2019-10-24 PROCEDURE — 25800030 ZZH RX IP 258 OP 636: Performed by: STUDENT IN AN ORGANIZED HEALTH CARE EDUCATION/TRAINING PROGRAM

## 2019-10-24 PROCEDURE — 80053 COMPREHEN METABOLIC PANEL: CPT | Performed by: INTERNAL MEDICINE

## 2019-10-24 PROCEDURE — 94003 VENT MGMT INPAT SUBQ DAY: CPT

## 2019-10-24 PROCEDURE — 97165 OT EVAL LOW COMPLEX 30 MIN: CPT | Mod: GO | Performed by: OCCUPATIONAL THERAPIST

## 2019-10-24 PROCEDURE — 85027 COMPLETE CBC AUTOMATED: CPT | Performed by: INTERNAL MEDICINE

## 2019-10-24 PROCEDURE — 85520 HEPARIN ASSAY: CPT | Performed by: STUDENT IN AN ORGANIZED HEALTH CARE EDUCATION/TRAINING PROGRAM

## 2019-10-24 RX ORDER — FUROSEMIDE 10 MG/ML
40 INJECTION INTRAMUSCULAR; INTRAVENOUS
Status: DISCONTINUED | OUTPATIENT
Start: 2019-10-24 | End: 2019-11-03

## 2019-10-24 RX ORDER — QUETIAPINE FUMARATE 100 MG/1
200 TABLET, FILM COATED ORAL 2 TIMES DAILY
Status: DISCONTINUED | OUTPATIENT
Start: 2019-10-24 | End: 2019-10-31

## 2019-10-24 RX ADMIN — VASOPRESSIN 1.5 UNITS/HR: 20 INJECTION INTRAVENOUS at 23:20

## 2019-10-24 RX ADMIN — LEVETIRACETAM 750 MG: 100 SOLUTION ORAL at 21:26

## 2019-10-24 RX ADMIN — INSULIN ASPART 1 UNITS: 100 INJECTION, SOLUTION INTRAVENOUS; SUBCUTANEOUS at 13:14

## 2019-10-24 RX ADMIN — Medication 200 MCG/HR: at 15:00

## 2019-10-24 RX ADMIN — TICAGRELOR 90 MG: 90 TABLET ORAL at 07:46

## 2019-10-24 RX ADMIN — FOLIC ACID 1 MG: 1 TABLET ORAL at 07:46

## 2019-10-24 RX ADMIN — DEXMEDETOMIDINE 1.2 MCG/KG/HR: 100 INJECTION, SOLUTION, CONCENTRATE INTRAVENOUS at 21:05

## 2019-10-24 RX ADMIN — PIPERACILLIN SODIUM AND TAZOBACTAM SODIUM 3.38 G: 3; .375 INJECTION, POWDER, LYOPHILIZED, FOR SOLUTION INTRAVENOUS at 07:46

## 2019-10-24 RX ADMIN — FUROSEMIDE 40 MG: 10 INJECTION, SOLUTION INTRAVENOUS at 07:46

## 2019-10-24 RX ADMIN — THIAMINE HCL TAB 100 MG 100 MG: 100 TAB at 07:46

## 2019-10-24 RX ADMIN — PIPERACILLIN SODIUM AND TAZOBACTAM SODIUM 3.38 G: 3; .375 INJECTION, POWDER, LYOPHILIZED, FOR SOLUTION INTRAVENOUS at 02:36

## 2019-10-24 RX ADMIN — ACETAMINOPHEN 650 MG: 325 TABLET, FILM COATED ORAL at 21:24

## 2019-10-24 RX ADMIN — DEXMEDETOMIDINE 1.2 MCG/KG/HR: 100 INJECTION, SOLUTION, CONCENTRATE INTRAVENOUS at 08:26

## 2019-10-24 RX ADMIN — DEXMEDETOMIDINE 1.2 MCG/KG/HR: 100 INJECTION, SOLUTION, CONCENTRATE INTRAVENOUS at 04:12

## 2019-10-24 RX ADMIN — POTASSIUM CHLORIDE 20 MEQ: 29.8 INJECTION, SOLUTION INTRAVENOUS at 06:36

## 2019-10-24 RX ADMIN — INSULIN ASPART 1 UNITS: 100 INJECTION, SOLUTION INTRAVENOUS; SUBCUTANEOUS at 04:30

## 2019-10-24 RX ADMIN — ACETAMINOPHEN 650 MG: 325 TABLET, FILM COATED ORAL at 08:38

## 2019-10-24 RX ADMIN — Medication 1 PACKET: at 07:47

## 2019-10-24 RX ADMIN — LEVETIRACETAM 750 MG: 100 SOLUTION ORAL at 07:47

## 2019-10-24 RX ADMIN — ASPIRIN 81 MG CHEWABLE TABLET 81 MG: 81 TABLET CHEWABLE at 07:46

## 2019-10-24 RX ADMIN — Medication 1 PACKET: at 21:25

## 2019-10-24 RX ADMIN — FUROSEMIDE 40 MG: 10 INJECTION, SOLUTION INTRAVENOUS at 19:23

## 2019-10-24 RX ADMIN — MULTIVITAMIN 15 ML: LIQUID ORAL at 07:46

## 2019-10-24 RX ADMIN — GABAPENTIN 600 MG: 300 CAPSULE ORAL at 21:24

## 2019-10-24 RX ADMIN — QUETIAPINE FUMARATE 100 MG: 100 TABLET ORAL at 07:46

## 2019-10-24 RX ADMIN — INSULIN ASPART 1 UNITS: 100 INJECTION, SOLUTION INTRAVENOUS; SUBCUTANEOUS at 16:27

## 2019-10-24 RX ADMIN — QUETIAPINE FUMARATE 200 MG: 100 TABLET ORAL at 21:24

## 2019-10-24 RX ADMIN — TICAGRELOR 90 MG: 90 TABLET ORAL at 21:24

## 2019-10-24 RX ADMIN — ALLOPURINOL 200 MG: 100 TABLET ORAL at 07:46

## 2019-10-24 RX ADMIN — GABAPENTIN 600 MG: 300 CAPSULE ORAL at 13:18

## 2019-10-24 RX ADMIN — Medication 200 MCG/HR: at 03:53

## 2019-10-24 RX ADMIN — GABAPENTIN 600 MG: 300 CAPSULE ORAL at 07:46

## 2019-10-24 RX ADMIN — PANTOPRAZOLE SODIUM 40 MG: 40 INJECTION, POWDER, FOR SOLUTION INTRAVENOUS at 15:10

## 2019-10-24 RX ADMIN — Medication 1 PACKET: at 13:17

## 2019-10-24 RX ADMIN — PANTOPRAZOLE SODIUM 40 MG: 40 INJECTION, POWDER, FOR SOLUTION INTRAVENOUS at 04:29

## 2019-10-24 RX ADMIN — Medication 0.1 MCG/KG/MIN: at 15:02

## 2019-10-24 ASSESSMENT — ACTIVITIES OF DAILY LIVING (ADL)
ADLS_ACUITY_SCORE: 19
IADL_COMMENTS: OT: PT WAS IND, SPOUSE CAN A PRN
ADLS_ACUITY_SCORE: 19

## 2019-10-24 ASSESSMENT — MIFFLIN-ST. JEOR: SCORE: 1960.24

## 2019-10-24 NOTE — PLAN OF CARE
ICU End of Shift Summary. See flowsheets for vital signs and detailed assessment.    Changes this shift: Tolerated P/S 10/6 from [id number].  Up in chair.  Occasionally appears to follow some commands.  Had extra large BM this evening x2; rectal pouch in place; c.diff sent.    Plan: Continue to pressure support,  monitor mental status and signs of infection.    Problem: Adult Inpatient Plan of Care  Goal: Optimal Comfort and Wellbeing  10/24/2019 1837 by Jessica Saldaña, RN  Outcome: No Change  10/24/2019 0606 by Casi Reina, RN  Outcome: No Change

## 2019-10-24 NOTE — PROGRESS NOTES
[facility]   Cardiology Progress Note    Assessment & Plan   53 year old male who was admitted on 10/13/19 as a transfer for an OSH for refractory VT/VF arrest s/p PCI to LAD and placement on peripheral [facility] ECMO. IABP removed successfully 10/20/2019. Weaning sedation and progressing towards extubation.    Changes Today:  - weaning sedation - off versed, on precedex, seroquel to assist with agitation  - up to chair during the day  - diuresis goal net -1L, giving lasix  - still febrile, although without leukocytosis, stopped vanc, continue zosyn beyond initial 5d course  - anticipate chest tube removal soon  - ongoing discussions regarding tracheostomy, will leave in ET tube and re-evaluate on 10/28/2019  - no PEG tube planned at this moment     Neurology: Intubated, sedated, paralyzed. Cooled to 34 degrees, now rewarmed. Grimacing to pain and blinking eyes on command intermittently.  - NeuroCrit consulted  - EEG with small amount of epileptiform activity, due to pt becoming more alert, stopped EEG [id number]/808202  - Loaded and started keppra 750 mg BID on 10/17  - Continue versed gtt and fent gtt, weaning as able with precedex  - CT Head 10/16 with smal right paracentral lesion, new right hemicerebellum lesion concerning for stroke.   Cardiovascular / Hemodynamics: Refractory VF arrest s/p peripheral V-A ECMO at OSH on 10/13/19.  TTE: LVEF 5-10%, increase to 15% on 1L  - Wean pressors/inotropes as able, currently not on any  - Decannulation today, will continue IABP for now  - Continue ASA 81mg and ticagrelor 90 mg BID  - Held temp at 34 degrees for 24 hours, now rewarmed  - Hold lipitor for now given likely hepatic injury during arrest  - Hold ACE/ARB for now given likely reduced renal fxn after arrest  - Holding beta blocker given shock    Pulmonary: Now weaning vent requirements. Large hemothorax on 10/14 s/p right-sided chest tube.  CXR: stable devices, worsening RLL pleural effusion  - Wean vent as able  - Will have another  chest tube placed during decannulation on 10/18  - Monitor CT output  - Daily CXR  - Q2H ABGs for now  - Consider scheduled duonebs if signs of lung dz, currently PRN     GI and Nutrition: Per Pt's wife, he is an alcoholic. Concern for possible GIB given black OG output, improved.  - Monitor BID LFTs  - NJ placed at bedside on 10/16, started TFs  - Bowel regimen - started  - GI Prophylaxis: PPI IV BID   Renal, Fluid and Electrolytes: - Monitor urine output  - Maintain K>3 and Mg>2    Infectious Disease: No signs of infection. Leukocytosis c/w arrest. Blood cultures without growth.   - Initially vancomycin/zosyn x5 days for presumed aspiration - then extended given multiple invasive procedures  - Daily blood cultures  - Monitor for signs of infection   Hematology and Oncology: Receiving heparin for ECMO and ASA/ticagrelor for KAMRON. Large intramuscular hematoma of right pec and hemothorax on 10/14 with possible GIB as above.  - Cryo PRN fibrinogen < 200; FFP for INR >2  - Transfuse for Hgb<10, Plts<70  - Heparin gtt for ECMO with ACT goal 140-160 given bleeding concerns as above  - US LE w/ arterial duplex per ECMO protocol   - DVT PPX: Heparin as above  - Right chest wall hematoma: monitor exam and Hgb closely   Endocrinology: No known medical history. BG elevated.  - Insulin gtt PRN   Lines: Restraint: needed  Peripheral IV 10/16/19 Left Lower forearm (Active)       Arterial Line Radial (Active)       CVC Triple Lumen 10/13/19 Left Subclavian (Active)       Left Groin Interventional Procedure Access (Active)     R chest tube (placed 10/18/2019 by IR)    Current lines are required for patient management       Family updated today: Yes  Code Status: FULL    Pt was seen and examined with Dr. Juan Pablo Santillan MD, PhD, who agrees with the assessment and plan.    Bronson Dsouza MD. PhD  Cardiology Fellow      Interval History    Care team notes last 24 hours reviewed. No acute events overnight. Some Afib. Still  "intermittently restless. Wife again at bedside this morning.     ROS: Unable to obtain as Pt is intubated and sedated.    Data reviewed today: I reviewed all new labs and imaging results over the last 24 hours. I personally reviewed:    Physical Exam   Temp: 100.7  F (38.2  C) Temp src: Axillary BP: 107/68 Pulse: 114 Heart Rate: 101 Resp: 18 SpO2: 93 % O2 Device: Mechanical Ventilator    Vitals:    10/21/19 1200 10/22/19 0400 10/24/19 0200   Weight: 130.7 kg (288 lb 2.3 oz) 104 kg (229 lb 4.5 oz) 103.9 kg (229 lb 0.9 oz)     Vital Signs with Ranges  Temp:  [100.5  F (38.1  C)-102.8  F (39.3  C)] 100.7  F (38.2  C)  Pulse:  [114] 114  Heart Rate:  [] 101  Resp:  [18-22] 18  BP: (106-107)/(68-73) 107/68  MAP:  [62 mmHg-80 mmHg] 64 mmHg  Arterial Line BP: ()/(49-71) 91/51  FiO2 (%):  [50 %-70 %] 50 %  SpO2:  [91 %-100 %] 93 %  I/O last 3 completed shifts:  In: 3255.06 [I.V.:1485.06; NG/GT:450]  Out: 3211 [Urine:2975; Stool:50; Chest Tube:186]    Heart Rate: 101, Blood pressure 107/68, pulse 114, temperature 100.7  F (38.2  C), temperature source Axillary, resp. rate 18, height 1.89 m (6' 2.41\"), weight 103.9 kg (229 lb 0.9 oz), SpO2 93 %.  229 lbs .93 oz  GEN:  Intubated, sedated  CV:  S1/S2 heard  LUNGS: coarse breath sounds, right-sided chest tube in place  ABD: Soft BS, soft, mildly distended  EXT: warm, trace-1+ edema, dopplerable pulses  SKIN: Large hematoma along chest wall    Medications     amiodarone 0.5 mg/min (10/24/19 0900)     dexmedetomidine Stopped (10/24/19 1103)     IV fluid REPLACEMENT ONLY 25 mL/hr at 10/18/19 1929     dextrose 10 mL/hr at 10/18/19 2016     fentaNYL 200 mcg/hr (10/24/19 0900)     norepinephrine 0.14 mcg/kg/min (10/24/19 1045)     sodium chloride       vasopressin (PITRESSIN) infusion ADULT (40 mL) 0.8 Units/hr (10/24/19 1045)       allopurinol  200 mg Oral Daily     aspirin  81 mg Per Feeding Tube Daily     folic acid  1 mg Oral Daily     furosemide  40 mg Intravenous " Daily     gabapentin  600 mg Oral TID     influenza vaccine adult (product based on age)  0.5 mL Intramuscular Prior to discharge     insulin aspart  1-4 Units Subcutaneous Q4H     levETIRAcetam  750 mg Oral or Feeding Tube BID     multivitamins w/minerals  15 mL Per Feeding Tube Daily     pantoprazole (PROTONIX) IV  40 mg Intravenous Q12H     protein modular  1 packet Per Feeding Tube TID     QUEtiapine  200 mg Oral or Feeding Tube BID     sodium chloride (PF)  3 mL Intracatheter Q8H     ticagrelor  90 mg Oral or Feeding Tube BID     vitamin B1  100 mg Oral Daily       Data   Recent Labs   Lab 10/24/19  0446 10/23/19  1629 10/23/19  0401  10/22/19  0417   WBC 10.6 10.9 11.1*   < > 10.6   HGB 9.0* 8.8* 9.2*   < > 9.1*   MCV 99 98 98   < > 98    313 280   < > 205   INR 1.28*  --  1.25*  --  1.26*   * 145* 146*   < > 144   POTASSIUM 3.9 4.1 3.9   < > 3.7   CHLORIDE 111* 108 109   < > 108   CO2 28 29 29   < > 27   BUN 38* 36* 35*   < > 35*   CR 0.91 0.96 0.76   < > 0.86   ANIONGAP 7 7 8   < > 10   HEATHER 8.2* 8.2* 8.4*   < > 8.5   * 137* 140*   < > 134*   ALBUMIN 2.2* 2.3* 2.3*   < > 2.4*   PROTTOTAL 6.5* 6.5* 6.4*   < > 6.4*   BILITOTAL 1.5* 1.6* 1.6*   < > 2.0*   ALKPHOS 96 107 112   < > 141   ALT 23 25 26   < > 30   AST 40 42 41   < > 46*   TROPI 1.074* 1.449* 1.868*   < > 4.126*    < > = values in this interval not displayed.       No results found for this or any previous visit (from the past 24 hour(s)).     Critical Care Services Progress Note:     Joel Campbell remains critically ill with acute coronary syndrome, cardiac arrhythmia, hypotension, mental status changes and shock     I personally examined and evaluated the patient today.   The patient s prognosis today is tentative  I have evaluated all laboratory values and imaging studies from the past 24 hours.  Key findings and decisions made today included   - weaning sedation - off versed, on precedex, seroquel to assist with agitation  - up  to chair during the day  - diuresis goal net -1L, giving lasix  - still febrile, although without leukocytosis, stopped vanc, continue zosyn beyond initial 5d course  - anticipate chest tube removal soon  - ongoing discussions regarding tracheostomy, will leave in ET tube and re-evaluate on 10/28/2019  - no PEG tube planned at this moment     I personally managed the ventilator, sedation, pain control and analgesia, metabolic abnormalities, antibiotic therapy, nutritional status and vasoactive medications.   Consults ongoing and ordered are none  Procedures that will happen today are: none  All treatments were placed at my direction.  I formulated today s plan with the house staff team or resident(s) and agree with the findings and plan in the associated note.       The above plans and care have been discussed with family and all questions and concerns were addressed.     I spent a total of 45 minutes (excluding procedure time) personally providing and directing critical care services at the bedside and on the critical care unit for Joel Campbell.        Senthil Goodwin MD

## 2019-10-24 NOTE — PROGRESS NOTES
[facility] Nurse Inpatient Pressure Injury Assessment   Reason for consultation: Evaluate and treat Left upper lip PI      ASSESSMENT  Pressure Injury: on left upper lip , hospital acquired ,   This is a Medical Device Related Pressure Injury (MDRPI) due to ETT  Pressure Injury is Stage Mucosal   Contributing factor of the pressure injury: pressure and immobility  Status: follow up assessment, stable  Recommend provider order: NA     TREATMENT PLAN  Left Upper Lip wound: BID continue with Q2H repositioning per unit routine and apply Vaseline to lips BID. AVoid positioing ETT directly over wound.   Orders Written  WO Nurse follow-up plan:twice weekly  Nursing to notify the Provider(s) and re-consult the WO Nurse if wound(s) deteriorates or new skin concern.    Patient History  According to provider note(s):  53 year old male who was admitted on 10/13/19 as a transfer for an OSH for refractory VT/VF arrest s/p PCI to LAD and placement on peripheral VA ECMO.     ECMO decannulation 10/18     Objective Data  Containment of urine/stool: Internal fecal management and Indwelling catheter    Current Diet/ Nutrition:  Orders Placed This Encounter      NPO for Medical/Clinical Reasons Except for: No Exceptions    Output:   I/O last 3 completed shifts:  In: 3255.06 [I.V.:1485.06; NG/GT:450]  Out: 3211 [Urine:2975; Stool:50; Chest Tube:186]    Risk Assessment:   Sensory Perception: 3-->slightly limited  Moisture: 3-->occasionally moist  Activity: 2-->chairfast  Mobility: 2-->very limited  Nutrition: 3-->adequate  Friction and Shear: 2-->potential problem  Franck Score: 15    Labs:   Recent Labs   Lab 10/24/19  0446  10/18/19  0336   ALBUMIN 2.2*   < > 3.2*   HGB 9.0*   < > 8.7*   INR 1.28*   < > 1.12   WBC 10.6   < > 5.8   CRP  --   --  190.0*    < > = values in this interval not displayed.       Physical Exam  Skin inspection: focused Mouth, bilateral groin, occiput.     Wound Location:  Left upper lip    Date of last Photo  10/21  Wound History: pt has had ETT since 10/13. Wound found when patient arrived back from OR. No documented ETT repositioning from 3290-2732 prior to injury being found.   Measurements (length x width x depth, in cm) 0.2 cm x 0.2 cm  x  0.01 cm, noted multiple bite wounds on lower lip mucosa (plan for extubation today)  Wound Base:  100 % mucosal  Tunneling N/A  Undermining N/A  Palpation of the wound bed: normal   Periwound skin: intact  Color: normal and consistent with surrounding tissue  Temperature: normal   Drainage:, none  Description of drainage: none  Odor: none  Pain: no grimacing or signs of discomfort, none       Interventions  Current support surface: Standard  Low air loss mattress with pulsation   Current off-loading measures: Q2H ETT repositioning  Repositioning aid: ETAD  Visual inspection of wound(s) completed   Tube Securement: ETAD  Wound Care: was not done per plan of care.  Supplies: discussed with RN  Educated provided: importance of repositioning and plan of care  Education provided to: patient , spouse and nurse  Discussed importance of:repositioning every 2 hours and their role in pressure injury prevention  Discussed plan of care with Patient, Family and Nurse    Jocelin Sales RN CWOCN

## 2019-10-24 NOTE — PLAN OF CARE
Discharge Planner OT   Patient plan for discharge: not stated  Current status: Pt intubated and following zero% of commands for BUE movement, 50% of commands for AAROM BLE movement during OT tx session. Pt's VSS on vent settings CMV, FiO2 50% and PEEP 6.   Barriers to return to prior living situation: medical status  Recommendations for discharge: IP rehab  Rationale for recommendations: to increase ind in ADLS/IADLS       Entered by: Larissa Bowers 10/24/2019 1:58 PM     Interdisciplinary Non-Pharmacological Management of Delirium:     General Supportive Measures: Ensure adequate hydration and nutrition. Schedule toileting. Appropriate assessment and treatment of pain.   Re-[place] Patient: Ensure clock has correct time and white board has correct date. Communicate clearly, providing explanations as appropriate. Encourage presence of family members for reassurance. Have family/caregiver bring in familiar objects/pictures.  Cognition: Engage in appropriate meaningful communication and activities with patient (current events discussion, word games, magazines, newspapers).   Sensory: Use eyeglasses, hearing aids, or voice amplifiers as appropriate.   Avoid Use of Physical Restraints as Appropriate: Indicate need and frequently re-assess bahena catheters and other tethers (cap IVs if medically appropriate).   Maintain Mobility and Self Care Ability: Avoid bedrest. Have patient up in chair for meals if appropriate.   Normalize Sleep-Wake Cycle: Discourage too much daytime napping (less than 30 minutes at a time), aim for uninterrupted periods of sleep at night.   Days: Keep room well light with lights on and shades open.   Night: Keep room quiet with low level lighting.   For Agitation: Avoid overstimulating environment, try music, massage, appropriate TV stations, and relaxation techniques. Take patient for a walk if appropriate, even if in the middle of the night.   Safety Concerns: Patients with delirium are at high  risk for falls. Use bed and chair alarms along with frequent surveillance.

## 2019-10-24 NOTE — PROGRESS NOTES
"Social Work Services Progress Note    Hospital Day: 12  Date of Initial Social Work Evaluation: 10/18/2019  Collaborated with:  Pt's wife Jackelyn    Data:  Joel Campbell is a 53 year old male who was cannulated for ECMO 10/13/19 due to refractory VF arrest    Intervention:  SW met briefly with pt's wife for a supportive visit. Wife was at bedside with OT, encouraging pt's participation. Jackelyn expressed that she is \"doing okay.\" She acknowledges that \"yesterday was a bad day\" but also said she felt a great deal of relief from Reiki (see note by Palliative therapy student from 10/23). She was appreciative of SW visit but denied any current needs at this time.    Assessment:  Pt remains critically ill in ICU    Plan:    Anticipated Disposition:  TBD    Barriers to d/c plan:  Pt remains critically ill    Follow Up:  SW will continue to remain available for patient and family support, discharge planning, other resources and support PRN.    Lucia Reynaga, JERRY, [facility]  ICU    P: [phone]  Pager: [phone]        "

## 2019-10-24 NOTE — PLAN OF CARE
ICU End of Shift Summary. See flowsheets for vital signs and detailed assessment.    Changes this shift: Opens eyes spontaneously and intermittently follows commands. Becomes restless with cares. Converted to Afib at 1900 with HR in the low 100's and MAP 65 with 1.4 Levo. Vasopressin started to maintain MAPs. Chest tube draining serosanguinous drainage. Febrile (tmax 101.3). Tylenol given and blood cultures drawn.     Plan: Continue to wean sedation and O2 needs.

## 2019-10-24 NOTE — PROGRESS NOTES
"   10/24/19 1300   Quick Adds   Type of Visit Initial Occupational Therapy Evaluation   Living Environment   Lives With spouse   Living Arrangements house   Home Accessibility stairs to enter home;stairs within home   Number of Stairs, Main Entrance 3   Stair Railings, Main Entrance railings safe and in good condition  (one side)   Number of Stairs, Within Home, Primary   (one flight to basement)   Transportation Anticipated family or friend will provide;car, drives self   Living Environment Comment PLOF attained from pt's spouse   Self-Care   Usual Activity Tolerance excellent   Current Activity Tolerance fair   Regular Exercise No   Activity/Exercise/Self-Care Comment OT: Pt highly active job as , pt's spouse stating pt w/ high level of strength at baseline   Functional Level   Ambulation 0-->independent   Transferring 0-->independent   Toileting 0-->independent   Bathing 0-->independent   Dressing 0-->independent   Eating 0-->independent   Communication 0-->understands/communicates without difficulty   Swallowing 0-->swallows foods/liquids without difficulty   Cognition 0 - no cognition issues reported   Fall history within last six months no   Which of the above functional risks had a recent onset or change? none   General Information   Onset of Illness/Injury or Date of Surgery - Date 10/13/19   Referring Physician Bronson Dsouza MD   Patient/Family Goals Statement not stated   Additional Occupational Profile Info/Pertinent History of Current Problem per chart \"Joel is a 52 yo man with PMHX significant for substance abuse (tobacco, EtOH, other substances), who is admitted to [facility] s/p cardiac arrest for which he was treated with V-A ECMO (decannulated) and IABP. From a cardiac standpoint, he is making a good recovery, and requires only a small dose of norepinephrine at this time to maintain MAPS. From a pulmonary standpoint, he has been mechanically ventilated since 10/12/2019, and though he remains " "on minimal ventilator settings, he is unable to tolerate pressure support for extended periods of time, due to delirium.\"   Precautions/Limitations fall precautions;oxygen therapy device and L/min  (heart attack 10/12: 2-4wks from 10/12 not lifting over 10#)   Cognitive Status Examination   Cognitive Comment OT: Pt able to follow 50% of commands for BLE AAROM, not BUE ROM command follow   Range of Motion (ROM)   ROM Comment OT: BUE WFL   Strength   Strength Comments OT: BUEs no active command follow, PROM WFL   Muscle Tone Assessment   Muscle Tone Comments OT: Overall deconditioned   Transfer Skills   Transfer Comments OT: pt in chair when OT entered the room, not approrpiate for STS transfers   Instrumental Activities of Daily Living (IADL)   IADL Comments OT: Pt was ind, spouse can A prn   Activities of Daily Living Analysis   Impairments Contributing to Impaired Activities of Daily Living balance impaired;cognition impaired;strength decreased;fear and anxiety  (post heart attack precautions for lifting )   General Therapy Interventions   Planned Therapy Interventions ADL retraining;IADL retraining;balance training;bed mobility training;fine motor coordination training;cognition;strengthening;transfer training;home program guidelines;progressive activity/exercise;ROM   Clinical Impression   Criteria for Skilled Therapeutic Interventions Met yes, treatment indicated   OT Diagnosis decreased ind in ADLS/IADLS   Influenced by the following impairments generalized weakness and decreased cognition   Assessment of Occupational Performance 1-3 Performance Deficits   Identified Performance Deficits decreased ind in ADLS/IADLS   Clinical Decision Making (Complexity) Low complexity   Therapy Frequency 5x/week   Predicted Duration of Therapy Intervention (days/wks) 11/12/19   Anticipated Discharge Disposition Transitional Care Facility;Acute Rehabilitation Facility   Risks and Benefits of Treatment have been explained. Yes " "  Patient, Family & other staff in agreement with plan of care Yes   [facility] AM-PAC TM \"6 Clicks\"   2016, Trustees of [facility], under license to [employer].  All rights reserved.   6 Clicks Short Forms Daily Activity Inpatient Short Form   [facility] AM-PAC  \"6 Clicks\" Daily Activity Inpatient Short Form   1. Putting on and taking off regular lower body clothing? 1 - Total   2. Bathing (including washing, rinsing, drying)? 1 - Total   3. Toileting, which includes using toilet, bedpan or urinal? 1 - Total   4. Putting on and taking off regular upper body clothing? 1 - Total   5. Taking care of personal grooming such as brushing teeth? 1 - Total   6. Eating meals? 1 - Total   Daily Activity Raw Score (Score out of 24.Lower scores equate to lower levels of function) 6   Total Evaluation Time   Total Evaluation Time (Minutes) 3     "

## 2019-10-25 ENCOUNTER — APPOINTMENT (OUTPATIENT)
Dept: GENERAL RADIOLOGY | Facility: CLINIC | Age: 54
DRG: 003 | End: 2019-10-25
Attending: STUDENT IN AN ORGANIZED HEALTH CARE EDUCATION/TRAINING PROGRAM
Payer: COMMERCIAL

## 2019-10-25 LAB
ALBUMIN SERPL-MCNC: 2.3 G/DL (ref 3.4–5)
ALBUMIN UR-MCNC: 30 MG/DL
ALP SERPL-CCNC: 84 U/L (ref 40–150)
ALT SERPL W P-5'-P-CCNC: 21 U/L (ref 0–70)
ANION GAP SERPL CALCULATED.3IONS-SCNC: 5 MMOL/L (ref 3–14)
ANION GAP SERPL CALCULATED.3IONS-SCNC: 6 MMOL/L (ref 3–14)
APPEARANCE UR: CLEAR
AST SERPL W P-5'-P-CCNC: 35 U/L (ref 0–45)
BACTERIA SPEC CULT: NO GROWTH
BASE EXCESS BLDA CALC-SCNC: 6.1 MMOL/L
BASE EXCESS BLDA CALC-SCNC: 7.2 MMOL/L
BASE EXCESS BLDA CALC-SCNC: 8 MMOL/L
BASOPHILS # BLD AUTO: 0.1 10E9/L (ref 0–0.2)
BASOPHILS NFR BLD AUTO: 0.5 %
BILIRUB SERPL-MCNC: 1.1 MG/DL (ref 0.2–1.3)
BILIRUB UR QL STRIP: NEGATIVE
BUN SERPL-MCNC: 40 MG/DL (ref 7–30)
BUN SERPL-MCNC: 41 MG/DL (ref 7–30)
CA-I BLD-MCNC: 4.3 MG/DL (ref 4.4–5.2)
CALCIUM SERPL-MCNC: 8.3 MG/DL (ref 8.5–10.1)
CALCIUM SERPL-MCNC: 8.4 MG/DL (ref 8.5–10.1)
CHLORIDE SERPL-SCNC: 109 MMOL/L (ref 94–109)
CHLORIDE SERPL-SCNC: 110 MMOL/L (ref 94–109)
CO2 SERPL-SCNC: 29 MMOL/L (ref 20–32)
CO2 SERPL-SCNC: 31 MMOL/L (ref 20–32)
COLOR UR AUTO: YELLOW
CREAT SERPL-MCNC: 0.84 MG/DL (ref 0.66–1.25)
CREAT SERPL-MCNC: 0.88 MG/DL (ref 0.66–1.25)
DIFFERENTIAL METHOD BLD: ABNORMAL
EOSINOPHIL # BLD AUTO: 0.3 10E9/L (ref 0–0.7)
EOSINOPHIL NFR BLD AUTO: 2.7 %
ERYTHROCYTE [DISTWIDTH] IN BLOOD BY AUTOMATED COUNT: 16.4 % (ref 10–15)
GFR SERPL CREATININE-BSD FRML MDRD: >90 ML/MIN/{1.73_M2}
GFR SERPL CREATININE-BSD FRML MDRD: >90 ML/MIN/{1.73_M2}
GLUCOSE BLDC GLUCOMTR-MCNC: 105 MG/DL (ref 70–99)
GLUCOSE BLDC GLUCOMTR-MCNC: 113 MG/DL (ref 70–99)
GLUCOSE BLDC GLUCOMTR-MCNC: 135 MG/DL (ref 70–99)
GLUCOSE BLDC GLUCOMTR-MCNC: 143 MG/DL (ref 70–99)
GLUCOSE BLDC GLUCOMTR-MCNC: 155 MG/DL (ref 70–99)
GLUCOSE BLDC GLUCOMTR-MCNC: 157 MG/DL (ref 70–99)
GLUCOSE BLDC GLUCOMTR-MCNC: 86 MG/DL (ref 70–99)
GLUCOSE SERPL-MCNC: 115 MG/DL (ref 70–99)
GLUCOSE SERPL-MCNC: 149 MG/DL (ref 70–99)
GLUCOSE UR STRIP-MCNC: NEGATIVE MG/DL
HCO3 BLD-SCNC: 31 MMOL/L (ref 21–28)
HCO3 BLD-SCNC: 32 MMOL/L (ref 21–28)
HCO3 BLD-SCNC: 33 MMOL/L (ref 21–28)
HCT VFR BLD AUTO: 28.2 % (ref 40–53)
HGB BLD-MCNC: 8.5 G/DL (ref 13.3–17.7)
HGB UR QL STRIP: NEGATIVE
IMM GRANULOCYTES # BLD: 0.1 10E9/L (ref 0–0.4)
IMM GRANULOCYTES NFR BLD: 0.4 %
INR PPP: 1.25 (ref 0.86–1.14)
KETONES UR STRIP-MCNC: NEGATIVE MG/DL
LACTATE BLD-SCNC: 1.6 MMOL/L (ref 0.7–2)
LDH SERPL L TO P-CCNC: 434 U/L (ref 85–227)
LEUKOCYTE ESTERASE UR QL STRIP: NEGATIVE
LYMPHOCYTES # BLD AUTO: 1.8 10E9/L (ref 0.8–5.3)
LYMPHOCYTES NFR BLD AUTO: 13.9 %
Lab: NORMAL
MAGNESIUM SERPL-MCNC: 2.7 MG/DL (ref 1.6–2.3)
MAGNESIUM SERPL-MCNC: 2.9 MG/DL (ref 1.6–2.3)
MCH RBC QN AUTO: 30 PG (ref 26.5–33)
MCHC RBC AUTO-ENTMCNC: 30.1 G/DL (ref 31.5–36.5)
MCV RBC AUTO: 100 FL (ref 78–100)
MONOCYTES # BLD AUTO: 1 10E9/L (ref 0–1.3)
MONOCYTES NFR BLD AUTO: 7.7 %
MUCOUS THREADS #/AREA URNS LPF: PRESENT /LPF
NEUTROPHILS # BLD AUTO: 9.5 10E9/L (ref 1.6–8.3)
NEUTROPHILS NFR BLD AUTO: 74.8 %
NITRATE UR QL: NEGATIVE
O2/TOTAL GAS SETTING VFR VENT: 55 %
O2/TOTAL GAS SETTING VFR VENT: 55 %
O2/TOTAL GAS SETTING VFR VENT: 70 %
OXYHGB MFR BLD: 89 % (ref 92–100)
OXYHGB MFR BLD: 92 % (ref 92–100)
OXYHGB MFR BLD: 97 % (ref 92–100)
PCO2 BLD: 44 MM HG (ref 35–45)
PCO2 BLD: 45 MM HG (ref 35–45)
PCO2 BLD: 45 MM HG (ref 35–45)
PH BLD: 7.44 PH (ref 7.35–7.45)
PH BLD: 7.47 PH (ref 7.35–7.45)
PH BLD: 7.47 PH (ref 7.35–7.45)
PH UR STRIP: 6 PH (ref 5–7)
PHOSPHATE SERPL-MCNC: 2.5 MG/DL (ref 2.5–4.5)
PLATELET # BLD AUTO: 368 10E9/L (ref 150–450)
PO2 BLD: 103 MM HG (ref 80–105)
PO2 BLD: 57 MM HG (ref 80–105)
PO2 BLD: 69 MM HG (ref 80–105)
POTASSIUM SERPL-SCNC: 3.8 MMOL/L (ref 3.4–5.3)
POTASSIUM SERPL-SCNC: 3.8 MMOL/L (ref 3.4–5.3)
PROT SERPL-MCNC: 6.9 G/DL (ref 6.8–8.8)
RBC # BLD AUTO: 2.83 10E12/L (ref 4.4–5.9)
RBC #/AREA URNS AUTO: 0 /HPF (ref 0–2)
SODIUM SERPL-SCNC: 145 MMOL/L (ref 133–144)
SODIUM SERPL-SCNC: 146 MMOL/L (ref 133–144)
SOURCE: ABNORMAL
SP GR UR STRIP: 1.02 (ref 1–1.03)
SPECIMEN SOURCE: NORMAL
SQUAMOUS #/AREA URNS AUTO: <1 /HPF (ref 0–1)
TROPONIN I SERPL-MCNC: 0.61 UG/L (ref 0–0.04)
UROBILINOGEN UR STRIP-MCNC: NORMAL MG/DL (ref 0–2)
WBC # BLD AUTO: 12.6 10E9/L (ref 4–11)
WBC #/AREA URNS AUTO: 1 /HPF (ref 0–5)

## 2019-10-25 PROCEDURE — 82330 ASSAY OF CALCIUM: CPT | Performed by: INTERNAL MEDICINE

## 2019-10-25 PROCEDURE — 80053 COMPREHEN METABOLIC PANEL: CPT | Performed by: STUDENT IN AN ORGANIZED HEALTH CARE EDUCATION/TRAINING PROGRAM

## 2019-10-25 PROCEDURE — 99233 SBSQ HOSP IP/OBS HIGH 50: CPT | Mod: GC | Performed by: INTERNAL MEDICINE

## 2019-10-25 PROCEDURE — 87086 URINE CULTURE/COLONY COUNT: CPT | Performed by: STUDENT IN AN ORGANIZED HEALTH CARE EDUCATION/TRAINING PROGRAM

## 2019-10-25 PROCEDURE — 85610 PROTHROMBIN TIME: CPT | Performed by: STUDENT IN AN ORGANIZED HEALTH CARE EDUCATION/TRAINING PROGRAM

## 2019-10-25 PROCEDURE — 80048 BASIC METABOLIC PNL TOTAL CA: CPT | Performed by: STUDENT IN AN ORGANIZED HEALTH CARE EDUCATION/TRAINING PROGRAM

## 2019-10-25 PROCEDURE — 81001 URINALYSIS AUTO W/SCOPE: CPT | Performed by: STUDENT IN AN ORGANIZED HEALTH CARE EDUCATION/TRAINING PROGRAM

## 2019-10-25 PROCEDURE — 25000132 ZZH RX MED GY IP 250 OP 250 PS 637: Performed by: STUDENT IN AN ORGANIZED HEALTH CARE EDUCATION/TRAINING PROGRAM

## 2019-10-25 PROCEDURE — 93010 ELECTROCARDIOGRAM REPORT: CPT | Mod: 76 | Performed by: INTERNAL MEDICINE

## 2019-10-25 PROCEDURE — 00000146 ZZHCL STATISTIC GLUCOSE BY METER IP

## 2019-10-25 PROCEDURE — 94003 VENT MGMT INPAT SUBQ DAY: CPT

## 2019-10-25 PROCEDURE — C9113 INJ PANTOPRAZOLE SODIUM, VIA: HCPCS | Performed by: STUDENT IN AN ORGANIZED HEALTH CARE EDUCATION/TRAINING PROGRAM

## 2019-10-25 PROCEDURE — 40000014 ZZH STATISTIC ARTERIAL MONITORING DAILY

## 2019-10-25 PROCEDURE — 25000125 ZZHC RX 250: Performed by: STUDENT IN AN ORGANIZED HEALTH CARE EDUCATION/TRAINING PROGRAM

## 2019-10-25 PROCEDURE — 25800030 ZZH RX IP 258 OP 636: Performed by: STUDENT IN AN ORGANIZED HEALTH CARE EDUCATION/TRAINING PROGRAM

## 2019-10-25 PROCEDURE — 25000128 H RX IP 250 OP 636: Performed by: STUDENT IN AN ORGANIZED HEALTH CARE EDUCATION/TRAINING PROGRAM

## 2019-10-25 PROCEDURE — 83735 ASSAY OF MAGNESIUM: CPT | Performed by: STUDENT IN AN ORGANIZED HEALTH CARE EDUCATION/TRAINING PROGRAM

## 2019-10-25 PROCEDURE — 85004 AUTOMATED DIFF WBC COUNT: CPT | Performed by: STUDENT IN AN ORGANIZED HEALTH CARE EDUCATION/TRAINING PROGRAM

## 2019-10-25 PROCEDURE — 84484 ASSAY OF TROPONIN QUANT: CPT | Performed by: STUDENT IN AN ORGANIZED HEALTH CARE EDUCATION/TRAINING PROGRAM

## 2019-10-25 PROCEDURE — 27210429 ZZH NUTRITION PRODUCT INTERMEDIATE LITER

## 2019-10-25 PROCEDURE — 83605 ASSAY OF LACTIC ACID: CPT | Performed by: INTERNAL MEDICINE

## 2019-10-25 PROCEDURE — 93005 ELECTROCARDIOGRAM TRACING: CPT

## 2019-10-25 PROCEDURE — 82805 BLOOD GASES W/O2 SATURATION: CPT | Performed by: STUDENT IN AN ORGANIZED HEALTH CARE EDUCATION/TRAINING PROGRAM

## 2019-10-25 PROCEDURE — 85027 COMPLETE CBC AUTOMATED: CPT | Performed by: STUDENT IN AN ORGANIZED HEALTH CARE EDUCATION/TRAINING PROGRAM

## 2019-10-25 PROCEDURE — 71045 X-RAY EXAM CHEST 1 VIEW: CPT

## 2019-10-25 PROCEDURE — 40000275 ZZH STATISTIC RCP TIME EA 10 MIN

## 2019-10-25 PROCEDURE — 20000004 ZZH R&B ICU UMMC

## 2019-10-25 PROCEDURE — 84100 ASSAY OF PHOSPHORUS: CPT | Performed by: STUDENT IN AN ORGANIZED HEALTH CARE EDUCATION/TRAINING PROGRAM

## 2019-10-25 PROCEDURE — 83615 LACTATE (LD) (LDH) ENZYME: CPT | Performed by: STUDENT IN AN ORGANIZED HEALTH CARE EDUCATION/TRAINING PROGRAM

## 2019-10-25 RX ORDER — FENTANYL CITRATE 50 UG/ML
25-50 INJECTION, SOLUTION INTRAMUSCULAR; INTRAVENOUS EVERY 30 MIN PRN
Status: DISCONTINUED | OUTPATIENT
Start: 2019-10-25 | End: 2019-10-31

## 2019-10-25 RX ORDER — DIGOXIN 0.25 MG/ML
500 INJECTION INTRAMUSCULAR; INTRAVENOUS ONCE
Status: DISCONTINUED | OUTPATIENT
Start: 2019-10-25 | End: 2019-10-25

## 2019-10-25 RX ORDER — OXYCODONE HYDROCHLORIDE 5 MG/1
5 TABLET ORAL EVERY 4 HOURS PRN
Status: DISCONTINUED | OUTPATIENT
Start: 2019-10-25 | End: 2019-10-29

## 2019-10-25 RX ADMIN — INSULIN ASPART 1 UNITS: 100 INJECTION, SOLUTION INTRAVENOUS; SUBCUTANEOUS at 07:56

## 2019-10-25 RX ADMIN — Medication 1 PACKET: at 20:08

## 2019-10-25 RX ADMIN — GABAPENTIN 600 MG: 300 CAPSULE ORAL at 07:42

## 2019-10-25 RX ADMIN — ASPIRIN 81 MG CHEWABLE TABLET 81 MG: 81 TABLET CHEWABLE at 07:42

## 2019-10-25 RX ADMIN — THIAMINE HCL TAB 100 MG 100 MG: 100 TAB at 07:42

## 2019-10-25 RX ADMIN — OXYCODONE HYDROCHLORIDE 5 MG: 5 TABLET ORAL at 12:05

## 2019-10-25 RX ADMIN — ACETAMINOPHEN 650 MG: 325 TABLET, FILM COATED ORAL at 08:57

## 2019-10-25 RX ADMIN — LEVETIRACETAM 750 MG: 100 SOLUTION ORAL at 07:43

## 2019-10-25 RX ADMIN — POTASSIUM CHLORIDE 20 MEQ: 1.5 POWDER, FOR SOLUTION ORAL at 20:06

## 2019-10-25 RX ADMIN — Medication 200 MCG/HR: at 03:29

## 2019-10-25 RX ADMIN — PANTOPRAZOLE SODIUM 40 MG: 40 INJECTION, POWDER, FOR SOLUTION INTRAVENOUS at 04:25

## 2019-10-25 RX ADMIN — FENTANYL CITRATE 50 MCG: 50 INJECTION INTRAMUSCULAR; INTRAVENOUS at 12:44

## 2019-10-25 RX ADMIN — DEXMEDETOMIDINE 0.6 MCG/KG/HR: 100 INJECTION, SOLUTION, CONCENTRATE INTRAVENOUS at 14:44

## 2019-10-25 RX ADMIN — INSULIN ASPART 1 UNITS: 100 INJECTION, SOLUTION INTRAVENOUS; SUBCUTANEOUS at 01:03

## 2019-10-25 RX ADMIN — TICAGRELOR 90 MG: 90 TABLET ORAL at 07:42

## 2019-10-25 RX ADMIN — DEXMEDETOMIDINE 1.2 MCG/KG/HR: 100 INJECTION, SOLUTION, CONCENTRATE INTRAVENOUS at 05:46

## 2019-10-25 RX ADMIN — OXYCODONE HYDROCHLORIDE 5 MG: 5 TABLET ORAL at 21:53

## 2019-10-25 RX ADMIN — AMIODARONE HYDROCHLORIDE 0.5 MG/MIN: 50 INJECTION, SOLUTION INTRAVENOUS at 21:26

## 2019-10-25 RX ADMIN — FUROSEMIDE 40 MG: 10 INJECTION, SOLUTION INTRAVENOUS at 15:52

## 2019-10-25 RX ADMIN — GABAPENTIN 600 MG: 300 CAPSULE ORAL at 20:06

## 2019-10-25 RX ADMIN — LEVETIRACETAM 750 MG: 100 SOLUTION ORAL at 20:07

## 2019-10-25 RX ADMIN — DEXMEDETOMIDINE 1.2 MCG/KG/HR: 100 INJECTION, SOLUTION, CONCENTRATE INTRAVENOUS at 01:15

## 2019-10-25 RX ADMIN — FENTANYL CITRATE 50 MCG: 50 INJECTION INTRAMUSCULAR; INTRAVENOUS at 14:00

## 2019-10-25 RX ADMIN — Medication 1 PACKET: at 07:43

## 2019-10-25 RX ADMIN — INSULIN ASPART 1 UNITS: 100 INJECTION, SOLUTION INTRAVENOUS; SUBCUTANEOUS at 11:22

## 2019-10-25 RX ADMIN — TICAGRELOR 90 MG: 90 TABLET ORAL at 20:06

## 2019-10-25 RX ADMIN — FOLIC ACID 1 MG: 1 TABLET ORAL at 07:42

## 2019-10-25 RX ADMIN — MULTIVITAMIN 15 ML: LIQUID ORAL at 07:42

## 2019-10-25 RX ADMIN — QUETIAPINE FUMARATE 200 MG: 100 TABLET ORAL at 20:06

## 2019-10-25 RX ADMIN — QUETIAPINE FUMARATE 200 MG: 100 TABLET ORAL at 07:42

## 2019-10-25 RX ADMIN — FENTANYL CITRATE 50 MCG: 50 INJECTION INTRAMUSCULAR; INTRAVENOUS at 11:50

## 2019-10-25 RX ADMIN — ALLOPURINOL 200 MG: 100 TABLET ORAL at 07:42

## 2019-10-25 RX ADMIN — POTASSIUM CHLORIDE 20 MEQ: 1.5 POWDER, FOR SOLUTION ORAL at 11:56

## 2019-10-25 RX ADMIN — FENTANYL CITRATE 50 MCG: 50 INJECTION INTRAMUSCULAR; INTRAVENOUS at 15:48

## 2019-10-25 RX ADMIN — FUROSEMIDE 40 MG: 10 INJECTION, SOLUTION INTRAVENOUS at 07:42

## 2019-10-25 ASSESSMENT — ACTIVITIES OF DAILY LIVING (ADL)
ADLS_ACUITY_SCORE: 19

## 2019-10-25 ASSESSMENT — MIFFLIN-ST. JEOR: SCORE: 1933.24

## 2019-10-25 NOTE — PROGRESS NOTES
[facility]   Cardiology Progress Note    Assessment & Plan   53 year old male who was admitted on 10/13/19 as a transfer from [place], [place] for refractory VT/VF arrest s/p PCI to LAD and placement on peripheral [facility] ECMO. IABP removed successfully 10/20/2019. Successful extubated 10/25/2019.     Changes Today:  - Wean off levophed  - Start metoprolol 5mg IV Q6   - Start lisinopril 5mg BID tomorrow if BP tolerates it   - Per speech, patient can get clear liquid diet      Neurology: Initially ntubated, sedated, paralyzed. Cooled to 34 degrees, now rewarmed.   - NeuroCrit consulted, initiated Keppra 750 mg BID on 10/17 for possible seizure activity, will continue as outpatient until neuro follow up  - EEG with small amount of epileptiform activity, due to pt becoming more alert, stopped EEG [id number]  - CT Head 10/16 with smal right paracentral lesion, new right hemicerebellum lesion concerning for stroke.   Cardiovascular / Hemodynamics: Refractory VF arrest s/p peripheral V-A ECMO at OSH on 10/13/19.  TTE: LVEF 5-10%, increase to 15% on 1L  - Continue ASA 81mg and ticagrelor 90 mg BID  - Hold lipitor for now given likely hepatic injury during arrest  - Metoprolol 5mg IV Q6   - Off norepi   - Start lisinopril 5mg BID as tolerated   - Diuresis with lasix 40mg IV BID    Pulmonary: Large hemothorax on 10/14 s/p right-sided chest tube.  CXR: stable devices, worsening RLL pleural effusion  - extubated 10/25/2019  - on high flow NC   GI and Nutrition: Per Pt's wife, he is an alcoholic. Concern for possible GIB given black OG output, improved.  - Monitor BID LFTs  - NJ placed at bedside on 10/16, started TFs  - Bowel regimen - started  - Per speech, can start tube feeds   Renal, Fluid and Electrolytes: - Monitor urine output  - Maintain K>4 and Mg>2    Infectious Disease: No signs of infection. Leukocytosis c/w arrest. Blood cultures without growth.   - Monitor for signs of infection   Hematology and Oncology:  ASA/ticagrelor for KAMRON.  Large intramuscular hematoma of right pec and hemothorax on 10/14 with possible GIB as above.  - Cryo PRN fibrinogen < 200; FFP for INR >2  - Transfuse for Hgb<10, Plts<70  - DVT PPX: Heparin ppx  - Right chest wall hematoma: monitor exam and Hgb closely   Endocrinology: No known medical history. BG elevated.  - Insulin gtt PRN   Lines: Restraint: needed  Peripheral IV 10/16/19 Left Lower forearm (Active)       Arterial Line Radial (Active)       CVC Triple Lumen 10/13/19 Left Subclavian (Active)       Left Groin Interventional Procedure Access (Active)     R chest tube (placed 10/18/2019 by IR)  Current lines are required for patient management       Family updated today: Yes  Code Status: FULL    Pt was seen and examined with  who agrees with the assessment and plan.    Meme Vasquez  Cardiology PGY4    Interval History    Patient doing well overnight. Still confused and agitated at times. Does not answer questions appropriately.     ROS: 4-pt ROS otherwise negative.     Data reviewed today: I reviewed all new labs and imaging results over the last 24 hours. I personally reviewed:    Physical Exam   Temp: 99.3  F (37.4  C) Temp src: Oral BP: 99/74 Pulse: 137 Heart Rate: 94 Resp: (!) 37 SpO2: 92 % O2 Device: High Flow Nasal Cannula (HFNC)(pt seen on HFNC breathing well) Oxygen Delivery: Other (Comments)(40 lpm)  Vitals:    10/24/19 0200 10/25/19 0400 10/26/19 0000   Weight: 103.9 kg (229 lb 0.9 oz) 101.2 kg (223 lb 1.7 oz) 99.4 kg (219 lb 2.2 oz)     Vital Signs with Ranges  Temp:  [98.3  F (36.8  C)-99.4  F (37.4  C)] 99.3  F (37.4  C)  Pulse:  [112-140] 137  Heart Rate:  [] 94  Resp:  [15-40] 37  BP: (79-99)/(55-74) 99/74  MAP:  [60 mmHg-141 mmHg] 63 mmHg  Arterial Line BP: ()/() 111/18  FiO2 (%):  [50 %-70 %] 60 %  SpO2:  [90 %-98 %] 92 %  I/O last 3 completed shifts:  In: 3267.26 [P.O.:118; I.V.:364.26; NG/GT:1520]  Out: 4180 [Urine:3900; Chest Tube:280]    Heart Rate: 94,  "Blood pressure 99/74, pulse 137, temperature 99.3  F (37.4  C), temperature source Oral, resp. rate (!) 37, height 1.89 m (6' 2.41\"), weight 99.4 kg (219 lb 2.2 oz), SpO2 92 %.  219 lbs 2.2 oz  GEN: Cooperative, sometimes agitated  CV:  S1/S2 heard  LUNGS: coarse breath sounds, right-sided chest tube in place  ABD: Soft BS, soft, mildly distended  EXT: warm, trace-1+ edema, dopplerable pulses  NEURO: able to follow commands, taking deep breaths  SKIN: Large hematoma along chest wall    Medications     amiodarone 0.5 mg/min (10/26/19 0700)     dexmedetomidine 0.6 mcg/kg/hr (10/26/19 1134)     IV fluid REPLACEMENT ONLY 25 mL/hr at 10/18/19 1929     dextrose 10 mL/hr at 10/18/19 2016     fentaNYL 50 mcg/hr (10/26/19 0700)     norepinephrine 0.02 mcg/kg/min (10/26/19 1300)     sodium chloride         allopurinol  200 mg Oral Daily     aspirin  81 mg Per Feeding Tube Daily     folic acid  1 mg Oral Daily     furosemide  40 mg Intravenous BID     gabapentin  600 mg Oral TID     heparin (porcine)  5,000 Units Intravenous BID     influenza vaccine adult (product based on age)  0.5 mL Intramuscular Prior to discharge     insulin aspart  1-4 Units Subcutaneous Q4H     levETIRAcetam  750 mg Oral or Feeding Tube BID     [START ON 10/27/2019] lisinopril  5 mg Oral BID     metoprolol  5 mg Intravenous Q6H     multivitamins w/minerals  15 mL Per Feeding Tube Daily     omeprazole  20 mg Oral BID AC     protein modular  1 packet Per Feeding Tube TID     QUEtiapine  200 mg Oral or Feeding Tube BID     sodium chloride (PF)  3 mL Intracatheter Q8H     ticagrelor  90 mg Oral or Feeding Tube BID     vitamin B1  100 mg Oral Daily       Data   Recent Labs   Lab 10/26/19  0346 10/25/19  1645 10/25/19  0426  10/24/19  1507 10/24/19  0446   WBC 11.8*  --  12.6*  --  11.4* 10.6   HGB 8.6*  --  8.5*  --  8.7* 9.0*     --  100  --  100 99     --  368  --  347 332   INR 1.23*  --  1.25*  --   --  1.28*   * 145* 146*  --  142 " 146*   POTASSIUM 3.8 3.8 3.8   < > 4.1 3.9   CHLORIDE 110* 109 110*  --  109 111*   CO2 32 31 29  --  30 28   BUN 41* 40* 41*  --  38* 38*   CR 0.87 0.84 0.88  --  0.84 0.91   ANIONGAP 5 5 6  --  4 7   HEATHER 8.6 8.3* 8.4*  --  7.9* 8.2*   * 115* 149*  --  144* 146*   ALBUMIN  --   --  2.3*  --  2.1* 2.2*   PROTTOTAL  --   --  6.9  --  6.5* 6.5*   BILITOTAL  --   --  1.1  --  1.2 1.5*   ALKPHOS  --   --  84  --  87 96   ALT  --   --  21  --  25 23   AST  --   --  35  --  40 40   TROPI  --   --  0.612*  --  0.827* 1.074*    < > = values in this interval not displayed.       Recent Results (from the past 24 hour(s))   XR Chest Port 1 View    Narrative    EXAMINATION: XR CHEST PORT 1 VW, 10/26/2019 12:31 AM    INDICATION: s/p intubation (now extubation) and R hemothorax    COMPARISON: Chest radiograph 10/25/2019    FINDINGS: Single AP radiograph of the chest.    Trachea is midline. Feeding tube with tip coming off the  field-of-view. Improved aeration in the right lung base. Left  costophrenic angle is cut off from the field-of-view. Left IJ central  venous catheter with tip located at the lower SVC. Unchanged position  of right apical chest tube with side port projecting outside of the  chest cavity. No appreciable pneumothorax. Cardiac silhouette is  stable. Bilateral diffuse mixed interstitial airspace opacities.  Decreased right-sided pleural effusion. No acute osseous  abnormalities.      Impression    IMPRESSION:  1. Right apical chest tube is side port projecting outside of the  chest cavity consider removal or replacement  2. Improved aeration of the right lung base with decrease in right  pleural effusion.  3. Unchanged mixed interstitial and airspace opacities. May represent  pulmonary edema versus infection.    I have personally reviewed the examination and initial interpretation  and I agree with the findings.    AGNIESZKA GRANADO MD

## 2019-10-25 NOTE — PROGRESS NOTES
"PALLIATIVE CARE INTEGRATIVE THERAPIES Progress Note   [facility] 4C      REFERRAL SOURCE: Integrative therapies referral.     Visit with Jackelyn, wife of patient Joel \"Claudio\" Shannan. Jackelyn requested energy work (reiki) for herself.     Prior to reiki session, Jackelyn and I spoke about their family dynamics.     Jackelyn named goals for reiki session for:    relaxation    release    Jackelyn described her experience during a 30 minute reiki as very helpful.     Jackelyn requested follow up for reiki 1 to 2 times per week for herself if possible during Claudio's hospitalization.     Plan: I will follow for energy work while Palliative Care is consulted.     Casandra Ring  Integrative Therapies Intern    [facility] Inpatient Palliative Care Team Consult pager [phone] (M-F 8-4:30)  After-hours Answering Service [phone]  "

## 2019-10-25 NOTE — PROGRESS NOTES
Patient extubated per MD order. Patient doing well on PS, suctioned orally and ETT, cuff deflated with positive cuff leak, and extubated to Bipap per MD order. Patient has good strong productive cough. MD and RN present.

## 2019-10-25 NOTE — PROGRESS NOTES
[facility]   Cardiology Progress Note    Assessment & Plan   53 year old male who was admitted on 10/13/19 as a transfer from [place], [place] for refractory VT/VF arrest s/p PCI to LAD and placement on peripheral VA ECMO. IABP removed successfully 10/20/2019. Successful extubation 10/25/2019. Still somewhat delirious    Changes Today:  - up to chair during the day  - still febrile, although without leukocytosis, stopped vanc, continue zosyn beyond initial 5d course  - anticipate chest tube removal soon  - extubated 10/25/2019     Neurology: Initially ntubated, sedated, paralyzed. Cooled to 34 degrees, now rewarmed.   - NeuroCrit consulted, initiated Keppra 750 mg BID on 10/17 for possible seizure activity, will continue as outpatient until neuro follow up  - EEG with small amount of epileptiform activity, due to pt becoming more alert, stopped EEG [id number]  - CT Head 10/16 with smal right paracentral lesion, new right hemicerebellum lesion concerning for stroke.   Cardiovascular / Hemodynamics: Refractory VF arrest s/p peripheral V-A ECMO at OSH on 10/13/19.  TTE: LVEF 5-10%, increase to 15% on 1L  - Wean pressors/inotropes as able, currently not on any  - Continue ASA 81mg and ticagrelor 90 mg BID  - Hold lipitor for now given likely hepatic injury during arrest  - Hold ACE/ARB for now given likely reduced renal fxn after arrest  - Holding beta blocker given shock    Pulmonary: Now weaning vent requirements. Large hemothorax on 10/14 s/p right-sided chest tube.  CXR: stable devices, worsening RLL pleural effusion  - Will have another chest tube placed during decannulation on 10/18  - Monitor CT output  - Daily CXR  - Q2H ABGs for now  - extubated 10/25/2019   GI and Nutrition: Per Pt's wife, he is an alcoholic. Concern for possible GIB given black OG output, improved.  - Monitor BID LFTs  - NJ placed at bedside on 10/16, started TFs  - Bowel regimen - started  - GI Prophylaxis: no longer needed given extubation   Renal,  Fluid and Electrolytes: - Monitor urine output  - Maintain K>4 and Mg>2    Infectious Disease: No signs of infection. Leukocytosis c/w arrest. Blood cultures without growth.   - Initially vancomycin/zosyn x5 days for presumed aspiration - then extended given multiple invasive procedures  - Daily blood cultures  - Monitor for signs of infection   Hematology and Oncology: Receiving heparin for ECMO and ASA/ticagrelor for KAMRON. Large intramuscular hematoma of right pec and hemothorax on 10/14 with possible GIB as above.  - Cryo PRN fibrinogen < 200; FFP for INR >2  - Transfuse for Hgb<10, Plts<70  - Heparin gtt for ECMO with ACT goal 140-160 given bleeding concerns as above  - US LE w/ arterial duplex per ECMO protocol   - DVT PPX: Heparin as above  - Right chest wall hematoma: monitor exam and Hgb closely   Endocrinology: No known medical history. BG elevated.  - Insulin gtt PRN   Lines: Restraint: needed  Peripheral IV 10/16/19 Left Lower forearm (Active)       Arterial Line Radial (Active)       CVC Triple Lumen 10/13/19 Left Subclavian (Active)       Left Groin Interventional Procedure Access (Active)     R chest tube (placed 10/18/2019 by IR)    Current lines are required for patient management       Family updated today: Yes  Code Status: FULL    Pt was seen and examined with Dr. Senthil Goodwin MD, MS, who agrees with the assessment and plan.    Bronson Dsouza MD. PhD  Cardiology Fellow      Interval History    Care team notes last 24 hours reviewed. No acute events overnight. Resting better. Extubation today. Some throat discomfort.     ROS: 4-pt ROS otherwise negative.     Data reviewed today: I reviewed all new labs and imaging results over the last 24 hours. I personally reviewed:    Physical Exam   Temp: 98.8  F (37.1  C) Temp src: Axillary BP: 99/65 Pulse: 90 Heart Rate: 123 Resp: 20 SpO2: 95 % O2 Device: Mechanical Ventilator    Vitals:    10/22/19 0400 10/24/19 0200 10/25/19 0400   Weight: 104 kg  "(229 lb 4.5 oz) 103.9 kg (229 lb 0.9 oz) 101.2 kg (223 lb 1.7 oz)     Vital Signs with Ranges  Temp:  [98.8  F (37.1  C)-101.9  F (38.8  C)] 98.8  F (37.1  C)  Pulse:  [90] 90  Heart Rate:  [] 123  Resp:  [12-22] 20  BP: (91-99)/(63-67) 99/65  MAP:  [59 mmHg-101 mmHg] 74 mmHg  Arterial Line BP: ()/(40-86) 111/58  FiO2 (%):  [50 %-65 %] 55 %  SpO2:  [89 %-99 %] 95 %  I/O last 3 completed shifts:  In: 3994.58 [I.V.:1024.58; NG/GT:1650]  Out: 3264 [Urine:3090; Chest Tube:174]    Heart Rate: 123, Blood pressure 99/65, pulse 90, temperature 98.8  F (37.1  C), temperature source Axillary, resp. rate 20, height 1.89 m (6' 2.41\"), weight 101.2 kg (223 lb 1.7 oz), SpO2 95 %.  223 lbs 1.69 oz  GEN:  Mostly peaceful, sometimes agitated  CV:  S1/S2 heard  LUNGS: coarse breath sounds, right-sided chest tube in place  ABD: Soft BS, soft, mildly distended  EXT: warm, trace-1+ edema, dopplerable pulses  NEURO: able to follow commands, taking deep breaths  SKIN: Large hematoma along chest wall    Medications     amiodarone 0.5 mg/min (10/25/19 1100)     dexmedetomidine 0.2 mcg/kg/hr (10/25/19 1145)     IV fluid REPLACEMENT ONLY 25 mL/hr at 10/18/19 1929     dextrose 10 mL/hr at 10/18/19 2016     fentaNYL 50 mcg/hr (10/25/19 1117)     norepinephrine 0.1 mcg/kg/min (10/25/19 1355)     sodium chloride         allopurinol  200 mg Oral Daily     aspirin  81 mg Per Feeding Tube Daily     folic acid  1 mg Oral Daily     furosemide  40 mg Intravenous BID     gabapentin  600 mg Oral TID     influenza vaccine adult (product based on age)  0.5 mL Intramuscular Prior to discharge     insulin aspart  1-4 Units Subcutaneous Q4H     levETIRAcetam  750 mg Oral or Feeding Tube BID     multivitamins w/minerals  15 mL Per Feeding Tube Daily     pantoprazole (PROTONIX) IV  40 mg Intravenous Q12H     protein modular  1 packet Per Feeding Tube TID     QUEtiapine  200 mg Oral or Feeding Tube BID     sodium chloride (PF)  3 mL Intracatheter Q8H "     ticagrelor  90 mg Oral or Feeding Tube BID     vitamin B1  100 mg Oral Daily       Data   Recent Labs   Lab 10/25/19  0426 10/24/19  2226 10/24/19  1507 10/24/19  0446  10/23/19  0401   WBC 12.6*  --  11.4* 10.6   < > 11.1*   HGB 8.5*  --  8.7* 9.0*   < > 9.2*     --  100 99   < > 98     --  347 332   < > 280   INR 1.25*  --   --  1.28*  --  1.25*   *  --  142 146*   < > 146*   POTASSIUM 3.8 4.1 4.1 3.9   < > 3.9   CHLORIDE 110*  --  109 111*   < > 109   CO2 29  --  30 28   < > 29   BUN 41*  --  38* 38*   < > 35*   CR 0.88  --  0.84 0.91   < > 0.76   ANIONGAP 6  --  4 7   < > 8   HEATHER 8.4*  --  7.9* 8.2*   < > 8.4*   *  --  144* 146*   < > 140*   ALBUMIN 2.3*  --  2.1* 2.2*   < > 2.3*   PROTTOTAL 6.9  --  6.5* 6.5*   < > 6.4*   BILITOTAL 1.1  --  1.2 1.5*   < > 1.6*   ALKPHOS 84  --  87 96   < > 112   ALT 21  --  25 23   < > 26   AST 35  --  40 40   < > 41   TROPI 0.612*  --  0.827* 1.074*   < > 1.868*    < > = values in this interval not displayed.       Recent Results (from the past 24 hour(s))   XR Chest Port 1 View    Narrative    XR CHEST PORT 1 VW  10/25/2019 8:54 AM      HISTORY: s/p intubation and R hemothorax    COMPARISON: Chest radiograph from 10/24/2019    FINDINGS: AP view of the chest. Left costophrenic angle is cut off  from the field of view. Endotracheal tube is in the midthoracic  trachea. Feeding tube courses below the field of view. Enteric tube is  over the stomach. Left central line tip is in the mid SVC. Right  apical chest tube with sidehole outside of the chest cavity.    Stable cardiac silhouette size. Stable diffuse mixed interstitial and  airspace opacities, greater on the right. Stable right pleural  effusion. Visualized upper abdomen is unremarkable. No acute osseous  abnormality.      Impression    IMPRESSION:   1. Stable, right greater than left, mixed interstitial and airspace  opacities, consistent with aspiration versus pulmonary edema.  2. Stable  right pleural fluid with associated atelectasis.  3. Right apical chest tube with sidehole outside of the chest cavity.    I have personally reviewed the examination and initial interpretation  and I agree with the findings.    AGNIESZKA GRANADO MD     Critical Care Services Progress Note:     Joel Campbell remains critically ill with acute coronary syndrome, cardiac arrhythmia, shock and Out of hospital Cardiac arrest   I personally examined and evaluated the patient today.   The patient s prognosis today is tentative  I have evaluated all laboratory values and imaging studies from the past 24 hours.  Key findings and decisions made today included   - up to chair during the day  - still febrile, although without leukocytosis, stopped vanc, continue zosyn beyond initial 5d course  - anticipate chest tube removal soon  - Extubated today  I personally managed the sedation, pain control and analgesia, metabolic abnormalities, antibiotic therapy, nutritional status and non-invasive positive pressure ventilator.   Consults ongoing and ordered are none  Procedures that will happen today are: none  All treatments were placed at my direction.  I formulated today s plan with the house staff team or resident(s) and agree with the findings and plan in the associated note.       The above plans and care have been discussed with family and all questions and concerns were addressed.     I spent a total of 30 minutes (excluding procedure time) personally providing and directing critical care services at the bedside and on the critical care unit for Joel Campbell.        Senthil Goodwin MD

## 2019-10-25 NOTE — PLAN OF CARE
ICU End of Shift Summary. See flowsheets for vital signs and detailed assessment.    Changes this shift: Patient extubated at 1415 to BiPAP 55%. Poor mask fit and frequent leak alarms- switched to 70% HFNC, 50L. Patient more comfortable with this intervention. Patient now in new aflutter rate 140s, CSI notified, no further intervention right now. BP stable on minimal levophed. Confused to time and situation but follows commands, localizes with LUE. NPO until SLP eval, TF remain with NJ. 1 small BM. Adequate urine output.     Plan:  Continue to monitor aflutter, notify CSI if BP unstable. Wean FiO2 if able. BiPAP overnight if tolerated.

## 2019-10-25 NOTE — PLAN OF CARE
OT: Per discussion w/ RN, pt on 2 pressers, pt pressure supporting, possible plans to extubate and wean sedation, BPs supine 90s/60s per ART line. Yesterday, per RN, pt's BP decreased supine> chair and pt required elevation of legs in chair. OT will check back as available/appropriate or reschedule.

## 2019-10-25 NOTE — PLAN OF CARE
ICU End of Shift Summary. See flowsheets for vital signs and detailed assessment.    Changes this shift: Intermittently following commands. Opens eyes spontaneously. NSR. Titrating pressors for map above 65. Increased pressor needs throughout the night. MD aware. New scrotal skin tear. WOC consulted.     Plan: Continue to wean ventilator. Reconsider trach on Monday.

## 2019-10-26 ENCOUNTER — APPOINTMENT (OUTPATIENT)
Dept: GENERAL RADIOLOGY | Facility: CLINIC | Age: 54
DRG: 003 | End: 2019-10-26
Attending: STUDENT IN AN ORGANIZED HEALTH CARE EDUCATION/TRAINING PROGRAM
Payer: COMMERCIAL

## 2019-10-26 ENCOUNTER — APPOINTMENT (OUTPATIENT)
Dept: SPEECH THERAPY | Facility: CLINIC | Age: 54
DRG: 003 | End: 2019-10-26
Attending: STUDENT IN AN ORGANIZED HEALTH CARE EDUCATION/TRAINING PROGRAM
Payer: COMMERCIAL

## 2019-10-26 LAB
ANION GAP SERPL CALCULATED.3IONS-SCNC: 3 MMOL/L (ref 3–14)
ANION GAP SERPL CALCULATED.3IONS-SCNC: 5 MMOL/L (ref 3–14)
BACTERIA SPEC CULT: NO GROWTH
BASE EXCESS BLDA CALC-SCNC: 5.6 MMOL/L
BASE EXCESS BLDA CALC-SCNC: 7.8 MMOL/L
BASE EXCESS BLDA CALC-SCNC: 8 MMOL/L
BASOPHILS # BLD AUTO: 0 10E9/L (ref 0–0.2)
BASOPHILS NFR BLD AUTO: 0.3 %
BUN SERPL-MCNC: 39 MG/DL (ref 7–30)
BUN SERPL-MCNC: 41 MG/DL (ref 7–30)
CALCIUM SERPL-MCNC: 8.1 MG/DL (ref 8.5–10.1)
CALCIUM SERPL-MCNC: 8.6 MG/DL (ref 8.5–10.1)
CHLORIDE SERPL-SCNC: 108 MMOL/L (ref 94–109)
CHLORIDE SERPL-SCNC: 110 MMOL/L (ref 94–109)
CO2 SERPL-SCNC: 32 MMOL/L (ref 20–32)
CO2 SERPL-SCNC: 34 MMOL/L (ref 20–32)
CREAT SERPL-MCNC: 0.75 MG/DL (ref 0.66–1.25)
CREAT SERPL-MCNC: 0.87 MG/DL (ref 0.66–1.25)
CRP SERPL-MCNC: 190 MG/L (ref 0–8)
DIFFERENTIAL METHOD BLD: ABNORMAL
EOSINOPHIL # BLD AUTO: 0.5 10E9/L (ref 0–0.7)
EOSINOPHIL NFR BLD AUTO: 3.9 %
ERYTHROCYTE [DISTWIDTH] IN BLOOD BY AUTOMATED COUNT: 16.3 % (ref 10–15)
ERYTHROCYTE [SEDIMENTATION RATE] IN BLOOD BY WESTERGREN METHOD: 121 MM/H (ref 0–20)
GFR SERPL CREATININE-BSD FRML MDRD: >90 ML/MIN/{1.73_M2}
GFR SERPL CREATININE-BSD FRML MDRD: >90 ML/MIN/{1.73_M2}
GLUCOSE BLDC GLUCOMTR-MCNC: 121 MG/DL (ref 70–99)
GLUCOSE BLDC GLUCOMTR-MCNC: 131 MG/DL (ref 70–99)
GLUCOSE BLDC GLUCOMTR-MCNC: 153 MG/DL (ref 70–99)
GLUCOSE BLDC GLUCOMTR-MCNC: 154 MG/DL (ref 70–99)
GLUCOSE BLDC GLUCOMTR-MCNC: 171 MG/DL (ref 70–99)
GLUCOSE SERPL-MCNC: 135 MG/DL (ref 70–99)
GLUCOSE SERPL-MCNC: 164 MG/DL (ref 70–99)
HCO3 BLD-SCNC: 31 MMOL/L (ref 21–28)
HCO3 BLD-SCNC: 33 MMOL/L (ref 21–28)
HCO3 BLD-SCNC: 33 MMOL/L (ref 21–28)
HCT VFR BLD AUTO: 28.2 % (ref 40–53)
HGB BLD-MCNC: 8.6 G/DL (ref 13.3–17.7)
IMM GRANULOCYTES # BLD: 0.1 10E9/L (ref 0–0.4)
IMM GRANULOCYTES NFR BLD: 0.4 %
INR PPP: 1.23 (ref 0.86–1.14)
LYMPHOCYTES # BLD AUTO: 1.7 10E9/L (ref 0.8–5.3)
LYMPHOCYTES NFR BLD AUTO: 14.7 %
Lab: NORMAL
MAGNESIUM SERPL-MCNC: 3.1 MG/DL (ref 1.6–2.3)
MCH RBC QN AUTO: 30.4 PG (ref 26.5–33)
MCHC RBC AUTO-ENTMCNC: 30.5 G/DL (ref 31.5–36.5)
MCV RBC AUTO: 100 FL (ref 78–100)
MONOCYTES # BLD AUTO: 0.8 10E9/L (ref 0–1.3)
MONOCYTES NFR BLD AUTO: 6.7 %
NEUTROPHILS # BLD AUTO: 8.8 10E9/L (ref 1.6–8.3)
NEUTROPHILS NFR BLD AUTO: 74 %
NRBC # BLD AUTO: 0 10*3/UL
NRBC BLD AUTO-RTO: 0 /100
O2/TOTAL GAS SETTING VFR VENT: 50 %
O2/TOTAL GAS SETTING VFR VENT: 50 %
O2/TOTAL GAS SETTING VFR VENT: 60 %
OXYHGB MFR BLD: 87 % (ref 92–100)
OXYHGB MFR BLD: 90 % (ref 92–100)
OXYHGB MFR BLD: 96 % (ref 92–100)
PCO2 BLD: 47 MM HG (ref 35–45)
PCO2 BLD: 47 MM HG (ref 35–45)
PCO2 BLD: 48 MM HG (ref 35–45)
PH BLD: 7.42 PH (ref 7.35–7.45)
PH BLD: 7.44 PH (ref 7.35–7.45)
PH BLD: 7.46 PH (ref 7.35–7.45)
PLATELET # BLD AUTO: 412 10E9/L (ref 150–450)
PO2 BLD: 55 MM HG (ref 80–105)
PO2 BLD: 60 MM HG (ref 80–105)
PO2 BLD: 92 MM HG (ref 80–105)
POTASSIUM SERPL-SCNC: 3.8 MMOL/L (ref 3.4–5.3)
POTASSIUM SERPL-SCNC: 3.9 MMOL/L (ref 3.4–5.3)
RBC # BLD AUTO: 2.83 10E12/L (ref 4.4–5.9)
SODIUM SERPL-SCNC: 144 MMOL/L (ref 133–144)
SODIUM SERPL-SCNC: 147 MMOL/L (ref 133–144)
SPECIMEN SOURCE: NORMAL
WBC # BLD AUTO: 11.8 10E9/L (ref 4–11)

## 2019-10-26 PROCEDURE — 87040 BLOOD CULTURE FOR BACTERIA: CPT | Performed by: STUDENT IN AN ORGANIZED HEALTH CARE EDUCATION/TRAINING PROGRAM

## 2019-10-26 PROCEDURE — 25000125 ZZHC RX 250: Performed by: STUDENT IN AN ORGANIZED HEALTH CARE EDUCATION/TRAINING PROGRAM

## 2019-10-26 PROCEDURE — 40000983 ZZH STATISTIC HFNC ADULT NON-CPAP

## 2019-10-26 PROCEDURE — 25000128 H RX IP 250 OP 636: Performed by: STUDENT IN AN ORGANIZED HEALTH CARE EDUCATION/TRAINING PROGRAM

## 2019-10-26 PROCEDURE — 25000132 ZZH RX MED GY IP 250 OP 250 PS 637: Performed by: STUDENT IN AN ORGANIZED HEALTH CARE EDUCATION/TRAINING PROGRAM

## 2019-10-26 PROCEDURE — 82805 BLOOD GASES W/O2 SATURATION: CPT | Performed by: STUDENT IN AN ORGANIZED HEALTH CARE EDUCATION/TRAINING PROGRAM

## 2019-10-26 PROCEDURE — 40000275 ZZH STATISTIC RCP TIME EA 10 MIN

## 2019-10-26 PROCEDURE — 36415 COLL VENOUS BLD VENIPUNCTURE: CPT | Performed by: STUDENT IN AN ORGANIZED HEALTH CARE EDUCATION/TRAINING PROGRAM

## 2019-10-26 PROCEDURE — 83735 ASSAY OF MAGNESIUM: CPT | Performed by: STUDENT IN AN ORGANIZED HEALTH CARE EDUCATION/TRAINING PROGRAM

## 2019-10-26 PROCEDURE — 85025 COMPLETE CBC W/AUTO DIFF WBC: CPT | Performed by: STUDENT IN AN ORGANIZED HEALTH CARE EDUCATION/TRAINING PROGRAM

## 2019-10-26 PROCEDURE — 86140 C-REACTIVE PROTEIN: CPT | Performed by: STUDENT IN AN ORGANIZED HEALTH CARE EDUCATION/TRAINING PROGRAM

## 2019-10-26 PROCEDURE — 80048 BASIC METABOLIC PNL TOTAL CA: CPT | Performed by: STUDENT IN AN ORGANIZED HEALTH CARE EDUCATION/TRAINING PROGRAM

## 2019-10-26 PROCEDURE — 87070 CULTURE OTHR SPECIMN AEROBIC: CPT | Performed by: STUDENT IN AN ORGANIZED HEALTH CARE EDUCATION/TRAINING PROGRAM

## 2019-10-26 PROCEDURE — 85652 RBC SED RATE AUTOMATED: CPT | Performed by: STUDENT IN AN ORGANIZED HEALTH CARE EDUCATION/TRAINING PROGRAM

## 2019-10-26 PROCEDURE — 20000004 ZZH R&B ICU UMMC

## 2019-10-26 PROCEDURE — 40000047 ZZH STATISTIC CTO2 CONT OXYGEN TECH TIME EA 90 MIN

## 2019-10-26 PROCEDURE — 92610 EVALUATE SWALLOWING FUNCTION: CPT | Mod: GN

## 2019-10-26 PROCEDURE — 87205 SMEAR GRAM STAIN: CPT | Performed by: STUDENT IN AN ORGANIZED HEALTH CARE EDUCATION/TRAINING PROGRAM

## 2019-10-26 PROCEDURE — 71045 X-RAY EXAM CHEST 1 VIEW: CPT

## 2019-10-26 PROCEDURE — 27210429 ZZH NUTRITION PRODUCT INTERMEDIATE LITER

## 2019-10-26 PROCEDURE — 94640 AIRWAY INHALATION TREATMENT: CPT

## 2019-10-26 PROCEDURE — 40000014 ZZH STATISTIC ARTERIAL MONITORING DAILY

## 2019-10-26 PROCEDURE — 25800030 ZZH RX IP 258 OP 636: Performed by: STUDENT IN AN ORGANIZED HEALTH CARE EDUCATION/TRAINING PROGRAM

## 2019-10-26 PROCEDURE — 00000146 ZZHCL STATISTIC GLUCOSE BY METER IP

## 2019-10-26 PROCEDURE — 85610 PROTHROMBIN TIME: CPT | Performed by: STUDENT IN AN ORGANIZED HEALTH CARE EDUCATION/TRAINING PROGRAM

## 2019-10-26 RX ORDER — METOPROLOL TARTRATE 1 MG/ML
5 INJECTION, SOLUTION INTRAVENOUS EVERY 6 HOURS
Status: DISCONTINUED | OUTPATIENT
Start: 2019-10-26 | End: 2019-10-26

## 2019-10-26 RX ORDER — HEPARIN SODIUM 5000 [USP'U]/.5ML
5000 INJECTION, SOLUTION INTRAVENOUS; SUBCUTANEOUS EVERY 12 HOURS
Status: DISCONTINUED | OUTPATIENT
Start: 2019-10-26 | End: 2019-11-03

## 2019-10-26 RX ORDER — HEPARIN SODIUM 5000 [USP'U]/.5ML
5000 INJECTION, SOLUTION INTRAVENOUS; SUBCUTANEOUS EVERY 12 HOURS
Status: DISCONTINUED | OUTPATIENT
Start: 2019-10-26 | End: 2019-10-26

## 2019-10-26 RX ORDER — METOPROLOL TARTRATE 1 MG/ML
5 INJECTION, SOLUTION INTRAVENOUS EVERY 8 HOURS
Status: DISCONTINUED | OUTPATIENT
Start: 2019-10-26 | End: 2019-10-26

## 2019-10-26 RX ORDER — LISINOPRIL 5 MG/1
5 TABLET ORAL 2 TIMES DAILY
Status: DISCONTINUED | OUTPATIENT
Start: 2019-10-27 | End: 2019-10-26

## 2019-10-26 RX ORDER — METOPROLOL TARTRATE 1 MG/ML
5 INJECTION, SOLUTION INTRAVENOUS EVERY 8 HOURS
Status: DISCONTINUED | OUTPATIENT
Start: 2019-10-26 | End: 2019-10-29

## 2019-10-26 RX ORDER — HEPARIN SODIUM 1000 [USP'U]/ML
5000 INJECTION, SOLUTION INTRAVENOUS; SUBCUTANEOUS 2 TIMES DAILY
Status: DISCONTINUED | OUTPATIENT
Start: 2019-10-26 | End: 2019-10-26

## 2019-10-26 RX ADMIN — METOPROLOL TARTRATE 5 MG: 5 INJECTION INTRAVENOUS at 20:01

## 2019-10-26 RX ADMIN — QUETIAPINE FUMARATE 200 MG: 100 TABLET ORAL at 20:02

## 2019-10-26 RX ADMIN — OMEPRAZOLE 20 MG: 20 CAPSULE, DELAYED RELEASE ORAL at 16:10

## 2019-10-26 RX ADMIN — MULTIVITAMIN 15 ML: LIQUID ORAL at 09:03

## 2019-10-26 RX ADMIN — OXYCODONE HYDROCHLORIDE 5 MG: 5 TABLET ORAL at 04:32

## 2019-10-26 RX ADMIN — ALLOPURINOL 200 MG: 100 TABLET ORAL at 09:03

## 2019-10-26 RX ADMIN — TICAGRELOR 90 MG: 90 TABLET ORAL at 09:03

## 2019-10-26 RX ADMIN — LEVETIRACETAM 750 MG: 100 SOLUTION ORAL at 20:04

## 2019-10-26 RX ADMIN — GABAPENTIN 600 MG: 300 CAPSULE ORAL at 09:03

## 2019-10-26 RX ADMIN — HEPARIN SODIUM 5000 UNITS: 5000 INJECTION, SOLUTION INTRAVENOUS; SUBCUTANEOUS at 22:14

## 2019-10-26 RX ADMIN — METOPROLOL TARTRATE 5 MG: 1 INJECTION, SOLUTION INTRAVENOUS at 12:22

## 2019-10-26 RX ADMIN — THIAMINE HCL TAB 100 MG 100 MG: 100 TAB at 09:03

## 2019-10-26 RX ADMIN — ASPIRIN 81 MG CHEWABLE TABLET 81 MG: 81 TABLET CHEWABLE at 09:03

## 2019-10-26 RX ADMIN — Medication 50 MCG/HR: at 06:21

## 2019-10-26 RX ADMIN — TICAGRELOR 90 MG: 90 TABLET ORAL at 20:03

## 2019-10-26 RX ADMIN — QUETIAPINE FUMARATE 200 MG: 100 TABLET ORAL at 09:03

## 2019-10-26 RX ADMIN — FUROSEMIDE 40 MG: 10 INJECTION, SOLUTION INTRAVENOUS at 16:10

## 2019-10-26 RX ADMIN — GABAPENTIN 600 MG: 300 CAPSULE ORAL at 20:02

## 2019-10-26 RX ADMIN — LEVETIRACETAM 750 MG: 100 SOLUTION ORAL at 09:08

## 2019-10-26 RX ADMIN — Medication 1 PACKET: at 15:01

## 2019-10-26 RX ADMIN — IPRATROPIUM BROMIDE AND ALBUTEROL SULFATE 3 ML: .5; 3 SOLUTION RESPIRATORY (INHALATION) at 00:21

## 2019-10-26 RX ADMIN — POTASSIUM CHLORIDE 20 MEQ: 1.5 POWDER, FOR SOLUTION ORAL at 05:25

## 2019-10-26 RX ADMIN — DEXMEDETOMIDINE 0.6 MCG/KG/HR: 100 INJECTION, SOLUTION, CONCENTRATE INTRAVENOUS at 20:51

## 2019-10-26 RX ADMIN — OMEPRAZOLE 20 MG: 20 CAPSULE, DELAYED RELEASE ORAL at 09:03

## 2019-10-26 RX ADMIN — FUROSEMIDE 40 MG: 10 INJECTION, SOLUTION INTRAVENOUS at 09:07

## 2019-10-26 RX ADMIN — DEXMEDETOMIDINE 0.6 MCG/KG/HR: 100 INJECTION, SOLUTION, CONCENTRATE INTRAVENOUS at 11:34

## 2019-10-26 RX ADMIN — INSULIN ASPART 1 UNITS: 100 INJECTION, SOLUTION INTRAVENOUS; SUBCUTANEOUS at 16:11

## 2019-10-26 RX ADMIN — Medication 1 PACKET: at 09:08

## 2019-10-26 RX ADMIN — Medication 1 PACKET: at 20:06

## 2019-10-26 RX ADMIN — FOLIC ACID 1 MG: 1 TABLET ORAL at 09:03

## 2019-10-26 RX ADMIN — INSULIN ASPART 1 UNITS: 100 INJECTION, SOLUTION INTRAVENOUS; SUBCUTANEOUS at 14:58

## 2019-10-26 RX ADMIN — DEXMEDETOMIDINE 0.6 MCG/KG/HR: 100 INJECTION, SOLUTION, CONCENTRATE INTRAVENOUS at 02:35

## 2019-10-26 RX ADMIN — GABAPENTIN 600 MG: 300 CAPSULE ORAL at 14:46

## 2019-10-26 RX ADMIN — POTASSIUM CHLORIDE 20 MEQ: 1.5 POWDER, FOR SOLUTION ORAL at 20:03

## 2019-10-26 RX ADMIN — INSULIN ASPART 1 UNITS: 100 INJECTION, SOLUTION INTRAVENOUS; SUBCUTANEOUS at 11:29

## 2019-10-26 ASSESSMENT — ACTIVITIES OF DAILY LIVING (ADL)
ADLS_ACUITY_SCORE: 19

## 2019-10-26 ASSESSMENT — MIFFLIN-ST. JEOR: SCORE: 1915.24

## 2019-10-26 NOTE — PROGRESS NOTES
CVTS Progress Note    Right groin examined. Prevena removed. Wound healing well, no evidence of hematoma or erythema. Steristrips and dressing applied. Distal pulses intact. Patient tolerated well.    Tena Cano MD  Cardiothoracic Fellow  [facility] [phone]

## 2019-10-26 NOTE — PLAN OF CARE
D/I/A: Tolerating HFNC 40 lpm, FIO2 60% this am. Increased work of breathing noted overnight, abdominal muscle, coarse breath sounds noted. Resident notified and observed pt at bedside. Pt encouraged to cough to clear secretions, CXR, ABG, and neb given. Work of breathing improved, will continue to monitor. Levophed restarted to maintain MAP>65. AFib 100-140's, amio gtt continued. TF continued at 55 ml/h.  q2h. Na 147 this am. 100-200 ml/h UOP. Potassium replaced per protocol. Remains disoriented to time, place, and situation. Speech incoherent and garbled at times. Follows commands. LUE weakness noted. Precedex and fentanyl gtts continued. P: Will continue to monitor.

## 2019-10-26 NOTE — PLAN OF CARE
Discharge Planner SLP   Patient plan for discharge: Unknown  Current status: Clinical swallow eval completed per MD order. Pt presents with mild dysphagia which is exacerbated by high O2 needs, delirium, and impulsivity. Oral mech remarkable for upper dentures which were placed for eval. Assessed with thin liquids, puree, and higher texture solids. Oral phase WFL, pharyngeal phase also functional. No overt s/sx of aspiration with any consistency trialed, but pt required frequent-constant cues to slow rate of intake or focus on PO intake vs talking/moving. Recommend cautious initiation of clear liquid diet with 1:1 assistance. Pt must be fully alert and upright for all PO, taking single bites/sips at slow rate. SLP to follow.  Barriers to return to prior living situation: Mental status, respiratory status, medical condition  Recommendations for discharge: Rehab  Rationale for recommendations: Pt will benefit from ongoing ST targeting swallow function       Entered by: Deborah Watts 10/26/2019 9:53 AM

## 2019-10-26 NOTE — PLAN OF CARE
PT 4C: Cancel- Patient was evaluated by OT yesterday, at that time patient not following commands. Patient not medically appropriate for therapy this morning due to hypotension, then working toward extubation. PT will hold evaluation at this time, OT will continue follow and notify PT to initiate as appropriate.

## 2019-10-26 NOTE — PROGRESS NOTES
10/26/19 0856   General Information   Onset Date 10/13/19   Start of Care Date 10/26/19   Referring Physician Bronson Dsouza MD   Patient Profile Review/OT: Additional Occupational Profile Info See Profile for full history and prior level of function   Patient/Family Goals Statement None stated   Swallowing Evaluation Bedside swallow evaluation   Behaviorial Observations Impulsive;Confused;Distractible  (agitated, constant movement)   Mode of current nutrition NJ   Respiratory Status O2 Supply   Type of O2 supply Nasal cannula  (High-flow, 60%FIO2, 40LPM)   Comments SLP: Pt is a 53 year old male who was admitted on 10/13/19 as a transfer from [place], [place] for refractory VT/VF arrest s/p PCI to [facility] and placement on peripheral [facility] ECMO. IABP removed successfully 10/20/2019. Successful extubation 10/25/2019. Still delirious, constantly moving and making nonsensical comments. Clinical swallow eval completed per MD order.   Clinical Swallow Evaluation   Oral Musculature generally intact   Dentition upper dentures   Mucosal Quality dry   Mandibular Strength and Mobility intact   Oral Labial Strength and Mobility WFL   Lingual Strength and Mobility WFL   Laryngeal Function Cough;Throat clear;Swallow;Voicing initiated   Oral Musculature Comments Grossly functional; pt had some difficulty following commands d/t confusion and frequent remarks and movement   Swallow Eval   Feeding Assistance frequent cues/help required   Clinical Swallow Eval: Thin Liquid Texture Trial   Mode of Presentation, Thin Liquids cup;straw;fed by clinician   Volume of Liquid or Food Presented 3oz water   Oral Phase of Swallow WFL   Pharyngeal Phase of Swallow intact   Diagnostic Statement Grossly functional, no overt s/sx of aspiration. Throat clear noted during session but this was not correlated with PO intake   Clinical Swallow Eval: Puree Solid Texture Trial   Mode of Presentation, Puree spoon;fed by clinician   Volume of Puree Presented 3oz  pudding   Oral Phase, Puree WFL   Pharyngeal Phase, Puree intact   Diagnostic Statement Grossly functional, no overt s/sx of aspiration   Clinical Swallow Eval: Solid Food Texture Trial   Mode of Presentation, Solid self-fed   Volume of Solid Food Presented 1/2 cracker   Oral Phase, Solid WFL   Pharyngeal Phase, Solid intact   Diagnostic Statement WFL, no overt s/sx of aspiration   Esophageal Phase of Swallow   Patient reports or presents with symptoms of esophageal dysphagia No   General Therapy Interventions   Planned Therapy Interventions Dysphagia Treatment   Dysphagia treatment Oropharyngeal exercise training;Modified diet education;Instruction of safe swallow strategies;Compensatory strategies for swallowing   Swallow Eval: Clinical Impressions   Skilled Criteria for Therapy Intervention Skilled criteria met.  Treatment indicated.   Functional Assessment Scale (FAS) 5   Treatment Diagnosis Mild dysphagia in setting of high O2 needs and delirium/impulsivity   Diet texture recommendations Clear liquid;Thin liquids   Recommended Feeding/Eating Techniques small sips/bites  (1:1 assistance, slow rate, fully upright)   Demonstrates Need for Referral to Another Service dietitian;respiratory therapy;occupational therapy;physical therapy   Therapy Frequency Daily   Predicted Duration of Therapy Intervention (days/wks) 1 week   Anticipated Discharge Disposition inpatient rehabilitation facility   Risks and Benefits of Treatment have been explained. Yes   Patient, family and/or staff in agreement with Plan of Care Yes   Clinical Impression Comments SLP: Clinical swallow eval completed per MD order. Pt presents with mild dysphagia which is exacerbated by high O2 needs, delirium, and impulsivity. Oral mech remarkable for upper dentures which were placed for eval. Assessed with thin liquids, puree, and higher texture solids. Oral phase WFL, pharyngeal phase also functional. No overt s/sx of aspiration with any consistency  trialed, but pt required frequent-constant cues to slow rate of intake or focus on PO intake vs talking/moving. Recommend cautious initiation of clear liquid diet with 1:1 assistance. Pt must be fully alert and upright for all PO, taking single bites/sips at slow rate. SLP to follow.   Total Evaluation Time   Total Evaluation Time (Minutes) 16

## 2019-10-27 ENCOUNTER — APPOINTMENT (OUTPATIENT)
Dept: GENERAL RADIOLOGY | Facility: CLINIC | Age: 54
DRG: 003 | End: 2019-10-27
Attending: INTERNAL MEDICINE
Payer: COMMERCIAL

## 2019-10-27 ENCOUNTER — APPOINTMENT (OUTPATIENT)
Dept: GENERAL RADIOLOGY | Facility: CLINIC | Age: 54
DRG: 003 | End: 2019-10-27
Attending: STUDENT IN AN ORGANIZED HEALTH CARE EDUCATION/TRAINING PROGRAM
Payer: COMMERCIAL

## 2019-10-27 ENCOUNTER — APPOINTMENT (OUTPATIENT)
Dept: SPEECH THERAPY | Facility: CLINIC | Age: 54
DRG: 003 | End: 2019-10-27
Attending: INTERNAL MEDICINE
Payer: COMMERCIAL

## 2019-10-27 LAB
ANION GAP SERPL CALCULATED.3IONS-SCNC: 2 MMOL/L (ref 3–14)
ANION GAP SERPL CALCULATED.3IONS-SCNC: 2 MMOL/L (ref 3–14)
BACTERIA SPEC CULT: NO GROWTH
BASE EXCESS BLDA CALC-SCNC: 6.8 MMOL/L
BASE EXCESS BLDA CALC-SCNC: 7.8 MMOL/L
BASE EXCESS BLDV CALC-SCNC: 11 MMOL/L
BASOPHILS # BLD AUTO: 0.1 10E9/L (ref 0–0.2)
BASOPHILS NFR BLD AUTO: 0.5 %
BUN SERPL-MCNC: 39 MG/DL (ref 7–30)
BUN SERPL-MCNC: 41 MG/DL (ref 7–30)
CALCIUM SERPL-MCNC: 8.5 MG/DL (ref 8.5–10.1)
CALCIUM SERPL-MCNC: 8.6 MG/DL (ref 8.5–10.1)
CHLORIDE SERPL-SCNC: 105 MMOL/L (ref 94–109)
CHLORIDE SERPL-SCNC: 108 MMOL/L (ref 94–109)
CO2 SERPL-SCNC: 34 MMOL/L (ref 20–32)
CO2 SERPL-SCNC: 34 MMOL/L (ref 20–32)
CREAT SERPL-MCNC: 0.68 MG/DL (ref 0.66–1.25)
CREAT SERPL-MCNC: 0.75 MG/DL (ref 0.66–1.25)
DIFFERENTIAL METHOD BLD: ABNORMAL
EOSINOPHIL # BLD AUTO: 0.5 10E9/L (ref 0–0.7)
EOSINOPHIL NFR BLD AUTO: 4.8 %
ERYTHROCYTE [DISTWIDTH] IN BLOOD BY AUTOMATED COUNT: 16.4 % (ref 10–15)
GFR SERPL CREATININE-BSD FRML MDRD: >90 ML/MIN/{1.73_M2}
GFR SERPL CREATININE-BSD FRML MDRD: >90 ML/MIN/{1.73_M2}
GLUCOSE BLDC GLUCOMTR-MCNC: 117 MG/DL (ref 70–99)
GLUCOSE BLDC GLUCOMTR-MCNC: 118 MG/DL (ref 70–99)
GLUCOSE BLDC GLUCOMTR-MCNC: 121 MG/DL (ref 70–99)
GLUCOSE BLDC GLUCOMTR-MCNC: 134 MG/DL (ref 70–99)
GLUCOSE BLDC GLUCOMTR-MCNC: 139 MG/DL (ref 70–99)
GLUCOSE BLDC GLUCOMTR-MCNC: 145 MG/DL (ref 70–99)
GLUCOSE BLDC GLUCOMTR-MCNC: 158 MG/DL (ref 70–99)
GLUCOSE SERPL-MCNC: 140 MG/DL (ref 70–99)
GLUCOSE SERPL-MCNC: 167 MG/DL (ref 70–99)
GRAM STN SPEC: NORMAL
HCO3 BLD-SCNC: 33 MMOL/L (ref 21–28)
HCO3 BLD-SCNC: 34 MMOL/L (ref 21–28)
HCO3 BLDV-SCNC: 36 MMOL/L (ref 21–28)
HCT VFR BLD AUTO: 27.6 % (ref 40–53)
HGB BLD-MCNC: 8.3 G/DL (ref 13.3–17.7)
IMM GRANULOCYTES # BLD: 0.1 10E9/L (ref 0–0.4)
IMM GRANULOCYTES NFR BLD: 0.5 %
INR PPP: 1.14 (ref 0.86–1.14)
LYMPHOCYTES # BLD AUTO: 1.3 10E9/L (ref 0.8–5.3)
LYMPHOCYTES NFR BLD AUTO: 11.8 %
Lab: NORMAL
MAGNESIUM SERPL-MCNC: 3 MG/DL (ref 1.6–2.3)
MCH RBC QN AUTO: 30.4 PG (ref 26.5–33)
MCHC RBC AUTO-ENTMCNC: 30.1 G/DL (ref 31.5–36.5)
MCV RBC AUTO: 101 FL (ref 78–100)
MONOCYTES # BLD AUTO: 0.7 10E9/L (ref 0–1.3)
MONOCYTES NFR BLD AUTO: 6.2 %
NEUTROPHILS # BLD AUTO: 8.2 10E9/L (ref 1.6–8.3)
NEUTROPHILS NFR BLD AUTO: 76.2 %
NRBC # BLD AUTO: 0 10*3/UL
NRBC BLD AUTO-RTO: 0 /100
O2/TOTAL GAS SETTING VFR VENT: 100 %
O2/TOTAL GAS SETTING VFR VENT: 60 %
O2/TOTAL GAS SETTING VFR VENT: 60 %
OXYHGB MFR BLD: 91 % (ref 92–100)
OXYHGB MFR BLD: 92 % (ref 92–100)
OXYHGB MFR BLDV: 66 %
PCO2 BLD: 54 MM HG (ref 35–45)
PCO2 BLD: 56 MM HG (ref 35–45)
PCO2 BLDV: 53 MM HG (ref 40–50)
PH BLD: 7.38 PH (ref 7.35–7.45)
PH BLD: 7.41 PH (ref 7.35–7.45)
PH BLDV: 7.45 PH (ref 7.32–7.43)
PLATELET # BLD AUTO: 465 10E9/L (ref 150–450)
PO2 BLD: 67 MM HG (ref 80–105)
PO2 BLD: 68 MM HG (ref 80–105)
PO2 BLDV: 36 MM HG (ref 25–47)
POTASSIUM SERPL-SCNC: 4.1 MMOL/L (ref 3.4–5.3)
POTASSIUM SERPL-SCNC: 4.6 MMOL/L (ref 3.4–5.3)
RBC # BLD AUTO: 2.73 10E12/L (ref 4.4–5.9)
SODIUM SERPL-SCNC: 142 MMOL/L (ref 133–144)
SODIUM SERPL-SCNC: 144 MMOL/L (ref 133–144)
SPECIMEN SOURCE: NORMAL
WBC # BLD AUTO: 10.8 10E9/L (ref 4–11)

## 2019-10-27 PROCEDURE — 25000132 ZZH RX MED GY IP 250 OP 250 PS 637: Performed by: STUDENT IN AN ORGANIZED HEALTH CARE EDUCATION/TRAINING PROGRAM

## 2019-10-27 PROCEDURE — 25000128 H RX IP 250 OP 636: Performed by: STUDENT IN AN ORGANIZED HEALTH CARE EDUCATION/TRAINING PROGRAM

## 2019-10-27 PROCEDURE — 94660 CPAP INITIATION&MGMT: CPT

## 2019-10-27 PROCEDURE — 00000146 ZZHCL STATISTIC GLUCOSE BY METER IP

## 2019-10-27 PROCEDURE — 93005 ELECTROCARDIOGRAM TRACING: CPT

## 2019-10-27 PROCEDURE — 71045 X-RAY EXAM CHEST 1 VIEW: CPT | Mod: 77

## 2019-10-27 PROCEDURE — 25000132 ZZH RX MED GY IP 250 OP 250 PS 637: Performed by: INTERNAL MEDICINE

## 2019-10-27 PROCEDURE — 40000275 ZZH STATISTIC RCP TIME EA 10 MIN

## 2019-10-27 PROCEDURE — 25800030 ZZH RX IP 258 OP 636: Performed by: STUDENT IN AN ORGANIZED HEALTH CARE EDUCATION/TRAINING PROGRAM

## 2019-10-27 PROCEDURE — 40000047 ZZH STATISTIC CTO2 CONT OXYGEN TECH TIME EA 90 MIN

## 2019-10-27 PROCEDURE — 27210429 ZZH NUTRITION PRODUCT INTERMEDIATE LITER

## 2019-10-27 PROCEDURE — 82805 BLOOD GASES W/O2 SATURATION: CPT | Performed by: STUDENT IN AN ORGANIZED HEALTH CARE EDUCATION/TRAINING PROGRAM

## 2019-10-27 PROCEDURE — 94799 UNLISTED PULMONARY SVC/PX: CPT

## 2019-10-27 PROCEDURE — 25000125 ZZHC RX 250: Performed by: STUDENT IN AN ORGANIZED HEALTH CARE EDUCATION/TRAINING PROGRAM

## 2019-10-27 PROCEDURE — 71045 X-RAY EXAM CHEST 1 VIEW: CPT

## 2019-10-27 PROCEDURE — 20000004 ZZH R&B ICU UMMC

## 2019-10-27 PROCEDURE — 85025 COMPLETE CBC W/AUTO DIFF WBC: CPT | Performed by: STUDENT IN AN ORGANIZED HEALTH CARE EDUCATION/TRAINING PROGRAM

## 2019-10-27 PROCEDURE — 93010 ELECTROCARDIOGRAM REPORT: CPT | Performed by: INTERNAL MEDICINE

## 2019-10-27 PROCEDURE — 92526 ORAL FUNCTION THERAPY: CPT | Mod: GN

## 2019-10-27 PROCEDURE — 83735 ASSAY OF MAGNESIUM: CPT | Performed by: STUDENT IN AN ORGANIZED HEALTH CARE EDUCATION/TRAINING PROGRAM

## 2019-10-27 PROCEDURE — 85610 PROTHROMBIN TIME: CPT | Performed by: STUDENT IN AN ORGANIZED HEALTH CARE EDUCATION/TRAINING PROGRAM

## 2019-10-27 PROCEDURE — 80048 BASIC METABOLIC PNL TOTAL CA: CPT | Performed by: STUDENT IN AN ORGANIZED HEALTH CARE EDUCATION/TRAINING PROGRAM

## 2019-10-27 PROCEDURE — 40000014 ZZH STATISTIC ARTERIAL MONITORING DAILY

## 2019-10-27 RX ORDER — HYDRALAZINE HYDROCHLORIDE 20 MG/ML
5 INJECTION INTRAMUSCULAR; INTRAVENOUS 2 TIMES DAILY
Status: DISCONTINUED | OUTPATIENT
Start: 2019-10-27 | End: 2019-10-28

## 2019-10-27 RX ORDER — OMEPRAZOLE
20 KIT
Status: DISCONTINUED | OUTPATIENT
Start: 2019-10-27 | End: 2019-11-03

## 2019-10-27 RX ORDER — AMIODARONE HYDROCHLORIDE 200 MG/1
200 TABLET ORAL DAILY
Status: DISCONTINUED | OUTPATIENT
Start: 2019-10-27 | End: 2019-10-28

## 2019-10-27 RX ORDER — ACETYLCYSTEINE 100 MG/ML
4 SOLUTION ORAL; RESPIRATORY (INHALATION) EVERY 4 HOURS
Status: DISCONTINUED | OUTPATIENT
Start: 2019-10-27 | End: 2019-10-28

## 2019-10-27 RX ADMIN — HEPARIN SODIUM 5000 UNITS: 5000 INJECTION, SOLUTION INTRAVENOUS; SUBCUTANEOUS at 22:06

## 2019-10-27 RX ADMIN — FENTANYL CITRATE 50 MCG: 50 INJECTION INTRAMUSCULAR; INTRAVENOUS at 04:53

## 2019-10-27 RX ADMIN — Medication 1 PACKET: at 19:49

## 2019-10-27 RX ADMIN — HYDRALAZINE HYDROCHLORIDE 5 MG: 20 INJECTION INTRAMUSCULAR; INTRAVENOUS at 12:53

## 2019-10-27 RX ADMIN — OXYCODONE HYDROCHLORIDE 5 MG: 5 TABLET ORAL at 14:50

## 2019-10-27 RX ADMIN — FOLIC ACID 1 MG: 1 TABLET ORAL at 08:10

## 2019-10-27 RX ADMIN — DEXMEDETOMIDINE 0.6 MCG/KG/HR: 100 INJECTION, SOLUTION, CONCENTRATE INTRAVENOUS at 07:24

## 2019-10-27 RX ADMIN — LEVETIRACETAM 750 MG: 100 SOLUTION ORAL at 19:49

## 2019-10-27 RX ADMIN — TICAGRELOR 90 MG: 90 TABLET ORAL at 19:48

## 2019-10-27 RX ADMIN — Medication 1 PACKET: at 08:12

## 2019-10-27 RX ADMIN — Medication 1 PACKET: at 15:40

## 2019-10-27 RX ADMIN — HYDRALAZINE HYDROCHLORIDE 5 MG: 20 INJECTION INTRAMUSCULAR; INTRAVENOUS at 15:42

## 2019-10-27 RX ADMIN — MULTIVITAMIN 15 ML: LIQUID ORAL at 08:09

## 2019-10-27 RX ADMIN — OMEPRAZOLE 20 MG: KIT at 15:41

## 2019-10-27 RX ADMIN — OXYCODONE HYDROCHLORIDE 5 MG: 5 TABLET ORAL at 19:48

## 2019-10-27 RX ADMIN — GABAPENTIN 600 MG: 300 CAPSULE ORAL at 19:48

## 2019-10-27 RX ADMIN — FUROSEMIDE 40 MG: 10 INJECTION, SOLUTION INTRAVENOUS at 15:41

## 2019-10-27 RX ADMIN — THIAMINE HCL TAB 100 MG 100 MG: 100 TAB at 08:15

## 2019-10-27 RX ADMIN — METOPROLOL TARTRATE 5 MG: 5 INJECTION INTRAVENOUS at 12:56

## 2019-10-27 RX ADMIN — IPRATROPIUM BROMIDE AND ALBUTEROL SULFATE 3 ML: .5; 3 SOLUTION RESPIRATORY (INHALATION) at 21:08

## 2019-10-27 RX ADMIN — ASPIRIN 81 MG CHEWABLE TABLET 81 MG: 81 TABLET CHEWABLE at 08:10

## 2019-10-27 RX ADMIN — GABAPENTIN 600 MG: 300 CAPSULE ORAL at 08:10

## 2019-10-27 RX ADMIN — ALLOPURINOL 200 MG: 100 TABLET ORAL at 08:10

## 2019-10-27 RX ADMIN — QUETIAPINE FUMARATE 200 MG: 100 TABLET ORAL at 19:48

## 2019-10-27 RX ADMIN — METOPROLOL TARTRATE 5 MG: 5 INJECTION INTRAVENOUS at 19:48

## 2019-10-27 RX ADMIN — ACETYLCYSTEINE 4 ML: 100 INHALANT RESPIRATORY (INHALATION) at 21:08

## 2019-10-27 RX ADMIN — AMIODARONE HYDROCHLORIDE 200 MG: 200 TABLET ORAL at 14:15

## 2019-10-27 RX ADMIN — LEVETIRACETAM 750 MG: 100 SOLUTION ORAL at 08:12

## 2019-10-27 RX ADMIN — METOPROLOL TARTRATE 5 MG: 5 INJECTION INTRAVENOUS at 04:26

## 2019-10-27 RX ADMIN — FUROSEMIDE 40 MG: 10 INJECTION, SOLUTION INTRAVENOUS at 08:04

## 2019-10-27 RX ADMIN — INSULIN ASPART 1 UNITS: 100 INJECTION, SOLUTION INTRAVENOUS; SUBCUTANEOUS at 20:05

## 2019-10-27 RX ADMIN — INSULIN ASPART 1 UNITS: 100 INJECTION, SOLUTION INTRAVENOUS; SUBCUTANEOUS at 16:24

## 2019-10-27 RX ADMIN — OMEPRAZOLE 20 MG: 20 CAPSULE, DELAYED RELEASE ORAL at 08:09

## 2019-10-27 RX ADMIN — QUETIAPINE FUMARATE 200 MG: 100 TABLET ORAL at 08:09

## 2019-10-27 RX ADMIN — GABAPENTIN 600 MG: 300 CAPSULE ORAL at 14:15

## 2019-10-27 RX ADMIN — HEPARIN SODIUM 5000 UNITS: 5000 INJECTION, SOLUTION INTRAVENOUS; SUBCUTANEOUS at 10:20

## 2019-10-27 RX ADMIN — AMIODARONE HYDROCHLORIDE 0.5 MG/MIN: 50 INJECTION, SOLUTION INTRAVENOUS at 08:58

## 2019-10-27 RX ADMIN — TICAGRELOR 90 MG: 90 TABLET ORAL at 08:10

## 2019-10-27 ASSESSMENT — ACTIVITIES OF DAILY LIVING (ADL)
ADLS_ACUITY_SCORE: 18
ADLS_ACUITY_SCORE: 18
ADLS_ACUITY_SCORE: 19
ADLS_ACUITY_SCORE: 19
ADLS_ACUITY_SCORE: 18
ADLS_ACUITY_SCORE: 19

## 2019-10-27 ASSESSMENT — MIFFLIN-ST. JEOR: SCORE: 1911.24

## 2019-10-27 NOTE — PLAN OF CARE
Problem: Adult Inpatient Plan of Care  Goal: Plan of Care Review  Outcome: No Change  Goal: Patient-Specific Goal (Individualization)  Outcome: No Change  Goal: Absence of Hospital-Acquired Illness or Injury  Outcome: No Change  Goal: Optimal Comfort and Wellbeing  Outcome: No Change  Goal: Readiness for Transition of Care  Outcome: No Change  Goal: Rounds/Family Conference  Outcome: No Change     Problem: Communication Impairment (Mechanical Ventilation, Invasive)  Goal: Effective Communication  Outcome: No Change     Problem: Device-Related Complication Risk (Mechanical Ventilation, Invasive)  Goal: Optimal Device Function  Outcome: No Change     Problem: Nutrition Impairment (Mechanical Ventilation, Invasive)  Goal: Optimal Nutrition Delivery  Outcome: No Change     Problem: Skin and Tissue Injury (Mechanical Ventilation, Invasive)  Goal: Absence of Device-Related Skin and Tissue Injury  Outcome: No Change     Problem: Ventilator-Induced Lung Injury (Mechanical Ventilation, Invasive)  Goal: Absence of Ventilator-Induced Lung Injury  Outcome: No Change     Problem: Ventilator-Induced Lung Injury (Mechanical Ventilation, Invasive)  Goal: Absence of Ventilator-Induced Lung Injury  Outcome: No Change     Problem: Cardiac Disease Comorbidity  Goal: Cardiac Disease  Description  Patient comorbidity will be monitored for signs and symptoms of Cardiac Disease.  Problems will be absent, minimized or managed by discharge/transition of care.  Outcome: No Change   ICU End of Shift Summary. See flowsheets for vital signs and detailed assessment.    Changes this shift: Pt. Had a large amount of stool with a great deal of agitation this morning.  Pt. Agitated then slept intermittently throughout the day despite our best attempts to keep him awake.  Pt. Sat in a chair for 2H.  Respiratory he was turned up to 60% o2 after his ABG results.  Pt. Sounded more coarse as the day progressed.  Discussed anticoagulation with MD; team  opted to wait and discuss with primary team tomorrow.  Pressor between 0.01-0.03 throughout the day.  Pt. Can have clear liquids with an assist.  Speech to revisit tomorrow.    Plan:  Attempt to provide the patient with sleep.  Titrate pressor if possible.

## 2019-10-27 NOTE — PLAN OF CARE
Discharge Planner SLP   Patient plan for discharge: Not stated  Current status: The patient was seen for tx when positioned upright in bed. Swallowing safety is impacted by generalized weakness, AMS and respiratory status. Cautiously recommend the patient take small sips of a thin consistency full liquid diet, fed only when fully alert and upright, one small sip at a time. Please withhold if the patient cannot sit up, or if he is too lethargic.  Barriers to return to prior living situation: medical complexity, deconditioning, TF, modified diet  Recommendations for discharge: inpatient rehab, may be a candidate for ARU pending progress  Rationale for recommendations: below baseline swallowing skills and diet level       Entered by: Letty Alves 10/27/2019 3:54 PM

## 2019-10-27 NOTE — PLAN OF CARE
ICU End of Shift Summary. See flowsheets for vital signs and detailed assessment.    Changes this shift: FIO2 increased to 70% this am d/t decreased PO2 and c/o of shortness of breath by patient. Productive cough with thick cloudy/blood tinged secretions. Sputum cx sent, results reported to [facility]. Levophed weaned off. SR 80's. 650 ml UOP this shift. Potassium replacement per protocol. 3 loose stools this am.     Plan: Will continue to monitor.

## 2019-10-27 NOTE — PROGRESS NOTES
[facility]   Cardiology Progress Note    Assessment & Plan   53 year old male who was admitted on 10/13/19 as a transfer from [place], [place] for refractory VT/VF arrest s/p PCI to [facility]AD and placement on peripheral VA ECMO. IABP removed successfully 10/20/2019. Successful extubated 10/25/2019.     Changes Today:  - Weaned off levophed  - Trial of hydralazine 5mg IV BID   - Bronchial hygiene      Neurology: Initially intubated, sedated, paralyzed. Cooled to 34 degrees, now rewarmed.   - NeuroCrit consulted, initiated Keppra 750 mg BID on 10/17 for possible seizure activity, will continue as outpatient until neuro follow up  - EEG with small amount of epileptiform activity, due to pt becoming more alert, stopped EEG 10/20/2019  - CT Head 10/16 with smal right paracentral lesion, new right hemicerebellum lesion concerning for stroke.  - Continues to remain agitated and confused  - Weaning off fentanyl, still on precedex for agitation    Cardiovascular / Hemodynamics: Refractory VF arrest s/p peripheral V-A ECMO at OSH on 10/13/19.  TTE: LVEF 5-10%, increase to 15% on 1L  - Continue ASA 81mg and ticagrelor 90 mg BID  - Hold lipitor for now given likely hepatic injury during arrest  - Continue metoprolol 5mg IV Q8  - Start hydralazine 5mg IV BID for afterload reduction  - Transition to PO medication once more stable   - Diuresis with lasix 40mg IV BID, will consider increasing if with no improvement in respiratory status   - Discontinue amiodarone IV, start amiodarone 200mg PO daily   - Holding off on a/c for afib given hematoma, will readdress once more hemodynamically stable    Pulmonary: Large hemothorax on 10/14 s/p right-sided chest tube.extubated 10/25/2019  CXR: stable devices, worsening RLL pleural effusion also with increasing FiO2 requirements on HFNC   - wean HFNC as tolerated   - pulmonary hygiene per RT   - discussed chest tube placement with CT surgery, will remove tomorrow given CXR findings concerning for displacement     GI and Nutrition: Per Pt's wife, he is an alcoholic. Concern for possible GIB given black OG output, improved.  - Monitor BID LFTs  - NJ placed at bedside on 10/16, started TFs  - Bowel regimen held given multiple bowel movements today  - Per speech, can start tube feeds   Renal, Fluid and Electrolytes: - Monitor urine output  - Maintain K>4 and Mg>2    Infectious Disease: No signs of infection. Leukocytosis c/w arrest. Blood cultures without growth.    Hematology and Oncology:  ASA/ticagrelor for KAMRON. Large intramuscular hematoma of right pec and hemothorax on 10/14 with possible GIB as above.  - Transfuse for Hgb<10, Plts<70  - DVT PPX: Heparin ppx  - Right chest wall hematoma: monitor exam and Hgb closely, Chest tube still in place. Will discuss with CT surgery when to remove    Endocrinology: No known medical history. BG elevated.  - Insulin gtt PRN   Lines: Restraint: needed  Peripheral IV 10/16/19 Left Lower forearm (Active)       Arterial Line Radial (Active)       CVC Triple Lumen 10/13/19 Left Subclavian (Active)       Left Groin Interventional Procedure Access (Active)     R chest tube (placed 10/18/2019 by IR)  Current lines are required for patient management       Family updated today: Yes  Code Status: FULL    Pt was seen and examined with  who agrees with the assessment and plan.    Meme Vasquez  Cardiology PGY4    Interval History    Patient doing well overnight. Weaned off pressors. Still confused and agitated at times. Does not answer questions appropriately.     ROS: 4-pt ROS otherwise negative.     Data reviewed today: I reviewed all new labs and imaging results over the last 24 hours. I personally reviewed:    Physical Exam   Temp: 98.4  F (36.9  C) Temp src: Axillary     Heart Rate: 98 Resp: (!) 42 SpO2: 91 % O2 Device: High Flow Nasal Cannula (HFNC) Oxygen Delivery: Other (Comments)(35 lpm)  Vitals:    10/25/19 0400 10/26/19 0000 10/27/19 0400   Weight: 101.2 kg (223 lb 1.7  "oz) 99.4 kg (219 lb 2.2 oz) 99 kg (218 lb 4.1 oz)     Vital Signs with Ranges  Temp:  [97.8  F (36.6  C)-99.1  F (37.3  C)] 98.4  F (36.9  C)  Heart Rate:  [] 98  Resp:  [15-54] 42  MAP:  [59 mmHg-132 mmHg] 81 mmHg  Arterial Line BP: ()/(33-81) 118/61  FiO2 (%):  [50 %-70 %] 70 %  SpO2:  [81 %-100 %] 91 %  I/O last 3 completed shifts:  In: 4371.74 [P.O.:712; I.V.:519.74; NG/GT:1820]  Out: 2430 [Urine:2200; Chest Tube:230]    Heart Rate: 98, Blood pressure 99/74, pulse 137, temperature 98.4  F (36.9  C), temperature source Axillary, resp. rate (!) 42, height 1.89 m (6' 2.41\"), weight 99 kg (218 lb 4.1 oz), SpO2 91 %.  218 lbs 4.09 oz  GEN: Cooperative, sometimes agitated  CV:  S1/S2 heard  LUNGS: coarse breath sounds, right-sided chest tube in place  ABD: Soft BS, soft, mildly distended  EXT: warm, trace-1+ edema, dopplerable pulses  NEURO: able to follow commands, taking deep breaths  SKIN: Large hematoma along chest wall    Medications     dexmedetomidine 0.5 mcg/kg/hr (10/27/19 1400)     IV fluid REPLACEMENT ONLY 25 mL/hr at 10/18/19 1929     dextrose 10 mL/hr at 10/18/19 2016     fentaNYL Stopped (10/27/19 0811)     sodium chloride         allopurinol  200 mg Oral Daily     amiodarone  200 mg Oral Daily     aspirin  81 mg Per Feeding Tube Daily     folic acid  1 mg Oral Daily     furosemide  40 mg Intravenous BID     gabapentin  600 mg Oral TID     heparin ANTICOAGULANT  5,000 Units Subcutaneous Q12H     hydrALAZINE  5 mg Intravenous BID     influenza vaccine adult (product based on age)  0.5 mL Intramuscular Prior to discharge     insulin aspart  1-4 Units Subcutaneous Q4H     levETIRAcetam  750 mg Oral or Feeding Tube BID     metoprolol  5 mg Intravenous Q8H     multivitamins w/minerals  15 mL Per Feeding Tube Daily     omeprazole  20 mg Oral or Feeding Tube BID AC     protein modular  1 packet Per Feeding Tube TID     QUEtiapine  200 mg Oral or Feeding Tube BID     sodium chloride (PF)  3 mL " Intracatheter Q8H     ticagrelor  90 mg Oral or Feeding Tube BID     vitamin B1  100 mg Oral Daily       Data   Recent Labs   Lab 10/27/19  0419 10/26/19  1750 10/26/19  0346  10/25/19  0426  10/24/19  1507 10/24/19  0446   WBC 10.8  --  11.8*  --  12.6*  --  11.4* 10.6   HGB 8.3*  --  8.6*  --  8.5*  --  8.7* 9.0*   *  --  100  --  100  --  100 99   *  --  412  --  368  --  347 332   INR 1.14  --  1.23*  --  1.25*  --   --  1.28*    144 147*   < > 146*  --  142 146*   POTASSIUM 4.1 3.9 3.8   < > 3.8   < > 4.1 3.9   CHLORIDE 108 108 110*   < > 110*  --  109 111*   CO2 34* 34* 32   < > 29  --  30 28   BUN 41* 39* 41*   < > 41*  --  38* 38*   CR 0.75 0.75 0.87   < > 0.88  --  0.84 0.91   ANIONGAP 2* 3 5   < > 6  --  4 7   HEATHER 8.5 8.1* 8.6   < > 8.4*  --  7.9* 8.2*   * 164* 135*   < > 149*  --  144* 146*   ALBUMIN  --   --   --   --  2.3*  --  2.1* 2.2*   PROTTOTAL  --   --   --   --  6.9  --  6.5* 6.5*   BILITOTAL  --   --   --   --  1.1  --  1.2 1.5*   ALKPHOS  --   --   --   --  84  --  87 96   ALT  --   --   --   --  21  --  25 23   AST  --   --   --   --  35  --  40 40   TROPI  --   --   --   --  0.612*  --  0.827* 1.074*    < > = values in this interval not displayed.       Recent Results (from the past 24 hour(s))   XR Chest Port 1 View    Narrative    EXAMINATION: XR CHEST PORT 1 VW, 10/27/2019 6:00 AM    INDICATION: s/p intubation (now extubation) and R hemothorax    COMPARISON: Chest radiograph 10/26/2019    FINDINGS: Single AP semiupright radiograph of the chest.     Trachea is midline. Feeding tube with tip, not field-of-view. Left  central venous catheter with tip located at the lower SVC. Unchanged  position of right apical chest tube with the side-port out of the  chest cavity. No appreciable pneumothorax. Right-sided pleural  effusion. Bilateral interstitial and airspace opacities.       Impression    IMPRESSION:  1. Right apical chest tube in unchanged position with the side  port  projecting outside the chest cavity. Consider removal or replacement.  2. Similar appearance of mixed interstitial and airspace opacities  likely representing pulmonary edema versus infection.  3. Small right-sided pleural effusion.    I have personally reviewed the examination and initial interpretation  and I agree with the findings.    AGNIESZKA GRANADO MD

## 2019-10-28 ENCOUNTER — APPOINTMENT (OUTPATIENT)
Dept: GENERAL RADIOLOGY | Facility: CLINIC | Age: 54
DRG: 003 | End: 2019-10-28
Attending: INTERNAL MEDICINE
Payer: COMMERCIAL

## 2019-10-28 ENCOUNTER — APPOINTMENT (OUTPATIENT)
Dept: CT IMAGING | Facility: CLINIC | Age: 54
DRG: 003 | End: 2019-10-28
Attending: STUDENT IN AN ORGANIZED HEALTH CARE EDUCATION/TRAINING PROGRAM
Payer: COMMERCIAL

## 2019-10-28 LAB
ALBUMIN SERPL-MCNC: 2.2 G/DL (ref 3.4–5)
ALP SERPL-CCNC: 74 U/L (ref 40–150)
ALT SERPL W P-5'-P-CCNC: 30 U/L (ref 0–70)
ANION GAP SERPL CALCULATED.3IONS-SCNC: 5 MMOL/L (ref 3–14)
AST SERPL W P-5'-P-CCNC: 44 U/L (ref 0–45)
BASE EXCESS BLDA CALC-SCNC: 9.9 MMOL/L
BASOPHILS # BLD AUTO: 0 10E9/L (ref 0–0.2)
BASOPHILS NFR BLD AUTO: 0.3 %
BILIRUB DIRECT SERPL-MCNC: 0.5 MG/DL (ref 0–0.2)
BILIRUB SERPL-MCNC: 1.4 MG/DL (ref 0.2–1.3)
BUN SERPL-MCNC: 41 MG/DL (ref 7–30)
CALCIUM SERPL-MCNC: 8.3 MG/DL (ref 8.5–10.1)
CHLORIDE SERPL-SCNC: 105 MMOL/L (ref 94–109)
CO2 SERPL-SCNC: 34 MMOL/L (ref 20–32)
CREAT SERPL-MCNC: 0.8 MG/DL (ref 0.66–1.25)
DIFFERENTIAL METHOD BLD: ABNORMAL
EOSINOPHIL # BLD AUTO: 0.2 10E9/L (ref 0–0.7)
EOSINOPHIL NFR BLD AUTO: 2.5 %
ERYTHROCYTE [DISTWIDTH] IN BLOOD BY AUTOMATED COUNT: 16.1 % (ref 10–15)
GFR SERPL CREATININE-BSD FRML MDRD: >90 ML/MIN/{1.73_M2}
GLUCOSE BLDC GLUCOMTR-MCNC: 110 MG/DL (ref 70–99)
GLUCOSE BLDC GLUCOMTR-MCNC: 112 MG/DL (ref 70–99)
GLUCOSE BLDC GLUCOMTR-MCNC: 113 MG/DL (ref 70–99)
GLUCOSE BLDC GLUCOMTR-MCNC: 117 MG/DL (ref 70–99)
GLUCOSE BLDC GLUCOMTR-MCNC: 120 MG/DL (ref 70–99)
GLUCOSE SERPL-MCNC: 134 MG/DL (ref 70–99)
HCO3 BLD-SCNC: 35 MMOL/L (ref 21–28)
HCT VFR BLD AUTO: 26.7 % (ref 40–53)
HGB BLD-MCNC: 8.1 G/DL (ref 13.3–17.7)
IMM GRANULOCYTES # BLD: 0.1 10E9/L (ref 0–0.4)
IMM GRANULOCYTES NFR BLD: 0.5 %
INR PPP: 1.14 (ref 0.86–1.14)
LYMPHOCYTES # BLD AUTO: 1.4 10E9/L (ref 0.8–5.3)
LYMPHOCYTES NFR BLD AUTO: 14.6 %
MAGNESIUM SERPL-MCNC: 3 MG/DL (ref 1.6–2.3)
MCH RBC QN AUTO: 30.2 PG (ref 26.5–33)
MCHC RBC AUTO-ENTMCNC: 30.3 G/DL (ref 31.5–36.5)
MCV RBC AUTO: 100 FL (ref 78–100)
MONOCYTES # BLD AUTO: 0.6 10E9/L (ref 0–1.3)
MONOCYTES NFR BLD AUTO: 6.3 %
NEUTROPHILS # BLD AUTO: 7.1 10E9/L (ref 1.6–8.3)
NEUTROPHILS NFR BLD AUTO: 75.8 %
NRBC # BLD AUTO: 0 10*3/UL
NRBC BLD AUTO-RTO: 0 /100
O2/TOTAL GAS SETTING VFR VENT: 60 %
PCO2 BLD: 50 MM HG (ref 35–45)
PH BLD: 7.46 PH (ref 7.35–7.45)
PLATELET # BLD AUTO: 520 10E9/L (ref 150–450)
PO2 BLD: 74 MM HG (ref 80–105)
POTASSIUM SERPL-SCNC: 3.8 MMOL/L (ref 3.4–5.3)
PROT SERPL-MCNC: 6.9 G/DL (ref 6.8–8.8)
RBC # BLD AUTO: 2.68 10E12/L (ref 4.4–5.9)
SODIUM SERPL-SCNC: 144 MMOL/L (ref 133–144)
WBC # BLD AUTO: 9.3 10E9/L (ref 4–11)

## 2019-10-28 PROCEDURE — 25000125 ZZHC RX 250

## 2019-10-28 PROCEDURE — 99233 SBSQ HOSP IP/OBS HIGH 50: CPT | Mod: GC | Performed by: INTERNAL MEDICINE

## 2019-10-28 PROCEDURE — 85610 PROTHROMBIN TIME: CPT | Performed by: STUDENT IN AN ORGANIZED HEALTH CARE EDUCATION/TRAINING PROGRAM

## 2019-10-28 PROCEDURE — 25000132 ZZH RX MED GY IP 250 OP 250 PS 637: Performed by: STUDENT IN AN ORGANIZED HEALTH CARE EDUCATION/TRAINING PROGRAM

## 2019-10-28 PROCEDURE — 80076 HEPATIC FUNCTION PANEL: CPT | Performed by: STUDENT IN AN ORGANIZED HEALTH CARE EDUCATION/TRAINING PROGRAM

## 2019-10-28 PROCEDURE — 40000275 ZZH STATISTIC RCP TIME EA 10 MIN

## 2019-10-28 PROCEDURE — 25000125 ZZHC RX 250: Performed by: STUDENT IN AN ORGANIZED HEALTH CARE EDUCATION/TRAINING PROGRAM

## 2019-10-28 PROCEDURE — 25800030 ZZH RX IP 258 OP 636: Performed by: INTERNAL MEDICINE

## 2019-10-28 PROCEDURE — 00000146 ZZHCL STATISTIC GLUCOSE BY METER IP

## 2019-10-28 PROCEDURE — G0463 HOSPITAL OUTPT CLINIC VISIT: HCPCS

## 2019-10-28 PROCEDURE — 40000281 ZZH STATISTIC TRANSPORT TIME EA 15 MIN

## 2019-10-28 PROCEDURE — 25000128 H RX IP 250 OP 636: Performed by: STUDENT IN AN ORGANIZED HEALTH CARE EDUCATION/TRAINING PROGRAM

## 2019-10-28 PROCEDURE — 85025 COMPLETE CBC W/AUTO DIFF WBC: CPT | Performed by: STUDENT IN AN ORGANIZED HEALTH CARE EDUCATION/TRAINING PROGRAM

## 2019-10-28 PROCEDURE — 94660 CPAP INITIATION&MGMT: CPT

## 2019-10-28 PROCEDURE — 80048 BASIC METABOLIC PNL TOTAL CA: CPT | Performed by: STUDENT IN AN ORGANIZED HEALTH CARE EDUCATION/TRAINING PROGRAM

## 2019-10-28 PROCEDURE — 25000128 H RX IP 250 OP 636: Performed by: INTERNAL MEDICINE

## 2019-10-28 PROCEDURE — 71250 CT THORAX DX C-: CPT

## 2019-10-28 PROCEDURE — 36415 COLL VENOUS BLD VENIPUNCTURE: CPT | Performed by: STUDENT IN AN ORGANIZED HEALTH CARE EDUCATION/TRAINING PROGRAM

## 2019-10-28 PROCEDURE — 83735 ASSAY OF MAGNESIUM: CPT | Performed by: STUDENT IN AN ORGANIZED HEALTH CARE EDUCATION/TRAINING PROGRAM

## 2019-10-28 PROCEDURE — 25000132 ZZH RX MED GY IP 250 OP 250 PS 637: Performed by: INTERNAL MEDICINE

## 2019-10-28 PROCEDURE — 94640 AIRWAY INHALATION TREATMENT: CPT

## 2019-10-28 PROCEDURE — 20000004 ZZH R&B ICU UMMC

## 2019-10-28 PROCEDURE — 40000014 ZZH STATISTIC ARTERIAL MONITORING DAILY

## 2019-10-28 PROCEDURE — 71045 X-RAY EXAM CHEST 1 VIEW: CPT

## 2019-10-28 PROCEDURE — 94640 AIRWAY INHALATION TREATMENT: CPT | Mod: 76

## 2019-10-28 PROCEDURE — 82803 BLOOD GASES ANY COMBINATION: CPT | Performed by: STUDENT IN AN ORGANIZED HEALTH CARE EDUCATION/TRAINING PROGRAM

## 2019-10-28 PROCEDURE — 87040 BLOOD CULTURE FOR BACTERIA: CPT | Performed by: STUDENT IN AN ORGANIZED HEALTH CARE EDUCATION/TRAINING PROGRAM

## 2019-10-28 RX ORDER — RAMELTEON 8 MG/1
8 TABLET ORAL AT BEDTIME
Status: COMPLETED | OUTPATIENT
Start: 2019-10-28 | End: 2019-10-28

## 2019-10-28 RX ORDER — AMIODARONE HYDROCHLORIDE 200 MG/1
400 TABLET ORAL 2 TIMES DAILY
Status: DISCONTINUED | OUTPATIENT
Start: 2019-10-28 | End: 2019-10-28

## 2019-10-28 RX ORDER — PIPERACILLIN SODIUM, TAZOBACTAM SODIUM 4; .5 G/20ML; G/20ML
4.5 INJECTION, POWDER, LYOPHILIZED, FOR SOLUTION INTRAVENOUS EVERY 6 HOURS
Status: DISCONTINUED | OUTPATIENT
Start: 2019-10-28 | End: 2019-10-28

## 2019-10-28 RX ORDER — DIGOXIN 0.25 MG/ML
500 INJECTION INTRAMUSCULAR; INTRAVENOUS ONCE
Status: COMPLETED | OUTPATIENT
Start: 2019-10-28 | End: 2019-10-28

## 2019-10-28 RX ORDER — PROCHLORPERAZINE 25 MG
25 SUPPOSITORY, RECTAL RECTAL EVERY 12 HOURS PRN
Status: DISCONTINUED | OUTPATIENT
Start: 2019-10-28 | End: 2019-11-05 | Stop reason: HOSPADM

## 2019-10-28 RX ORDER — ONDANSETRON 2 MG/ML
4 INJECTION INTRAMUSCULAR; INTRAVENOUS EVERY 6 HOURS PRN
Status: DISCONTINUED | OUTPATIENT
Start: 2019-10-28 | End: 2019-11-05 | Stop reason: HOSPADM

## 2019-10-28 RX ORDER — NOREPINEPHRINE BITARTRATE 0.06 MG/ML
INJECTION, SOLUTION INTRAVENOUS
Status: DISCONTINUED
Start: 2019-10-28 | End: 2019-10-28 | Stop reason: CLARIF

## 2019-10-28 RX ORDER — ACETYLCYSTEINE 100 MG/ML
4 SOLUTION ORAL; RESPIRATORY (INHALATION) 3 TIMES DAILY
Status: DISCONTINUED | OUTPATIENT
Start: 2019-10-28 | End: 2019-10-29

## 2019-10-28 RX ORDER — NOREPINEPHRINE BITARTRATE 0.06 MG/ML
0.03-0.4 INJECTION, SOLUTION INTRAVENOUS CONTINUOUS
Status: DISCONTINUED | OUTPATIENT
Start: 2019-10-28 | End: 2019-10-31

## 2019-10-28 RX ORDER — LEVOFLOXACIN 5 MG/ML
750 INJECTION, SOLUTION INTRAVENOUS EVERY 24 HOURS
Status: DISCONTINUED | OUTPATIENT
Start: 2019-10-28 | End: 2019-10-28

## 2019-10-28 RX ORDER — ONDANSETRON 4 MG/1
4 TABLET, ORALLY DISINTEGRATING ORAL EVERY 6 HOURS PRN
Status: DISCONTINUED | OUTPATIENT
Start: 2019-10-28 | End: 2019-11-05 | Stop reason: HOSPADM

## 2019-10-28 RX ORDER — ALBUTEROL SULFATE 0.83 MG/ML
SOLUTION RESPIRATORY (INHALATION)
Status: COMPLETED
Start: 2019-10-28 | End: 2019-10-28

## 2019-10-28 RX ORDER — LEVALBUTEROL INHALATION SOLUTION 0.31 MG/3ML
0.31 SOLUTION RESPIRATORY (INHALATION) 3 TIMES DAILY
Status: DISCONTINUED | OUTPATIENT
Start: 2019-10-28 | End: 2019-10-29

## 2019-10-28 RX ORDER — PROCHLORPERAZINE MALEATE 5 MG
10 TABLET ORAL EVERY 6 HOURS PRN
Status: DISCONTINUED | OUTPATIENT
Start: 2019-10-28 | End: 2019-11-05 | Stop reason: HOSPADM

## 2019-10-28 RX ORDER — METOPROLOL TARTRATE 1 MG/ML
5 INJECTION, SOLUTION INTRAVENOUS ONCE
Status: COMPLETED | OUTPATIENT
Start: 2019-10-29 | End: 2019-10-28

## 2019-10-28 RX ORDER — OXYCODONE HYDROCHLORIDE 5 MG/1
5 TABLET ORAL ONCE
Status: COMPLETED | OUTPATIENT
Start: 2019-10-28 | End: 2019-10-28

## 2019-10-28 RX ORDER — DIGOXIN 0.05 MG/ML
125 SOLUTION ORAL DAILY
Status: DISCONTINUED | OUTPATIENT
Start: 2019-10-29 | End: 2019-11-03

## 2019-10-28 RX ADMIN — Medication 1 PACKET: at 07:57

## 2019-10-28 RX ADMIN — GABAPENTIN 600 MG: 300 CAPSULE ORAL at 07:56

## 2019-10-28 RX ADMIN — MULTIVITAMIN 15 ML: LIQUID ORAL at 07:56

## 2019-10-28 RX ADMIN — LEVALBUTEROL HYDROCHLORIDE 0.31 MG: 0.31 SOLUTION RESPIRATORY (INHALATION) at 20:13

## 2019-10-28 RX ADMIN — VANCOMYCIN HYDROCHLORIDE 1750 MG: 10 INJECTION, POWDER, LYOPHILIZED, FOR SOLUTION INTRAVENOUS at 14:43

## 2019-10-28 RX ADMIN — LEVETIRACETAM 750 MG: 100 SOLUTION ORAL at 20:41

## 2019-10-28 RX ADMIN — QUETIAPINE FUMARATE 200 MG: 100 TABLET ORAL at 07:56

## 2019-10-28 RX ADMIN — ALBUTEROL SULFATE 2.5 MG: 2.5 SOLUTION RESPIRATORY (INHALATION) at 08:30

## 2019-10-28 RX ADMIN — QUETIAPINE FUMARATE 200 MG: 100 TABLET ORAL at 20:38

## 2019-10-28 RX ADMIN — ACETYLCYSTEINE 4 ML: 100 INHALANT RESPIRATORY (INHALATION) at 05:03

## 2019-10-28 RX ADMIN — ACETAMINOPHEN 650 MG: 325 TABLET, FILM COATED ORAL at 01:41

## 2019-10-28 RX ADMIN — GABAPENTIN 600 MG: 300 CAPSULE ORAL at 20:38

## 2019-10-28 RX ADMIN — Medication: at 20:40

## 2019-10-28 RX ADMIN — Medication 1 PACKET: at 13:08

## 2019-10-28 RX ADMIN — IPRATROPIUM BROMIDE AND ALBUTEROL SULFATE 3 ML: .5; 3 SOLUTION RESPIRATORY (INHALATION) at 05:03

## 2019-10-28 RX ADMIN — OMEPRAZOLE 20 MG: KIT at 16:40

## 2019-10-28 RX ADMIN — ALLOPURINOL 200 MG: 100 TABLET ORAL at 07:56

## 2019-10-28 RX ADMIN — ACETAMINOPHEN 650 MG: 325 TABLET, FILM COATED ORAL at 18:52

## 2019-10-28 RX ADMIN — THIAMINE HCL TAB 100 MG 100 MG: 100 TAB at 07:56

## 2019-10-28 RX ADMIN — PIPERACILLIN SODIUM AND TAZOBACTAM SODIUM 4.5 G: 4; .5 INJECTION, POWDER, LYOPHILIZED, FOR SOLUTION INTRAVENOUS at 01:28

## 2019-10-28 RX ADMIN — FUROSEMIDE 40 MG: 10 INJECTION, SOLUTION INTRAVENOUS at 16:38

## 2019-10-28 RX ADMIN — LEVETIRACETAM 750 MG: 100 SOLUTION ORAL at 07:57

## 2019-10-28 RX ADMIN — AMIODARONE HYDROCHLORIDE 0.5 MG/MIN: 50 INJECTION, SOLUTION INTRAVENOUS at 04:08

## 2019-10-28 RX ADMIN — RAMELTEON 8 MG: 8 TABLET ORAL at 23:08

## 2019-10-28 RX ADMIN — FENTANYL CITRATE 25 MCG: 50 INJECTION INTRAMUSCULAR; INTRAVENOUS at 15:03

## 2019-10-28 RX ADMIN — DIGOXIN 500 MCG: 0.25 INJECTION INTRAMUSCULAR; INTRAVENOUS at 13:07

## 2019-10-28 RX ADMIN — PIPERACILLIN SODIUM AND TAZOBACTAM SODIUM 4.5 G: 4; .5 INJECTION, POWDER, LYOPHILIZED, FOR SOLUTION INTRAVENOUS at 13:00

## 2019-10-28 RX ADMIN — IPRATROPIUM BROMIDE AND ALBUTEROL SULFATE 3 ML: .5; 3 SOLUTION RESPIRATORY (INHALATION) at 01:09

## 2019-10-28 RX ADMIN — HEPARIN SODIUM 5000 UNITS: 5000 INJECTION, SOLUTION INTRAVENOUS; SUBCUTANEOUS at 10:46

## 2019-10-28 RX ADMIN — METOPROLOL TARTRATE 5 MG: 1 INJECTION, SOLUTION INTRAVENOUS at 23:54

## 2019-10-28 RX ADMIN — POTASSIUM CHLORIDE 20 MEQ: 1.5 POWDER, FOR SOLUTION ORAL at 04:55

## 2019-10-28 RX ADMIN — ASPIRIN 81 MG CHEWABLE TABLET 81 MG: 81 TABLET CHEWABLE at 07:56

## 2019-10-28 RX ADMIN — PIPERACILLIN SODIUM AND TAZOBACTAM SODIUM 4.5 G: 4; .5 INJECTION, POWDER, LYOPHILIZED, FOR SOLUTION INTRAVENOUS at 07:51

## 2019-10-28 RX ADMIN — GABAPENTIN 600 MG: 300 CAPSULE ORAL at 13:07

## 2019-10-28 RX ADMIN — FENTANYL CITRATE 50 MCG: 50 INJECTION INTRAMUSCULAR; INTRAVENOUS at 18:52

## 2019-10-28 RX ADMIN — OXYCODONE HYDROCHLORIDE 5 MG: 5 TABLET ORAL at 06:37

## 2019-10-28 RX ADMIN — METOPROLOL TARTRATE 5 MG: 5 INJECTION INTRAVENOUS at 20:38

## 2019-10-28 RX ADMIN — TICAGRELOR 90 MG: 90 TABLET ORAL at 20:38

## 2019-10-28 RX ADMIN — OXYCODONE HYDROCHLORIDE 5 MG: 5 TABLET ORAL at 18:52

## 2019-10-28 RX ADMIN — VANCOMYCIN HYDROCHLORIDE 2000 MG: 1 INJECTION, POWDER, LYOPHILIZED, FOR SOLUTION INTRAVENOUS at 02:59

## 2019-10-28 RX ADMIN — OMEPRAZOLE 20 MG: KIT at 07:57

## 2019-10-28 RX ADMIN — ACETYLCYSTEINE 4 ML: 100 INHALANT RESPIRATORY (INHALATION) at 20:13

## 2019-10-28 RX ADMIN — OXYCODONE HYDROCHLORIDE 5 MG: 5 TABLET ORAL at 00:39

## 2019-10-28 RX ADMIN — HEPARIN SODIUM 5000 UNITS: 5000 INJECTION, SOLUTION INTRAVENOUS; SUBCUTANEOUS at 21:46

## 2019-10-28 RX ADMIN — FENTANYL CITRATE 25 MCG: 50 INJECTION INTRAMUSCULAR; INTRAVENOUS at 14:33

## 2019-10-28 RX ADMIN — METOPROLOL TARTRATE 5 MG: 5 INJECTION INTRAVENOUS at 12:00

## 2019-10-28 RX ADMIN — METOPROLOL TARTRATE 5 MG: 5 INJECTION INTRAVENOUS at 03:15

## 2019-10-28 RX ADMIN — TICAGRELOR 90 MG: 90 TABLET ORAL at 07:56

## 2019-10-28 RX ADMIN — ACETYLCYSTEINE 4 ML: 100 INHALANT RESPIRATORY (INHALATION) at 08:31

## 2019-10-28 RX ADMIN — FOLIC ACID 1 MG: 1 TABLET ORAL at 07:56

## 2019-10-28 RX ADMIN — Medication 1 PACKET: at 20:40

## 2019-10-28 RX ADMIN — Medication 0.1 MCG/KG/MIN: at 02:12

## 2019-10-28 RX ADMIN — FUROSEMIDE 40 MG: 10 INJECTION, SOLUTION INTRAVENOUS at 07:56

## 2019-10-28 RX ADMIN — OXYCODONE HYDROCHLORIDE 5 MG: 5 TABLET ORAL at 11:59

## 2019-10-28 RX ADMIN — ACETYLCYSTEINE 4 ML: 100 INHALANT RESPIRATORY (INHALATION) at 01:09

## 2019-10-28 RX ADMIN — OXYCODONE HYDROCHLORIDE 5 MG: 5 TABLET ORAL at 14:33

## 2019-10-28 ASSESSMENT — ACTIVITIES OF DAILY LIVING (ADL)
ADLS_ACUITY_SCORE: 19
ADLS_ACUITY_SCORE: 18

## 2019-10-28 ASSESSMENT — MIFFLIN-ST. JEOR: SCORE: 1916.24

## 2019-10-28 NOTE — PROGRESS NOTES
Cardiology Critical Care Note - Cardiology  Anshu Roberts M.D.  Critical Care Diagnoses:    Afib with RVR   Acute hypoxic respiratory failure  Septic shock  hypotension        Critical Care management intervention:  Started antibiotics for fever  Managed the vasoactive meds and stopped hydralazine   Stopped the levophed   Decided to pull the chest tube.    Spent 38 minutes on critical care management on this patient.        Professor Alejandra PADILLA  Interventional Cariology and Cardiac Critical Care

## 2019-10-28 NOTE — PLAN OF CARE
ST 4C: Cancel- Pt on BiPAP initially this AM, and then requiring toileting cares at time of attempt. Will follow up later this date or tomorrow as schedule allows

## 2019-10-28 NOTE — PLAN OF CARE
Problem: Adult Inpatient Plan of Care  Goal: Plan of Care Review  Outcome: No Change  Goal: Patient-Specific Goal (Individualization)  Outcome: No Change  Goal: Absence of Hospital-Acquired Illness or Injury  Outcome: No Change  Goal: Optimal Comfort and Wellbeing  Outcome: No Change  Goal: Readiness for Transition of Care  Outcome: No Change  Goal: Rounds/Family Conference  Outcome: No Change     Problem: Communication Impairment (Mechanical Ventilation, Invasive)  Goal: Effective Communication  Outcome: Completed     Problem: Device-Related Complication Risk (Mechanical Ventilation, Invasive)  Goal: Optimal Device Function  Outcome: Completed     Problem: Nutrition Impairment (Mechanical Ventilation, Invasive)  Goal: Optimal Nutrition Delivery  Outcome: Completed     Problem: Skin and Tissue Injury (Mechanical Ventilation, Invasive)  Goal: Absence of Device-Related Skin and Tissue Injury  Outcome: Completed     Problem: Ventilator-Induced Lung Injury (Mechanical Ventilation, Invasive)  Goal: Absence of Ventilator-Induced Lung Injury  Outcome: Completed     Problem: Ventilator-Induced Lung Injury (Mechanical Ventilation, Invasive)  Goal: Absence of Ventilator-Induced Lung Injury  Outcome: Completed     Problem: Cardiac Disease Comorbidity  Goal: Cardiac Disease  Description  Patient comorbidity will be monitored for signs and symptoms of Cardiac Disease.  Problems will be absent, minimized or managed by discharge/transition of care.  Outcome: Improving   ICU End of Shift Summary. See flowsheets for vital signs and detailed assessment.    Changes this shift: Pt. +634 for the day.  All drips have been discontinued.  Pt's oxygen needs increased at one point today as his cough became weak.  He was respirating in the 40s; he was using accessory muscle use as well.  MDs notified.  Chest x-ray done.  Oxygen at 100% ABG done. Pt. Stated he was getting tired.  MDs came to the bedside and discussed possible intubation (in  the future) with the patient's wife.  Pt. Napped and awoke with a stronger cough, coughed up some red/thick sputum, oxgyen was decreased to 60%, and RR fell to the 30s.    Discussed what a trach is, what is involved, and weaning the trach with the patient's spouse.  Questions were answered.   Pt. Continues to breath with his abdominal muscles.      Plan:  Watch respiratory status throughout the night.  Support the patient's spouse.

## 2019-10-28 NOTE — PROGRESS NOTES
[facility] Nurse Inpatient Pressure Injury Assessment   Reason for consultation: Evaluate and treat Left upper lip PI  New - L) nare      ASSESSMENT  Pressure Injury: on left upper lip , hospital acquired ,   This is a Medical Device Related Pressure Injury (MDRPI) due to ETT  Pressure Injury is Stage Mucosal   Contributing factor of the pressure injury: pressure and immobility  Status: follow up assessment, healing, now extubated  Recommend provider order: NA     L) Nare - No injury, blanchable erythema      TREATMENT PLAN  Left Upper Lip wound: Apply vaseline BID and PRN.     L) nare- May apply a piece of Mepilex lite dressing in need of HFNC usage.    Orders Written  WO Nurse follow-up plan:weekly  Nursing to notify the Provider(s) and re-consult the WOC Nurse if wound(s) deteriorates or new skin concern.    Patient History  According to provider note(s):  53 year old male who was admitted on 10/13/19 as a transfer for an OSH for refractory VT/VF arrest s/p PCI to [facility] and placement on peripheral [facility] ECMO.     ECMO decannulation 10/18     Objective Data  Containment of urine/stool: Internal fecal management and Indwelling catheter    Current Diet/ Nutrition:  Orders Placed This Encounter      Clear Liquid Diet    Output:   I/O last 3 completed shifts:  In: 4116.12 [P.O.:118; I.V.:883.12; NG/GT:1795]  Out: 2675 [Urine:2425; Chest Tube:250]    Risk Assessment:   Sensory Perception: 2-->very limited  Moisture: 1-->constantly moist  Activity: 2-->chairfast  Mobility: 3-->slightly limited  Nutrition: 3-->adequate  Friction and Shear: 2-->potential problem  Franck Score: 13    Labs:   Recent Labs   Lab 10/28/19  0328  10/26/19  0346 10/25/19  0426   ALBUMIN  --   --   --  2.3*   HGB 8.1*   < > 8.6* 8.5*   INR 1.14   < > 1.23* 1.25*   WBC 9.3   < > 11.8* 12.6*   CRP  --   --  190.0*  --     < > = values in this interval not displayed.       Physical Exam  Skin inspection: focused Mouth, bilateral groin, occiput.     Wound  Location:  Left upper lip  Date of last Photo 10/21  Wound History: pt has had ETT since 10/13. Wound found when patient arrived back from OR. No documented ETT repositioning from [id number] prior to injury being found.   Measurements (length x width x depth, in cm) 0.2 cm x 0.2 cm  x  0.01 cm, healing multiple bite wounds on lower lip mucosa, extubated now  Wound Base:  100 % mucosal, dry  Tunneling N/A  Undermining N/A  Palpation of the wound bed: normal   Periwound skin: intact  Color: normal and consistent with surrounding tissue  Temperature: normal   Drainage:, none  Description of drainage: none  Odor: none  Pain: no grimacing or signs of discomfort, none       Interventions  Current support surface: Standard  Low air loss mattress with pulsation   Current off-loading measures: Pt utilizing mask now.  Visual inspection of wound(s) completed   Wound Care: was not done per plan of care.  Supplies: discussed with RN  Educated provided: importance of repositioning and plan of care  Education provided to: patient , spouse and nurse  Discussed importance of:repositioning every 2 hours and their role in pressure injury prevention  Discussed plan of care with Patient, Family and Nurse    Jocelin Sales RN CWOCN

## 2019-10-28 NOTE — PLAN OF CARE
OT 4C: Cancel-at scheduled therapy time, pt being transferred back to bed as he did not tolerate up to recliner on highflow O2. Not appropriate for therapy this morning

## 2019-10-28 NOTE — CONSULTS
GENERAL ID SERVICE CONSULTATION     Patient:  Joel Campbell   Date of birth 1965, Medical record number [id number]  Date of Visit:  10/28/2019  Date of Admission: 10/13/2019  Consult Requester:Anshu Roberts MD            Assessment and Recommendations:   ASSESSMENT:  1. Fever without clear clinical signs of infection in a patient who completed empiric abx 5 days ago and subsequently successfully extubated. Given extent of hemothorax I wonder if this is predominantly a fever from non-infectious pulmonary source. No evidence of bloodstream infection though he's at risk given prior support devices earlier in stay. It would be unusual for odontogenic infection to produce a fever, but this is also a possibility. Intra-abdominal infection less likely but a possibility since these can be asymptomatic.  2. VT/VF arrest s/p CPR for 40 min with chest wall trauma and hemothorax s/p chest tube s/p removal today.  3. ETOH abuse hx per family ( in chart). This may have accounted for temp elevation earlier in his course - 2 weeks from his transfer it is plausible that ETOH is still playing a role in fevers but would be a diganosis of exclusion given his risk for BERENICE.      RECOMMENDATION:  1. Consider CT chest looking for empyema, abscess, or, alternatively, clear evidence of atelectasis or other entity that may be an infectious or non-infectious etiology for fever  2. In the absence of clear localizing infection, would consider stopping abx and watching closely.    Thanks for this consult. ID will follow with you. Discussed with Cards ICU team.    Naty Sage MD   of Medicine, Division of Infectious Diseases  [facility] [phone]          ________________________________________________________________    Consult Question:.  Admission Diagnosis: ECMO  Chest Pain  Cardiac Arrest  Ventricular tachycardia (H)         History of Present Illness:     This is a 53-year-old man with past history of  alcohol abuse who was admitted on 10/13 as a transfer from V. tach/V. fib status post CPR for 40 minutes which was followed by ROSC.  He was initially stabilized at [facility] and subsequently transferred to [place], where he underwent PCI of LAD, IABP insertion, and VA ECMO cannulation.  Following transfer to [facility] he was ECMO decannulated on 10/18.  He was extubated on 10/25.  His course was complicated by hemothorax status post chest tube with complicating migration of his chest tube such that the sidehole was external and this chest tube was ultimately removed on 10/28.    He was treated with Zosyn and vancomycin from 10/13 to 10/23, with concern for possible aspiration pneumonia.  During his course of therapy, his CRP leon from 69-1 90.  His white blood cell count however was not significantly elevated.  He had intermittent elevated temps, interestingly the T-max was 102.8 on 10/23, when his antibiotics were stopped.  Following antibiotics discontinuation, he was weaned off pressors and successfully extubated (as above, on 10/25).    He developed a temperature of 101.5 on 10/27, prompting resumption of vancomycin and Zosyn.    He has had multiple cultures to date, all of which have been negative.  Blood cultures were negative on 10/16, 10/20, 10/21, 10/23, 10/26, and 10/21.  He had negative sputum culture on 10/14 and 10/26.  He had a negative BAL on 10/19.  He remains on high flow nasal cannula.  His mental status has been somewhat waxing and waning, but generally he is able to follow commands.        Recent culture results include:  Culture Micro   Date Value Ref Range Status   10/28/2019 No growth after 12 hours  Preliminary   10/28/2019 No growth after 12 hours  Preliminary   10/26/2019 Moderate growth  Normal danny to date    Preliminary   10/26/2019 No growth after 2 days  Preliminary   10/26/2019 No growth after 2 days  Preliminary   10/25/2019 No growth  Final   10/24/2019 No growth  Final    10/24/2019 No growth after 4 days  Preliminary   10/24/2019 No growth after 4 days  Preliminary   10/23/2019 No growth after 5 days  Preliminary              Review of Systems:   The patient reports no complaints but is an unreliable historian           Past Medical History:   History reviewed. No pertinent past medical history.         Past Surgical History:     Past Surgical History:   Procedure Laterality Date     CV EXTRACORPERAL MEMBRANE OXYGENATION N/A 10/13/2019    Procedure: Placement of RLE reperfusion cannula, placement of cooling catheter;  Surgeon: Anshu Roberts MD;  Location:  [facility]     REMOVE EXTRACORPORAL MEMBRANE OXYGENATOR ADULT N/A 10/18/2019    Procedure: Extracorporeal Membrane Oxygenator Decannulation, Repair of Right Femoral Artery,  Attempted right thoracostomy tube placement.;  Surgeon: Max Brown MD;  Location:  OR            Family History:   No family history on file.  Unable to obtain due to patient status         Social History:     Social History     Tobacco Use     Smoking status: Not on file   Substance Use Topics     Alcohol use: Not on file     History   Sexual Activity     Sexual activity: Not on file            Current Medications (antimicrobials listed in bold):       acetylcysteine  4 mL Nebulization TID     allopurinol  200 mg Oral Daily     aspirin  81 mg Per Feeding Tube Daily     [START ON 10/29/2019] digoxin  125 mcg Oral Daily     folic acid  1 mg Oral Daily     furosemide  40 mg Intravenous BID     gabapentin  600 mg Oral TID     heparin ANTICOAGULANT  5,000 Units Subcutaneous Q12H     influenza vaccine adult (product based on age)  0.5 mL Intramuscular Prior to discharge     insulin aspart  1-4 Units Subcutaneous Q4H     ketamine 5% gabapentin 8% lidocaine 2.5%   Topical TID     levalbuterol  0.31 mg Nebulization TID     levETIRAcetam  750 mg Oral or Feeding Tube BID     metoprolol  5 mg Intravenous Q8H     multivitamins w/minerals   15 mL Per Feeding Tube Daily     omeprazole  20 mg Oral or Feeding Tube BID AC     protein modular  1 packet Per Feeding Tube TID     QUEtiapine  200 mg Oral or Feeding Tube BID     ramelteon  8 mg Oral At Bedtime     sodium chloride (PF)  3 mL Intracatheter Q8H     ticagrelor  90 mg Oral or Feeding Tube BID     vitamin B1  100 mg Oral Daily            Allergies:   No Known Allergies         Physical Exam:   Vitals were reviewed  Patient Vitals for the past 24 hrs:   BP Temp Temp src Pulse Heart Rate Resp SpO2 Weight   10/28/19 1700 -- -- -- -- 101 17 100 % --   10/28/19 1600 -- 98.2  F (36.8  C) Oral -- 98 28 91 % --   10/28/19 1535 -- -- -- -- -- -- 90 % --   10/28/19 1500 -- -- -- -- 101 25 91 % --   10/28/19 1400 -- -- -- -- 99 30 93 % --   10/28/19 1300 -- -- -- -- 96 (!) 32 94 % --   10/28/19 1200 -- 98.6  F (37  C) -- -- 98 27 93 % --   10/28/19 1100 -- -- -- -- 96 23 95 % --   10/28/19 1000 -- -- -- -- 96 21 94 % --   10/28/19 0900 -- -- -- -- 107 18 -- --   10/28/19 0800 -- 98.5  F (36.9  C) Axillary -- 98 17 97 % --   10/28/19 0700 -- -- -- -- 97 23 96 % --   10/28/19 0645 -- -- -- -- 96 29 (!) 87 % --   10/28/19 0630 -- -- -- -- 98 24 97 % --   10/28/19 0615 -- -- -- -- 99 28 93 % --   10/28/19 0600 -- -- -- -- 97 19 99 % --   10/28/19 0545 -- -- -- -- 96 22 (!) 71 % --   10/28/19 0530 -- -- -- -- 94 13 100 % --   10/28/19 0515 -- -- -- -- 116 23 97 % --   10/28/19 0503 -- -- -- -- -- -- 100 % --   10/28/19 0500 -- -- -- -- 137 19 100 % --   10/28/19 0445 -- -- -- -- 137 19 98 % --   10/28/19 0430 -- -- -- -- 126 28 (!) 86 % --   10/28/19 0415 -- -- -- -- 97 18 100 % --   10/28/19 0400 -- 98.7  F (37.1  C) Axillary -- 117 21 99 % 99.5 kg (219 lb 5.7 oz)   10/28/19 0346 -- -- -- -- 137 19 97 % --   10/28/19 0345 -- -- -- -- 131 22 98 % --   10/28/19 0330 -- -- -- -- 134 16 98 % --   10/28/19 0315 -- -- -- -- 138 20 97 % --   10/28/19 0300 -- -- -- -- 123 21 98 % --   10/28/19 0245 -- -- -- -- 117 21 97 %  --   10/28/19 0230 96/63 -- -- 98 108 21 97 % --   10/28/19 0215 -- -- -- -- 112 23 95 % --   10/28/19 0200 (!) 89/56 99.4  F (37.4  C) Axillary 97 96 22 97 % --   10/28/19 0109 -- -- -- -- -- -- 99 % --   10/28/19 0100 -- -- -- -- 98 16 97 % --   10/28/19 0000 -- 101.5  F (38.6  C) Axillary -- 100 24 97 % --   10/27/19 2300 -- -- -- -- 101 22 97 % --   10/27/19 2245 -- -- -- -- 101 23 97 % --   10/27/19 2230 -- -- -- -- 101 24 97 % --   10/27/19 2215 -- -- -- -- 101 26 97 % --   10/27/19 2200 -- -- -- -- 102 22 98 % --   10/27/19 2145 -- -- -- -- 101 25 97 % --   10/27/19 2130 -- -- -- -- 102 (!) 34 94 % --   10/27/19 2115 -- -- -- -- 100 (!) 33 91 % --   10/27/19 2108 -- -- -- -- -- -- 91 % --   10/27/19 2100 -- -- -- -- 99 (!) 38 93 % --   10/27/19 2045 -- -- -- -- 95 (!) 38 95 % --   10/27/19 2030 -- -- -- -- 95 21 (!) 89 % --   10/27/19 2015 -- -- -- -- 93 29 91 % --   10/27/19 2000 -- 99.4  F (37.4  C) Axillary -- 109 (!) 36 95 % --   10/27/19 1945 -- -- -- -- 106 30 96 % --   10/27/19 1930 -- -- -- -- 108 (!) 45 95 % --   10/27/19 1915 -- -- -- -- 107 (!) 34 96 % --   10/27/19 1900 -- -- -- -- 105 (!) 37 94 % --   10/27/19 1800 -- -- -- -- 101 21 97 % --     Ranges for his vital signs:  Temp:  [98.2  F (36.8  C)-101.5  F (38.6  C)] 98.2  F (36.8  C)  Pulse:  [97-98] 98  Heart Rate:  [] 101  Resp:  [13-45] 17  BP: (89-96)/(56-63) 96/63  MAP:  [54 mmHg-94 mmHg] 74 mmHg  Arterial Line BP: ()/(44-79) 113/64  FiO2 (%):  [40 %-70 %] 50 %  SpO2:  [71 %-100 %] 100 %    Physical Examination:  GENERAL:   in bed in no acute distress.   HEENT:  Head is normocephalic, atraumatic   EYES:  Eyes have anicteric sclerae without conjunctival injection or stigmata of endocarditis.    ENT:  Very poor dentition  NECK: Supple.   LUNGS:  Splinting due to presumably pain from rib fractures related to CPR. + use of secondary muscles of respiration. Diminished on R>L.    CARDIOVASCULAR:  Regular rate and rhythm  ABDOMEN:   Normal bowel sounds, soft, nontender. No appreciable hepatosplenomegaly  SKIN:  No acute rashes.  Line(s) are in place without any surrounding erythema or exudate. No stigmata of endocarditis.  NEUROLOGIC:  Told me he was in [place]. Oriented to person and location of hospital         Laboratory Data:     Inflammatory Markers    Recent Labs   Lab Test 10/26/19  0346 10/18/19  0336 10/17/19  0346 10/16/19  0351 10/15/19  0341 10/14/19  0424 10/13/19  1947   * 82* 83* 68* 33* 14 8   .0* 190.0* 290.0* 270.0* 180.0* 93.0* 69.0*       Hematology Studies    Recent Labs   Lab Test 10/28/19  0328 10/27/19  0419 10/26/19  0346 10/25/19  0426 10/24/19  1507 10/24/19  0446  10/13/19  1947   WBC 9.3 10.8 11.8* 12.6* 11.4* 10.6   < > 11.6*   ANEU 7.1 8.2 8.8* 9.5*  --   --   --  8.4*   AEOS 0.2 0.5 0.5 0.3  --   --   --  0.0   HGB 8.1* 8.3* 8.6* 8.5* 8.7* 9.0*   < > 10.1*    101* 100 100 100 99   < > 104*   * 465* 412 368 347 332   < > 154    < > = values in this interval not displayed.       Immune Globulin Studies  No lab results found.    Metabolic Studies     Recent Labs   Lab Test 10/28/19  0328 10/27/19  1605 10/27/19  0419 10/26/19  1750 10/26/19  0346    142 144 144 147*   POTASSIUM 3.8 4.6 4.1 3.9 3.8   CHLORIDE 105 105 108 108 110*   CO2 34* 34* 34* 34* 32   BUN 41* 39* 41* 39* 41*   CR 0.80 0.68 0.75 0.75 0.87   GFRESTIMATED >90 >90 >90 >90 >90       Hepatic Studies    Recent Labs   Lab Test 10/28/19  0328 10/25/19  0426 10/24/19  1507 10/24/19  0446 10/23/19  1629 10/23/19  0401   BILITOTAL 1.4* 1.1 1.2 1.5* 1.6* 1.6*   ALKPHOS 74 84 87 96 107 112   ALBUMIN 2.2* 2.3* 2.1* 2.2* 2.3* 2.3*   AST 44 35 40 40 42 41   ALT 30 21 25 23 25 26       Thyroid Studies    Recent Labs   Lab Test 10/22/19  0417   TSH 7.44*   T4 0.73*       Microbiology:  Culture Micro   Date Value Ref Range Status   10/28/2019 No growth after 12 hours  Preliminary   10/28/2019 No growth after 12 hours  Preliminary    10/26/2019 Moderate growth  Normal danny to date    Preliminary   10/26/2019 No growth after 2 days  Preliminary   10/26/2019 No growth after 2 days  Preliminary   10/25/2019 No growth  Final   10/24/2019 No growth  Final   10/24/2019 No growth after 4 days  Preliminary   10/24/2019 No growth after 4 days  Preliminary   10/23/2019 No growth after 5 days  Preliminary   10/23/2019 No growth after 5 days  Preliminary   10/22/2019 No growth  Final   10/21/2019 No growth  Final   10/21/2019 No growth  Final   10/20/2019 No growth  Final   10/20/2019 No growth  Final   10/19/2019 No growth  Final   10/19/2019 Culture negative monitoring continues  Preliminary   10/19/2019 No growth  Final   10/18/2019 No growth  Final   10/17/2019 No growth  Final   10/17/2019 No growth  Final   10/16/2019 No growth  Final   10/15/2019 No growth  Final   10/14/2019 Light growth  Normal danny    Final       Urine Studies    Recent Labs   Lab Test 10/25/19  0457 10/14/19  0438   LEUKEST Negative Negative   WBCU 1 2       Vancomycin Levels    Recent Labs   Lab Test 10/21/19  0616 10/19/19  1541 10/17/19  0555   VANCOMYCIN 23.5 11.5 9.6

## 2019-10-28 NOTE — PROGRESS NOTES
CLINICAL NUTRITION SERVICES - REASSESSMENT NOTE     Nutrition Prescription    RECOMMENDATIONS FOR MDs/PROVIDERS TO ORDER:  None at this time     Malnutrition Status:    Patient does not meet two of the above criteria necessary for diagnosing malnutrition but is at risk for malnutrition    Recommendations already ordered by Registered Dietitian (RD):  None at this time    Future/Additional Recommendations:  Unable to complete met cart 2/2 to extubation on 10/25  Continue to monitor WOC notes and need for wound care protocol     EVALUATION OF THE PROGRESS TOWARD GOALS   Diet: Full Liquid  Nutrition Support: Nutren @ 55 ml/hr  + Prosource (3 pkts/day)  This provides 2760 kcals (28 kcal/kg), 144 g PRO (1.5 g/kg), 924 mL H2O, 289 g CHO and 7 g Fiber daily.    Intake: Average TF intakes over the past 7 days: 1289 ml, 1933 kcals, 88 g pro + 2.6 pkts ( average over 7 days)  TF+ Prosource: 2037 kcals (21), 116 g pro (1.2)     NEW FINDINGS   Weight: weight gain since admit 2/2 to fluids  Labs: CRP: 190 (H)  Meds: Folic acid, Certavite, thiamine,   GI: Bm+ increased from 1-2/day to 4-7 over the past few days.   Skin: WOC following, MDRPI on upper L lip  Resp: High flow nasal cannula. Extubated 10/25    MALNUTRITION  % Intake: Decreased intake does not meet criteria  % Weight Loss: Unable to assess 2/2 to fluids masking true weight  Subcutaneous Fat Loss: None observed - per previous RD  Muscle Loss: None observed, Temporal and Scapular bone: Mild - per previous RD  Fluid Accumulation/Edema: Does not meet criteria  Malnutrition Diagnosis: Patient does not meet two of the above criteria necessary for diagnosing malnutrition but is at risk for malnutrition    Previous Goals   Total avg nutritional intake to meet a minimum of 25 kcal/kg and 1.2 g PRO/kg daily (per dosing wt 98 kg).  Evaluation: Not met- kcals, Met- pro    Previous Nutrition Diagnosis  Inadequate protein-energy intake related to NPO, reliant on TF to meet  nutritional needs as evidenced by received ~60% nutritional needs over the past 5 days (avg of 15 kcal/kg and 1 g protein/kg daily).    Evaluation: Improving    CURRENT NUTRITION DIAGNOSIS  Inadequate energy intake related to inadequate TF infusion 2/2 to slow advancement of TF and TF interupptions as evidenced by pt only receiving 21/25 kcals/kg over the past 7 days.     INTERVENTIONS  Implementation  Collaboration with other providers    Goals  Total avg nutritional intake to meet a minimum of 25 kcal/kg and 1.2 g PRO/kg daily (per dosing wt 98 kg).    Monitoring/Evaluation  Progress toward goals will be monitored and evaluated per protocol.      Ingrid Chapa, RD, MS, LD  SICU: [phone]

## 2019-10-28 NOTE — PROGRESS NOTES
[facility]   Cardiology Progress Note    Assessment & Plan   53 year old male who was admitted on 10/13/19 as a transfer from [place], [place] for refractory VT/VF arrest s/p PCI to LAD and placement on peripheral [facility] ECMO. IABP removed successfully 10/20/2019. Successful extubation 10/25/2019. Still somewhat delirious. Waxing and waning fevers and leukocytosis remains an issue.     Changes Today:  - up to chair during the day  - ID consult  - chest tube removal    Neurology: Initially ntubated, sedated, paralyzed. Cooled to 34 degrees, now rewarmed.   - NeuroCrit consulted, initiated Keppra 750 mg BID on 10/17 for possible seizure activity, will continue as outpatient until neuro follow up  - EEG with small amount of epileptiform activity, due to pt becoming more alert, stopped EEG [id number]/912056  - CT Head 10/16 with smal right paracentral lesion, new right hemicerebellum lesion concerning for stroke.   Cardiovascular / Hemodynamics: Refractory VF arrest s/p peripheral V-A ECMO at OSH on 10/13/19  Persistent Afib  TTE: LVEF 5-10%, increase to 15% on 1L  - Wean pressors/inotropes as able, currently not on any  - Continue ASA 81mg and ticagrelor 90 mg BID  - Hold lipitor for now given likely hepatic injury during arrest  - Hold ACE/ARB for now given likely reduced renal fxn after arrest  - gentle start of lopressor for CAD  - digoxin for Afib rate ctrl  - holding anticoagulation for now due to R chest tube, consider in future   Pulmonary: Large hemothorax on 10/14 s/p right-sided chest tube.  CXR: stable devices, worsening RLL pleural effusion  - Daily CXR  - Q2H ABGs for now  - extubated 10/25/2019  - chest tube removal 10/28/2019   GI and Nutrition: Per Pt's wife, pt has EtOH use disorder. Initial oncern for possible GIB given black OG output, but this has resolved.  - Monitor BID LFTs  - NJ placed at bedside on 10/16, started TFs  - Bowel regimen - started  - GI Prophylaxis: no longer needed given extubation  - omeprazole 20mg BID  given possible GI bleed, can consider weaning as outpatient   Renal, Fluid and Electrolytes: - Monitor urine output  - Maintain K>4 and Mg>2    Infectious Disease: No signs of infection. Leukocytosis c/w arrest. Blood cultures without growth.   - Initially vancomycin/zosyn x5 days for presumed aspiration - then extended given multiple invasive procedures  - ID consult for culture negative fevers and leukocytosis  - Daily blood cultures  - Monitor for signs of infection   Hematology and Oncology: Receiving heparin for ECMO and ASA/ticagrelor for KAMRON. Large intramuscular hematoma of right pec and hemothorax on 10/14 with possible GIB as above.  - Cryo PRN fibrinogen < 200; FFP for INR >2  - Transfuse for Hgb<10, Plts<70  - US LE w/ arterial duplex per ECMO protocol   - DVT PPX: subcutaneous heparin for now  - Right chest wall hematoma: monitor exam and Hgb closely   Endocrinology: No known medical history. BG elevated.  - Insulin gtt PRN   Lines: Restraint: needed  Peripheral IV 10/16/19 Left Lower forearm (Active)       Arterial Line Radial (Active)       CVC Triple Lumen 10/13/19 Left Subclavian (Active)       Left Groin Interventional Procedure Access (Active)     R chest tube (placed 10/18/2019 by IR)    Current lines are required for patient management       Family updated today: Yes  Code Status: FULL    Pt was seen and examined with Dr. Anshu Roberts MD, who agrees with the assessment and plan.    Bronson Dsouza MD. PhD  Cardiology Fellow    Interval History    Care team notes last 24 hours reviewed. Febrile overnight. Had to restart pressors and abx. Awoke aroudn 3am and had difficulty sleeping afterward.     ROS: 4-pt ROS otherwise negative.     Data reviewed today: I reviewed all new labs and imaging results over the last 24 hours. I personally reviewed:    Physical Exam   Temp: 98.5  F (36.9  C) Temp src: Axillary BP: 96/63 Pulse: 98 Heart Rate: 96 Resp: 21 SpO2: 94 % O2 Device: High Flow Nasal  "Cannula (HFNC) Oxygen Delivery: Other (Comments)(35 lpm)  Vitals:    10/26/19 0000 10/27/19 0400 10/28/19 0400   Weight: 99.4 kg (219 lb 2.2 oz) 99 kg (218 lb 4.1 oz) 99.5 kg (219 lb 5.7 oz)     Vital Signs with Ranges  Temp:  [98.4  F (36.9  C)-101.5  F (38.6  C)] 98.5  F (36.9  C)  Pulse:  [97-98] 98  Heart Rate:  [] 96  Resp:  [13-45] 21  BP: (89-96)/(56-63) 96/63  MAP:  [54 mmHg-94 mmHg] 63 mmHg  Arterial Line BP: ()/(44-79) 93/49  FiO2 (%):  [60 %-70 %] 60 %  SpO2:  [71 %-100 %] 94 %  I/O last 3 completed shifts:  In: 4116.12 [P.O.:118; I.V.:883.12; NG/GT:1795]  Out: 2675 [Urine:2425; Chest Tube:250]    Heart Rate: 96, Blood pressure 96/63, pulse 98, temperature 98.5  F (36.9  C), temperature source Axillary, resp. rate 21, height 1.89 m (6' 2.41\"), weight 99.5 kg (219 lb 5.7 oz), SpO2 94 %.  219 lbs 5.72 oz  GEN:  Mostly peaceful, sometimes agitated  CV:  S1/S2 heard  LUNGS: coarse breath sounds, right-sided chest tube in place  ABD: Soft BS, soft, mildly distended  EXT: warm, trace-1+ edema, dopplerable pulses  NEURO: able to follow commands, taking deep breaths  SKIN: no acute lesions    Medications     IV fluid REPLACEMENT ONLY 25 mL/hr at 10/18/19 1929     norepinephrine Stopped (10/28/19 0700)     sodium chloride         acetylcysteine  4 mL Nebulization Q4H     allopurinol  200 mg Oral Daily     amiodarone  400 mg Oral BID     aspirin  81 mg Per Feeding Tube Daily     digoxin  500 mcg Intravenous Once     [START ON 10/29/2019] digoxin  125 mcg Oral Daily     folic acid  1 mg Oral Daily     furosemide  40 mg Intravenous BID     gabapentin  600 mg Oral TID     heparin ANTICOAGULANT  5,000 Units Subcutaneous Q12H     influenza vaccine adult (product based on age)  0.5 mL Intramuscular Prior to discharge     insulin aspart  1-4 Units Subcutaneous Q4H     levETIRAcetam  750 mg Oral or Feeding Tube BID     metoprolol  5 mg Intravenous Q8H     multivitamins w/minerals  15 mL Per Feeding Tube Daily "     omeprazole  20 mg Oral or Feeding Tube BID AC     piperacillin-tazobactam  4.5 g Intravenous Q6H     protein modular  1 packet Per Feeding Tube TID     QUEtiapine  200 mg Oral or Feeding Tube BID     sodium chloride (PF)  3 mL Intracatheter Q8H     ticagrelor  90 mg Oral or Feeding Tube BID     vancomycin (VANCOCIN) IV  1,750 mg Intravenous Q12H     vitamin B1  100 mg Oral Daily       Data   Recent Labs   Lab 10/28/19  0328 10/27/19  1605 10/27/19  0419  10/26/19  0346  10/25/19  0426  10/24/19  1507 10/24/19  0446   WBC 9.3  --  10.8  --  11.8*  --  12.6*  --  11.4* 10.6   HGB 8.1*  --  8.3*  --  8.6*  --  8.5*  --  8.7* 9.0*     --  101*  --  100  --  100  --  100 99   *  --  465*  --  412  --  368  --  347 332   INR 1.14  --  1.14  --  1.23*  --  1.25*  --   --  1.28*    142 144   < > 147*   < > 146*  --  142 146*   POTASSIUM 3.8 4.6 4.1   < > 3.8   < > 3.8   < > 4.1 3.9   CHLORIDE 105 105 108   < > 110*   < > 110*  --  109 111*   CO2 34* 34* 34*   < > 32   < > 29  --  30 28   BUN 41* 39* 41*   < > 41*   < > 41*  --  38* 38*   CR 0.80 0.68 0.75   < > 0.87   < > 0.88  --  0.84 0.91   ANIONGAP 5 2* 2*   < > 5   < > 6  --  4 7   HEATHER 8.3* 8.6 8.5   < > 8.6   < > 8.4*  --  7.9* 8.2*   * 167* 140*   < > 135*   < > 149*  --  144* 146*   ALBUMIN  --   --   --   --   --   --  2.3*  --  2.1* 2.2*   PROTTOTAL  --   --   --   --   --   --  6.9  --  6.5* 6.5*   BILITOTAL  --   --   --   --   --   --  1.1  --  1.2 1.5*   ALKPHOS  --   --   --   --   --   --  84  --  87 96   ALT  --   --   --   --   --   --  21  --  25 23   AST  --   --   --   --   --   --  35  --  40 40   TROPI  --   --   --   --   --   --  0.612*  --  0.827* 1.074*    < > = values in this interval not displayed.       Recent Results (from the past 24 hour(s))   XR Chest Port 1 View    Narrative    Portable chest    Comparisons: Same date at 5:57 AM    HISTORY: Respiratory distress    FINDINGS: Right chest wall drain, left  subclavian line and feeding  tube remain in place. Progressing interstitial and alveolar opacities  in both lungs, greater on the right. Pulmonary vascularity is  indistinct. Cardiac silhouette remains enlarged. No pneumothorax.      Impression    IMPRESSION: Worsening interstitial and alveolar opacities in both  lungs, right greater than left, pulmonary edema versus infection.    JOCELYN CASTRO MD   XR Chest Port 1 View    Narrative    Exam: XR CHEST PORT 1 VW, 10/28/2019 1:00 AM    Indication: New fever patient with worsening oxygenation    Comparison: 10/27/2019    Findings:   Semiupright frontal view of chest. Right apical chest tube stable in  position with side hole projects outside pleural space. Stable  position of left subclavian catheter. An enteric tube courses below  the left hemidiaphragm and out of the field-of-view.  Right greater than the left, worsening of mixed interstitial and  airspace opacities. Layering right pleural effusion. Stable cardiac  silhouette. Trachea is midline. No appreciable pneumothorax.      Impression    Impression:   1. Unchanged position of right apical chest tube with sidehole  projects outside the pleural space.  2. Right greater than the left, worsening of mixed interstitial and  airspace opacities. Differential includes infection/subsegmental  atelectasis versus pulmonary edema.    I have personally reviewed the examination and initial interpretation  and I agree with the findings.    SUKHJINDER SLATER MD   I have seen and examined the patient with the [facility] team. I agree with the assessment and plan of the note above.I have reviewed pertinent labs.     Anshu Roberts MD  Interventional Cardiology  Pager: [phone]

## 2019-10-28 NOTE — PHARMACY-VANCOMYCIN DOSING SERVICE
Pharmacy Vancomycin Initial Note  Date of Service 2019  Patient's  1965  53 year old, male    Indication: Ventilator-Associated Pneumonia    Current estimated CrCl = Estimated Creatinine Clearance: 175.9 mL/min (based on SCr of 0.68 mg/dL).    Creatinine for last 3 days  10/25/2019:  4:26 AM Creatinine 0.88 mg/dL;  4:45 PM Creatinine 0.84 mg/dL  10/26/2019:  3:46 AM Creatinine 0.87 mg/dL;  5:50 PM Creatinine 0.75 mg/dL  10/27/2019:  4:19 AM Creatinine 0.75 mg/dL;  4:05 PM Creatinine 0.68 mg/dL    Recent Vancomycin Level(s) for last 3 days  No results found for requested labs within last 72 hours.      Vancomycin IV Administrations (past 72 hours)      No vancomycin orders with administrations in past 72 hours.                Nephrotoxins and other renal medications (From now, onward)    Start     Dose/Rate Route Frequency Ordered Stop    10/28/19 1500  vancomycin (VANCOCIN) 1,750 mg in sodium chloride 0.9 % 500 mL intermittent infusion      1,750 mg  over 2 Hours Intravenous EVERY 12 HOURS 10/28/19 0222      10/28/19 0300  vancomycin (VANCOCIN) 2,000 mg in sodium chloride 0.9 % 500 mL intermittent infusion      20 mg/kg × 99 kg  over 2 Hours Intravenous ONCE 10/28/19 0221      10/28/19 0215  norepinephrine (LEVOPHED) 16 mg in  mL infusion      0.03-0.4 mcg/kg/min × 75 kg (Dosing Weight)  2.1-28.1 mL/hr  Intravenous CONTINUOUS 10/28/19 0208      10/28/19 0200  piperacillin-tazobactam (ZOSYN) 4.5 g vial to attach to  mL bag      4.5 g  over 30 Minutes Intravenous EVERY 6 HOURS 10/28/19 0037      10/24/19 181  furosemide (LASIX) injection 40 mg      40 mg  over 1-2 Minutes Intravenous 2 TIMES DAILY. 10/24/19 180            Contrast Orders - past 72 hours (72h ago, onward)    None        Plan:  1.  Start vancomycin 2000 mg IV x 1 dose then, 1750 mg IV q12h.   2.  Goal Trough Level: 15-20 mg/L   3.  Pharmacy will check trough levels as appropriate in 1-3 Days.    4. Serum creatinine  levels will be ordered daily for the first week of therapy and at least twice weekly for subsequent weeks.    5. [place] method utilized to dose vancomycin therapy: Method 2    Vivian Farnsworth, PharmD, BCPS

## 2019-10-29 ENCOUNTER — APPOINTMENT (OUTPATIENT)
Dept: OCCUPATIONAL THERAPY | Facility: CLINIC | Age: 54
DRG: 003 | End: 2019-10-29
Attending: INTERNAL MEDICINE
Payer: COMMERCIAL

## 2019-10-29 ENCOUNTER — APPOINTMENT (OUTPATIENT)
Dept: PHYSICAL THERAPY | Facility: CLINIC | Age: 54
DRG: 003 | End: 2019-10-29
Attending: STUDENT IN AN ORGANIZED HEALTH CARE EDUCATION/TRAINING PROGRAM
Payer: COMMERCIAL

## 2019-10-29 ENCOUNTER — APPOINTMENT (OUTPATIENT)
Dept: SPEECH THERAPY | Facility: CLINIC | Age: 54
DRG: 003 | End: 2019-10-29
Attending: INTERNAL MEDICINE
Payer: COMMERCIAL

## 2019-10-29 LAB
ANION GAP SERPL CALCULATED.3IONS-SCNC: 3 MMOL/L (ref 3–14)
BACTERIA SPEC CULT: ABNORMAL
BACTERIA SPEC CULT: ABNORMAL
BACTERIA SPEC CULT: NO GROWTH
BACTERIA SPEC CULT: NO GROWTH
BACTERIA SPEC CULT: NORMAL
BASOPHILS # BLD AUTO: 0.1 10E9/L (ref 0–0.2)
BASOPHILS NFR BLD AUTO: 0.5 %
BUN SERPL-MCNC: 36 MG/DL (ref 7–30)
CALCIUM SERPL-MCNC: 8.5 MG/DL (ref 8.5–10.1)
CHLORIDE SERPL-SCNC: 104 MMOL/L (ref 94–109)
CO2 SERPL-SCNC: 36 MMOL/L (ref 20–32)
CREAT SERPL-MCNC: 0.69 MG/DL (ref 0.66–1.25)
DIFFERENTIAL METHOD BLD: ABNORMAL
EOSINOPHIL # BLD AUTO: 0.7 10E9/L (ref 0–0.7)
EOSINOPHIL NFR BLD AUTO: 7.3 %
ERYTHROCYTE [DISTWIDTH] IN BLOOD BY AUTOMATED COUNT: 16.4 % (ref 10–15)
GFR SERPL CREATININE-BSD FRML MDRD: >90 ML/MIN/{1.73_M2}
GLUCOSE BLDC GLUCOMTR-MCNC: 110 MG/DL (ref 70–99)
GLUCOSE BLDC GLUCOMTR-MCNC: 118 MG/DL (ref 70–99)
GLUCOSE BLDC GLUCOMTR-MCNC: 125 MG/DL (ref 70–99)
GLUCOSE BLDC GLUCOMTR-MCNC: 132 MG/DL (ref 70–99)
GLUCOSE BLDC GLUCOMTR-MCNC: 134 MG/DL (ref 70–99)
GLUCOSE BLDC GLUCOMTR-MCNC: 136 MG/DL (ref 70–99)
GLUCOSE BLDC GLUCOMTR-MCNC: 89 MG/DL (ref 70–99)
GLUCOSE SERPL-MCNC: 155 MG/DL (ref 70–99)
GRAM STN SPEC: ABNORMAL
HCT VFR BLD AUTO: 27.1 % (ref 40–53)
HGB BLD-MCNC: 8.2 G/DL (ref 13.3–17.7)
IMM GRANULOCYTES # BLD: 0.1 10E9/L (ref 0–0.4)
IMM GRANULOCYTES NFR BLD: 0.8 %
INR PPP: 1.17 (ref 0.86–1.14)
INTERPRETATION ECG - MUSE: NORMAL
LYMPHOCYTES # BLD AUTO: 1.1 10E9/L (ref 0.8–5.3)
LYMPHOCYTES NFR BLD AUTO: 11.7 %
Lab: ABNORMAL
Lab: NORMAL
Lab: NORMAL
MAGNESIUM SERPL-MCNC: 2.8 MG/DL (ref 1.6–2.3)
MCH RBC QN AUTO: 30.1 PG (ref 26.5–33)
MCHC RBC AUTO-ENTMCNC: 30.3 G/DL (ref 31.5–36.5)
MCV RBC AUTO: 100 FL (ref 78–100)
MONOCYTES # BLD AUTO: 0.8 10E9/L (ref 0–1.3)
MONOCYTES NFR BLD AUTO: 8 %
NEUTROPHILS # BLD AUTO: 6.8 10E9/L (ref 1.6–8.3)
NEUTROPHILS NFR BLD AUTO: 71.7 %
NRBC # BLD AUTO: 0 10*3/UL
NRBC BLD AUTO-RTO: 0 /100
PHOSPHATE SERPL-MCNC: 3.2 MG/DL (ref 2.5–4.5)
PLATELET # BLD AUTO: 558 10E9/L (ref 150–450)
POTASSIUM SERPL-SCNC: 3.7 MMOL/L (ref 3.4–5.3)
RBC # BLD AUTO: 2.72 10E12/L (ref 4.4–5.9)
SODIUM SERPL-SCNC: 143 MMOL/L (ref 133–144)
SPECIMEN SOURCE: ABNORMAL
SPECIMEN SOURCE: ABNORMAL
SPECIMEN SOURCE: NORMAL
TSH SERPL DL<=0.005 MIU/L-ACNC: 5.46 MU/L (ref 0.4–4)
WBC # BLD AUTO: 9.5 10E9/L (ref 4–11)

## 2019-10-29 PROCEDURE — 97530 THERAPEUTIC ACTIVITIES: CPT | Mod: GO | Performed by: OCCUPATIONAL THERAPIST

## 2019-10-29 PROCEDURE — 25000132 ZZH RX MED GY IP 250 OP 250 PS 637: Performed by: STUDENT IN AN ORGANIZED HEALTH CARE EDUCATION/TRAINING PROGRAM

## 2019-10-29 PROCEDURE — 00000146 ZZHCL STATISTIC GLUCOSE BY METER IP

## 2019-10-29 PROCEDURE — 84443 ASSAY THYROID STIM HORMONE: CPT | Performed by: STUDENT IN AN ORGANIZED HEALTH CARE EDUCATION/TRAINING PROGRAM

## 2019-10-29 PROCEDURE — 25000132 ZZH RX MED GY IP 250 OP 250 PS 637: Performed by: INTERNAL MEDICINE

## 2019-10-29 PROCEDURE — 40000275 ZZH STATISTIC RCP TIME EA 10 MIN

## 2019-10-29 PROCEDURE — 85025 COMPLETE CBC W/AUTO DIFF WBC: CPT | Performed by: STUDENT IN AN ORGANIZED HEALTH CARE EDUCATION/TRAINING PROGRAM

## 2019-10-29 PROCEDURE — 97535 SELF CARE MNGMENT TRAINING: CPT | Mod: GO | Performed by: OCCUPATIONAL THERAPIST

## 2019-10-29 PROCEDURE — 25000128 H RX IP 250 OP 636: Performed by: STUDENT IN AN ORGANIZED HEALTH CARE EDUCATION/TRAINING PROGRAM

## 2019-10-29 PROCEDURE — 84100 ASSAY OF PHOSPHORUS: CPT | Performed by: STUDENT IN AN ORGANIZED HEALTH CARE EDUCATION/TRAINING PROGRAM

## 2019-10-29 PROCEDURE — 25000125 ZZHC RX 250: Performed by: STUDENT IN AN ORGANIZED HEALTH CARE EDUCATION/TRAINING PROGRAM

## 2019-10-29 PROCEDURE — 20000004 ZZH R&B ICU UMMC

## 2019-10-29 PROCEDURE — 92526 ORAL FUNCTION THERAPY: CPT | Mod: GN

## 2019-10-29 PROCEDURE — 93010 ELECTROCARDIOGRAM REPORT: CPT | Performed by: INTERNAL MEDICINE

## 2019-10-29 PROCEDURE — 80048 BASIC METABOLIC PNL TOTAL CA: CPT | Performed by: STUDENT IN AN ORGANIZED HEALTH CARE EDUCATION/TRAINING PROGRAM

## 2019-10-29 PROCEDURE — 27210429 ZZH NUTRITION PRODUCT INTERMEDIATE LITER

## 2019-10-29 PROCEDURE — 83735 ASSAY OF MAGNESIUM: CPT | Performed by: STUDENT IN AN ORGANIZED HEALTH CARE EDUCATION/TRAINING PROGRAM

## 2019-10-29 PROCEDURE — 93005 ELECTROCARDIOGRAM TRACING: CPT

## 2019-10-29 PROCEDURE — 40000983 ZZH STATISTIC HFNC ADULT NON-CPAP

## 2019-10-29 PROCEDURE — 97162 PT EVAL MOD COMPLEX 30 MIN: CPT | Mod: GP

## 2019-10-29 PROCEDURE — 87205 SMEAR GRAM STAIN: CPT | Performed by: STUDENT IN AN ORGANIZED HEALTH CARE EDUCATION/TRAINING PROGRAM

## 2019-10-29 PROCEDURE — 94640 AIRWAY INHALATION TREATMENT: CPT

## 2019-10-29 PROCEDURE — 97530 THERAPEUTIC ACTIVITIES: CPT | Mod: GP

## 2019-10-29 PROCEDURE — 25800030 ZZH RX IP 258 OP 636: Performed by: STUDENT IN AN ORGANIZED HEALTH CARE EDUCATION/TRAINING PROGRAM

## 2019-10-29 PROCEDURE — 99233 SBSQ HOSP IP/OBS HIGH 50: CPT | Mod: GC | Performed by: INTERNAL MEDICINE

## 2019-10-29 PROCEDURE — 85610 PROTHROMBIN TIME: CPT | Performed by: STUDENT IN AN ORGANIZED HEALTH CARE EDUCATION/TRAINING PROGRAM

## 2019-10-29 PROCEDURE — 94640 AIRWAY INHALATION TREATMENT: CPT | Mod: 76

## 2019-10-29 RX ORDER — DEXMEDETOMIDINE HYDROCHLORIDE 4 UG/ML
.2-1 INJECTION, SOLUTION INTRAVENOUS CONTINUOUS PRN
Status: DISCONTINUED | OUTPATIENT
Start: 2019-10-29 | End: 2019-10-31

## 2019-10-29 RX ORDER — OXYCODONE HYDROCHLORIDE 5 MG/1
5 TABLET ORAL EVERY 6 HOURS PRN
Status: DISCONTINUED | OUTPATIENT
Start: 2019-10-29 | End: 2019-10-31

## 2019-10-29 RX ORDER — GABAPENTIN 300 MG/1
300 CAPSULE ORAL 2 TIMES DAILY
Status: DISCONTINUED | OUTPATIENT
Start: 2019-10-29 | End: 2019-11-02

## 2019-10-29 RX ORDER — LIDOCAINE 4 G/G
2 PATCH TOPICAL
Status: DISCONTINUED | OUTPATIENT
Start: 2019-10-29 | End: 2019-11-05 | Stop reason: HOSPADM

## 2019-10-29 RX ORDER — FUROSEMIDE 10 MG/ML
40 INJECTION INTRAMUSCULAR; INTRAVENOUS ONCE
Status: COMPLETED | OUTPATIENT
Start: 2019-10-29 | End: 2019-10-29

## 2019-10-29 RX ORDER — ZINC OXIDE 20 %
OINTMENT (GRAM) TOPICAL 2 TIMES DAILY PRN
Status: DISCONTINUED | OUTPATIENT
Start: 2019-10-29 | End: 2019-11-05 | Stop reason: HOSPADM

## 2019-10-29 RX ORDER — GABAPENTIN 300 MG/1
600 CAPSULE ORAL AT BEDTIME
Status: DISCONTINUED | OUTPATIENT
Start: 2019-10-29 | End: 2019-11-02

## 2019-10-29 RX ORDER — RAMELTEON 8 MG/1
8 TABLET ORAL AT BEDTIME
Status: DISCONTINUED | OUTPATIENT
Start: 2019-10-29 | End: 2019-11-05 | Stop reason: HOSPADM

## 2019-10-29 RX ADMIN — TICAGRELOR 90 MG: 90 TABLET ORAL at 08:32

## 2019-10-29 RX ADMIN — GABAPENTIN 300 MG: 300 CAPSULE ORAL at 14:28

## 2019-10-29 RX ADMIN — THIAMINE HCL TAB 100 MG 100 MG: 100 TAB at 08:32

## 2019-10-29 RX ADMIN — IPRATROPIUM BROMIDE 0.5 MG: 0.5 SOLUTION RESPIRATORY (INHALATION) at 09:32

## 2019-10-29 RX ADMIN — OXYCODONE HYDROCHLORIDE 5 MG: 5 TABLET ORAL at 18:06

## 2019-10-29 RX ADMIN — FENTANYL CITRATE 50 MCG: 50 INJECTION INTRAMUSCULAR; INTRAVENOUS at 18:33

## 2019-10-29 RX ADMIN — ACETAMINOPHEN 650 MG: 325 TABLET, FILM COATED ORAL at 10:27

## 2019-10-29 RX ADMIN — Medication: at 20:00

## 2019-10-29 RX ADMIN — ACETAMINOPHEN 650 MG: 325 TABLET, FILM COATED ORAL at 20:10

## 2019-10-29 RX ADMIN — OXYCODONE HYDROCHLORIDE 5 MG: 5 TABLET ORAL at 13:04

## 2019-10-29 RX ADMIN — DIGOXIN 125 MCG: 0.05 SOLUTION ORAL at 08:33

## 2019-10-29 RX ADMIN — OMEPRAZOLE 20 MG: KIT at 08:33

## 2019-10-29 RX ADMIN — ASPIRIN 81 MG CHEWABLE TABLET 81 MG: 81 TABLET CHEWABLE at 08:32

## 2019-10-29 RX ADMIN — ACETAMINOPHEN 650 MG: 325 TABLET, FILM COATED ORAL at 16:41

## 2019-10-29 RX ADMIN — QUETIAPINE FUMARATE 200 MG: 100 TABLET ORAL at 20:10

## 2019-10-29 RX ADMIN — ACETAMINOPHEN 650 MG: 325 TABLET, FILM COATED ORAL at 00:24

## 2019-10-29 RX ADMIN — Medication 1 PACKET: at 20:13

## 2019-10-29 RX ADMIN — FENTANYL CITRATE 25 MCG: 50 INJECTION INTRAMUSCULAR; INTRAVENOUS at 10:26

## 2019-10-29 RX ADMIN — GABAPENTIN 600 MG: 300 CAPSULE ORAL at 08:32

## 2019-10-29 RX ADMIN — ACETAMINOPHEN 650 MG: 325 TABLET, FILM COATED ORAL at 05:34

## 2019-10-29 RX ADMIN — LIDOCAINE 2 PATCH: 560 PATCH PERCUTANEOUS; TOPICAL; TRANSDERMAL at 12:27

## 2019-10-29 RX ADMIN — TICAGRELOR 90 MG: 90 TABLET ORAL at 20:10

## 2019-10-29 RX ADMIN — QUETIAPINE FUMARATE 200 MG: 100 TABLET ORAL at 08:32

## 2019-10-29 RX ADMIN — LEVETIRACETAM 750 MG: 100 SOLUTION ORAL at 08:34

## 2019-10-29 RX ADMIN — IPRATROPIUM BROMIDE 0.5 MG: 0.5 SOLUTION RESPIRATORY (INHALATION) at 14:40

## 2019-10-29 RX ADMIN — HEPARIN SODIUM 5000 UNITS: 5000 INJECTION, SOLUTION INTRAVENOUS; SUBCUTANEOUS at 21:06

## 2019-10-29 RX ADMIN — FUROSEMIDE 40 MG: 10 INJECTION, SOLUTION INTRAVENOUS at 12:26

## 2019-10-29 RX ADMIN — OXYCODONE HYDROCHLORIDE 5 MG: 5 TABLET ORAL at 00:24

## 2019-10-29 RX ADMIN — RAMELTEON 8 MG: 8 TABLET ORAL at 21:58

## 2019-10-29 RX ADMIN — FUROSEMIDE 40 MG: 10 INJECTION, SOLUTION INTRAVENOUS at 08:33

## 2019-10-29 RX ADMIN — LEVETIRACETAM 750 MG: 100 SOLUTION ORAL at 20:15

## 2019-10-29 RX ADMIN — DEXMEDETOMIDINE 0.2 MCG/KG/HR: 100 INJECTION, SOLUTION, CONCENTRATE INTRAVENOUS at 18:46

## 2019-10-29 RX ADMIN — GABAPENTIN 600 MG: 300 CAPSULE ORAL at 21:58

## 2019-10-29 RX ADMIN — ALLOPURINOL 200 MG: 100 TABLET ORAL at 08:32

## 2019-10-29 RX ADMIN — HEPARIN SODIUM 5000 UNITS: 5000 INJECTION, SOLUTION INTRAVENOUS; SUBCUTANEOUS at 08:32

## 2019-10-29 RX ADMIN — FUROSEMIDE 40 MG: 10 INJECTION, SOLUTION INTRAVENOUS at 16:41

## 2019-10-29 RX ADMIN — METOPROLOL TARTRATE 5 MG: 5 INJECTION INTRAVENOUS at 03:45

## 2019-10-29 RX ADMIN — FENTANYL CITRATE 50 MCG: 50 INJECTION INTRAMUSCULAR; INTRAVENOUS at 15:12

## 2019-10-29 RX ADMIN — FOLIC ACID 1 MG: 1 TABLET ORAL at 08:32

## 2019-10-29 RX ADMIN — POTASSIUM CHLORIDE 20 MEQ: 1.5 POWDER, FOR SOLUTION ORAL at 05:14

## 2019-10-29 RX ADMIN — Medication 1 PACKET: at 08:34

## 2019-10-29 RX ADMIN — Medication 12.5 MG: at 20:10

## 2019-10-29 RX ADMIN — Medication: at 20:25

## 2019-10-29 RX ADMIN — OMEPRAZOLE 20 MG: KIT at 16:44

## 2019-10-29 RX ADMIN — OXYCODONE HYDROCHLORIDE 5 MG: 5 TABLET ORAL at 05:35

## 2019-10-29 RX ADMIN — MULTIVITAMIN 15 ML: LIQUID ORAL at 08:32

## 2019-10-29 RX ADMIN — Medication 1 PACKET: at 14:28

## 2019-10-29 RX ADMIN — IPRATROPIUM BROMIDE 0.5 MG: 0.5 SOLUTION RESPIRATORY (INHALATION) at 20:11

## 2019-10-29 ASSESSMENT — ACTIVITIES OF DAILY LIVING (ADL)
ADLS_ACUITY_SCORE: 16
ADLS_ACUITY_SCORE: 19
ADLS_ACUITY_SCORE: 16
ADLS_ACUITY_SCORE: 16
ADLS_ACUITY_SCORE: 19
ADLS_ACUITY_SCORE: 17

## 2019-10-29 ASSESSMENT — MIFFLIN-ST. JEOR: SCORE: 1915.24

## 2019-10-29 ASSESSMENT — PAIN DESCRIPTION - DESCRIPTORS
DESCRIPTORS: ACHING
DESCRIPTORS: ACHING;SORE
DESCRIPTORS: ACHING;DISCOMFORT
DESCRIPTORS: ACHING;THROBBING

## 2019-10-29 NOTE — PLAN OF CARE
OT/CR 4C: Discharge Planner OT   Patient plan for discharge: Pt unable to state; pt wife aware of ongoing rehab needs  Current status: Pt is alert (though intermittently drowsy), disoriented x 3, confusion/possible hallucinations noted in conversation, Requires frequent cues and redirection for safe and purposeful engagement in activity.  Mod-max A x 2 supine to EOB. Able to sit EOB with min A x 2; however, due to agitation and cognitive deficit, unsafe to complete sit to stand.  Dependent for bedside transfers at this time for safety.  Barriers to return to prior living situation: AMS, weakness, deconditioning, acute medical needs  Recommendations for discharge: ARU  Rationale for recommendations: Pt is currently below baseline with regards to mobility and independence with self cares and will benefit from continued skilled therapy intervention to address deficits.  Anticipate pt would tolerate 3hrs therapy/day         Entered by: Xuan Sarmiento 10/29/2019 11:10 AM

## 2019-10-29 NOTE — PLAN OF CARE
Discharge Planner SLP   Patient plan for discharge: Pt unable to state  Current status: Recommend continuation of full liquid diet, with 1:1 supervision and feeding assist.  Hold PO with changes in pt's respiratory status.  Pt to be fully alert and upright for all PO, taking small bites/sips at slow rate.  Pt's impulsivity, polite refusal of PO intake of advanced textures, respiratory status impacting diet advancement.  SLP to follow.  Barriers to return to prior living situation: medical complexity, deconditioning, TF, modified diet  Recommendations for discharge: inpatient rehab, may be a candidate for ARU pending progress  Rationale for recommendations: Below baseline function; pt will benefit from ongoing ST targeting swallowing       Entered by: Lucia Sin 10/29/2019 1:37 PM

## 2019-10-29 NOTE — PLAN OF CARE
ICU End of Shift Summary. See flowsheets for vital signs and detailed assessment.    Changes this shift: Alert, disoriented x3. Following commands this morning. C/O generalized discomfort; PRN oxycodone and tylenol given for comfort. HR elevated (130's); MD notified and one time dose of IV metoprolol 5 mg given. EKG 12-lead obtained (atrial flutter with variable AV block); MD updated, no new interventions. Amio @ 0.5. Levo stopped at 0450. MAP's >65 since. Weaned from 60% to 50% (45 LPM) on HFNC. Poor appetite. Voided 260 mL's via condom cath. One incontinent loose/watery stool. K+ 3.7 replaced with 20 mEq.     Plan: Wean FiO2 as tolerated. Continue to monitor hemodynamics.

## 2019-10-29 NOTE — PLAN OF CARE
"PT 4C / Discharge Planner PT   Patient plan for discharge: not discussed  Current status: PT evaluation completed. Patient is alert but disoriented to time, place, and situation. Despite reorientation patient unable to correctly state where he is at and month/year x3 during session. Dependent transfer bed>chair via OH lift for safety. Patient completes sit<>stand x3 from chair with min Ax2 up to FWW, very wide base of support and patient reports feeling \"wiggly\" while standing. Difficulty coordinating taking small marches despite cueing. Overall patient demonstrates poor insight into deficits and can be impulsive. SpO2>90% on HFNC FiO2 40%, HR 130s.   Barriers to return to prior living situation: medical status, cognition, deconditioning, level of assist, safety awareness  Recommendations for discharge: ARU  Rationale for recommendations: Patient demonstrates skilled multi-disciplinary therapy needs, has strong family support, is motivated for therapy and would tolerate 3 hours therapy/day.       Entered by: Ole Guzmán 10/29/2019 2:55 PM      "

## 2019-10-29 NOTE — PROGRESS NOTES
Pt transported to and from CT scan on BiPAP. Pt tolerated well with no acute changes in vital signs.

## 2019-10-29 NOTE — PLAN OF CARE
ICU End of Shift Summary. See flowsheets for vital signs and detailed assessment.    Changes this shift: Remains extremely delirious. Disoriented to place, time, and situation. Impulsive and not always redirectable. Does not comprehend that he is in the hospital. Awake most of the day talking with wife. Patient in the chair for 2 hours in the AM and 3 hours in the afternoon. Team ordered precedex gtt to be used at bedtime/overnight to facilitate sleep. Art line removed by patient, no complications. Levo at 0.02. High flow nasal cannula at 40% FiO2. Rectal tube placed for copious diarrhea. WOC consult placed for yanely area excoriation.     Plan:  Manage pain and delirium. Notify MD of changes.       Problem: Cardiac Disease Comorbidity  Goal: Cardiac Disease  Description  Patient comorbidity will be monitored for signs and symptoms of Cardiac Disease.  Problems will be absent, minimized or managed by discharge/transition of care.  10/29/2019 1815 by Gosia Gates, RN  Outcome: Improving

## 2019-10-29 NOTE — PROGRESS NOTES
"[facility] Unit 4C  Palliative Care Spiritual Health Follow Up    Summary and Recommendations:  Patient Joel \"Claudio\" Shannan's wife Jackelyn continues to be supportive of him. She relies on personal spirituality and network of family and friends for strength in coping with Claudio's needs as he remains in the hospital.     Palliative Care  and energy work student will continue to follow for spiritual support and integrative therapies while Palliative Care is consulted.    Laila Kathleen  Palliative   Pager [phone]  [facility] Inpatient Team Consult pager [phone] (M-F 8-4:30)  After-hours Answering Service [phone]      Assessments:   Visit with patient Joel \"Claudio\" Shannan and wife Jackelyn at bedside. Claudio was somewhat confused during visit, asking me if I had been supporting his daughter Andreea, not wife Jackelyn. Daughter Andreea  some years ago. Wife remains supportive of Claudio.    Distress:  Distress in the context of serious illness was assessed today:    Existential/spiritual/emotional distress: Yes; family understandably concerned about Claudio's course during hospitalization.      Quaker distress:  No.      Social/economic/relational distress:  Yes; wife Jackelyn continues to be on leave from work while Claudio is hospitalized.    Coping, Meaning, & Spirituality:   Coping, meaning, and/or spirituality in the context of serious illness were assessed today:    Spiritual background and preferences: personal spirituality (Zoroastrian)      Beliefs, rituals, and practices: prayer for wife Jackelyn; not yet assessed for Claudio.      Meaning-making: through relationships and life experience      Strengths and resources: family, friends    Key Palliative Symptom Data:  We are not helping to manage these symptoms currently in this patient.      Interventions:  I offered spiritual and emotional support through reflective listening that affirmed emotions, experience, and " coping.

## 2019-10-29 NOTE — PROGRESS NOTES
GENERAL ID SERVICE CONSULTATION     Patient:  Joel Campbell   Date of birth 1965, Medical record number [id number]  Date of Visit:  10/29/2019  Date of Admission: 10/13/2019  Consult Requester:Anshu Roberts MD            Assessment and Recommendations:   ASSESSMENT:  1. Fever without clear clinical signs of infection in a patient who completed empiric abx within the last week and subsequently successfully extubated. While CT was concerning for pna, it generally looks unchanged from prior and his clinical status is not c/w pulmonary parenchymal process. Given extent of hemothorax I wonder if this is predominantly a fever from non-infectious pulmonary source. No evidence of bloodstream infection though he's at risk given prior support devices earlier in stay. It would be unusual for odontogenic infection to produce a fever, but this is also a possibility. Intra-abdominal infection less likely but a possibility since these can be asymptomatic.  2. VT/VF arrest s/p CPR for 40 min with chest wall trauma and hemothorax s/p chest tube s/p removal today.  3. ETOH abuse hx per family ( in chart). This may have accounted for temp elevation earlier in his course - 2 weeks from his transfer it is plausible that ETOH is still playing a role in fevers but would be a diganosis of exclusion given his risk for BERENICE.      RECOMMENDATION:  1. In the absence of clear localizing infection, would consider stopping abx and watching closely.    Thanks for this consult. ID will follow with you.     Naty Sage MD   of Medicine, Division of Infectious Diseases  [facility] [phone]          ________________________________________________________________    Consult Question:.  Admission Diagnosis: ECMO  Chest Pain  Cardiac Arrest  Ventricular tachycardia (H)         History of Present Illness:     This is a 53-year-old man with past history of alcohol abuse who was admitted on 10/13 as a transfer from   tach/V. fib status post CPR for 40 minutes which was followed by ROSC.  He was initially stabilized at [facility] and subsequently transferred to [facility], where he underwent PCI of LAD, IABP insertion, and VA ECMO cannulation.  Following transfer to [facility] he was ECMO decannulated on 10/18.  He was extubated on 10/25.  His course was complicated by hemothorax status post chest tube with complicating migration of his chest tube such that the sidehole was external and this chest tube was ultimately removed on 10/28.    He was treated with Zosyn and vancomycin from 10/13 to 10/23, with concern for possible aspiration pneumonia.  During his course of therapy, his CRP leon from 69-1 90.  His white blood cell count however was not significantly elevated.  He had intermittent elevated temps, interestingly the T-max was 102.8 on 10/23, when his antibiotics were stopped.  Following antibiotics discontinuation, he was weaned off pressors and successfully extubated (as above, on 10/25).    He developed a temperature of 101.5 on 10/27, prompting resumption of vancomycin and Zosyn.    He has had multiple cultures to date, all of which have been negative.  Blood cultures were negative on 10/16, 10/20, 10/21, 10/23, 10/26, and 10/21.  He had negative sputum culture on 10/14 and 10/26.  He had a negative BAL on 10/19.  He remains on high flow nasal cannula.  His mental status has been somewhat waxing and waning, but generally he is able to follow commands. ID was consulted to assist with his management.    Interval 24hrs events:  Accompanied by wife. No acute events. Had a little bit of soup. Continues to believe he's in [place]. Denies complaints but largely incapable of providing ROS.        Recent culture results include:  Culture Micro   Date Value Ref Range Status   10/29/2019 Canceled, Test credited  Final   10/29/2019 (A)  Final    >10 Squamous epithelial cells/low power field indicates oral contamination. Please    recollect.     10/28/2019 No growth after 1 day  Preliminary   10/28/2019 No growth after 1 day  Preliminary   10/26/2019 Heavy growth  Normal danny    Final   10/26/2019 No growth after 3 days  Preliminary   10/26/2019 No growth after 3 days  Preliminary   10/25/2019 No growth  Final   10/24/2019 No growth  Final   10/24/2019 No growth after 5 days  Preliminary                Current Medications (antimicrobials listed in bold):       allopurinol  200 mg Oral Daily     aspirin  81 mg Per Feeding Tube Daily     digoxin  125 mcg Oral Daily     folic acid  1 mg Oral Daily     furosemide  40 mg Intravenous BID     gabapentin  300 mg Oral BID     gabapentin  600 mg Oral At Bedtime     heparin ANTICOAGULANT  5,000 Units Subcutaneous Q12H     influenza vaccine adult (product based on age)  0.5 mL Intramuscular Prior to discharge     insulin aspart  1-4 Units Subcutaneous Q4H     ipratropium  0.5 mg Nebulization 4x Daily     ketamine 5% gabapentin 8% lidocaine 2.5%   Topical TID     levETIRAcetam  750 mg Oral or Feeding Tube BID     lidocaine  2 patch Transdermal Q24H     lidocaine   Transdermal Q8H     lidocaine   Transdermal Q24h     metoprolol tartrate  12.5 mg Oral BID     multivitamins w/minerals  15 mL Per Feeding Tube Daily     omeprazole  20 mg Oral or Feeding Tube BID AC     protein modular  1 packet Per Feeding Tube TID     QUEtiapine  200 mg Oral or Feeding Tube BID     ramelteon  8 mg Oral At Bedtime     sodium chloride (PF)  3 mL Intracatheter Q8H     ticagrelor  90 mg Oral or Feeding Tube BID     vitamin B1  100 mg Oral Daily            Allergies:   No Known Allergies         Physical Exam:   Vitals were reviewed  Patient Vitals for the past 24 hrs:   BP Temp Temp src Pulse Heart Rate Resp SpO2 Weight   10/29/19 1530 (!) 88/55 -- -- 134 134 30 92 % --   10/29/19 1515 (!) 88/71 -- -- 133 131 21 90 % --   10/29/19 1500 (!) 101/34 -- -- 134 133 (!) 35 90 % --   10/29/19 1445 92/68 -- -- 133 133 30 93 % --    10/29/19 1430 (!) 78/69 -- -- 133 134 26 92 % --   10/29/19 1415 101/68 -- -- 134 134 24 91 % --   10/29/19 1400 111/69 -- -- -- 134 23 94 % --   10/29/19 1345 101/67 -- -- 134 133 27 93 % --   10/29/19 1330 100/72 -- -- 133 131 23 90 % --   10/29/19 1315 98/75 -- -- 131 131 (!) 33 92 % --   10/29/19 1300 (!) 89/77 -- -- 134 131 25 93 % --   10/29/19 1245 114/89 -- -- 134 133 (!) 34 93 % --   10/29/19 1230 110/78 -- -- 133 131 27 92 % --   10/29/19 1215 106/73 -- -- 133 131 28 95 % --   10/29/19 1210 99/72 -- -- 133 131 (!) 32 95 % --   10/29/19 1205 102/72 -- -- 134 131 24 96 % --   10/29/19 1200 90/71 98.8  F (37.1  C) Oral -- 133 27 97 % --   10/29/19 1135 91/65 -- -- 133 133 26 91 % --   10/29/19 1130 95/63 -- -- 134 134 (!) 32 93 % --   10/29/19 1125 (!) 89/65 -- -- 134 134 21 92 % --   10/29/19 1120 (!) 80/60 -- -- 134 133 (!) 31 91 % --   10/29/19 1115 (!) 74/47 -- -- 120 134 (!) 37 93 % --   10/29/19 1100 (!) 84/64 -- -- 134 134 29 91 % --   10/29/19 1045 (!) 87/64 -- -- 134 134 25 92 % --   10/29/19 1030 91/68 -- -- 134 133 19 95 % --   10/29/19 1015 98/64 -- -- 134 133 15 94 % --   10/29/19 1000 -- -- -- -- -- -- 92 % --   10/29/19 0945 (!) 83/65 -- -- 131 130 19 92 % --   10/29/19 0932 -- -- -- -- -- -- 92 % --   10/29/19 0930 95/69 -- -- 129 129 (!) 35 92 % --   10/29/19 0915 -- -- -- -- 130 (!) 38 91 % --   10/29/19 0900 -- -- -- -- 131 (!) 33 92 % --   10/29/19 0845 -- -- -- -- 128 (!) 33 96 % --   10/29/19 0830 108/81 -- -- 128 126 21 94 % --   10/29/19 0815 -- -- -- -- 129 (!) 32 93 % --   10/29/19 0800 -- 97.8  F (36.6  C) Oral -- 126 18 91 % --   10/29/19 0745 -- -- -- -- 125 8 94 % --   10/29/19 0730 -- -- -- -- 125 13 95 % --   10/29/19 0715 -- -- -- -- 125 23 95 % --   10/29/19 0700 -- -- -- -- 126 24 94 % --   10/29/19 0650 -- -- -- -- -- -- 96 % --   10/29/19 0645 -- -- -- -- 125 14 94 % --   10/29/19 0630 -- -- -- -- 123 19 93 % --   10/29/19 0627 -- -- -- -- -- -- 91 % --   10/29/19 0625  -- -- -- -- -- -- (!) 87 % --   10/29/19 0615 -- -- -- -- 126 26 97 % --   10/29/19 0600 -- -- -- -- 125 18 94 % --   10/29/19 0549 -- -- -- -- -- -- 93 % --   10/29/19 0545 -- -- -- -- 123 25 91 % --   10/29/19 0542 -- -- -- -- -- -- (!) 88 % --   10/29/19 0530 -- -- -- -- 124 20 93 % --   10/29/19 0515 -- -- -- -- 125 30 97 % --   10/29/19 0500 -- -- -- -- 126 29 95 % --   10/29/19 0450 -- -- -- -- 124 (!) 40 93 % --   10/29/19 0445 -- -- -- -- 122 (!) 33 90 % --   10/29/19 0430 -- -- -- -- 124 22 95 % --   10/29/19 0429 -- -- -- -- 125 27 94 % --   10/29/19 0415 -- -- -- -- 125 22 94 % --   10/29/19 0400 -- 97.6  F (36.4  C) Oral -- 125 29 97 % 99.4 kg (219 lb 2.2 oz)   10/29/19 0345 109/85 -- -- 129 129 24 95 % --   10/29/19 0330 -- -- -- -- 129 24 96 % --   10/29/19 0315 -- -- -- -- 124 25 95 % --   10/29/19 0300 -- -- -- -- 128 25 94 % --   10/29/19 0245 -- -- -- -- 126 28 94 % --   10/29/19 0230 100/69 -- -- 126 128 22 96 % --   10/29/19 0229 -- -- -- -- 126 23 95 % --   10/29/19 0215 -- -- -- -- 125 23 96 % --   10/29/19 0200 -- -- -- -- 125 21 95 % --   10/29/19 0145 93/69 -- -- 126 126 26 93 % --   10/29/19 0130 97/65 -- -- 126 125 21 95 % --   10/29/19 0115 93/62 -- -- 124 125 28 95 % --   10/29/19 0100 95/87 -- -- 128 128 25 95 % --   10/29/19 0045 100/64 -- -- -- 128 24 96 % --   10/29/19 0030 -- -- -- -- 122 28 99 % --   10/29/19 0015 -- -- -- -- 111 29 97 % --   10/29/19 0000 (!) 89/75 98.2  F (36.8  C) Oral 111 128 28 97 % --   10/28/19 2345 -- -- -- -- 133 23 96 % --   10/28/19 2330 -- -- -- -- 131 30 95 % --   10/28/19 2315 (!) 134/120 -- -- -- 133 (!) 31 99 % --   10/28/19 2300 -- -- -- -- 133 28 97 % --   10/28/19 2200 -- -- -- -- 130 24 95 % --   10/28/19 2145 -- -- -- -- 126 25 94 % --   10/28/19 2139 -- -- -- -- 126 23 94 % --   10/28/19 2115 (!) 83/63 -- -- 128 123 24 94 % --   10/28/19 2111 (!) 83/63 -- -- 128 125 23 93 % --   10/28/19 2109 -- -- -- -- 81 25 95 % --   10/28/19 2100 -- -- --  -- 124 23 94 % --   10/28/19 2045 -- -- -- -- 131 (!) 32 94 % --   10/28/19 2030 -- -- -- -- 131 21 96 % --   10/28/19 2015 -- -- -- -- 131 23 93 % --   10/28/19 2000 -- 98  F (36.7  C) Oral -- 110 30 91 % --   10/28/19 1900 -- -- -- -- 94 28 98 % --   10/28/19 1800 -- -- -- -- 102 17 99 % --     Ranges for his vital signs:  Temp:  [97.6  F (36.4  C)-98.8  F (37.1  C)] 98.8  F (37.1  C)  Pulse:  [111-134] 134  Heart Rate:  [] 134  Resp:  [8-40] 30  BP: ()/() 88/55  MAP:  [50 mmHg-113 mmHg] 90 mmHg  Arterial Line BP: ()/(26-81) 111/78  FiO2 (%):  [40 %-60 %] 40 %  SpO2:  [87 %-99 %] 92 %    Physical Examination:  GENERAL:   in bed in no acute distress.   HEENT:  Head is normocephalic, atraumatic   EYES:  Eyes have anicteric sclerae without conjunctival injection or stigmata of endocarditis.    ENT:  Very poor dentition  LUNGS:  Splinting due to presumably pain from rib fractures related to CPR. + use of secondary muscles of respiration. Diminished on R>L.    CARDIOVASCULAR:  Regular rate and rhythm  ABDOMEN:  Soft           Laboratory Data:     Inflammatory Markers    Recent Labs   Lab Test 10/26/19  0346 10/18/19  0336 10/17/19  0346 10/16/19  0351 10/15/19  0341 10/14/19  0424 10/13/19  1947   * 82* 83* 68* 33* 14 8   .0* 190.0* 290.0* 270.0* 180.0* 93.0* 69.0*       Hematology Studies    Recent Labs   Lab Test 10/29/19  0338 10/28/19  0328 10/27/19  0419 10/26/19  0346 10/25/19  0426 10/24/19  1507  10/13/19  1947   WBC 9.5 9.3 10.8 11.8* 12.6* 11.4*   < > 11.6*   ANEU 6.8 7.1 8.2 8.8* 9.5*  --   --  8.4*   AEOS 0.7 0.2 0.5 0.5 0.3  --   --  0.0   HGB 8.2* 8.1* 8.3* 8.6* 8.5* 8.7*   < > 10.1*    100 101* 100 100 100   < > 104*   * 520* 465* 412 368 347   < > 154    < > = values in this interval not displayed.       Immune Globulin Studies  No lab results found.    Metabolic Studies     Recent Labs   Lab Test 10/29/19  0338 10/28/19  0328 10/27/19  5248  10/27/19  0419 10/26/19  1750    144 142 144 144   POTASSIUM 3.7 3.8 4.6 4.1 3.9   CHLORIDE 104 105 105 108 108   CO2 36* 34* 34* 34* 34*   BUN 36* 41* 39* 41* 39*   CR 0.69 0.80 0.68 0.75 0.75   GFRESTIMATED >90 >90 >90 >90 >90       Hepatic Studies    Recent Labs   Lab Test 10/28/19  0328 10/25/19  0426 10/24/19  1507 10/24/19  0446 10/23/19  1629 10/23/19  0401   BILITOTAL 1.4* 1.1 1.2 1.5* 1.6* 1.6*   ALKPHOS 74 84 87 96 107 112   ALBUMIN 2.2* 2.3* 2.1* 2.2* 2.3* 2.3*   AST 44 35 40 40 42 41   ALT 30 21 25 23 25 26       Thyroid Studies    Recent Labs   Lab Test 10/29/19  0338 10/22/19  0417   TSH 5.46* 7.44*   T4  --  0.73*       Microbiology:  Culture Micro   Date Value Ref Range Status   10/29/2019 Canceled, Test credited  Final   10/29/2019 (A)  Final    >10 Squamous epithelial cells/low power field indicates oral contamination. Please   recollect.     10/28/2019 No growth after 1 day  Preliminary   10/28/2019 No growth after 1 day  Preliminary   10/26/2019 Heavy growth  Normal danny    Final   10/26/2019 No growth after 3 days  Preliminary   10/26/2019 No growth after 3 days  Preliminary   10/25/2019 No growth  Final   10/24/2019 No growth  Final   10/24/2019 No growth after 5 days  Preliminary   10/24/2019 No growth after 5 days  Preliminary   10/23/2019 No growth  Final   10/23/2019 No growth  Final   10/22/2019 No growth  Final   10/21/2019 No growth  Final   10/21/2019 No growth  Final   10/20/2019 No growth  Final   10/20/2019 No growth  Final   10/19/2019 No growth  Final   10/19/2019 Culture negative monitoring continues  Preliminary   10/19/2019 No growth  Final   10/18/2019 No growth  Final   10/17/2019 No growth  Final   10/17/2019 No growth  Final   10/16/2019 No growth  Final   10/15/2019 No growth  Final   10/14/2019 Light growth  Normal danny    Final       Urine Studies    Recent Labs   Lab Test 10/25/19  0457 10/14/19  0438   LEUKEST Negative Negative   WBCU 1 2       Vancomycin Levels     Recent Labs   Lab Test 10/21/19  0616 10/19/19  1541 10/17/19  0555   VANCOMYCIN 23.5 11.5 9.6

## 2019-10-29 NOTE — PROGRESS NOTES
[facility]   Cardiology Progress Note    Assessment & Plan   53 year old male who was admitted on 10/13/19 as a transfer from [place], [place] for refractory VT/VF arrest s/p PCI to LAD and placement on peripheral VA ECMO. IABP removed successfully 10/20/2019. Successful extubation 10/25/2019. Still somewhat delirious. Waxing and waning fevers and leukocytosis remains an issue.     Changes Today:  - up to chair during the day  - remaining off abx for now  - still hypoxic on hi flow NC  - adjusting medications to reduce delirium    Neurology: Initially intubated, sedated, paralyzed. Cooled to 34 degrees, now rewarmed.   - NeuroCrit consulted, initiated Keppra 750 mg BID on 10/17 for possible seizure activity, will continue as outpatient until neuro follow up  - EEG with small amount of epileptiform activity, due to pt becoming more alert, stopped EEG [id number]  - CT Head 10/16 with smal right paracentral lesion, new right hemicerebellum lesion concerning for stroke.   Cardiovascular / Hemodynamics: Refractory VF arrest s/p peripheral V-A ECMO at OSH on 10/13/19  Persistent Afib, [id number]  TTE: LVEF 5-10%, increase to 15% on 1L  - Wean pressors/inotropes as able, currently not on any  - Continue ASA 81mg and ticagrelor 90 mg BID  - Hold lipitor for now given likely hepatic injury during arrest  - Hold ACE/ARB for now given likely reduced renal fxn after arrest  - gentle start of lopressor for CAD  - digoxin for Afib rate ctrl  - holding anticoagulation for now due to recent chest trauma, consider in future   Pulmonary: Large hemothorax on 10/14 s/p right-sided chest tube.  - Daily CXR  - extubated 10/25/2019  - chest tube removal 10/28/2019   GI and Nutrition: Per Pt's wife, pt has EtOH use disorder. Initial oncern for possible GIB given black OG output, but this has resolved.  - Monitor BID LFTs  - NJ placed at bedside on 10/16, started TFs  - Bowel regimen - started  - GI Prophylaxis: no longer needed given extubation  -  omeprazole 20mg BID given possible GI bleed, can consider weaning as outpatient   Renal, Fluid and Electrolytes: - Monitor urine output  - Maintain K>4 and Mg>2    Infectious Disease: No signs of infection. Leukocytosis c/w arrest. Blood cultures without growth.   - Initially vancomycin/zosyn x5 days for presumed aspiration - then extended given multiple invasive procedures. As of 10/28/2019 evening, refraining from restarting antibiotics  - ID consult for culture negative fevers and leukocytosis  - Daily blood cultures  - Monitor for signs of infection   Hematology and Oncology: Receiving ASA/ticagrelor for KAMRON. Large intramuscular hematoma of right pec and hemothorax on 10/14 with possible GIB as above.  - Cryo PRN fibrinogen < 200; FFP for INR >2  - Transfuse for Hgb<10, Plts<70  - US LE w/ arterial duplex per ECMO protocol   - DVT PPX: subcutaneous heparin for now  - Right chest wall hematoma: monitor exam and Hgb closely   Endocrinology: No known medical history. BG elevated.  - Insulin gtt PRN   Lines: Restraint: needed  Peripheral IV 10/16/19 Left Lower forearm (Active)       Arterial Line Radial (Active)       CVC Triple Lumen 10/13/19 Left Subclavian (Active)       Left Groin Interventional Procedure Access (Active)     R chest tube (placed 10/18/2019 by IR)    Current lines are required for patient management       Family updated today: Yes  Code Status: FULL    Pt was seen and examined with Dr. Juan Pablo Santillan MD, PhD, who agrees with the assessment and plan.    Bronson Dsouza MD. PhD  Cardiology Fellow    Interval History    Care team notes last 24 hours reviewed. Slept better last night. Up in chair most of the day. Wife at bedside during rounds.     ROS: 4-pt ROS otherwise negative.     Data reviewed today: I reviewed all new labs and imaging results over the last 24 hours. I personally reviewed:    Physical Exam   Temp: 97.8  F (36.6  C) Temp src: Oral BP: 91/65 Pulse: 133 Heart Rate: 133 Resp: 26  "SpO2: 91 % O2 Device: High Flow Nasal Cannula (HFNC) Oxygen Delivery: Other (Comments)(45 lpm)  Vitals:    10/27/19 0400 10/28/19 0400 10/29/19 0400   Weight: 99 kg (218 lb 4.1 oz) 99.5 kg (219 lb 5.7 oz) 99.4 kg (219 lb 2.2 oz)     Vital Signs with Ranges  Temp:  [97.6  F (36.4  C)-98.2  F (36.8  C)] 97.8  F (36.6  C)  Pulse:  [111-134] 133  Heart Rate:  [] 133  Resp:  [8-40] 26  BP: ()/() 91/65  MAP:  [50 mmHg-113 mmHg] 90 mmHg  Arterial Line BP: ()/(26-81) 111/78  FiO2 (%):  [40 %-60 %] 45 %  SpO2:  [87 %-100 %] 91 %  I/O last 3 completed shifts:  In: 4813.81 [P.O.:100; I.V.:1416.81; NG/GT:1977]  Out: 2040 [Urine:1980; Chest Tube:60]    Heart Rate: 133, Blood pressure 91/65, pulse 133, temperature 97.8  F (36.6  C), temperature source Oral, resp. rate 26, height 1.89 m (6' 2.41\"), weight 99.4 kg (219 lb 2.2 oz), SpO2 91 %.  219 lbs 2.2 oz  GEN:  Mostly peaceful, sometimes agitated  CV:  S1/S2 heard  LUNGS: soft breath sounds  ABD: Soft BS, soft, mildly distended  EXT: warm, no lower extremity edema  NEURO: able to follow commands, taking deep breaths  SKIN: no acute lesions    Medications     amiodarone 0.5 mg/min (10/29/19 1100)     dexmedetomidine       IV fluid REPLACEMENT ONLY 25 mL/hr at 10/18/19 1929     norepinephrine 0.03 mcg/kg/min (10/29/19 1120)     sodium chloride         allopurinol  200 mg Oral Daily     aspirin  81 mg Per Feeding Tube Daily     digoxin  125 mcg Oral Daily     folic acid  1 mg Oral Daily     furosemide  40 mg Intravenous Once     furosemide  40 mg Intravenous BID     gabapentin  300 mg Oral BID     gabapentin  600 mg Oral At Bedtime     heparin ANTICOAGULANT  5,000 Units Subcutaneous Q12H     influenza vaccine adult (product based on age)  0.5 mL Intramuscular Prior to discharge     insulin aspart  1-4 Units Subcutaneous Q4H     ipratropium  0.5 mg Nebulization 4x Daily     ketamine 5% gabapentin 8% lidocaine 2.5%   Topical TID     levETIRAcetam  750 mg Oral " or Feeding Tube BID     lidocaine  2 patch Transdermal Q24H     lidocaine   Transdermal Q8H     lidocaine   Transdermal Q24h     metoprolol tartrate  12.5 mg Oral BID     multivitamins w/minerals  15 mL Per Feeding Tube Daily     omeprazole  20 mg Oral or Feeding Tube BID AC     protein modular  1 packet Per Feeding Tube TID     QUEtiapine  200 mg Oral or Feeding Tube BID     ramelteon  8 mg Oral At Bedtime     sodium chloride (PF)  3 mL Intracatheter Q8H     ticagrelor  90 mg Oral or Feeding Tube BID     vitamin B1  100 mg Oral Daily       Data   Recent Labs   Lab 10/29/19  0338 10/28/19  0328 10/27/19  1605 10/27/19  0419  10/25/19  0426  10/24/19  1507 10/24/19  0446   WBC 9.5 9.3  --  10.8   < > 12.6*  --  11.4* 10.6   HGB 8.2* 8.1*  --  8.3*   < > 8.5*  --  8.7* 9.0*    100  --  101*   < > 100  --  100 99   * 520*  --  465*   < > 368  --  347 332   INR 1.17* 1.14  --  1.14   < > 1.25*  --   --  1.28*    144 142 144   < > 146*  --  142 146*   POTASSIUM 3.7 3.8 4.6 4.1   < > 3.8   < > 4.1 3.9   CHLORIDE 104 105 105 108   < > 110*  --  109 111*   CO2 36* 34* 34* 34*   < > 29  --  30 28   BUN 36* 41* 39* 41*   < > 41*  --  38* 38*   CR 0.69 0.80 0.68 0.75   < > 0.88  --  0.84 0.91   ANIONGAP 3 5 2* 2*   < > 6  --  4 7   HEATHER 8.5 8.3* 8.6 8.5   < > 8.4*  --  7.9* 8.2*   * 134* 167* 140*   < > 149*  --  144* 146*   ALBUMIN  --  2.2*  --   --   --  2.3*  --  2.1* 2.2*   PROTTOTAL  --  6.9  --   --   --  6.9  --  6.5* 6.5*   BILITOTAL  --  1.4*  --   --   --  1.1  --  1.2 1.5*   ALKPHOS  --  74  --   --   --  84  --  87 96   ALT  --  30  --   --   --  21  --  25 23   AST  --  44  --   --   --  35  --  40 40   TROPI  --   --   --   --   --  0.612*  --  0.827* 1.074*    < > = values in this interval not displayed.       Recent Results (from the past 24 hour(s))   CT Chest w/o Contrast    Narrative    EXAMINATION: CT CHEST W/O CONTRAST, 10/28/2019 7:51 PM    TECHNIQUE:  Helical CT images from the  thoracic inlet through the  upper abdomen were obtained without intravenous contrast.  Images are  displayed at 1 and 5 mm intervals. Images reviewed in lung, soft  tissue, and bone windows.    Radiation Dose (DLP): 427 mGy*cm    COMPARISON: 10/21/2019    HISTORY: Acute resp illness, > 40 years old; s/p ECMO c/b R hemothorax  and chest tube, now with new fever    FINDINGS:    The central tracheobronchial tree is patent. There is redemonstration  of reticular groundglass and consolidative densities of the bilateral  lungs, right greater than left, primarily involving the lung bases.  This is slightly improved from prior imaging. There are new  predominantly right-sided groundglass and nodular opacities for  example series 3 image 32. No appreciable pneumothorax or pleural  effusion.    Left sided central venous catheter with tip located at the high SVC.  Enteric tube with tip located in the stomach. Abdominal film of  10/18/2019 showed the tip to be at the ligament of Treitz. Right  apical chest tube has been removed.     The heart is grossly normal in size. No pericardial effusion. Severe  coronary artery calcifications of the LAD. 1.2 cm enlarged right  paratracheal lymph node. Multiple other prominent enlarged mediastinal  lymph nodes. The thyroid is grossly unremarkable. The esophagus is  unremarkable.    Redemonstration of multiple anterior bilateral rib fractures and  sternal fractures. Resolving right pectoral hematoma.    Limited views of the abdomen reveal no acute pathology.      Impression    IMPRESSION:     1. New primarily right-sided groundglass and nodular opacities.  Concerning for infectious etiology  2. Redemonstration of reticular groundglass and solid densities  primarily in the lung bases, right greater than left. Mildly improved  compared to prior CT 10/21/2019.  3. Prominent mediastinal lymphadenopathy, unchanged.    I have personally reviewed the examination and initial interpretation  and I  agree with the findings.    JOSE SANCHEZ MD

## 2019-10-29 NOTE — PROGRESS NOTES
Pt transferred to room 4-415 from 4-409 on BiPAP. Pt tolerated well with  No respiratory distress noted.    Debra Claros, RT on 10/28/2019 at 10:42 PM

## 2019-10-29 NOTE — PLAN OF CARE
Bedside RN NOTE  D: Pt on bipap @ 55% fi02, sats 97%.  LS coarse throughout, productive cough. NSR, Amiodarone gtt  infusing. MAP 65-70.  Oriented to self only, able to follow simple, one step commands. No fever.   I/A:  OOB to chair via lift, frequent reorientation, frequent repositioning, pt pulling at lines/tubes, bedside sitter for repeated redirection. No improvement in neuro exam/agitation. Lasix 40 mg X 4 with good response. HFNC while OOB, weaned from 70% to 45% fi02. ID consult, Chest CT. Chest tube pulled, tolerated well, no change in 02 support needed. Wife at bedside, updated throughout the day.   P: Continue to wean fi02 as tolerated, increase PT/OT support and encourage rehab/OOB/pulmonary toilet as able given confusion. Await culture and imaging results. Continue POC.

## 2019-10-30 ENCOUNTER — APPOINTMENT (OUTPATIENT)
Dept: PHYSICAL THERAPY | Facility: CLINIC | Age: 54
DRG: 003 | End: 2019-10-30
Attending: INTERNAL MEDICINE
Payer: COMMERCIAL

## 2019-10-30 ENCOUNTER — APPOINTMENT (OUTPATIENT)
Dept: SPEECH THERAPY | Facility: CLINIC | Age: 54
DRG: 003 | End: 2019-10-30
Attending: INTERNAL MEDICINE
Payer: COMMERCIAL

## 2019-10-30 ENCOUNTER — APPOINTMENT (OUTPATIENT)
Dept: OCCUPATIONAL THERAPY | Facility: CLINIC | Age: 54
DRG: 003 | End: 2019-10-30
Attending: INTERNAL MEDICINE
Payer: COMMERCIAL

## 2019-10-30 ENCOUNTER — APPOINTMENT (OUTPATIENT)
Dept: CARDIOLOGY | Facility: CLINIC | Age: 54
DRG: 003 | End: 2019-10-30
Attending: STUDENT IN AN ORGANIZED HEALTH CARE EDUCATION/TRAINING PROGRAM
Payer: COMMERCIAL

## 2019-10-30 LAB
ANION GAP SERPL CALCULATED.3IONS-SCNC: 4 MMOL/L (ref 3–14)
BACTERIA SPEC CULT: NO GROWTH
BACTERIA SPEC CULT: NO GROWTH
BASE EXCESS BLDV CALC-SCNC: 9.8 MMOL/L
BASOPHILS # BLD AUTO: 0 10E9/L (ref 0–0.2)
BASOPHILS NFR BLD AUTO: 0.5 %
BUN SERPL-MCNC: 35 MG/DL (ref 7–30)
CALCIUM SERPL-MCNC: 8.7 MG/DL (ref 8.5–10.1)
CHLORIDE SERPL-SCNC: 104 MMOL/L (ref 94–109)
CO2 SERPL-SCNC: 32 MMOL/L (ref 20–32)
CREAT SERPL-MCNC: 0.65 MG/DL (ref 0.66–1.25)
DIFFERENTIAL METHOD BLD: ABNORMAL
EOSINOPHIL # BLD AUTO: 0.6 10E9/L (ref 0–0.7)
EOSINOPHIL NFR BLD AUTO: 7.7 %
ERYTHROCYTE [DISTWIDTH] IN BLOOD BY AUTOMATED COUNT: 16.3 % (ref 10–15)
GFR SERPL CREATININE-BSD FRML MDRD: >90 ML/MIN/{1.73_M2}
GLUCOSE BLDC GLUCOMTR-MCNC: 107 MG/DL (ref 70–99)
GLUCOSE BLDC GLUCOMTR-MCNC: 109 MG/DL (ref 70–99)
GLUCOSE BLDC GLUCOMTR-MCNC: 128 MG/DL (ref 70–99)
GLUCOSE BLDC GLUCOMTR-MCNC: 131 MG/DL (ref 70–99)
GLUCOSE BLDC GLUCOMTR-MCNC: 145 MG/DL (ref 70–99)
GLUCOSE BLDC GLUCOMTR-MCNC: 148 MG/DL (ref 70–99)
GLUCOSE BLDC GLUCOMTR-MCNC: 88 MG/DL (ref 70–99)
GLUCOSE SERPL-MCNC: 147 MG/DL (ref 70–99)
HCO3 BLDV-SCNC: 35 MMOL/L (ref 21–28)
HCT VFR BLD AUTO: 28.9 % (ref 40–53)
HGB BLD-MCNC: 8.9 G/DL (ref 13.3–17.7)
IMM GRANULOCYTES # BLD: 0.1 10E9/L (ref 0–0.4)
IMM GRANULOCYTES NFR BLD: 0.9 %
INTERPRETATION ECG - MUSE: NORMAL
LYMPHOCYTES # BLD AUTO: 1.3 10E9/L (ref 0.8–5.3)
LYMPHOCYTES NFR BLD AUTO: 16 %
Lab: NORMAL
Lab: NORMAL
MAGNESIUM SERPL-MCNC: 2.6 MG/DL (ref 1.6–2.3)
MCH RBC QN AUTO: 30.7 PG (ref 26.5–33)
MCHC RBC AUTO-ENTMCNC: 30.8 G/DL (ref 31.5–36.5)
MCV RBC AUTO: 100 FL (ref 78–100)
MONOCYTES # BLD AUTO: 0.8 10E9/L (ref 0–1.3)
MONOCYTES NFR BLD AUTO: 9.8 %
NEUTROPHILS # BLD AUTO: 5.4 10E9/L (ref 1.6–8.3)
NEUTROPHILS NFR BLD AUTO: 65.1 %
NRBC # BLD AUTO: 0 10*3/UL
NRBC BLD AUTO-RTO: 0 /100
O2/TOTAL GAS SETTING VFR VENT: ABNORMAL %
OXYHGB MFR BLDV: 53 %
PCO2 BLDV: 55 MM HG (ref 40–50)
PH BLDV: 7.42 PH (ref 7.32–7.43)
PHOSPHATE SERPL-MCNC: 4.5 MG/DL (ref 2.5–4.5)
PLATELET # BLD AUTO: 569 10E9/L (ref 150–450)
PO2 BLDV: 31 MM HG (ref 25–47)
POTASSIUM SERPL-SCNC: 3.6 MMOL/L (ref 3.4–5.3)
POTASSIUM SERPL-SCNC: 4.1 MMOL/L (ref 3.4–5.3)
RBC # BLD AUTO: 2.9 10E12/L (ref 4.4–5.9)
SODIUM SERPL-SCNC: 140 MMOL/L (ref 133–144)
SPECIMEN SOURCE: NORMAL
SPECIMEN SOURCE: NORMAL
WBC # BLD AUTO: 8.2 10E9/L (ref 4–11)

## 2019-10-30 PROCEDURE — 85025 COMPLETE CBC W/AUTO DIFF WBC: CPT | Performed by: INTERNAL MEDICINE

## 2019-10-30 PROCEDURE — 20000004 ZZH R&B ICU UMMC

## 2019-10-30 PROCEDURE — 93321 DOPPLER ECHO F-UP/LMTD STD: CPT | Mod: 26 | Performed by: INTERNAL MEDICINE

## 2019-10-30 PROCEDURE — 99233 SBSQ HOSP IP/OBS HIGH 50: CPT | Mod: 25 | Performed by: INTERNAL MEDICINE

## 2019-10-30 PROCEDURE — 25000132 ZZH RX MED GY IP 250 OP 250 PS 637: Performed by: INTERNAL MEDICINE

## 2019-10-30 PROCEDURE — 25000128 H RX IP 250 OP 636: Performed by: STUDENT IN AN ORGANIZED HEALTH CARE EDUCATION/TRAINING PROGRAM

## 2019-10-30 PROCEDURE — 93308 TTE F-UP OR LMTD: CPT | Mod: 26 | Performed by: INTERNAL MEDICINE

## 2019-10-30 PROCEDURE — 25800030 ZZH RX IP 258 OP 636: Performed by: STUDENT IN AN ORGANIZED HEALTH CARE EDUCATION/TRAINING PROGRAM

## 2019-10-30 PROCEDURE — 94640 AIRWAY INHALATION TREATMENT: CPT

## 2019-10-30 PROCEDURE — 00000146 ZZHCL STATISTIC GLUCOSE BY METER IP

## 2019-10-30 PROCEDURE — 83735 ASSAY OF MAGNESIUM: CPT | Performed by: INTERNAL MEDICINE

## 2019-10-30 PROCEDURE — 25000132 ZZH RX MED GY IP 250 OP 250 PS 637: Performed by: STUDENT IN AN ORGANIZED HEALTH CARE EDUCATION/TRAINING PROGRAM

## 2019-10-30 PROCEDURE — 97530 THERAPEUTIC ACTIVITIES: CPT | Mod: GP

## 2019-10-30 PROCEDURE — G0463 HOSPITAL OUTPT CLINIC VISIT: HCPCS

## 2019-10-30 PROCEDURE — 25500064 ZZH RX 255 OP 636: Performed by: INTERNAL MEDICINE

## 2019-10-30 PROCEDURE — 80048 BASIC METABOLIC PNL TOTAL CA: CPT | Performed by: INTERNAL MEDICINE

## 2019-10-30 PROCEDURE — 25000125 ZZHC RX 250: Performed by: STUDENT IN AN ORGANIZED HEALTH CARE EDUCATION/TRAINING PROGRAM

## 2019-10-30 PROCEDURE — 93325 DOPPLER ECHO COLOR FLOW MAPG: CPT | Mod: 26 | Performed by: INTERNAL MEDICINE

## 2019-10-30 PROCEDURE — 40000264 ECHOCARDIOGRAM LIMITED

## 2019-10-30 PROCEDURE — 84100 ASSAY OF PHOSPHORUS: CPT | Performed by: INTERNAL MEDICINE

## 2019-10-30 PROCEDURE — 92526 ORAL FUNCTION THERAPY: CPT | Mod: GN

## 2019-10-30 PROCEDURE — 97535 SELF CARE MNGMENT TRAINING: CPT | Mod: GO | Performed by: OCCUPATIONAL THERAPIST

## 2019-10-30 PROCEDURE — 27210429 ZZH NUTRITION PRODUCT INTERMEDIATE LITER

## 2019-10-30 PROCEDURE — 82805 BLOOD GASES W/O2 SATURATION: CPT | Performed by: STUDENT IN AN ORGANIZED HEALTH CARE EDUCATION/TRAINING PROGRAM

## 2019-10-30 PROCEDURE — 84132 ASSAY OF SERUM POTASSIUM: CPT | Performed by: STUDENT IN AN ORGANIZED HEALTH CARE EDUCATION/TRAINING PROGRAM

## 2019-10-30 PROCEDURE — 40000275 ZZH STATISTIC RCP TIME EA 10 MIN

## 2019-10-30 RX ORDER — METOPROLOL TARTRATE 1 MG/ML
5 INJECTION, SOLUTION INTRAVENOUS ONCE
Status: COMPLETED | OUTPATIENT
Start: 2019-10-30 | End: 2019-10-30

## 2019-10-30 RX ORDER — MIDODRINE HYDROCHLORIDE 5 MG/1
10 TABLET ORAL
Status: DISCONTINUED | OUTPATIENT
Start: 2019-10-30 | End: 2019-11-03

## 2019-10-30 RX ADMIN — FUROSEMIDE 40 MG: 10 INJECTION, SOLUTION INTRAVENOUS at 15:56

## 2019-10-30 RX ADMIN — FENTANYL CITRATE 25 MCG: 50 INJECTION INTRAMUSCULAR; INTRAVENOUS at 11:00

## 2019-10-30 RX ADMIN — Medication 12.5 MG: at 07:53

## 2019-10-30 RX ADMIN — OXYCODONE HYDROCHLORIDE 5 MG: 5 TABLET ORAL at 14:29

## 2019-10-30 RX ADMIN — DIGOXIN 125 MCG: 0.05 SOLUTION ORAL at 07:53

## 2019-10-30 RX ADMIN — OMEPRAZOLE 20 MG: KIT at 16:00

## 2019-10-30 RX ADMIN — MIDODRINE HYDROCHLORIDE 10 MG: 5 TABLET ORAL at 17:41

## 2019-10-30 RX ADMIN — QUETIAPINE FUMARATE 200 MG: 100 TABLET ORAL at 07:54

## 2019-10-30 RX ADMIN — MIDODRINE HYDROCHLORIDE 10 MG: 5 TABLET ORAL at 12:05

## 2019-10-30 RX ADMIN — ALTEPLASE 2 MG: 2.2 INJECTION, POWDER, LYOPHILIZED, FOR SOLUTION INTRAVENOUS at 04:08

## 2019-10-30 RX ADMIN — HUMAN ALBUMIN MICROSPHERES AND PERFLUTREN 6 ML: 10; .22 INJECTION, SOLUTION INTRAVENOUS at 12:41

## 2019-10-30 RX ADMIN — TICAGRELOR 90 MG: 90 TABLET ORAL at 07:53

## 2019-10-30 RX ADMIN — Medication 12.5 MG: at 20:01

## 2019-10-30 RX ADMIN — QUETIAPINE FUMARATE 200 MG: 100 TABLET ORAL at 20:00

## 2019-10-30 RX ADMIN — FENTANYL CITRATE 50 MCG: 50 INJECTION INTRAMUSCULAR; INTRAVENOUS at 05:09

## 2019-10-30 RX ADMIN — LIDOCAINE 2 PATCH: 560 PATCH PERCUTANEOUS; TOPICAL; TRANSDERMAL at 07:53

## 2019-10-30 RX ADMIN — Medication 1 PACKET: at 07:53

## 2019-10-30 RX ADMIN — IPRATROPIUM BROMIDE 0.5 MG: 0.5 SOLUTION RESPIRATORY (INHALATION) at 09:21

## 2019-10-30 RX ADMIN — RAMELTEON 8 MG: 8 TABLET ORAL at 22:02

## 2019-10-30 RX ADMIN — LEVETIRACETAM 750 MG: 100 SOLUTION ORAL at 07:53

## 2019-10-30 RX ADMIN — GABAPENTIN 300 MG: 300 CAPSULE ORAL at 14:29

## 2019-10-30 RX ADMIN — FUROSEMIDE 40 MG: 10 INJECTION, SOLUTION INTRAVENOUS at 07:54

## 2019-10-30 RX ADMIN — GABAPENTIN 600 MG: 300 CAPSULE ORAL at 22:02

## 2019-10-30 RX ADMIN — OMEPRAZOLE 20 MG: KIT at 07:53

## 2019-10-30 RX ADMIN — OXYCODONE HYDROCHLORIDE 5 MG: 5 TABLET ORAL at 07:53

## 2019-10-30 RX ADMIN — LEVETIRACETAM 750 MG: 100 SOLUTION ORAL at 20:00

## 2019-10-30 RX ADMIN — Medication: at 20:55

## 2019-10-30 RX ADMIN — METOPROLOL TARTRATE 5 MG: 1 INJECTION, SOLUTION INTRAVENOUS at 01:54

## 2019-10-30 RX ADMIN — ALLOPURINOL 200 MG: 100 TABLET ORAL at 07:53

## 2019-10-30 RX ADMIN — FENTANYL CITRATE 50 MCG: 50 INJECTION INTRAMUSCULAR; INTRAVENOUS at 18:47

## 2019-10-30 RX ADMIN — HEPARIN SODIUM 5000 UNITS: 5000 INJECTION, SOLUTION INTRAVENOUS; SUBCUTANEOUS at 09:08

## 2019-10-30 RX ADMIN — ACETAMINOPHEN 650 MG: 325 TABLET, FILM COATED ORAL at 07:53

## 2019-10-30 RX ADMIN — INSULIN ASPART 1 UNITS: 100 INJECTION, SOLUTION INTRAVENOUS; SUBCUTANEOUS at 03:59

## 2019-10-30 RX ADMIN — FOLIC ACID 1 MG: 1 TABLET ORAL at 07:53

## 2019-10-30 RX ADMIN — ACETAMINOPHEN 650 MG: 325 TABLET, FILM COATED ORAL at 00:06

## 2019-10-30 RX ADMIN — MULTIVITAMIN 15 ML: LIQUID ORAL at 07:53

## 2019-10-30 RX ADMIN — ASPIRIN 81 MG CHEWABLE TABLET 81 MG: 81 TABLET CHEWABLE at 07:54

## 2019-10-30 RX ADMIN — HEPARIN SODIUM 5000 UNITS: 5000 INJECTION, SOLUTION INTRAVENOUS; SUBCUTANEOUS at 21:57

## 2019-10-30 RX ADMIN — Medication 1 PACKET: at 14:29

## 2019-10-30 RX ADMIN — POTASSIUM CHLORIDE 20 MEQ: 1.5 POWDER, FOR SOLUTION ORAL at 04:38

## 2019-10-30 RX ADMIN — Medication 1 PACKET: at 20:01

## 2019-10-30 RX ADMIN — OXYCODONE HYDROCHLORIDE 5 MG: 5 TABLET ORAL at 00:06

## 2019-10-30 RX ADMIN — GABAPENTIN 300 MG: 300 CAPSULE ORAL at 07:53

## 2019-10-30 RX ADMIN — AMIODARONE HYDROCHLORIDE 0.5 MG/MIN: 50 INJECTION, SOLUTION INTRAVENOUS at 00:10

## 2019-10-30 RX ADMIN — ACETAMINOPHEN 650 MG: 325 TABLET, FILM COATED ORAL at 22:02

## 2019-10-30 RX ADMIN — TICAGRELOR 90 MG: 90 TABLET ORAL at 20:01

## 2019-10-30 RX ADMIN — ACETAMINOPHEN 650 MG: 325 TABLET, FILM COATED ORAL at 12:38

## 2019-10-30 RX ADMIN — THIAMINE HCL TAB 100 MG 100 MG: 100 TAB at 07:53

## 2019-10-30 RX ADMIN — ALTEPLASE 2 MG: 2.2 INJECTION, POWDER, LYOPHILIZED, FOR SOLUTION INTRAVENOUS at 04:55

## 2019-10-30 ASSESSMENT — MIFFLIN-ST. JEOR: SCORE: 1884.24

## 2019-10-30 ASSESSMENT — PAIN DESCRIPTION - DESCRIPTORS
DESCRIPTORS: SORE;ACHING
DESCRIPTORS: SORE
DESCRIPTORS: SORE
DESCRIPTORS: PATIENT UNABLE TO DESCRIBE
DESCRIPTORS: SORE

## 2019-10-30 ASSESSMENT — ACTIVITIES OF DAILY LIVING (ADL)
ADLS_ACUITY_SCORE: 15
ADLS_ACUITY_SCORE: 13
ADLS_ACUITY_SCORE: 15
ADLS_ACUITY_SCORE: 13
ADLS_ACUITY_SCORE: 13
ADLS_ACUITY_SCORE: 15

## 2019-10-30 NOTE — PLAN OF CARE
ICU End of Shift Summary. See flowsheets for vital signs and detailed assessment.    Changes this shift: Restless and agitated at HS (pulling at lines/tubes); precedex continuous overnight for sleep promotion; precedex off @ 0615. C/O of back and chest discomfort; PRN oxycodone, tylenol and fentanyl given. One time dose of IV metoprolol 5 mg ordered for HR in the 130's. Continues to be in A-flutter. Levo off @ 0330. Amio @ 0.5. Weaned from HFNC at 40% to 4 LPM via NC. Unable to produce sputum. Straight cath for 475 mL's at 0045. Minimal diarrhea overnight. Alteplase ordered for left CVC; blood return noted x3 lumens. K+ 3.6 replaced with 20 mEQ.     Plan:  Wean supplemental oxygen as tolerated and manage delirium.

## 2019-10-30 NOTE — PROGRESS NOTES
WO Nurse Inpatient Pressure Injury Assessment   Reason for consultation: Evaluate and treat Left upper lip PI, L) nare   New -  Perianal area    ASSESSMENT  Pressure Injury: on left upper lip , hospital acquired ,   This is a Medical Device Related Pressure Injury (MDRPI) due to ETT  Pressure Injury is Stage Mucosal   Contributing factor of the pressure injury: pressure and immobility  Status: follow up assessment, healed  Recommend provider order: NA     L) Nare - No injury, blanchable erythema     Intergluteal cleft and Scrotum- Moisture Associated Skin damage     TREATMENT PLAN  Left Upper Lip wound: Apply vaseline BID and PRN.     L) nare- May apply a piece of Mepilex lite dressing in need of HFNC usage.       scrotum and surrounding skin-     Cleanse the area with Be cleanse and protect, very gently with soft cloth.    Apply critic aid paste.    With repeat application, do not scrub the paste, only remove soiled paste and reapply.    If complete removal of paste is necessary use baby oil/mineral oil and soft wash cloth.    Ensure pt has Robbie-cushion (#[id number]) while sitting up in the chair.    Use only one Covidien pad in between mattress and pt. No diaper while in bed.    POC for wound located on Intergluteal cleft:    Cleanse the area with NS and pat dry.    Apply No sting film barrier to periwound skin.    Cover wound with Mepilex.     Change dressing Q other days.    Turn and reposition Q 2hrs.    Ensure pt has Robbie-cushion while sitting up in the chair.    FYI- If pt has constant incontinent loose stools needing dressing changes Q shift please discontinue the Mepilex dressing and apply criticaid barrier paste BID and PRN.    Orders Written  WO Nurse follow-up plan:weekly  Nursing to notify the Provider(s) and re-consult the WOC Nurse if wound(s) deteriorates or new skin concern.    Patient History  According to provider note(s):  This is a 53-year-old man with past history of alcohol abuse who was  admitted on 10/13 as a transfer from V. tach/V. fib status post CPR for 40 minutes which was followed by ROSC.  He was initially stabilized at [facility] and subsequently transferred to [place], where he underwent PCI of LAD, IABP insertion, and VA ECMO cannulation.  Following transfer to [facility] he was ECMO decannulated on 10/18.  He was extubated on 10/25.  His course was complicated by hemothorax status post chest tube with complicating migration of his chest tube such that the sidehole was external and this chest tube was ultimately removed on 10/28.    ECMO decannulation 10/18     Objective Data  Containment of urine/stool: Incontinence Protocol    Current Diet/ Nutrition:  Orders Placed This Encounter      Full Liquid Diet    Output:   I/O last 3 completed shifts:  In: 3138.11 [I.V.:728.11; NG/GT:1090]  Out: 4390 [Urine:4090; Stool:300]    Risk Assessment:   Sensory Perception: 3-->slightly limited  Moisture: 3-->occasionally moist  Activity: 2-->chairfast  Mobility: 3-->slightly limited  Nutrition: 3-->adequate  Friction and Shear: 2-->potential problem  Franck Score: 16    Labs:   Recent Labs   Lab 10/30/19  0338 10/29/19  0338 10/28/19  0328  10/26/19  0346   ALBUMIN  --   --  2.2*  --   --    HGB 8.9* 8.2* 8.1*   < > 8.6*   INR  --  1.17* 1.14   < > 1.23*   WBC 8.2 9.5 9.3   < > 11.8*   CRP  --   --   --   --  190.0*    < > = values in this interval not displayed.       Physical Exam  Skin inspection: focused lip, L) nare, coccyx, perianal area, BL buttocks, and intergluteal cleft, scrotum    Wound Location:  Left upper lip  Date of last Photo 10/21  Wound History: pt has had ETT since 10/13. Wound found when patient arrived back from OR. No documented ETT repositioning from 2741-9351 prior to injury being found.   Measurements (length x width x depth, in cm)-healed    Intergluteal cleft (below coccyx) and bottom of scrotum-   Date of last photo- Unable to take picture as pt unable to tolerate side  laying for longer period due to pain. Pt wiggles consistently per RN and wife.  Wound History: Has a hx of incontinent loose stools and internal fecal management was placed yesterday which was removed today.  Measurements (length x width x depth, in cm) -  Right side of the intergluteal cleft- 1.2x0.6x0.1 and Left side of the intergluteal cleft 1 x 0.5 x0.1cm  Scrotum- 1.5x0.6x0.1 cm  Wound Base: 100 % red dermal tissue  Tunneling N/A  Undermining N/A  Palpation of the wound bed: normal  Periwound skin: multiple scattered healing moisture associated skin damage more on upper posterior thigh closer to scrotum and lower buttocks  Color: dull pink  Temperature: normal   Drainage:, scant serous  Description of drainage: serous  Odor: none  Pain: yes with turning on his left side due to chest tube      Interventions  Current support surface: Standard  Low air loss mattress  Current off-loading measures: foam dressing  Visual inspection of wound(s) completed   Wound Care: was done per plan of care.  Supplies: discussed with RN  Educated provided: importance of repositioning and plan of care  Education provided to: patient , spouse and nurse  Discussed importance of:repositioning every 2 hours and their role in pressure injury prevention  Discussed plan of care with Patient, Family and Nurse    Jocelin Sales RN CWOCN

## 2019-10-30 NOTE — PROGRESS NOTES
Social Work Services Progress Note    Hospital Day: 18  Date of Initial Social Work Evaluation:  10/18/2019  Collaborated with:  Pt's wife Jackelyn    Data:  Joel Campbell is a 53 year old male who was cannulated for ECMO 10/13/19 due to refractory VF arrest    Intervention:  SW met with pt's wife briefly for support. Wife asked if SW was aware of any agencies that might provide assistance with paying their mortgage, as she and pt are both unable to work at this time due to pt's illness. She reports she is getting help from her work as her coworkers took up a collection and her boss is paying for her premiums to ensure she doesn't lose health insurance.     Pt and wife are from [place], [place], an area SW is not familiar with. SW looked online and found a list of resources through their Pending sale to [facility] website, along with the [employer] [place] [id number]. SW provided these to Jackelyn. Jackelyn expressed appreciation and stated that this will get her started. SW reminded Jackelyn that if she needs further letters or paperwork documenting pt's condition this team is available to her. Jackelyn voiced understanding and appreciation.    Assessment:  Pt remains critically ill    Plan:    Anticipated Disposition:  TBD    Barriers to d/c plan:  Pt remains critically ill    Follow Up:  SW will continue to remain available for patient and family support, discharge planning, other resources and support PRN.    JERRY Anderson, [facility]  ICU    P: [phone]  Pager: [phone]

## 2019-10-30 NOTE — PLAN OF CARE
Discharge Planner SLP   Patient plan for discharge: Unknown  Current status: Pt seen for dysphagia management; pt with variable JELANI, requires cues to maintain alertness and consistently attend to task. Recommend diet advancement to dysphagia 2 (diced) diet/thin liquids with 1:1 assistance for cueing. Pt to be fully alert and upright for all PO, taking small bites/sips at slow rate. Ensure oral cavity is clear after each bolus. SLP will follow.  Barriers to return to prior living situation: Mental status, modified diet, weakness  Recommendations for discharge: TCU vs ARU  Rationale for recommendations: Pt below baseline for swallow function; will benefit from ongoing ST targeting dysphagia. Pt may be a good candidate for intensive rehab pending progress       Entered by: Deborah Watts 10/30/2019 10:44 AM

## 2019-10-30 NOTE — PLAN OF CARE
PT -   Discharge Planner PT   Patient plan for discharge: Not discussed  Current status: Pt continues to demonstrate impaired cognition. Not able to correctly state where he is. Pt completed supine <> sit EOB with Min A and vc. Pt completed 3x STS from EOB and 3x STS from chair with Min Ax2 and vc. Pt was able to complete standing pivot from EOB to chair with mod Ax2 and vc. Pt demonstrated improved mobility today. Pt c/o of dizziness throughout standing session minor drop in systolic BP and hypotension noted.   Barriers to return to prior living situation: Medical status, cognition, balance, coordination   Recommendations for discharge: ARU  Rationale for recommendations: Pt is below baseline and requires skilled therapy to address functional mobility deficits. Pt has interdisciplinary needs, can tolerate 3 hrs of therapy, and has good support.   Entered by: BRANDON NIEVES 10/30/2019 9:06 AM

## 2019-10-30 NOTE — PLAN OF CARE
ICU End of Shift Summary. See flowsheets for vital signs and detailed assessment.    Changes this shift: Patient pivoted to chair with gait belt and two assist. In chair and awake from 2699-4597, then again from 9630-6052. Patient was awake and interacting with staff and his spouse most of the day. Mental status is labile but improving. Disoriented to situation and place majority of the time. Impulsive but pleasant and redirectable. PRN oxycodone, fentanyl, and tylenol for sternal pain along with lidocaine patches. Bedside echo done today. Levophed gtt off since 1400. Oral midodrine started. Nasal cannula at 5 LPM. Strong cough. Independently administering incentive spirometer. SLP cleared patient for dysphagia diet 2 with thin liquids, patient drinking and eating small amounts but has very poor appetite. Tube feed schedule adjusted to cyclical overnight feeds to promote oral intake. Condom cath removed, patient voiding into urinal. Rectal tube removed r/t low output, no BM since. WOC saw excoriated butt and scrotum, wound care orders placed.     Plan:  Manage/treat delirium. Promote rest/wake cycle. Continue plan of care. Notify MD of changes.       Problem: Adult Inpatient Plan of Care  Goal: Readiness for Transition of Care  10/30/2019 1752 by Gosia Gates, RN  Outcome: Improving     Problem: Cardiac Disease Comorbidity  Goal: Cardiac Disease  Description  Patient comorbidity will be monitored for signs and symptoms of Cardiac Disease.  Problems will be absent, minimized or managed by discharge/transition of care.  10/30/2019 1752 by Gosia Gates, RN  Outcome: Improving

## 2019-10-30 NOTE — PROGRESS NOTES
Nutrition Services - Brief Note    Pt was cleared for a DD2 diet by SLP and per RN reports having minimal appetite. Requesting cycled TF to help improve appetite.     Interventions already implemented by the RD:  Changed TF to an 18 hr cycle (TwoCal @ 75 mL/hr x 18 hrs) to help improve appetite. If pt tolerates the increase in rate, can decrease length of feeds every noc (16 hr cycle, 14 hr cycle, etc).  Ordered calorie counts to start 10/31.    Recommendations:  Notify RD with any TF tolerance issues.  Continue TF until pt is meeting at least 60% of his kcal and protein needs via PO intake alone.    RD will continue to follow.  Melani Rodriguez, NARA, LD  ([facility] dietitian)

## 2019-10-30 NOTE — PROGRESS NOTES
10/29/19 1400   Quick Adds   Type of Visit Initial PT Evaluation   Living Environment   Lives With spouse   Living Arrangements house   Home Accessibility stairs to enter home;stairs within home   Number of Stairs, Main Entrance 3   Stair Railings, Main Entrance railings safe and in good condition  (one side)   Number of Stairs, Within Home, Primary   (1 flight to basement)   Transportation Anticipated family or friend will provide;car, drives self   Living Environment Comment Patient lives with wife, has 2 adult daughters.   Self-Care   Usual Activity Tolerance good   Current Activity Tolerance fair   Regular Exercise No   Activity/Exercise/Self-Care Comment Patient was working full-time at active job.   Functional Level Prior   Ambulation 0-->independent   Transferring 0-->independent   Toileting 0-->independent   Bathing 0-->independent   Communication 0-->understands/communicates without difficulty   Swallowing 0-->swallows foods/liquids without difficulty   Cognition 0 - no cognition issues reported   Fall history within last six months no   Which of the above functional risks had a recent onset or change? ambulation;transferring;toileting;bathing;dressing   Prior Functional Level Comment Independent   General Information   Onset of Illness/Injury or Date of Surgery - Date 10/13/19   Referring Physician Bronson Dsouza MD   Patient/Family Goals Statement to return home   Pertinent History of Current Problem (include personal factors and/or comorbidities that impact the POC) 53 year old male who was admitted on 10/13/19 as a transfer from [place], [place] for refractory VT/VF arrest s/p PCI to [facility] and placement on peripheral VA ECMO. IABP removed successfully 10/20/2019. Successful extubation 10/25/2019. Still somewhat delirious. Waxing and waning fevers and leukocytosis remains an issue.    Precautions/Limitations fall precautions;oxygen therapy device and L/min  (HFNC FiO2 50%)   General Observations Patient  greeted supine in bed, agreeable to PT, RN ok'd session.   General Info Comments Activity: up in chiar   Cognitive Status Examination   Orientation   (oriented to self only)   Level of Consciousness alert;confused   Personal Safety and Judgment impaired;at risk behaviors demonstrated;impulsive   Memory impaired   Pain Assessment   Patient Currently in Pain Yes, see Vital Sign flowsheet  (rib/chest pain)   Posture    Posture Protracted shoulders   Range of Motion (ROM)   ROM Comment BLE WFL   Strength   Strength Comments generalized weakness and deconditioning, demonstrates at least 3+/5 BLE strength with mobility   Bed Mobility   Bed Mobility Comments mad A for supine rolling   Transfer Skills   Transfer Comments sit<>stand with min Ax2   Gait   Gait Comments N/A   Balance   Balance Comments impaired standing balance, requiring FWW for stability   Sensory Examination   Sensory Perception no deficits were identified   General Therapy Interventions   Planned Therapy Interventions balance training;bed mobility training;gait training;neuromuscular re-education;strengthening;transfer training;risk factor education;home program guidelines;progressive activity/exercise   Clinical Impression   Criteria for Skilled Therapeutic Intervention yes, treatment indicated   PT Diagnosis impaired functional mobility   Influenced by the following impairments strength, balance, activity tolerance, pain, confusion/AMS, O2 needs   Functional limitations due to impairments bed mobility, transfers, gait, stairs, functional endurance   Clinical Presentation Evolving/Changing   Clinical Presentation Rationale PMH/comorbidities, clinical judgement   Clinical Decision Making (Complexity) Moderate complexity   Therapy Frequency 6x/week   Predicted Duration of Therapy Intervention (days/wks) 3 weeks   Anticipated Discharge Disposition Acute Rehabilitation Facility   Risk & Benefits of therapy have been explained Yes   Patient, Family & other  "staff in agreement with plan of care Yes   [facility] TM \"6 Clicks\"   2016, Trustees of [facility], under license to [employer].  All rights reserved.   6 Clicks Short Forms Basic Mobility Inpatient Short Form   [facility]  \"6 Clicks\" V.2 Basic Mobility Inpatient Short Form   1. Turning from your back to your side while in a flat bed without using bedrails? 3 - A Little   2. Moving from lying on your back to sitting on the side of a flat bed without using bedrails? 2 - A Lot   3. Moving to and from a bed to a chair (including a wheelchair)? 1 - Total   4. Standing up from a chair using your arms (e.g., wheelchair, or bedside chair)? 2 - A Lot   5. To walk in hospital room? 1 - Total   6. Climbing 3-5 steps with a railing? 1 - Total   Basic Mobility Raw Score (Score out of 24.Lower scores equate to lower levels of function) 10   Total Evaluation Time   Total Evaluation Time (Minutes) 8     "

## 2019-10-30 NOTE — PLAN OF CARE
OT/CR 4C: Discharge Planner OT   Patient plan for discharge: Pt unable to state  Current status: Pt continues to be confused, disoriented and mildly impulsive though gradually improving.  More redirectable and pleasant today.  Oriented to year and state but not place, month or situation despite reorientation.  Pt SBA supine to EOB with cues and effort, CGA x 2 sit to stand x 7 with FWW during session.  Mod A for LE dressing  Barriers to return to prior living situation: weakness, deconditioning, impaired cognition, poor safety awareness/impulsivity, acute medical needs  Recommendations for discharge: ARU  Rationale for recommendations: Pt is currently below baseline with regards to mobility and independence with self cares and will benefit from continued skilled therapy intervention to address deficits.  Anticipate pt will tolerate 3hrs therapy/day.         Entered by: Xuan Sarmiento 10/30/2019 4:20 PM

## 2019-10-30 NOTE — PROGRESS NOTES
GENERAL ID SERVICE CONSULTATION     Patient:  Joel Campbell   Date of birth 1965, Medical record number [id number]  Date of Visit:  10/30/2019  Date of Admission: 10/13/2019  Consult Requester:Anshu Roberts MD            Assessment and Recommendations:   ASSESSMENT:  1. Fever that is now resolved without clear clinical signs of infection in a patient who completed empiric abx within the last week and subsequently successfully extubated. While CT was concerning for pna, it generally looks unchanged from prior and his clinical status is not c/w pulmonary parenchymal process. No clear evidence ongoing infection and he's stable off antibiotics.  2. VT/VF arrest s/p CPR for 40 min with chest wall trauma and hemothorax s/p chest tube s/p removal today.      RECOMMENDATION:  1. Would continue off abx and watching closely; low threshold to re-culture if febrile given his risk for BERENICE, especially pna given chest wall pain from chest wall trauma which causes him to splint respirations.    Thanks for this consult. ID will sign off, but please page with additional questions!    Naty Sage MD   of Medicine, Division of Infectious Diseases  [facility] [phone]      ________________________________________________________________    Consult Question:.  Admission Diagnosis: ECMO  Chest Pain  Cardiac Arrest  Ventricular tachycardia (H)         History of Present Illness:     This is a 53-year-old man with past history of alcohol abuse who was admitted on 10/13 as a transfer from [facility] post CPR for 40 minutes which was followed by [facility].  He was initially stabilized at [facility] and subsequently transferred to [place], where he underwent PCI of LAD, IABP insertion, and VA ECMO cannulation.  Following transfer to [facility] he was ECMO decannulated on 10/18.  He was extubated on 10/25.  His course was complicated by hemothorax status post chest tube with complicating migration of  his chest tube such that the sidehole was external and this chest tube was ultimately removed on 10/28.    He was treated with Zosyn and vancomycin from 10/13 to 10/23, with concern for possible aspiration pneumonia.  During his course of therapy, his CRP leon from 69-1 90.  His white blood cell count however was not significantly elevated.  He had intermittent elevated temps, interestingly the T-max was 102.8 on 10/23, when his antibiotics were stopped.  Following antibiotics discontinuation, he was weaned off pressors and successfully extubated (as above, on 10/25).    He developed a temperature of 101.5 on 10/27, prompting resumption of vancomycin and Zosyn.    He has had multiple cultures to date, all of which have been negative.  Blood cultures were negative on 10/16, 10/20, 10/21, 10/23, 10/26, and 10/21.  He had negative sputum culture on 10/14 and 10/26.  He had a negative BAL on 10/19.  He remains on high flow nasal cannula.  His mental status has been somewhat waxing and waning, but generally he is able to follow commands. ID was consulted to assist with his management.    Interval 24hrs events:   No acute events. Seems a bit more lucid today. Recognizes that he remains disoriented to place. + Chest pain with deep breath, no other complaints but accuracy of report questionable.        Recent culture results include:  Culture Micro   Date Value Ref Range Status   10/29/2019 Canceled, Test credited  Final   10/29/2019 (A)  Final    >10 Squamous epithelial cells/low power field indicates oral contamination. Please   recollect.     10/28/2019 No growth after 2 days  Preliminary   10/28/2019 No growth after 2 days  Preliminary   10/26/2019 Heavy growth  Normal danny    Final   10/26/2019 No growth after 4 days  Preliminary   10/26/2019 No growth after 4 days  Preliminary   10/25/2019 No growth  Final   10/24/2019 No growth  Final   10/24/2019 No growth  Final                Current Medications (antimicrobials  listed in bold):       allopurinol  200 mg Oral Daily     aspirin  81 mg Per Feeding Tube Daily     digoxin  125 mcg Oral Daily     folic acid  1 mg Oral Daily     furosemide  40 mg Intravenous BID     gabapentin  300 mg Oral BID     gabapentin  600 mg Oral At Bedtime     heparin ANTICOAGULANT  5,000 Units Subcutaneous Q12H     influenza vaccine adult (product based on age)  0.5 mL Intramuscular Prior to discharge     insulin aspart  1-4 Units Subcutaneous Q4H     ipratropium  0.5 mg Nebulization BID     ketamine 5% gabapentin 8% lidocaine 2.5%   Topical TID     levETIRAcetam  750 mg Oral or Feeding Tube BID     lidocaine  2 patch Transdermal Q24H     lidocaine   Transdermal Q8H     lidocaine   Transdermal Q24h     metoprolol tartrate  12.5 mg Oral BID     midodrine  10 mg Oral TID w/meals     multivitamins w/minerals  15 mL Per Feeding Tube Daily     omeprazole  20 mg Oral or Feeding Tube BID AC     protein modular  1 packet Per Feeding Tube TID     QUEtiapine  200 mg Oral or Feeding Tube BID     ramelteon  8 mg Oral At Bedtime     sodium chloride (PF)  3 mL Intracatheter Q8H     ticagrelor  90 mg Oral or Feeding Tube BID     vitamin B1  100 mg Oral Daily            Allergies:   No Known Allergies         Physical Exam:   Vitals were reviewed  Patient Vitals for the past 24 hrs:   BP Temp Temp src Pulse Heart Rate Resp SpO2 Weight   10/30/19 1630 98/74 -- -- 86 93 21 98 % --   10/30/19 1600 104/79 96.7  F (35.9  C) Axillary 88 84 20 97 % --   10/30/19 1545 99/76 -- -- 86 96 (!) 36 92 % --   10/30/19 1530 96/72 -- -- 81 87 28 98 % --   10/30/19 1515 100/69 -- -- 82 81 (!) 37 96 % --   10/30/19 1500 99/69 -- -- 80 79 11 96 % --   10/30/19 1445 101/68 -- -- 85 90 19 (!) 89 % --   10/30/19 1430 96/67 -- -- 77 75 21 97 % --   10/30/19 1415 94/69 -- -- 86 81 15 97 % --   10/30/19 1400 92/71 -- -- 79 82 29 97 % --   10/30/19 1345 91/65 -- -- 79 82 26 99 % --   10/30/19 1330 90/62 -- -- 83 83 (!) 31 (!) 75 % --   10/30/19  1315 93/69 -- -- 88 89 14 95 % --   10/30/19 1300 103/71 -- -- 87 86 26 96 % --   10/30/19 1245 103/77 -- -- 85 90 28 97 % --   10/30/19 1230 103/73 -- -- 86 87 24 97 % --   10/30/19 1215 (!) 89/72 -- -- 84 90 19 98 % --   10/30/19 1200 93/72 97.5  F (36.4  C) Oral 82 87 24 97 % --   10/30/19 1145 110/59 -- -- -- 86 16 94 % --   10/30/19 1130 (!) 89/65 -- -- 78 81 (!) 32 98 % --   10/30/19 1115 92/68 -- -- 92 89 26 95 % --   10/30/19 1100 98/65 -- -- 90 87 24 97 % --   10/30/19 1045 90/67 -- -- 87 85 26 97 % --   10/30/19 1030 96/70 -- -- 84 81 27 97 % --   10/30/19 1015 (!) 88/59 -- -- 84 86 24 98 % --   10/30/19 1000 (!) 85/63 -- -- 77 81 28 96 % --   10/30/19 0945 (!) 87/60 -- -- -- 86 28 95 % --   10/30/19 0930 (!) 88/60 -- -- 86 93 27 95 % --   10/30/19 0915 93/59 -- -- -- 84 20 97 % --   10/30/19 0900 (!) 86/66 -- -- -- 94 25 91 % --   10/30/19 0830 98/66 -- -- 75 81 (!) 32 92 % --   10/30/19 0800 (!) 112/101 97.8  F (36.6  C) Oral 114 101 16 94 % --   10/30/19 0730 106/75 -- -- 88 101 22 93 % --   10/30/19 0715 -- -- -- -- 111 23 94 % --   10/30/19 0700 97/68 -- -- 87 80 (!) 33 93 % --   10/30/19 0630 99/63 -- -- 89 76 (!) 32 95 % --   10/30/19 0605 (!) 86/64 -- -- -- 74 (!) 31 96 % --   10/30/19 0600 (!) 71/50 -- -- 87 83 (!) 32 95 % --   10/30/19 0540 -- -- -- -- -- -- 91 % --   10/30/19 0530 95/67 -- -- 79 78 30 92 % --   10/30/19 0515 94/72 -- -- 76 80 (!) 33 93 % --   10/30/19 0500 (!) 89/71 -- -- 77 76 (!) 35 96 % --   10/30/19 0445 (!) 85/59 -- -- 74 65 (!) 34 95 % --   10/30/19 0430 101/63 -- -- 74 75 27 96 % --   10/30/19 0415 93/69 -- -- 73 75 28 96 % --   10/30/19 0400 100/65 97.7  F (36.5  C) Axillary 65 65 23 99 % 96.3 kg (212 lb 4.9 oz)   10/30/19 0345 91/61 -- -- 66 66 28 98 % --   10/30/19 0330 90/67 -- -- 79 70 28 99 % --   10/30/19 0315 95/76 -- -- 65 87 13 97 % --   10/30/19 0300 91/67 -- -- 70 101 30 96 % --   10/30/19 0245 96/66 -- -- 66 66 27 96 % --   10/30/19 0230 (!) 83/64 -- -- 73 65  17 95 % --   10/30/19 0215 (!) 86/64 -- -- 64 64 30 96 % --   10/30/19 0213 (!) 85/61 -- -- -- 64 28 98 % --   10/30/19 0200 (!) 83/53 -- -- 65 66 27 96 % --   10/30/19 0145 95/72 97.4  F (36.3  C) Axillary 133 134 23 94 % --   10/30/19 0130 90/59 -- -- 129 99 28 94 % --   10/30/19 0115 96/64 -- -- 116 128 26 93 % --   10/30/19 0100 (!) 86/64 -- -- 114 87 22 97 % --   10/30/19 0045 94/63 -- -- 89 77 26 98 % --   10/30/19 0030 101/79 -- -- 103 88 24 96 % --   10/30/19 0015 (!) 87/66 -- -- 97 105 22 97 % --   10/30/19 0012 97/67 -- -- 77 91 (!) 31 95 % --   10/30/19 0000 (!) 89/78 97.4  F (36.3  C) Axillary 98 123 18 96 % --   10/29/19 2345 93/71 -- -- 83 103 22 97 % --   10/29/19 2339 -- -- -- -- -- -- 98 % --   10/29/19 2330 (!) 111/97 -- -- 103 114 23 94 % --   10/29/19 2319 -- -- -- -- -- -- (!) 89 % --   10/29/19 2316 -- -- -- -- -- -- (!) 88 % --   10/29/19 2315 (!) 85/73 -- -- 112 115 30 92 % --   10/29/19 2314 -- -- -- -- -- -- (!) 89 % --   10/29/19 2300 90/71 -- -- 72 86 26 96 % --   10/29/19 2245 (!) 78/57 -- -- 86 76 22 99 % --   10/29/19 2230 (!) 77/57 -- -- 68 74 27 100 % --   10/29/19 2217 (!) 82/58 -- -- 73 82 25 100 % --   10/29/19 2215 (!) 60/41 97.6  F (36.4  C) Axillary 92 71 27 100 % --   10/29/19 2200 90/66 -- -- 73 81 25 100 % --   10/29/19 2145 90/68 -- -- 85 69 25 100 % --   10/29/19 2130 (!) 82/65 -- -- 73 79 23 100 % --   10/29/19 2115 (!) 82/56 -- -- 67 74 23 100 % --   10/29/19 2100 93/44 -- -- 67 66 30 100 % --   10/29/19 2045 (!) 76/52 -- -- 66 80 30 99 % --   10/29/19 2030 (!) 77/58 -- -- 99 104 (!) 33 98 % --   10/29/19 2015 (!) 79/68 -- -- 130 112 28 97 % --   10/29/19 2011 -- -- -- -- -- -- 99 % --   10/29/19 2000 103/81 98.7  F (37.1  C) Axillary 133 133 19 99 % --   10/29/19 1945 (!) 123/110 -- -- 133 134 23 (!) 81 % --   10/29/19 1930 (!) 121/90 -- -- 134 134 15 92 % --   10/29/19 1915 106/84 -- -- 135 135 16 93 % --   10/29/19 1900 (!) 119/91 -- -- 135 135 13 93 % --   10/29/19  1845 116/88 -- -- 133 133 14 (!) 88 % --   10/29/19 1830 118/72 -- -- 133 134 16 (!) 88 % --   10/29/19 1815 110/85 -- -- 133 134 14 91 % --   10/29/19 1800 116/66 -- -- 134 134 22 93 % --   10/29/19 1745 117/81 -- -- 133 134 (!) 31 93 % --   10/29/19 1730 122/86 -- -- 134 134 21 94 % --     Ranges for his vital signs:  Temp:  [96.7  F (35.9  C)-98.7  F (37.1  C)] 96.7  F (35.9  C)  Pulse:  [] 86  Heart Rate:  [] 93  Resp:  [11-37] 21  BP: ()/() 98/74  FiO2 (%):  [40 %] 40 %  SpO2:  [75 %-100 %] 98 %    Physical Examination:  GENERAL:   in bed in no acute distress.   HEENT:  Head is normocephalic, atraumatic   EYES:  Eyes have anicteric sclerae without conjunctival injection or stigmata of endocarditis.    ENT:  Very poor dentition  LUNGS:  Splinting due to presumably pain from rib fractures related to CPR. Improving use of accessory resp muscles  CARDIOVASCULAR:  Regular rate   ABDOMEN:  Soft           Laboratory Data:     Inflammatory Markers    Recent Labs   Lab Test 10/26/19  0346 10/18/19  0336 10/17/19  0346 10/16/19  0351 10/15/19  0341 10/14/19  0424 10/13/19  1947   * 82* 83* 68* 33* 14 8   .0* 190.0* 290.0* 270.0* 180.0* 93.0* 69.0*       Hematology Studies    Recent Labs   Lab Test 10/30/19  0338 10/29/19  0338 10/28/19  0328 10/27/19  0419 10/26/19  0346 10/25/19  0426   WBC 8.2 9.5 9.3 10.8 11.8* 12.6*   ANEU 5.4 6.8 7.1 8.2 8.8* 9.5*   AEOS 0.6 0.7 0.2 0.5 0.5 0.3   HGB 8.9* 8.2* 8.1* 8.3* 8.6* 8.5*    100 100 101* 100 100   * 558* 520* 465* 412 368       Immune Globulin Studies  No lab results found.    Metabolic Studies     Recent Labs   Lab Test 10/30/19  1104 10/30/19  0338 10/29/19  0338 10/28/19  0328 10/27/19  1605 10/27/19  0419   NA  --  140 143 144 142 144   POTASSIUM 4.1 3.6 3.7 3.8 4.6 4.1   CHLORIDE  --  104 104 105 105 108   CO2  --  32 36* 34* 34* 34*   BUN  --  35* 36* 41* 39* 41*   CR  --  0.65* 0.69 0.80 0.68 0.75   GFRESTIMATED  --   >90 >90 >90 >90 >90       Hepatic Studies    Recent Labs   Lab Test 10/28/19  0328 10/25/19  0426 10/24/19  1507 10/24/19  0446 10/23/19  1629 10/23/19  0401   BILITOTAL 1.4* 1.1 1.2 1.5* 1.6* 1.6*   ALKPHOS 74 84 87 96 107 112   ALBUMIN 2.2* 2.3* 2.1* 2.2* 2.3* 2.3*   AST 44 35 40 40 42 41   ALT 30 21 25 23 25 26       Thyroid Studies    Recent Labs   Lab Test 10/29/19  0338 10/22/19  0417   TSH 5.46* 7.44*   T4  --  0.73*       Microbiology:  Culture Micro   Date Value Ref Range Status   10/29/2019 Canceled, Test credited  Final   10/29/2019 (A)  Final    >10 Squamous epithelial cells/low power field indicates oral contamination. Please   recollect.     10/28/2019 No growth after 2 days  Preliminary   10/28/2019 No growth after 2 days  Preliminary   10/26/2019 Heavy growth  Normal danny    Final   10/26/2019 No growth after 4 days  Preliminary   10/26/2019 No growth after 4 days  Preliminary   10/25/2019 No growth  Final   10/24/2019 No growth  Final   10/24/2019 No growth  Final   10/24/2019 No growth  Final   10/23/2019 No growth  Final   10/23/2019 No growth  Final   10/22/2019 No growth  Final   10/21/2019 No growth  Final   10/21/2019 No growth  Final   10/20/2019 No growth  Final   10/20/2019 No growth  Final   10/19/2019 No growth  Final   10/19/2019 Culture negative monitoring continues  Preliminary   10/19/2019 No growth  Final   10/18/2019 No growth  Final   10/17/2019 No growth  Final   10/17/2019 No growth  Final   10/16/2019 No growth  Final   10/15/2019 No growth  Final   10/14/2019 Light growth  Normal danny    Final       Urine Studies    Recent Labs   Lab Test 10/25/19  0457 10/14/19  0438   LEUKEST Negative Negative   WBCU 1 2       Vancomycin Levels    Recent Labs   Lab Test 10/21/19  0616 10/19/19  1541 10/17/19  0555   VANCOMYCIN 23.5 11.5 9.6

## 2019-10-30 NOTE — PROGRESS NOTES
[facility]   Cardiology Progress Note    Assessment & Plan   53 year old male who was admitted on 10/13/19 as a transfer from [place], [place] for refractory VT/VF arrest s/p PCI to LAD and placement on peripheral [facility] ECMO. IABP removed successfully 10/20/2019. Successful extubation 10/25/2019. Still somewhat delirious. Waxing and waning fevers and leukocytosis remains an issue.     Changes Today:  - up to chair during the day  - continuing off abx  - no longer requiring hi-flow NC  - midodrine 10 mg TID for transient vasoplegia  - some remnant sutures in R chest from chest tube    Neurology: Initially intubated, sedated, paralyzed. Initially cooled to 34 degrees, now rewarmed.   - NeuroCrit consulted, initiated Keppra 750 mg BID on 10/17 for possible seizure activity, will continue as outpatient until neuro follow up  - EEG with small amount of epileptiform activity, due to pt becoming more alert, stopped EEG [id number]/627999  - CT Head 10/16 with smal right paracentral lesion, new right hemicerebellum lesion concerning for stroke.   Cardiovascular / Hemodynamics: Refractory VF arrest s/p peripheral V-A ECMO at OSH on 10/13/19  Persistent Afib, [id number] 3  TTE: LVEF 5-10%, increase to 15% on 1L  - Wean pressors/inotropes as able, currently not on any  - Continue ASA 81mg and ticagrelor 90 mg BID  - Hold lipitor for now given likely hepatic injury during arrest  - Hold ACE/ARB for now given likely reduced renal fxn after arrest  - gentle start of lopressor for CAD  - digoxin for Afib rate ctrl  - holding anticoagulation for now due to recent chest trauma, consider in future   Pulmonary: Large hemothorax on 10/14 s/p right-sided chest tube.  - Daily CXR  - extubated 10/25/2019  - chest tube removal 10/28/2019   GI and Nutrition: Per Pt's wife, pt has EtOH use disorder. Initial oncern for possible GIB given black OG output, but this has resolved.  - Monitor BID LFTs  - NJ placed at bedside on 10/16, started TFs  - Bowel regimen -  started  - GI Prophylaxis: no longer needed given extubation  - omeprazole 20mg BID given possible GI bleed, can consider weaning as outpatient   Renal, Fluid and Electrolytes: - Monitor urine output  - Maintain K>4 and Mg>2    Infectious Disease: No signs of infection. Leukocytosis c/w arrest. Blood cultures without growth.   - Initially vancomycin/zosyn x5 days for presumed aspiration - then extended given multiple invasive procedures. As of 10/28/2019 evening, refraining from restarting antibiotics  - ID consult for culture negative fevers and leukocytosis  - Daily blood cultures  - Monitor for signs of infection   Hematology and Oncology: Receiving ASA/ticagrelor for KAMRON. Large intramuscular hematoma of right pec and hemothorax on 10/14 with possible GIB as above.  - Cryo PRN fibrinogen < 200; FFP for INR >2  - Transfuse for Hgb<10, Plts<70  - US LE w/ arterial duplex per ECMO protocol   - DVT PPX: subcutaneous heparin for now  - Right chest wall hematoma: monitor exam and Hgb closely   Endocrinology: No known medical history. BG elevated.  - Insulin gtt PRN   Lines: Restraint: needed  Peripheral IV 10/16/19 Left Lower forearm (Active)       Arterial Line Radial (Active)       CVC Triple Lumen 10/13/19 Left Subclavian (Active)       Left Groin Interventional Procedure Access (Active)     R chest tube (placed 10/18/2019 by IR)    Current lines are required for patient management       Family updated today: Yes  Code Status: FULL    Pt was seen and examined with Dr. Juan Pablo Santillan MD, PhD, who agrees with the assessment and plan.    Bronson Dsouza MD. PhD  Cardiology Fellow    Interval History    Care team notes last 24 hours reviewed. Slept better last night. Off high flow nasal cannula. Able to make needs known regarding toileting.     ROS: 4-pt ROS otherwise negative.     Data reviewed today: I reviewed all new labs and imaging results over the last 24 hours. I personally reviewed:    Physical Exam   Temp:  "97.5  F (36.4  C) Temp src: Oral BP: 93/69 Pulse: 88 Heart Rate: 89 Resp: 14 SpO2: 95 % O2 Device: Nasal cannula Oxygen Delivery: 4 LPM  Vitals:    10/28/19 0400 10/29/19 0400 10/30/19 0400   Weight: 99.5 kg (219 lb 5.7 oz) 99.4 kg (219 lb 2.2 oz) 96.3 kg (212 lb 4.9 oz)     Vital Signs with Ranges  Temp:  [97.4  F (36.3  C)-98.7  F (37.1  C)] 97.5  F (36.4  C)  Pulse:  [] 88  Heart Rate:  [] 89  Resp:  [13-35] 14  BP: ()/() 93/69  FiO2 (%):  [35 %-40 %] 40 %  SpO2:  [81 %-100 %] 95 %  I/O last 3 completed shifts:  In: 3138.11 [I.V.:728.11; NG/GT:1090]  Out: 4390 [Urine:4090; Stool:300]    Heart Rate: 89, Blood pressure 93/69, pulse 88, temperature 97.5  F (36.4  C), temperature source Oral, resp. rate 14, height 1.89 m (6' 2.41\"), weight 96.3 kg (212 lb 4.9 oz), SpO2 95 %.  212 lbs 4.85 oz  GEN:  Mostly peaceful, sometimes agitated  CV:  S1/S2 heard  LUNGS: soft breath sounds  ABD: Soft BS, soft, mildly distended  EXT: warm, no lower extremity edema  NEURO: able to follow commands, taking deep breaths  SKIN: no acute lesions    Medications     amiodarone 0.5 mg/min (10/30/19 1200)     dexmedetomidine Stopped (10/30/19 0605)     IV fluid REPLACEMENT ONLY 25 mL/hr at 10/18/19 1929     norepinephrine Stopped (10/30/19 1300)     sodium chloride         allopurinol  200 mg Oral Daily     aspirin  81 mg Per Feeding Tube Daily     digoxin  125 mcg Oral Daily     folic acid  1 mg Oral Daily     furosemide  40 mg Intravenous BID     gabapentin  300 mg Oral BID     gabapentin  600 mg Oral At Bedtime     heparin ANTICOAGULANT  5,000 Units Subcutaneous Q12H     influenza vaccine adult (product based on age)  0.5 mL Intramuscular Prior to discharge     insulin aspart  1-4 Units Subcutaneous Q4H     ipratropium  0.5 mg Nebulization BID     ketamine 5% gabapentin 8% lidocaine 2.5%   Topical TID     levETIRAcetam  750 mg Oral or Feeding Tube BID     lidocaine  2 patch Transdermal Q24H     lidocaine   " Transdermal Q8H     lidocaine   Transdermal Q24h     metoprolol tartrate  12.5 mg Oral BID     midodrine  10 mg Oral TID w/meals     multivitamins w/minerals  15 mL Per Feeding Tube Daily     omeprazole  20 mg Oral or Feeding Tube BID AC     protein modular  1 packet Per Feeding Tube TID     QUEtiapine  200 mg Oral or Feeding Tube BID     ramelteon  8 mg Oral At Bedtime     sodium chloride (PF)  3 mL Intracatheter Q8H     ticagrelor  90 mg Oral or Feeding Tube BID     vitamin B1  100 mg Oral Daily       Data   Recent Labs   Lab 10/30/19  1104 10/30/19  0338 10/29/19  0338 10/28/19  0328  10/27/19  0419  10/25/19  0426  10/24/19  1507 10/24/19  0446   WBC  --  8.2 9.5 9.3  --  10.8   < > 12.6*  --  11.4* 10.6   HGB  --  8.9* 8.2* 8.1*  --  8.3*   < > 8.5*  --  8.7* 9.0*   MCV  --  100 100 100  --  101*   < > 100  --  100 99   PLT  --  569* 558* 520*  --  465*   < > 368  --  347 332   INR  --   --  1.17* 1.14  --  1.14   < > 1.25*  --   --  1.28*   NA  --  140 143 144   < > 144   < > 146*  --  142 146*   POTASSIUM 4.1 3.6 3.7 3.8   < > 4.1   < > 3.8   < > 4.1 3.9   CHLORIDE  --  104 104 105   < > 108   < > 110*  --  109 111*   CO2  --  32 36* 34*   < > 34*   < > 29  --  30 28   BUN  --  35* 36* 41*   < > 41*   < > 41*  --  38* 38*   CR  --  0.65* 0.69 0.80   < > 0.75   < > 0.88  --  0.84 0.91   ANIONGAP  --  4 3 5   < > 2*   < > 6  --  4 7   HEATHER  --  8.7 8.5 8.3*   < > 8.5   < > 8.4*  --  7.9* 8.2*   GLC  --  147* 155* 134*   < > 140*   < > 149*  --  144* 146*   ALBUMIN  --   --   --  2.2*  --   --   --  2.3*  --  2.1* 2.2*   PROTTOTAL  --   --   --  6.9  --   --   --  6.9  --  6.5* 6.5*   BILITOTAL  --   --   --  1.4*  --   --   --  1.1  --  1.2 1.5*   ALKPHOS  --   --   --  74  --   --   --  84  --  87 96   ALT  --   --   --  30  --   --   --  21  --  25 23   AST  --   --   --  44  --   --   --  35  --  40 40   TROPI  --   --   --   --   --   --   --  0.612*  --  0.827* 1.074*    < > = values in this interval not  displayed.       Recent Results (from the past 24 hour(s))   Echocardiogram Limited    Narrative    [id number]  [id number]  [id number]  [id number]^ASHLEY^CARLOS^ABBY           [facility],[place]  Echocardiography Laboratory  [address] [place] [id number]     Name: JF COLLAZO  MRN: [id number]  : 1965  Study Date: 10/30/2019 12:24 PM  Age: 53 yrs  Gender: Male  Patient Location: [facility] [place]  Reason For Study: Cardiac Arrest, Heart Failure - Left  Ordering Physician: CARLOS RAMIREZ  Referring Physician: SELF, REFERRED  Performed By: Perry Mandujano YULIA     BSA: 2.2 m2  Height: 74 in  Weight: 212 lb  HR: 92  BP: 92/68 mmHg  _____________________________________________________________________________  __        Procedure  Limited Portable Echo Adult. Contrast Optison. Optison (NDC #9860-3436-91)  given intravenously. Patient was given 6 ml mixture of 3 ml Optison and 6 ml  saline. 3 ml wasted.  _____________________________________________________________________________  __        Interpretation Summary  Limited study.  Ischemic cardiomyopathy, LVEF=25-30% with regional wall motion abnormalities  consistent with injury in a wrap-around LAD distribution.  RV size and function are probably normal on limited views.  No pericardial effusion is present.  This study was compared with the study from 10/20/19: There has been no  change.  _____________________________________________________________________________  __        Left Ventricle  Left ventricular size is normal. Left ventricular wall thickness is normal.  Severely (EF <30%) reduced left ventricular function is present. Akinesis of  all anterior, anteroseptal, and apical segments, consistent with injury in a  wrap-around LAD distribution.     Right Ventricle  RV size and function are probably normal on limited views.     Mitral Valve  The mitral valve cannot be assessed.     Aortic Valve  The aortic valve cannot be assessed.         Tricuspid Valve  The tricuspid valve cannot be assessed. Pulmonary artery systolic pressure  cannot be assessed.     Pulmonic Valve  The pulmonic valve cannot be assessed.     Vessels  The aorta root cannot be assessed. The inferior vena cava cannot be assessed.     Pericardium  No pericardial effusion is present.     Compared to Previous Study  This study was compared with the study from 10/20/19 . There has been no  change.     _____________________________________________________________________________  __                       Report approved by: Lynette Ruiz 10/30/2019 12:54 PM                 _____________________________________________________________________________  __

## 2019-10-31 ENCOUNTER — APPOINTMENT (OUTPATIENT)
Dept: OCCUPATIONAL THERAPY | Facility: CLINIC | Age: 54
DRG: 003 | End: 2019-10-31
Attending: INTERNAL MEDICINE
Payer: COMMERCIAL

## 2019-10-31 ENCOUNTER — APPOINTMENT (OUTPATIENT)
Dept: SPEECH THERAPY | Facility: CLINIC | Age: 54
DRG: 003 | End: 2019-10-31
Attending: INTERNAL MEDICINE
Payer: COMMERCIAL

## 2019-10-31 ENCOUNTER — APPOINTMENT (OUTPATIENT)
Dept: PHYSICAL THERAPY | Facility: CLINIC | Age: 54
DRG: 003 | End: 2019-10-31
Attending: INTERNAL MEDICINE
Payer: COMMERCIAL

## 2019-10-31 LAB
ANION GAP SERPL CALCULATED.3IONS-SCNC: 4 MMOL/L (ref 3–14)
BASE EXCESS BLDV CALC-SCNC: 9.3 MMOL/L
BASE EXCESS BLDV CALC-SCNC: 9.5 MMOL/L
BASOPHILS # BLD AUTO: 0 10E9/L (ref 0–0.2)
BASOPHILS NFR BLD AUTO: 0.5 %
BUN SERPL-MCNC: 31 MG/DL (ref 7–30)
CALCIUM SERPL-MCNC: 8.6 MG/DL (ref 8.5–10.1)
CHLORIDE SERPL-SCNC: 102 MMOL/L (ref 94–109)
CO2 SERPL-SCNC: 35 MMOL/L (ref 20–32)
CREAT SERPL-MCNC: 0.66 MG/DL (ref 0.66–1.25)
DIFFERENTIAL METHOD BLD: ABNORMAL
EOSINOPHIL # BLD AUTO: 0.6 10E9/L (ref 0–0.7)
EOSINOPHIL NFR BLD AUTO: 6.8 %
ERYTHROCYTE [DISTWIDTH] IN BLOOD BY AUTOMATED COUNT: 16.9 % (ref 10–15)
GFR SERPL CREATININE-BSD FRML MDRD: >90 ML/MIN/{1.73_M2}
GLUCOSE BLDC GLUCOMTR-MCNC: 108 MG/DL (ref 70–99)
GLUCOSE BLDC GLUCOMTR-MCNC: 108 MG/DL (ref 70–99)
GLUCOSE BLDC GLUCOMTR-MCNC: 126 MG/DL (ref 70–99)
GLUCOSE BLDC GLUCOMTR-MCNC: 131 MG/DL (ref 70–99)
GLUCOSE BLDC GLUCOMTR-MCNC: 159 MG/DL (ref 70–99)
GLUCOSE BLDC GLUCOMTR-MCNC: 79 MG/DL (ref 70–99)
GLUCOSE SERPL-MCNC: 132 MG/DL (ref 70–99)
HCO3 BLDV-SCNC: 36 MMOL/L (ref 21–28)
HCO3 BLDV-SCNC: 36 MMOL/L (ref 21–28)
HCT VFR BLD AUTO: 27.5 % (ref 40–53)
HGB BLD-MCNC: 8.4 G/DL (ref 13.3–17.7)
IMM GRANULOCYTES # BLD: 0.1 10E9/L (ref 0–0.4)
IMM GRANULOCYTES NFR BLD: 0.7 %
LYMPHOCYTES # BLD AUTO: 1.5 10E9/L (ref 0.8–5.3)
LYMPHOCYTES NFR BLD AUTO: 18.2 %
MAGNESIUM SERPL-MCNC: 2.9 MG/DL (ref 1.6–2.3)
MCH RBC QN AUTO: 30.7 PG (ref 26.5–33)
MCHC RBC AUTO-ENTMCNC: 30.5 G/DL (ref 31.5–36.5)
MCV RBC AUTO: 100 FL (ref 78–100)
MONOCYTES # BLD AUTO: 0.8 10E9/L (ref 0–1.3)
MONOCYTES NFR BLD AUTO: 9.8 %
NEUTROPHILS # BLD AUTO: 5.3 10E9/L (ref 1.6–8.3)
NEUTROPHILS NFR BLD AUTO: 64 %
NRBC # BLD AUTO: 0 10*3/UL
NRBC BLD AUTO-RTO: 0 /100
O2/TOTAL GAS SETTING VFR VENT: 4 %
O2/TOTAL GAS SETTING VFR VENT: ABNORMAL %
OXYHGB MFR BLDV: 51 %
OXYHGB MFR BLDV: 51 %
PCO2 BLDV: 59 MM HG (ref 40–50)
PCO2 BLDV: 60 MM HG (ref 40–50)
PH BLDV: 7.39 PH (ref 7.32–7.43)
PH BLDV: 7.39 PH (ref 7.32–7.43)
PLATELET # BLD AUTO: 574 10E9/L (ref 150–450)
PO2 BLDV: 31 MM HG (ref 25–47)
PO2 BLDV: 31 MM HG (ref 25–47)
POTASSIUM SERPL-SCNC: 3.9 MMOL/L (ref 3.4–5.3)
RBC # BLD AUTO: 2.74 10E12/L (ref 4.4–5.9)
SODIUM SERPL-SCNC: 141 MMOL/L (ref 133–144)
WBC # BLD AUTO: 8.3 10E9/L (ref 4–11)

## 2019-10-31 PROCEDURE — 25000132 ZZH RX MED GY IP 250 OP 250 PS 637: Performed by: STUDENT IN AN ORGANIZED HEALTH CARE EDUCATION/TRAINING PROGRAM

## 2019-10-31 PROCEDURE — 99233 SBSQ HOSP IP/OBS HIGH 50: CPT | Mod: GC | Performed by: INTERNAL MEDICINE

## 2019-10-31 PROCEDURE — 97110 THERAPEUTIC EXERCISES: CPT | Mod: GO | Performed by: OCCUPATIONAL THERAPIST

## 2019-10-31 PROCEDURE — 97530 THERAPEUTIC ACTIVITIES: CPT | Mod: GP

## 2019-10-31 PROCEDURE — 40000141 ZZH STATISTIC PERIPHERAL IV START W/O US GUIDANCE

## 2019-10-31 PROCEDURE — 25000132 ZZH RX MED GY IP 250 OP 250 PS 637: Performed by: INTERNAL MEDICINE

## 2019-10-31 PROCEDURE — 20000004 ZZH R&B ICU UMMC

## 2019-10-31 PROCEDURE — 80048 BASIC METABOLIC PNL TOTAL CA: CPT | Performed by: INTERNAL MEDICINE

## 2019-10-31 PROCEDURE — 00000146 ZZHCL STATISTIC GLUCOSE BY METER IP

## 2019-10-31 PROCEDURE — 83735 ASSAY OF MAGNESIUM: CPT | Performed by: INTERNAL MEDICINE

## 2019-10-31 PROCEDURE — 92526 ORAL FUNCTION THERAPY: CPT | Mod: GN

## 2019-10-31 PROCEDURE — 25000128 H RX IP 250 OP 636: Performed by: STUDENT IN AN ORGANIZED HEALTH CARE EDUCATION/TRAINING PROGRAM

## 2019-10-31 PROCEDURE — 97116 GAIT TRAINING THERAPY: CPT | Mod: GP

## 2019-10-31 PROCEDURE — 85025 COMPLETE CBC W/AUTO DIFF WBC: CPT | Performed by: INTERNAL MEDICINE

## 2019-10-31 PROCEDURE — 97535 SELF CARE MNGMENT TRAINING: CPT | Mod: GO | Performed by: OCCUPATIONAL THERAPIST

## 2019-10-31 PROCEDURE — 82805 BLOOD GASES W/O2 SATURATION: CPT | Performed by: STUDENT IN AN ORGANIZED HEALTH CARE EDUCATION/TRAINING PROGRAM

## 2019-10-31 RX ORDER — QUETIAPINE FUMARATE 50 MG/1
100 TABLET, FILM COATED ORAL EVERY MORNING
Status: DISCONTINUED | OUTPATIENT
Start: 2019-10-31 | End: 2019-11-02

## 2019-10-31 RX ORDER — NICOTINE 21 MG/24HR
1 PATCH, TRANSDERMAL 24 HOURS TRANSDERMAL EVERY EVENING
Status: DISCONTINUED | OUTPATIENT
Start: 2019-10-31 | End: 2019-11-05 | Stop reason: HOSPADM

## 2019-10-31 RX ORDER — OXYCODONE HYDROCHLORIDE 5 MG/1
5-10 TABLET ORAL EVERY 6 HOURS PRN
Status: DISCONTINUED | OUTPATIENT
Start: 2019-10-31 | End: 2019-11-04

## 2019-10-31 RX ORDER — NICOTINE 21 MG/24HR
1 PATCH, TRANSDERMAL 24 HOURS TRANSDERMAL DAILY
Status: DISCONTINUED | OUTPATIENT
Start: 2019-11-01 | End: 2019-10-31

## 2019-10-31 RX ORDER — AMIODARONE HYDROCHLORIDE 200 MG/1
200 TABLET ORAL DAILY
Status: DISCONTINUED | OUTPATIENT
Start: 2019-10-31 | End: 2019-11-03

## 2019-10-31 RX ORDER — QUETIAPINE FUMARATE 50 MG/1
200 TABLET, FILM COATED ORAL EVERY EVENING
Status: DISCONTINUED | OUTPATIENT
Start: 2019-10-31 | End: 2019-11-01

## 2019-10-31 RX ADMIN — Medication: at 14:26

## 2019-10-31 RX ADMIN — Medication 12.5 MG: at 20:36

## 2019-10-31 RX ADMIN — TICAGRELOR 90 MG: 90 TABLET ORAL at 08:54

## 2019-10-31 RX ADMIN — RAMELTEON 8 MG: 8 TABLET ORAL at 21:16

## 2019-10-31 RX ADMIN — ACETAMINOPHEN 650 MG: 325 TABLET, FILM COATED ORAL at 21:24

## 2019-10-31 RX ADMIN — MIDODRINE HYDROCHLORIDE 10 MG: 5 TABLET ORAL at 08:53

## 2019-10-31 RX ADMIN — FOLIC ACID 1 MG: 1 TABLET ORAL at 08:53

## 2019-10-31 RX ADMIN — NICOTINE POLACRILEX 2 MG: 2 GUM, CHEWING ORAL at 20:43

## 2019-10-31 RX ADMIN — Medication 12.5 MG: at 08:53

## 2019-10-31 RX ADMIN — NICOTINE 1 PATCH: 21 PATCH TRANSDERMAL at 22:15

## 2019-10-31 RX ADMIN — NICOTINE POLACRILEX 2 MG: 2 GUM, CHEWING ORAL at 09:12

## 2019-10-31 RX ADMIN — GABAPENTIN 300 MG: 300 CAPSULE ORAL at 08:54

## 2019-10-31 RX ADMIN — Medication: at 09:12

## 2019-10-31 RX ADMIN — AMIODARONE HYDROCHLORIDE 200 MG: 200 TABLET ORAL at 08:53

## 2019-10-31 RX ADMIN — NICOTINE POLACRILEX 2 MG: 2 GUM, CHEWING ORAL at 13:33

## 2019-10-31 RX ADMIN — MIDODRINE HYDROCHLORIDE 10 MG: 5 TABLET ORAL at 12:19

## 2019-10-31 RX ADMIN — ACETAMINOPHEN 650 MG: 325 TABLET, FILM COATED ORAL at 18:27

## 2019-10-31 RX ADMIN — MIDODRINE HYDROCHLORIDE 10 MG: 5 TABLET ORAL at 18:27

## 2019-10-31 RX ADMIN — HEPARIN SODIUM 5000 UNITS: 5000 INJECTION, SOLUTION INTRAVENOUS; SUBCUTANEOUS at 08:54

## 2019-10-31 RX ADMIN — THIAMINE HCL TAB 100 MG 100 MG: 100 TAB at 08:53

## 2019-10-31 RX ADMIN — Medication 1 PACKET: at 08:33

## 2019-10-31 RX ADMIN — QUETIAPINE FUMARATE 200 MG: 100 TABLET ORAL at 20:37

## 2019-10-31 RX ADMIN — LEVETIRACETAM 750 MG: 100 SOLUTION ORAL at 08:29

## 2019-10-31 RX ADMIN — ACETAMINOPHEN 650 MG: 325 TABLET, FILM COATED ORAL at 12:19

## 2019-10-31 RX ADMIN — Medication 1 PACKET: at 13:34

## 2019-10-31 RX ADMIN — FUROSEMIDE 40 MG: 10 INJECTION, SOLUTION INTRAVENOUS at 15:47

## 2019-10-31 RX ADMIN — MULTIVITAMIN 15 ML: LIQUID ORAL at 08:53

## 2019-10-31 RX ADMIN — OMEPRAZOLE 20 MG: KIT at 08:30

## 2019-10-31 RX ADMIN — FENTANYL CITRATE 25 MCG: 50 INJECTION INTRAMUSCULAR; INTRAVENOUS at 06:45

## 2019-10-31 RX ADMIN — FUROSEMIDE 40 MG: 10 INJECTION, SOLUTION INTRAVENOUS at 08:53

## 2019-10-31 RX ADMIN — Medication 1 PACKET: at 20:42

## 2019-10-31 RX ADMIN — OXYCODONE HYDROCHLORIDE 10 MG: 5 TABLET ORAL at 21:24

## 2019-10-31 RX ADMIN — INSULIN ASPART 1 UNITS: 100 INJECTION, SOLUTION INTRAVENOUS; SUBCUTANEOUS at 20:49

## 2019-10-31 RX ADMIN — DIGOXIN 125 MCG: 0.05 SOLUTION ORAL at 08:30

## 2019-10-31 RX ADMIN — TICAGRELOR 90 MG: 90 TABLET ORAL at 20:36

## 2019-10-31 RX ADMIN — ONDANSETRON 4 MG: 2 INJECTION INTRAMUSCULAR; INTRAVENOUS at 09:36

## 2019-10-31 RX ADMIN — QUETIAPINE FUMARATE 100 MG: 100 TABLET ORAL at 08:53

## 2019-10-31 RX ADMIN — POTASSIUM CHLORIDE 20 MEQ: 1.5 POWDER, FOR SOLUTION ORAL at 06:10

## 2019-10-31 RX ADMIN — LIDOCAINE 2 PATCH: 560 PATCH PERCUTANEOUS; TOPICAL; TRANSDERMAL at 08:52

## 2019-10-31 RX ADMIN — NICOTINE POLACRILEX 2 MG: 2 GUM, CHEWING ORAL at 14:24

## 2019-10-31 RX ADMIN — GABAPENTIN 600 MG: 300 CAPSULE ORAL at 21:15

## 2019-10-31 RX ADMIN — OXYCODONE HYDROCHLORIDE 5 MG: 5 TABLET ORAL at 08:53

## 2019-10-31 RX ADMIN — ALLOPURINOL 200 MG: 100 TABLET ORAL at 08:53

## 2019-10-31 RX ADMIN — ASPIRIN 81 MG CHEWABLE TABLET 81 MG: 81 TABLET CHEWABLE at 08:53

## 2019-10-31 RX ADMIN — GABAPENTIN 300 MG: 300 CAPSULE ORAL at 13:34

## 2019-10-31 RX ADMIN — LEVETIRACETAM 750 MG: 100 SOLUTION ORAL at 20:42

## 2019-10-31 RX ADMIN — OXYCODONE HYDROCHLORIDE 5 MG: 5 TABLET ORAL at 01:43

## 2019-10-31 RX ADMIN — Medication: at 21:16

## 2019-10-31 RX ADMIN — HEPARIN SODIUM 5000 UNITS: 5000 INJECTION, SOLUTION INTRAVENOUS; SUBCUTANEOUS at 21:15

## 2019-10-31 RX ADMIN — Medication: at 21:11

## 2019-10-31 RX ADMIN — OXYCODONE HYDROCHLORIDE 5 MG: 5 TABLET ORAL at 15:47

## 2019-10-31 RX ADMIN — OMEPRAZOLE 20 MG: KIT at 15:48

## 2019-10-31 ASSESSMENT — PAIN DESCRIPTION - DESCRIPTORS
DESCRIPTORS: ACHING;SORE
DESCRIPTORS: ACHING;SORE
DESCRIPTORS: ACHING
DESCRIPTORS: ACHING;SORE
DESCRIPTORS: ACHING;SORE
DESCRIPTORS: ACHING;SORE;DULL

## 2019-10-31 ASSESSMENT — MIFFLIN-ST. JEOR: SCORE: 1907.24

## 2019-10-31 ASSESSMENT — ACTIVITIES OF DAILY LIVING (ADL)
ADLS_ACUITY_SCORE: 17
ADLS_ACUITY_SCORE: 15
ADLS_ACUITY_SCORE: 17

## 2019-10-31 NOTE — PLAN OF CARE
ICU End of Shift Summary. See flowsheets for vital signs and detailed assessment.    Changes this shift:  SBP lower at times today (80's) - but MAPs remained acceptable (levophed off since 1300 yesterday). Amiodarone drip discontinued this am after PO dose given. Patient remained in controlled a-flutter (with occasional a-fib) throughout the day. Ambulated twice in the hallway, tolerating very well. Has been on 4 LNC all day, required 6 L for ambulation. CVC removed this pm. Patient is very restless & just wants to go home. Nicorette gum given PRN for nicotine cravings. Pain well controlled with PO oxycodone, Tylenol & lidocaine patches. IV lasix given BID with good UOP. VBG at 1330 was unchanged (Co2 59). MD aware. Encourage Bipap use at night. Sutures from old chest tube site will be removed tomorrow per MD. Appetite remains poor with patient taking a few bites of each meal. Calorie count maintained, tube feeds at 75 ml/hr for 18 hours per day.     Plan:  Transfer to , continue to wean oxygen & to encourage PO intake.

## 2019-10-31 NOTE — PLAN OF CARE
PT 4C / Discharge Planner PT   Patient plan for discharge: not discussed  Current status: Patient with improved cognition today, able to state he is in a hospital and the year. Completes sit<>stand transfers with CGA/min Ax1 up to FWW, ambulated 4 bouts of ~75-100ft with FWW + CGA, w/c follow for seated rest breaks as patient fatigues quickly and reports feeling weak. SpO2>90% on 4L NC, -130.  Barriers to return to prior living situation: medical status, cognition, deconditioning, level of assist, safety awareness  Recommendations for discharge: ARU  Rationale for recommendations: Patient demonstrates skilled multi-disciplinary therapy needs, has strong family support, is motivated for therapy and would tolerate 3 hours therapy/day.

## 2019-10-31 NOTE — PLAN OF CARE
ICU End of Shift Summary. See flowsheets for vital signs and detailed assessment.    Changes this shift: Pt intermittently restless, picking at lines and tubes but is redirectable. Pt following commands, oriented to self and year only. Pt also very forgetful but pleasantly confused; can answer questions appropriately but at times speech is incoherent. Tyl x1 and oxy x1 given for continued right side/chest/sternal pain 2/2 CPR. Pt appeared to rest/sleep well between cares. Pt on amio gtt, remains in atrial flutter/fib. HR 70s-90s. BPs stable, MAPs >65. Voided via urinal x2 this shift. Pt weaned from 5 to 4L NC with frequent reminders to leave on and readjusting of nasal cannula. VBG this AM 7.39/60/31/36, pCO2 increased from 55 from previous VBG. bipap at bedside if needed    Plan:  Continue to monitor. Promote normal wake/sleep cycle, continue PT/OT.     Problem: Cardiac Disease Comorbidity  Goal: Cardiac Disease  Description  Patient comorbidity will be monitored for signs and symptoms of Cardiac Disease.  Problems will be absent, minimized or managed by discharge/transition of care.  10/31/2019 0553 by Yanira Medeiros, RN  Outcome: Improving  10/30/2019 5922 by Gosia Gates, RN  Outcome: Improving

## 2019-10-31 NOTE — PROGRESS NOTES
[facility]   Cardiology Progress Note    Assessment & Plan   53 year old male who was admitted on 10/13/19 as a transfer from [place], [place] for refractory VT/VF arrest s/p PCI to LAD and placement on peripheral [facility] ECMO. IABP removed successfully 10/20/2019. Successful extubation 10/25/2019. Delirium has significantly improved. Vasoplegia has resolved.     Changes Today:  - up in chair and ambulating with assist  - off amiodarone gtt  - continue midodrine 10mg TID   - some remnant sutures in R chest from chest tube - removing 11/01/2019  - transfer to floor    Neurology: Initially intubated, sedated, paralyzed. Initially cooled to 34 degrees, now rewarmed.   - NeuroCrit consulted, initiated Keppra 750 mg BID on 10/17 for possible seizure activity, will continue as outpatient until neuro follow up  - EEG with small amount of epileptiform activity, due to pt becoming more alert, stopped EEG [id number]  - CT Head 10/16 with smal right paracentral lesion, new right hemicerebellum lesion concerning for stroke.   Cardiovascular / Hemodynamics: Refractory VF arrest s/p peripheral V-A ECMO at OSH on 10/13/19  Persistent Afib, [id number] 3  TTE: LVEF 15-20%  - Wean pressors/inotropes as able, currently not on any  - Continue ASA 81mg and ticagrelor 90 mg BID  - Hold lipitor for now given likely hepatic injury during arrest  - Hold ACE/ARB for now given likely reduced renal fxn after arrest  - gentle start of lopressor for CAD  - digoxin for Afib rate ctrl  - holding anticoagulation for now due to recent chest hematoma, consider in future   Pulmonary: Large hemothorax on 10/14 s/p right-sided chest tube.  - extubated 10/25/2019  - chest tube removal 10/28/2019  - will remove remaining sutures 11/01/2019   GI and Nutrition: Per Pt's wife, pt has EtOH use disorder. Initial oncern for possible GIB given black OG output, but this has resolved.  - Monitor BID LFTs  - NJ placed at bedside on 10/16, started TFs  - Bowel regimen - started  -  omeprazole 20mg BID given possible h/o GI bleed, can consider weaning as outpatient   Renal, Fluid and Electrolytes: - Monitor urine output  - Maintain K>4 and Mg>2    Infectious Disease: No signs of infection. Leukocytosis c/w arrest. Blood cultures without growth.   - Initially vancomycin/zosyn x5 days for presumed aspiration - then extended given multiple invasive procedures. As of 10/28/2019 evening, refraining from restarting antibiotics  - ID consult for culture negative fevers and leukocytosis  - Monitor for signs of infection   Hematology and Oncology: Receiving ASA/ticagrelor for KAMRON. Large intramuscular hematoma of right pec and hemothorax on 10/14 with possible GIB as above.  - Cryo PRN fibrinogen < 200; FFP for INR >2  - Transfuse for Hgb<10, Plts<70  - US LE w/ arterial duplex per ECMO protocol   - DVT PPX: subcutaneous heparin for now   Endocrinology: No known history of diabetes  - Insulin gtt PRN   Lines: Restraint: no longer needed  Peripheral IV 10/16/19 Left Lower forearm (Active)     Current lines are required for patient management       Family updated today: Yes  Code Status: FULL    Pt was seen and examined with Dr. Senthil Goodwin MD, MS, who agrees with the assessment and plan.    Bronson Dsouza MD, PhD  Cardiology Fellow    Interval History    Care team notes last 24 hours reviewed. No acute events overnight. Continues to sleep better. Ambulated with assist today.     ROS: 4-pt ROS otherwise negative.     Data reviewed today: I reviewed all new labs and imaging results over the last 24 hours. I personally reviewed:    Physical Exam   Temp: 98.5  F (36.9  C) Temp src: Axillary BP: (!) 85/65 Pulse: 82 Heart Rate: 81 Resp: 28 SpO2: 96 % O2 Device: Nasal cannula Oxygen Delivery: 4 LPM  Vitals:    10/29/19 0400 10/30/19 0400 10/31/19 0400   Weight: 99.4 kg (219 lb 2.2 oz) 96.3 kg (212 lb 4.9 oz) 98.6 kg (217 lb 6 oz)     Vital Signs with Ranges  Temp:  [96.7  F (35.9  C)-98.5  F (36.9  C)]  "98.5  F (36.9  C)  Pulse:  [72-96] 82  Heart Rate:  [75-96] 81  Resp:  [11-37] 28  BP: ()/() 85/65  SpO2:  [75 %-99 %] 96 %  I/O last 3 completed shifts:  In: 2879.2 [P.O.:680; I.V.:424.2; NG/GT:615]  Out: 2115 [Urine:2115]    Heart Rate: 81, Blood pressure (!) 85/65, pulse 82, temperature 98.5  F (36.9  C), temperature source Axillary, resp. rate 28, height 1.89 m (6' 2.41\"), weight 98.6 kg (217 lb 6 oz), SpO2 96 %.  217 lbs 5.98 oz  GEN:  NAD  CV:  S1/S2 heard  LUNGS: normal breath sounds  ABD: Soft BS, soft, mildly distended  EXT: warm, no lower extremity edema  NEURO: able to follow commands, taking deep breaths  SKIN: no acute lesions    Medications     IV fluid REPLACEMENT ONLY 25 mL/hr at 10/18/19 1929     sodium chloride         allopurinol  200 mg Oral Daily     amiodarone  200 mg Oral Daily     aspirin  81 mg Per Feeding Tube Daily     digoxin  125 mcg Oral Daily     folic acid  1 mg Oral Daily     furosemide  40 mg Intravenous BID     gabapentin  300 mg Oral BID     gabapentin  600 mg Oral At Bedtime     heparin ANTICOAGULANT  5,000 Units Subcutaneous Q12H     influenza vaccine adult (product based on age)  0.5 mL Intramuscular Prior to discharge     insulin aspart  1-4 Units Subcutaneous Q4H     ipratropium  0.5 mg Nebulization BID     ketamine 5% gabapentin 8% lidocaine 2.5%   Topical TID     levETIRAcetam  750 mg Oral or Feeding Tube BID     lidocaine  2 patch Transdermal Q24H     lidocaine   Transdermal Q8H     lidocaine   Transdermal Q24h     metoprolol tartrate  12.5 mg Oral BID     midodrine  10 mg Oral TID w/meals     multivitamins w/minerals  15 mL Per Feeding Tube Daily     omeprazole  20 mg Oral or Feeding Tube BID AC     protein modular  1 packet Per Feeding Tube TID     QUEtiapine  100 mg Oral QAM     QUEtiapine  200 mg Oral or Feeding Tube QPM     ramelteon  8 mg Oral At Bedtime     sodium chloride (PF)  3 mL Intracatheter Q8H     ticagrelor  90 mg Oral or Feeding Tube BID     " vitamin B1  100 mg Oral Daily       Data   Recent Labs   Lab 10/31/19  0355 10/30/19  1104 10/30/19  0338 10/29/19  0338 10/28/19  0328  10/27/19  0419  10/25/19  0426  10/24/19  1507   WBC 8.3  --  8.2 9.5 9.3  --  10.8   < > 12.6*  --  11.4*   HGB 8.4*  --  8.9* 8.2* 8.1*  --  8.3*   < > 8.5*  --  8.7*     --  100 100 100  --  101*   < > 100  --  100   *  --  569* 558* 520*  --  465*   < > 368  --  347   INR  --   --   --  1.17* 1.14  --  1.14   < > 1.25*  --   --      --  140 143 144   < > 144   < > 146*  --  142   POTASSIUM 3.9 4.1 3.6 3.7 3.8   < > 4.1   < > 3.8   < > 4.1   CHLORIDE 102  --  104 104 105   < > 108   < > 110*  --  109   CO2 35*  --  32 36* 34*   < > 34*   < > 29  --  30   BUN 31*  --  35* 36* 41*   < > 41*   < > 41*  --  38*   CR 0.66  --  0.65* 0.69 0.80   < > 0.75   < > 0.88  --  0.84   ANIONGAP 4  --  4 3 5   < > 2*   < > 6  --  4   HEATHER 8.6  --  8.7 8.5 8.3*   < > 8.5   < > 8.4*  --  7.9*   *  --  147* 155* 134*   < > 140*   < > 149*  --  144*   ALBUMIN  --   --   --   --  2.2*  --   --   --  2.3*  --  2.1*   PROTTOTAL  --   --   --   --  6.9  --   --   --  6.9  --  6.5*   BILITOTAL  --   --   --   --  1.4*  --   --   --  1.1  --  1.2   ALKPHOS  --   --   --   --  74  --   --   --  84  --  87   ALT  --   --   --   --  30  --   --   --  21  --  25   AST  --   --   --   --  44  --   --   --  35  --  40   TROPI  --   --   --   --   --   --   --   --  0.612*  --  0.827*    < > = values in this interval not displayed.       Recent Results (from the past 24 hour(s))   Echocardiogram Limited    Narrative    [id number]  [id number]  [id number]  [id number]^ASHLEY^CARLOS^ABBY           [facility],[place]  Echocardiography Laboratory  [address] [place] [id number]     Name: JF COLLAZO  MRN: [id number]  : 1965  Study Date: 10/30/2019 12:24 PM  Age: 53 yrs  Gender: Male  [facility] [place] [facility]  Reason For Study: Cardiac Arrest, Heart Failure  - Left  Ordering Physician: CARLOS RAMIREZ  Referring Physician: SELF, REFERRED  Performed By: Perry Mandujano RDCS     BSA: 2.2 m2  Height: 74 in  Weight: 212 lb  HR: 92  BP: 92/68 mmHg  _____________________________________________________________________________  __        Procedure  Limited Portable Echo Adult. Contrast Optison. Optison (NDC #0836-0023-44)  given intravenously. Patient was given 6 ml mixture of 3 ml Optison and 6 ml  saline. 3 ml wasted.  _____________________________________________________________________________  __        Interpretation Summary  Limited study.  Ischemic cardiomyopathy, LVEF=25-30% with regional wall motion abnormalities  consistent with injury in a wrap-around LAD distribution.  RV size and function are probably normal on limited views.  No pericardial effusion is present.  This study was compared with the study from 10/20/19: There has been no  change.  _____________________________________________________________________________  __        Left Ventricle  Left ventricular size is normal. Left ventricular wall thickness is normal.  Severely (EF <30%) reduced left ventricular function is present. Akinesis of  all anterior, anteroseptal, and apical segments, consistent with injury in a  wrap-around LAD distribution.     Right Ventricle  RV size and function are probably normal on limited views.     Mitral Valve  The mitral valve cannot be assessed.     Aortic Valve  The aortic valve cannot be assessed.        Tricuspid Valve  The tricuspid valve cannot be assessed. Pulmonary artery systolic pressure  cannot be assessed.     Pulmonic Valve  The pulmonic valve cannot be assessed.     Vessels  The aorta root cannot be assessed. The inferior vena cava cannot be assessed.     Pericardium  No pericardial effusion is present.     Compared to Previous Study  This study was compared with the study from 10/20/19 . There has been no  change.      _____________________________________________________________________________  __                       Report approved by: Lynette Ruiz 10/30/2019 12:54 PM                 _____________________________________________________________________________  __        Critical Care Services Progress Note:     I personally examined and evaluated the patient today.   The patient s prognosis today is tentative and Stable  I have evaluated all laboratory values and imaging studies from the past 24 hours.  Key findings and decisions made today included   - up in chair and ambulating with assist  - off amiodarone gtt  - continue midodrine 10mg TID   - some remnant sutures in R chest from chest tube - removing 11/01/2019  - transfer to floor    I personally managed the sedation, pain control and analgesia, metabolic abnormalities, antibiotic therapy and nutritional status.   Consults ongoing and ordered are none  Procedures that will happen today are: none  All treatments were placed at my direction.  I formulated today s plan with the house staff team or resident(s) and agree with the findings and plan in the associated note.       The above plans and care have been discussed with no one and all questions and concerns were addressed.     I spent a total of 30 minutes (excluding procedure time) personally providing and directing critical care services at the bedside and on the critical care unit for Joel Wareester.        Senthil Goodwin MD

## 2019-10-31 NOTE — PLAN OF CARE
"OT/CR 4C: Discharge Planner OT   Patient plan for discharge: Pt repeatedly states, \"I want to go home\" - not currently oriented to place.  Pt wife aware of ongoing need for therapy intervention  Current status: Pt oriented to person and year only, remains mildly impulsive, distractible and with impaired attention and memory; however, pt is showing gradual improvements from a cognitive standpoint.  CGA x 2 and FWW for sit to stand transfers.  Pt able to ambulate approx 50ft + 70ft with CGA x 2, FWW and wheelchair follow, seated rest between bouts.  Desats to 87% on 4L - maintained >92% on 6L via nc.    Barriers to return to prior living situation: AMS, weakness, deconditioning, acute medical needs, impaired safety awareness  Recommendations for discharge: ARU  Rationale for recommendations: Pt is currently below baseline with regards to mobility and independence with self cares and will benefit from continued skilled therapy intervention to address deficits.  Anticipate pt will tolerate 3hrs therapy/day         Entered by: Xuan Sarmiento 10/31/2019 1:41 PM       "

## 2019-10-31 NOTE — PLAN OF CARE
Discharge Planner SLP   Patient plan for discharge: Unknown  Current status: Pt remains appropriate for dysphagia 2 (diced) diet/thin liquids with 1:1 assistance for cueing. Pt to be fully alert and upright for all PO, taking small bites/sips at slow rate. Check oral cavity for pocketing. Reduce distractions during meals. SLP to follow.  Barriers to return to prior living situation: Cognition, modified diet, weakness  Recommendations for discharge: TCU vs ARU  Rationale for recommendations: Pt below baseline for swallow function; will benefit from ongoing ST targeting dysphagia. Pt may be a good candidate for intensive rehab pending progress and tolerance for therapy       Entered by: Deborah Watts 10/31/2019 10:09 AM

## 2019-11-01 ENCOUNTER — APPOINTMENT (OUTPATIENT)
Dept: SPEECH THERAPY | Facility: CLINIC | Age: 54
DRG: 003 | End: 2019-11-01
Attending: INTERNAL MEDICINE
Payer: COMMERCIAL

## 2019-11-01 ENCOUNTER — APPOINTMENT (OUTPATIENT)
Dept: PHYSICAL THERAPY | Facility: CLINIC | Age: 54
DRG: 003 | End: 2019-11-01
Attending: INTERNAL MEDICINE
Payer: COMMERCIAL

## 2019-11-01 ENCOUNTER — APPOINTMENT (OUTPATIENT)
Dept: OCCUPATIONAL THERAPY | Facility: CLINIC | Age: 54
DRG: 003 | End: 2019-11-01
Attending: INTERNAL MEDICINE
Payer: COMMERCIAL

## 2019-11-01 LAB
ANION GAP SERPL CALCULATED.3IONS-SCNC: 3 MMOL/L (ref 3–14)
ANISOCYTOSIS BLD QL SMEAR: SLIGHT
BACTERIA SPEC CULT: NO GROWTH
BACTERIA SPEC CULT: NO GROWTH
BASE EXCESS BLDV CALC-SCNC: 9.5 MMOL/L
BASOPHILS # BLD AUTO: 0 10E9/L (ref 0–0.2)
BASOPHILS # BLD AUTO: 0.1 10E9/L (ref 0–0.2)
BASOPHILS NFR BLD AUTO: 0 %
BASOPHILS NFR BLD AUTO: 0.7 %
BUN SERPL-MCNC: 36 MG/DL (ref 7–30)
CALCIUM SERPL-MCNC: 8.5 MG/DL (ref 8.5–10.1)
CHLORIDE SERPL-SCNC: 100 MMOL/L (ref 94–109)
CO2 SERPL-SCNC: 35 MMOL/L (ref 20–32)
CREAT SERPL-MCNC: 0.79 MG/DL (ref 0.66–1.25)
DIFFERENTIAL METHOD BLD: ABNORMAL
DIFFERENTIAL METHOD BLD: ABNORMAL
EOSINOPHIL # BLD AUTO: 0.3 10E9/L (ref 0–0.7)
EOSINOPHIL # BLD AUTO: 0.5 10E9/L (ref 0–0.7)
EOSINOPHIL NFR BLD AUTO: 4.5 %
EOSINOPHIL NFR BLD AUTO: 4.9 %
ERYTHROCYTE [DISTWIDTH] IN BLOOD BY AUTOMATED COUNT: 17.2 % (ref 10–15)
ERYTHROCYTE [DISTWIDTH] IN BLOOD BY AUTOMATED COUNT: 17.6 % (ref 10–15)
FOLATE SERPL-MCNC: 25.2 NG/ML
GFR SERPL CREATININE-BSD FRML MDRD: >90 ML/MIN/{1.73_M2}
GLUCOSE BLDC GLUCOMTR-MCNC: 106 MG/DL (ref 70–99)
GLUCOSE BLDC GLUCOMTR-MCNC: 122 MG/DL (ref 70–99)
GLUCOSE BLDC GLUCOMTR-MCNC: 129 MG/DL (ref 70–99)
GLUCOSE BLDC GLUCOMTR-MCNC: 131 MG/DL (ref 70–99)
GLUCOSE BLDC GLUCOMTR-MCNC: 156 MG/DL (ref 70–99)
GLUCOSE BLDC GLUCOMTR-MCNC: 99 MG/DL (ref 70–99)
GLUCOSE SERPL-MCNC: 133 MG/DL (ref 70–99)
HCO3 BLDV-SCNC: 35 MMOL/L (ref 21–28)
HCT VFR BLD AUTO: 27.2 % (ref 40–53)
HCT VFR BLD AUTO: 28.7 % (ref 40–53)
HGB BLD-MCNC: 8.1 G/DL (ref 13.3–17.7)
HGB BLD-MCNC: 8.6 G/DL (ref 13.3–17.7)
IMM GRANULOCYTES # BLD: 0.2 10E9/L (ref 0–0.4)
IMM GRANULOCYTES NFR BLD: 1.9 %
INTERPRETATION ECG - MUSE: NORMAL
IRON SATN MFR SERPL: 26 % (ref 15–46)
IRON SERPL-MCNC: 78 UG/DL (ref 35–180)
LACTATE BLD-SCNC: 1.7 MMOL/L (ref 0.7–2)
LYMPHOCYTES # BLD AUTO: 1.9 10E9/L (ref 0.8–5.3)
LYMPHOCYTES # BLD AUTO: 2.6 10E9/L (ref 0.8–5.3)
LYMPHOCYTES NFR BLD AUTO: 19.2 %
LYMPHOCYTES NFR BLD AUTO: 33.9 %
Lab: NORMAL
Lab: NORMAL
MACROCYTES BLD QL SMEAR: PRESENT
MAGNESIUM SERPL-MCNC: 2.8 MG/DL (ref 1.6–2.3)
MCH RBC QN AUTO: 30.3 PG (ref 26.5–33)
MCH RBC QN AUTO: 30.8 PG (ref 26.5–33)
MCHC RBC AUTO-ENTMCNC: 29.8 G/DL (ref 31.5–36.5)
MCHC RBC AUTO-ENTMCNC: 30 G/DL (ref 31.5–36.5)
MCV RBC AUTO: 102 FL (ref 78–100)
MCV RBC AUTO: 103 FL (ref 78–100)
MONOCYTES # BLD AUTO: 0.5 10E9/L (ref 0–1.3)
MONOCYTES # BLD AUTO: 1.1 10E9/L (ref 0–1.3)
MONOCYTES NFR BLD AUTO: 10.8 %
MONOCYTES NFR BLD AUTO: 7.1 %
NEUTROPHILS # BLD AUTO: 4.2 10E9/L (ref 1.6–8.3)
NEUTROPHILS # BLD AUTO: 6.2 10E9/L (ref 1.6–8.3)
NEUTROPHILS NFR BLD AUTO: 54.5 %
NEUTROPHILS NFR BLD AUTO: 62.5 %
NRBC # BLD AUTO: 0.2 10*3/UL
NRBC # BLD AUTO: 0.2 10*3/UL
NRBC BLD AUTO-RTO: 2 /100
NRBC BLD AUTO-RTO: 3 /100
O2/TOTAL GAS SETTING VFR VENT: 30 %
OXYHGB MFR BLDV: 75 %
PCO2 BLDV: 57 MM HG (ref 40–50)
PH BLDV: 7.4 PH (ref 7.32–7.43)
PHOSPHATE SERPL-MCNC: 4.3 MG/DL (ref 2.5–4.5)
PLATELET # BLD AUTO: 592 10E9/L (ref 150–450)
PLATELET # BLD AUTO: 597 10E9/L (ref 150–450)
PLATELET # BLD EST: ABNORMAL 10*3/UL
PO2 BLDV: 44 MM HG (ref 25–47)
POTASSIUM SERPL-SCNC: 4.2 MMOL/L (ref 3.4–5.3)
RBC # BLD AUTO: 2.67 10E12/L (ref 4.4–5.9)
RBC # BLD AUTO: 2.79 10E12/L (ref 4.4–5.9)
RETICS # AUTO: 161.8 10E9/L (ref 25–95)
RETICS/RBC NFR AUTO: 5.8 % (ref 0.5–2)
SODIUM SERPL-SCNC: 137 MMOL/L (ref 133–144)
SPECIMEN SOURCE: NORMAL
SPECIMEN SOURCE: NORMAL
TIBC SERPL-MCNC: 298 UG/DL (ref 240–430)
TRANSFERRIN SERPL-MCNC: 243 MG/DL (ref 210–360)
VIT B12 SERPL-MCNC: 721 PG/ML (ref 193–986)
WBC # BLD AUTO: 10 10E9/L (ref 4–11)
WBC # BLD AUTO: 7.7 10E9/L (ref 4–11)

## 2019-11-01 PROCEDURE — 25000132 ZZH RX MED GY IP 250 OP 250 PS 637: Performed by: PHYSICIAN ASSISTANT

## 2019-11-01 PROCEDURE — 93005 ELECTROCARDIOGRAM TRACING: CPT

## 2019-11-01 PROCEDURE — 40000275 ZZH STATISTIC RCP TIME EA 10 MIN: Performed by: OPTOMETRIST

## 2019-11-01 PROCEDURE — 83540 ASSAY OF IRON: CPT | Performed by: PHYSICIAN ASSISTANT

## 2019-11-01 PROCEDURE — 94640 AIRWAY INHALATION TREATMENT: CPT | Mod: 76

## 2019-11-01 PROCEDURE — 99232 SBSQ HOSP IP/OBS MODERATE 35: CPT | Performed by: INTERNAL MEDICINE

## 2019-11-01 PROCEDURE — 25000128 H RX IP 250 OP 636: Performed by: STUDENT IN AN ORGANIZED HEALTH CARE EDUCATION/TRAINING PROGRAM

## 2019-11-01 PROCEDURE — 94640 AIRWAY INHALATION TREATMENT: CPT

## 2019-11-01 PROCEDURE — 83735 ASSAY OF MAGNESIUM: CPT

## 2019-11-01 PROCEDURE — 85025 COMPLETE CBC W/AUTO DIFF WBC: CPT

## 2019-11-01 PROCEDURE — 25000125 ZZHC RX 250: Performed by: STUDENT IN AN ORGANIZED HEALTH CARE EDUCATION/TRAINING PROGRAM

## 2019-11-01 PROCEDURE — 40000275 ZZH STATISTIC RCP TIME EA 10 MIN

## 2019-11-01 PROCEDURE — 97530 THERAPEUTIC ACTIVITIES: CPT | Mod: GP | Performed by: REHABILITATION PRACTITIONER

## 2019-11-01 PROCEDURE — 82746 ASSAY OF FOLIC ACID SERUM: CPT | Performed by: PHYSICIAN ASSISTANT

## 2019-11-01 PROCEDURE — 94660 CPAP INITIATION&MGMT: CPT

## 2019-11-01 PROCEDURE — 85025 COMPLETE CBC W/AUTO DIFF WBC: CPT | Performed by: PHYSICIAN ASSISTANT

## 2019-11-01 PROCEDURE — 97535 SELF CARE MNGMENT TRAINING: CPT | Mod: GO

## 2019-11-01 PROCEDURE — 25000132 ZZH RX MED GY IP 250 OP 250 PS 637: Performed by: STUDENT IN AN ORGANIZED HEALTH CARE EDUCATION/TRAINING PROGRAM

## 2019-11-01 PROCEDURE — 82805 BLOOD GASES W/O2 SATURATION: CPT | Performed by: STUDENT IN AN ORGANIZED HEALTH CARE EDUCATION/TRAINING PROGRAM

## 2019-11-01 PROCEDURE — 97116 GAIT TRAINING THERAPY: CPT | Mod: GP | Performed by: REHABILITATION PRACTITIONER

## 2019-11-01 PROCEDURE — 25000132 ZZH RX MED GY IP 250 OP 250 PS 637: Performed by: INTERNAL MEDICINE

## 2019-11-01 PROCEDURE — 36415 COLL VENOUS BLD VENIPUNCTURE: CPT

## 2019-11-01 PROCEDURE — 84466 ASSAY OF TRANSFERRIN: CPT | Performed by: PHYSICIAN ASSISTANT

## 2019-11-01 PROCEDURE — 00000146 ZZHCL STATISTIC GLUCOSE BY METER IP

## 2019-11-01 PROCEDURE — 21400000 ZZH R&B CCU UMMC

## 2019-11-01 PROCEDURE — 27210429 ZZH NUTRITION PRODUCT INTERMEDIATE LITER

## 2019-11-01 PROCEDURE — 83605 ASSAY OF LACTIC ACID: CPT

## 2019-11-01 PROCEDURE — 85045 AUTOMATED RETICULOCYTE COUNT: CPT | Performed by: PHYSICIAN ASSISTANT

## 2019-11-01 PROCEDURE — 93010 ELECTROCARDIOGRAM REPORT: CPT | Performed by: INTERNAL MEDICINE

## 2019-11-01 PROCEDURE — 80048 BASIC METABOLIC PNL TOTAL CA: CPT

## 2019-11-01 PROCEDURE — 36415 COLL VENOUS BLD VENIPUNCTURE: CPT | Performed by: PHYSICIAN ASSISTANT

## 2019-11-01 PROCEDURE — 92526 ORAL FUNCTION THERAPY: CPT | Mod: GN

## 2019-11-01 PROCEDURE — 83550 IRON BINDING TEST: CPT | Performed by: PHYSICIAN ASSISTANT

## 2019-11-01 PROCEDURE — 40000611 ZZHCL STATISTIC MORPHOLOGY W/INTERP HEMEPATH TC 85060: Performed by: PHYSICIAN ASSISTANT

## 2019-11-01 PROCEDURE — 82607 VITAMIN B-12: CPT | Performed by: PHYSICIAN ASSISTANT

## 2019-11-01 PROCEDURE — 84100 ASSAY OF PHOSPHORUS: CPT

## 2019-11-01 PROCEDURE — 97530 THERAPEUTIC ACTIVITIES: CPT | Mod: GO

## 2019-11-01 RX ORDER — ATORVASTATIN CALCIUM 40 MG/1
40 TABLET, FILM COATED ORAL EVERY EVENING
Status: DISCONTINUED | OUTPATIENT
Start: 2019-11-01 | End: 2019-11-05 | Stop reason: HOSPADM

## 2019-11-01 RX ORDER — QUETIAPINE FUMARATE 50 MG/1
100 TABLET, FILM COATED ORAL EVERY EVENING
Status: DISCONTINUED | OUTPATIENT
Start: 2019-11-01 | End: 2019-11-04

## 2019-11-01 RX ORDER — WARFARIN SODIUM 3 MG/1
3 TABLET ORAL
Status: COMPLETED | OUTPATIENT
Start: 2019-11-01 | End: 2019-11-01

## 2019-11-01 RX ADMIN — Medication 12.5 MG: at 09:34

## 2019-11-01 RX ADMIN — DIGOXIN 125 MCG: 0.05 SOLUTION ORAL at 09:33

## 2019-11-01 RX ADMIN — TICAGRELOR 90 MG: 90 TABLET ORAL at 09:33

## 2019-11-01 RX ADMIN — FUROSEMIDE 40 MG: 10 INJECTION, SOLUTION INTRAVENOUS at 09:31

## 2019-11-01 RX ADMIN — GABAPENTIN 300 MG: 300 CAPSULE ORAL at 09:34

## 2019-11-01 RX ADMIN — GABAPENTIN 300 MG: 300 CAPSULE ORAL at 13:56

## 2019-11-01 RX ADMIN — WARFARIN SODIUM 3 MG: 3 TABLET ORAL at 16:38

## 2019-11-01 RX ADMIN — Medication: at 09:35

## 2019-11-01 RX ADMIN — LIDOCAINE 2 PATCH: 560 PATCH PERCUTANEOUS; TOPICAL; TRANSDERMAL at 09:31

## 2019-11-01 RX ADMIN — MULTIVITAMIN 15 ML: LIQUID ORAL at 09:33

## 2019-11-01 RX ADMIN — MIDODRINE HYDROCHLORIDE 10 MG: 5 TABLET ORAL at 13:56

## 2019-11-01 RX ADMIN — RAMELTEON 8 MG: 8 TABLET ORAL at 22:21

## 2019-11-01 RX ADMIN — INSULIN ASPART 1 UNITS: 100 INJECTION, SOLUTION INTRAVENOUS; SUBCUTANEOUS at 00:09

## 2019-11-01 RX ADMIN — Medication 1 PACKET: at 20:49

## 2019-11-01 RX ADMIN — FOLIC ACID 1 MG: 1 TABLET ORAL at 09:33

## 2019-11-01 RX ADMIN — LEVETIRACETAM 750 MG: 100 SOLUTION ORAL at 09:33

## 2019-11-01 RX ADMIN — OMEPRAZOLE 20 MG: KIT at 09:33

## 2019-11-01 RX ADMIN — IPRATROPIUM BROMIDE 0.5 MG: 0.5 SOLUTION RESPIRATORY (INHALATION) at 07:51

## 2019-11-01 RX ADMIN — TICAGRELOR 90 MG: 90 TABLET ORAL at 20:49

## 2019-11-01 RX ADMIN — ACETAMINOPHEN 650 MG: 325 TABLET, FILM COATED ORAL at 13:56

## 2019-11-01 RX ADMIN — AMIODARONE HYDROCHLORIDE 200 MG: 200 TABLET ORAL at 09:34

## 2019-11-01 RX ADMIN — OXYCODONE HYDROCHLORIDE 10 MG: 5 TABLET ORAL at 16:38

## 2019-11-01 RX ADMIN — QUETIAPINE FUMARATE 100 MG: 50 TABLET ORAL at 20:48

## 2019-11-01 RX ADMIN — GABAPENTIN 600 MG: 300 CAPSULE ORAL at 22:19

## 2019-11-01 RX ADMIN — ALLOPURINOL 200 MG: 100 TABLET ORAL at 09:34

## 2019-11-01 RX ADMIN — Medication 1 PACKET: at 09:31

## 2019-11-01 RX ADMIN — FUROSEMIDE 40 MG: 10 INJECTION, SOLUTION INTRAVENOUS at 16:39

## 2019-11-01 RX ADMIN — OXYCODONE HYDROCHLORIDE 10 MG: 5 TABLET ORAL at 22:19

## 2019-11-01 RX ADMIN — Medication 12.5 MG: at 20:48

## 2019-11-01 RX ADMIN — OXYCODONE HYDROCHLORIDE 10 MG: 5 TABLET ORAL at 09:33

## 2019-11-01 RX ADMIN — LEVETIRACETAM 750 MG: 100 SOLUTION ORAL at 20:49

## 2019-11-01 RX ADMIN — ATORVASTATIN CALCIUM 40 MG: 40 TABLET, FILM COATED ORAL at 20:49

## 2019-11-01 RX ADMIN — Medication 1 PACKET: at 14:21

## 2019-11-01 RX ADMIN — OMEPRAZOLE 20 MG: KIT at 16:41

## 2019-11-01 RX ADMIN — Medication: at 13:57

## 2019-11-01 RX ADMIN — NICOTINE 1 PATCH: 21 PATCH TRANSDERMAL at 20:50

## 2019-11-01 RX ADMIN — MIDODRINE HYDROCHLORIDE 10 MG: 5 TABLET ORAL at 09:34

## 2019-11-01 RX ADMIN — QUETIAPINE FUMARATE 100 MG: 100 TABLET ORAL at 09:34

## 2019-11-01 RX ADMIN — THIAMINE HCL TAB 100 MG 100 MG: 100 TAB at 09:34

## 2019-11-01 RX ADMIN — IPRATROPIUM BROMIDE 0.5 MG: 0.5 SOLUTION RESPIRATORY (INHALATION) at 19:26

## 2019-11-01 RX ADMIN — MIDODRINE HYDROCHLORIDE 10 MG: 5 TABLET ORAL at 16:40

## 2019-11-01 RX ADMIN — HEPARIN SODIUM 5000 UNITS: 5000 INJECTION, SOLUTION INTRAVENOUS; SUBCUTANEOUS at 09:31

## 2019-11-01 RX ADMIN — HEPARIN SODIUM 5000 UNITS: 5000 INJECTION, SOLUTION INTRAVENOUS; SUBCUTANEOUS at 20:48

## 2019-11-01 RX ADMIN — ASPIRIN 81 MG CHEWABLE TABLET 81 MG: 81 TABLET CHEWABLE at 09:34

## 2019-11-01 ASSESSMENT — PAIN DESCRIPTION - DESCRIPTORS
DESCRIPTORS: SHARP
DESCRIPTORS: ACHING
DESCRIPTORS: NAGGING
DESCRIPTORS: SORE
DESCRIPTORS: TIGHTNESS

## 2019-11-01 ASSESSMENT — ACTIVITIES OF DAILY LIVING (ADL)
ADLS_ACUITY_SCORE: 10.5
ADLS_ACUITY_SCORE: 10.5
ADLS_ACUITY_SCORE: 9.5
ADLS_ACUITY_SCORE: 10.5

## 2019-11-01 ASSESSMENT — MIFFLIN-ST. JEOR: SCORE: 1891.24

## 2019-11-01 NOTE — PLAN OF CARE
"Discharge Planner OT   Patient plan for discharge: ARU  Current status: Pt spouse at bedside and spouse choosing to feed pt sandwich with edu provided that speech has only approved pt for dysphagia 2 diet with spouse reporting \"I don't care. He has not eaten anything since he's been here\", increased HOB to full sitting due to spouse feeding pt at ~30 degree HOB and RN and speech therapy alerted, CGA for supine <> sit and SBA for EOB sitting, pt brushed teeth with SBA, orthostatic hypotension with standing (BP sitting 112/83 (MAAP 86),  standing, 85/75 (MAP 80), returned to sitting ~5 min with /55 9MAP 95), after standing to march 92/69 (map 71), mod A x 2 for sit <> supine, pt believing he is in \"some type of mental hospital\"  Barriers to return to prior living situation: medical status, cognition, orthostatic hypotension, weakness, level of A   Recommendations for discharge: ARU  Rationale for recommendations: Pt is far below baseline and will require intensive therapy to return to baseline IND with ADL/IADLs. Anticipate pt will be able to tolerate x3 hrs of therapy a day.       Entered by: Naty Bailey 11/01/2019 2:59 PM       "

## 2019-11-01 NOTE — PHARMACY-CONSULT NOTE
Pharmacy Delirium Chart Review    Upon chart review, the following medications may contribute to possible patient delirium: gabapentin (disturbance in thinkin-3%), levetiracetam (agitation, anxiety, apathy, emotional liability, and irritability: 7-37%), quetiapine (agitation 6-20%, and anxiety 2-4%). Agree with plan to titrate off quetiapine per primary team. Please consult unit pharmacist with further questions.      Jennifer Boyce  Pharm D Candidate  2019

## 2019-11-01 NOTE — PLAN OF CARE
Transferred to: 6C at 0650 via bed with float ICU nurse and sitter  Status at time of transfer: Alert, disoriented x3. Following commands. Aflutter with stable HR. MAP >60. Using 4 LPM via NC. TF's stopped for transfer.   Belongings: Sent with patient  Lawrence removed? (if no, why?): Yes  Chart and medications: Sent with patient  Family notified: Wife notified and sent with patient to floor

## 2019-11-01 NOTE — PROGRESS NOTES
[facility]   Cardiology Consults  Progress Note     Interval History:  - transferred to the floor this AM  - continues to have poor appetite. Has rib pain with deep inspiration/cough secondary to rib fractures  - wife reporting ongoing confusion, particularly in the evening.    Physical Exam:  Temp:  [97.5  F (36.4  C)-98.5  F (36.9  C)] 98  F (36.7  C)  Pulse:  [67-96] 84  Heart Rate:  [] 84  Resp:  [12-29] 22  BP: ()/(50-85) 92/72  FiO2 (%):  [30 %-60 %] 30 %  SpO2:  [88 %-100 %] 98 %      Intake/Output Summary (Last 24 hours) at 11/1/2019 1111  Last data filed at 11/1/2019 1041  Gross per 24 hour   Intake 3802 ml   Output 2145 ml   Net 1657 ml       Wt:   Wt Readings from Last 5 Encounters:   11/01/19 97 kg (213 lb 13.5 oz)       GEN: NAD, awake, alert  Pulm: coarse breath sounds in upper fields, remains on 4L NC  Cardiac: JVP WNL, no murmurs,   Vascular: no lower extremity edema and palpable radial/pedal pulses  GI: soft, non distended  Neuro: CN II-XII grossly intact    Medications:    allopurinol  200 mg Oral Daily     amiodarone  200 mg Oral Daily     aspirin  81 mg Per Feeding Tube Daily     digoxin  125 mcg Oral Daily     folic acid  1 mg Oral Daily     furosemide  40 mg Intravenous BID     gabapentin  300 mg Oral BID     gabapentin  600 mg Oral At Bedtime     heparin ANTICOAGULANT  5,000 Units Subcutaneous Q12H     influenza vaccine adult (product based on age)  0.5 mL Intramuscular Prior to discharge     insulin aspart  1-4 Units Subcutaneous Q4H     ipratropium  0.5 mg Nebulization BID     ketamine 5% gabapentin 8% lidocaine 2.5%   Topical TID     levETIRAcetam  750 mg Oral or Feeding Tube BID     lidocaine  2 patch Transdermal Q24H     lidocaine   Transdermal Q8H     lidocaine   Transdermal Q24h     metoprolol tartrate  12.5 mg Oral BID     midodrine  10 mg Oral TID w/meals     multivitamins w/minerals  15 mL Per Feeding Tube Daily     nicotine  1 patch  Transdermal QPM     nicotine   Transdermal Q8H     nicotine   Transdermal QPM     omeprazole  20 mg Oral or Feeding Tube BID AC     protein modular  1 packet Per Feeding Tube TID     QUEtiapine  100 mg Oral or Feeding Tube QPM     QUEtiapine  100 mg Oral QAM     ramelteon  8 mg Oral At Bedtime     sodium chloride (PF)  3 mL Intracatheter Q8H     ticagrelor  90 mg Oral or Feeding Tube BID     vitamin B1  100 mg Oral Daily       IV fluid REPLACEMENT ONLY 25 mL/hr at 10/18/19 1929     sodium chloride         Labs:   CMP  Recent Labs   Lab 11/01/19  0328 10/31/19  0355 10/30/19  1104 10/30/19  0338 10/29/19  0338 10/28/19  0328    141  --  140 143 144   POTASSIUM 4.2 3.9 4.1 3.6 3.7 3.8   CHLORIDE 100 102  --  104 104 105   CO2 35* 35*  --  32 36* 34*   ANIONGAP 3 4  --  4 3 5   * 132*  --  147* 155* 134*   BUN 36* 31*  --  35* 36* 41*   CR 0.79 0.66  --  0.65* 0.69 0.80   GFRESTIMATED >90 >90  --  >90 >90 >90   GFRESTBLACK >90 >90  --  >90 >90 >90   HEATHER 8.5 8.6  --  8.7 8.5 8.3*   MAG 2.8* 2.9*  --  2.6* 2.8* 3.0*   PHOS 4.3  --   --  4.5 3.2  --    PROTTOTAL  --   --   --   --   --  6.9   ALBUMIN  --   --   --   --   --  2.2*   BILITOTAL  --   --   --   --   --  1.4*   ALKPHOS  --   --   --   --   --  74   AST  --   --   --   --   --  44   ALT  --   --   --   --   --  30     CBC  Recent Labs   Lab 11/01/19  0328 10/31/19  0355 10/30/19  0338 10/29/19  0338   WBC 7.7 8.3 8.2 9.5   RBC 2.67* 2.74* 2.90* 2.72*   HGB 8.1* 8.4* 8.9* 8.2*   HCT 27.2* 27.5* 28.9* 27.1*   * 100 100 100   MCH 30.3 30.7 30.7 30.1   MCHC 29.8* 30.5* 30.8* 30.3*   RDW 17.2* 16.9* 16.3* 16.4*   * 574* 569* 558*     INR  Recent Labs   Lab 10/29/19  0338 10/28/19  0328 10/27/19  0419 10/26/19  0346   INR 1.17* 1.14 1.14 1.23*     Arterial Blood Gas  Recent Labs   Lab 11/01/19  0328 10/31/19  1324 10/31/19  0352 10/30/19  1053 10/28/19  0328  10/27/19  1600 10/27/19  0419 10/26/19  1601   PH  --   --   --   --  7.46*  --   7.38 7.41 7.42   PCO2  --   --   --   --  50*  --  56* 54* 47*   PO2  --   --   --   --  74*  --  67* 68* 55*   HCO3  --   --   --   --  35*  --  33* 34* 31*   O2PER 30.0 4L 4 6L 60   < > 100.0 60.0 50.0    < > = values in this interval not displayed.       Diagnostics:    EKG: Atrial flutter, variable block    Echo 10/30/2019:  Interpretation Summary  Limited study.  Ischemic cardiomyopathy, LVEF=25-30% with regional wall motion abnormalities  consistent with injury in a wrap-around LAD distribution.  RV size and function are probably normal on limited views.  No pericardial effusion is present.  This study was compared with the study from 10/20/19: There has been no  change.  _____________________________________________________________________________    Left Ventricle  Left ventricular size is normal. Left ventricular wall thickness is normal.  Severely (EF <30%) reduced left ventricular function is present. Akinesis of  all anterior, anteroseptal, and apical segments, consistent with injury in a  wrap-around LAD distribution.     Right Ventricle  RV size and function are probably normal on limited views.     Mitral Valve  The mitral valve cannot be assessed.     Aortic Valve  The aortic valve cannot be assessed.        Tricuspid Valve  The tricuspid valve cannot be assessed. Pulmonary artery systolic pressure  cannot be assessed.     Pulmonic Valve  The pulmonic valve cannot be assessed.     Vessels  The aorta root cannot be assessed. The inferior vena cava cannot be assessed.     Pericardium  No pericardial effusion is present.     Compared to Previous Study  This study was compared with the study from 10/20/19 . There has been no  change.     CT Chest 10/28/2019  IMPRESSION:      1. New primarily right-sided groundglass and nodular opacities.  Concerning for infectious etiology  2. Redemonstration of reticular groundglass and solid densities  primarily in the lung bases, right greater than left. Mildly  "improved  compared to prior CT 10/21/2019.  3. Prominent mediastinal lymphadenopathy, unchanged.    CT Head 10/16/2019  Impression:   1. Better visualized nonspecific hypodensity in the right paracentral  lobule, which is indeterminate for a small infarct versus small mass  lesion is better evaluated by MRI (although may not be feasible), or  postcontrast CT could help to visualize further.   2. A new right hemicerebellum lesion as well is more suspicious for  infarct.  3. Diffuse debris and fluid throughout the paranasal sinuses could  relate to intubation, but increased, and could also represent  sinusitis.    ASSESSMENT/PLAN:  53 year old male who with past medical history of smoking, was admitted on 10/13/19 as a transfer from [place], [place] for refractory VT/VF arrest s/p PCI to LAD and placement on peripheral VA ECMO.     #Refractory VT/VF arrest s/p ECMO  ##CAD s/p PCI to LAD 10/13/2019  ###ICM (LVEF 25-30%)  Per report, was witnessed by wife to have seizure like activity on 10/13. EMS called and responded in 3-4 minutes, found a \"shockable rhythm\". Unclear how many shocks he received. Got 40 min CPR en route to [facility], found to have VT and started on Amiodarone. Noted to have ST elevations, and transferred to [facility] - found to have LAD disease. Placed on VA ECMO, PCI to LAD, and transferred to [facility] for ongoing care. He was cooled for 24h. IABP removed successfully 10/20/2019. Successful extubation 10/25/2019. Coarse complicated by right hemothorax secondary to CPR requiring placement of chest tube from 10/14-10/28, stroke and seizures (discussed below), and vasoplegia requiring midodrine - given no obvious infectious source, will consider adrenal insuffiencey secondary to acute illness. Given his severely reduced EF would recommend life vest at discharge. Additionally, due to his akinetic LV, would anticoagulate for LV thrombus prophylaxis (will be on warfarin for atrial fibrillation). Unfortunately, " will not be able to get him on GDMT while he is requiring midodrine to maintain appropriate MAPs. Will initiate when able.  - monitor on telemetry  - will call attempt to get angiogram images to investigate if other vessels require intervention  - will need heart failure follow up in [place] after discharge.   - Lifevest at discharge (form completed and signed)  - aspirin 81mg + ticagrelor 90mg bid + warfarin. Once INR is therapeutic, will discontinue ASA  - metoprolol tartrate 12.5mg bid  - atorvastatin 40mg starting tonight  - continue midodrine 10mg tid. Decrease as able  - AM serum cortisol tomorrow morning    #Atrial fibrillation/Atrial flutter (Chadsvasc at least 3)  Noted in the ICU. Unknown if he has a history of this.   - Pharmacy to dose warfarin. Goal INR 2-3. May start tonight  - Digoxin .125mcg daily  - Metoprolol as above    #Stroke  ##Seizures  CT head 10/16 concerning for new right hemicerebellum lesion c/w infarct. Noted to have seizure like activity on EEG 10/17 - levitiracetam 750mg bid   - antiplatelets as above    #ICU delirium  - improving  Continues to have delirium, which per wife, is worse in the evening. Will titrate off seroquel as able.   - delirium precautions  - continue 100mg qam seroquel. Decrease PM dose to 100mg.     #Dysphagia  Patient with swallowing difficulty s/p extubation. He continues to require tube feeds as he is not meeting his caloric needs.   - RD following  - continue to monitor. De-escalate feeds in coming days    #Macrocytic hypochromic Anemia  ##Thrombocytosis   Hgb persistently low since placement on ECMO. Platelets continuing to rise, likely secondary to proliferation.   -Iron, ferritin, TIBC, folate, B12 studies ordered  - peripheral smear ordered  - CBC daily    #ID  - Initially vancomycin/zosyn x5 days for presumed aspiration - then extended given multiple invasive procedures. As of 10/28/2019 evening, refraining from restarting antibiotics. ID consulted given  ongoing vasoplegia. No additional recs at this time. CT chest 10/28 with new GGO on right, concerning for infection vs. Atelectasis. Will monitor closely. If any signs of infection, will re-consult ID.    Patient seen and discussed with Dr. Griffin, who agrees with above plan.    Christophe Bailey PA-C  [facility] Cardiology Team      ATTENDING NOTE:  Patient has been seen and evaluated by me on 11/01/2019. I have reviewed the documentation above.  I have reviewed today's vital signs, medications, labs, and imaging results.  I have reviewed and edited, as necessary, the history, review of systems, physical examination, and assessment and plan.  I have discussed my assessment and plan with the physician assistant.  I have seen and examined the patient today as part of a shared visit with Reg Bailey PA-C.  Joel Campbell is a 53 year old male with risk factor profile (-) HTN, (-) DM, (-) hypercholesterolemia, (-) tobacco use, (-) fam Hx premature CAD, [facility] to [facility] after having seizure activity followed by shockable rhythm by EMS, persistent VT/VF, prolonged CPR, transferred to [facility], STEMI noted, emergent coronary angiography, stenting of LAD, placed on ECMO, and transferred to [facility] for further management.   He was decannulated on 10/18/19,  IABP was removed on 10/20/19, extubated on 10/25/19, and transferred to telemetry this morning.  ECHO (10/30/19) showed LVEF 25-30%, akinesis of all anterior, anteroseptal and apical segments.   He has had NSVT.  He will require LifeVest prior to discharge and if LVEF remains <35%, he will require AICD.  I would be inclined to anticoagulate for 6 months to prophylax against LV thrombus.  He had presumed aspiration pneumonia earlier in the hospitalization and had a LF chest tube for hemothorax following resuscitation, tube removed 10/26/19.  Renal function Cr 0.79 mg/dl.   Patient remains vasoplegic, persistent hypotension, on Midrodine but no  evidence of underlying sepsis (lactate 1.7).  Patient on tube feedings, transitioning slowly to po.  Hgb 8.1, no evidence of acute bleeding, stable.  Check serum cortisol.  Neurologically impaired, reducing Seroquel.   Consult PT&OT.   Continue Ticagrelor and ASA.  Once INR therapeutic, stop ASA.      Kevyn Griffin MD     Cardiovascular Division

## 2019-11-01 NOTE — PLAN OF CARE
Discharge Planner SLP   Patient plan for discharge: Unknown  Current status: Recommend diet advancement to regular diet/thin liquids. Pt to be fully alert and upright for all PO, taking small bites/sips at slow rate. SLP to follow for short course to ensure tolerance.  Barriers to return to prior living situation: None from ST perspective  Recommendations for discharge: Defer to PT/OT  Rationale for recommendations: Functional oropharyngeal swallow mechanism; pending progress, may benefit from formal cog-comm eval at next level of care       Entered by: Deborah Watts 11/01/2019 3:51 PM

## 2019-11-01 NOTE — PROGRESS NOTES
Calorie Count  Intake recorded for: 10/31  Total Kcals: 536 Total Protein: 3g  Kcals from Hospital Food: 286  Protein: 3g  Kcals from Outside Food (average):250 Protein: 0g  # Meals Recorded: 100% Mountain Dew from outside the hospital, orange juice, 75% apple juice,less than 25% pancake w/ syrup, scrambled egg, egg noodles w/ meat sauce   # Supplements Recorded: 0

## 2019-11-01 NOTE — CONSULTS
"[facility]   PM&R CONSULT    Consulting Provider: Dr. Bailey  Reason for Consult: Assessment of rehabilitation needs  Location of Patient: 6C  Date of Encounter: 11/1/2019     ATTENDING'S NOTE   Patient seen and examined with the resident   Reviewed the chart with the resident, examined the patient, reviewed the note below, discussed the care plan and recommendations, and concur with the plan below which reflects my direct input     María Elena Hurst   ASSESSMENT/PLAN:    Mr. Joel Campbell is a 53 year old year-old male who presents with debility/right paracentral lobule new onset stroke in the setting of VT/VF arrest s/p PCI to LAD and peripheral ECMO. Required multiple chest tubes for hemothorax.     At baseline he was independent and working full-time. Support system includes his wife and daughter.     He now has deficits in endurance, cognition, and strength leading to impairments in functional mobility and independence of ADLs. He will have good support from wife and daughter upon discharge, however they do work during the day.    Recommendations:  - The patient is a good candidate for ARU  - He requires intensive inpatient rehab to address the above impairments  - Frequency of the prescribed therapy is 3 hours of PT/OT, 90 min daily in each discipline during the rehab stay.   - He meets medical complexity and requires daily physician assessment to manage and assess respiratory status, blood pressure, pain, and other comorbidities mentioned above to maximize the patient's ability to benefit from the rehabilitation process.    HISTORY OF PRESENT ILLNESS:   Mr. Campbell is a 54 yo male with reported unremarkable PMH admitted to the cardiac ICU on 10/13 as transfer from OSH with refractory VT/VF arrest s/p placement on peripheral [facility] ECMO.  Per records from that OSH, patient arrived 10/12 after \"seizure-like activity\" per wife and then became unresponsive.  EMS called, could not " identify pulse, so CPR was started and occurred for 40 minutes before ROSC.  Upon arrival to [facility] he underwent PCI to LAD, placement of IABP, and was started on VA ECMO.  Upon arrival to [facility] patient was intubated, sedated and unresponsive to commands.  Large hemothorax was identified on 10/14; chest tube placed that day with additional tube placed on 10/18.  ECMO decannulated 10/18.  Extubated 10/25.  Chest tube removed 10/28.    EEG had a small amount of epileptiform activity, for which patient was started on Keppra 750 mg BID on 10/17.  CT head on 10/16 also demonstrated small right paracentral lesion and new right felicia-cerebellum lesion concerning for stroke.    He currently has an NG tube and and is receiving tube feeds to supplement his p.o. intake.  Has poor appetite.  Good urine output.    Today, patient reports he still feels diffuse weakness, which makes mobility, especially transfers, difficult. Also with SOB. No issues with B/B. Sleeping well. No pain.    PREVIOUS LEVEL OF FUNCTION:  Independent in functional mobility and ADLs. Was working full-time at active job.    CURRENT FUNCTION:   Transfers with CGA/min A for sit to stand.  Walked  ft x4 with FWW and CGA.  Fatigues quickly. Impaired attention and memory, improving.    CURRENT RN NEEDS:   Blood pressure monitoring, his heart rhythm monitoring, oxygen requirements, pain control.    SOCIAL HISTORY(Home Setting/Support):  Lives with spouse in house in [place], [place]. One step to enter. Bedroom, bathroom, kitchen on main floor. 1 flight steps to basement, which is not required for essential needs.     Support from spouse and daughter, who both work.      Social History     Socioeconomic History     Marital status:      Spouse name: None     Number of children: None     Years of education: None     Highest education level: None   Occupational History     None   Social Needs     Financial resource strain: None     Food insecurity:      Worry: None     Inability: None     Transportation needs:     Medical: None     Non-medical: None   Tobacco Use     Smoking status: None   Substance and Sexual Activity     Alcohol use: None     Drug use: None     Sexual activity: None   Lifestyle     Physical activity:     Days per week: None     Minutes per session: None     Stress: None   Relationships     Social connections:     Talks on phone: None     Gets together: None     Attends [employer] service: None     Active member of club or organization: None     Attends meetings of clubs or organizations: None     Relationship status: None     Intimate partner violence:     Fear of current or ex partner: None     Emotionally abused: None     Physically abused: None     Forced sexual activity: None   Other Topics Concern     None   Social History Narrative     None     PAST MEDICAL HISTORY:  History reviewed. No pertinent past medical history.    CURRENT MEDICATIONS:  Current Facility-Administered Medications   Medication     acetaminophen (TYLENOL) tablet 650 mg     albumin human 25 % injection 12.5 g     allopurinol (ZYLOPRIM) tablet 200 mg     alteplase (CATHFLO ACTIVASE) injection 2 mg     amiodarone (PACERONE/CODARONE) tablet 200 mg     artificial tears ophthalmic ointment     aspirin (ASA) chewable tablet 81 mg     benzocaine 20% (HURRICAINE/TOPEX) 20 % spray 0.5-1 mL     benzocaine-menthol (CEPACOL) 15-3.6 MG lozenge 1 lozenge     bisacodyl (DULCOLAX) Suppository 10 mg     calcium gluconate in D5W 100 mL intermittent infusion 1 g     dextrose 10 % 1,000 mL infusion     glucose gel 15-30 g    Or     dextrose 50 % injection 25-50 mL    Or     glucagon injection 1 mg     digoxin (LANOXIN) solution 125 mcg     folic acid (FOLVITE) tablet 1 mg     furosemide (LASIX) injection 40 mg     gabapentin (NEURONTIN) capsule 300 mg     gabapentin (NEURONTIN) capsule 600 mg     heparin ANTICOAGULANT injection 5,000 Units     influenza recomb quadrivalent PF (FLUBLOK)  injection 0.5 mL     insulin aspart (NovoLOG) inj (RAPID ACTING)     ipratropium (ATROVENT) 0.02 % neb solution 0.5 mg     ketamine 5% gabapentin 8% lidocaine 2.5% topical PLO cream     levETIRAcetam (KEPPRA) solution 750 mg     Lidocaine (LIDOCARE) 4 % Patch 2 patch     lidocaine (LMX4) cream     lidocaine 1 % 0.1-1 mL     lidocaine patch in PLACE     lidocaine patch REMOVAL     magnesium sulfate 2 g in NS intermittent infusion (PharMEDium or FV Cmpd)     magnesium sulfate 4 g in 100 mL sterile water (premade)     melatonin tablet 5 mg     metoprolol tartrate (LOPRESSOR) half-tab 12.5 mg     midodrine (PROAMATINE) tablet 10 mg     multivitamins w/minerals (CERTAVITE) liquid 15 mL     naloxone (NARCAN) injection 0.1-0.4 mg     nicotine (NICODERM CQ) 21 MG/24HR 24 hr patch 1 patch     nicotine (NICORETTE) gum 2 mg     nicotine Patch in Place     nicotine patch REMOVAL     omeprazole (FIRST-OMEPRAZOLE) suspension 20 mg     ondansetron (ZOFRAN-ODT) ODT tab 4 mg    Or     ondansetron (ZOFRAN) injection 4 mg     oxyCODONE (ROXICODONE) tablet 5-10 mg     polyethylene glycol (MIRALAX/GLYCOLAX) Packet 17 g     potassium chloride (KLOR-CON) Packet 20-40 mEq     potassium chloride 10 mEq in 100 mL intermittent infusion with 10 mg lidocaine     potassium chloride 10 mEq in 100 mL sterile water intermittent infusion (premix)     potassium chloride 20 mEq in 50 mL intermittent infusion     potassium chloride ER (K-DUR/KLOR-CON M) CR tablet 20-40 mEq     potassium phosphate 10 mmol in D5W 250 mL intermittent infusion     potassium phosphate 15 mmol in D5W 250 mL intermittent infusion     potassium phosphate 20 mmol in D5W 250 mL intermittent infusion     potassium phosphate 20 mmol in D5W 500 mL intermittent infusion     potassium phosphate 25 mmol in D5W 500 mL intermittent infusion     prochlorperazine (COMPAZINE) injection 10 mg    Or     prochlorperazine (COMPAZINE) tablet 10 mg    Or     prochlorperazine (COMPAZINE)  "Suppository 25 mg     protein modular (PROSOURCE TF) 1 packet     QUEtiapine (SEROquel) tablet 100 mg     QUEtiapine (SEROquel) tablet 100 mg     ramelteon (ROZEREM) tablet 8 mg     senna-docusate (SENOKOT-S/PERICOLACE) 8.6-50 MG per tablet 2 tablet     sodium chloride (PF) 0.9% PF flush 3 mL     sodium chloride (PF) 0.9% PF flush 3 mL     sodium chloride 0.9% infusion     ticagrelor (BRILINTA) tablet 90 mg     vitamin B1 (THIAMINE) tablet 100 mg     zinc oxide (DESITIN) 20 % ointment     REVIEW OF SYSTEMS:   Total of ten systems were reviewed with patient. Pertinent positives and negatives as follows  Instability with standing and walking.    Feels generally fatigued  Reports weakness diffusely  Denies any tingling or numbness  No problems with bladder or bowel.   + SOB.  No visual changes.   No headache or photophobia.   No nausea, or abdominal pain.  Slept well  Mood is good, denies any symptoms of depression.   Remainder of the review of the systems was negative.    PHYSICAL EXAM:   VS: BP 92/72 (BP Location: Right arm)   Pulse 84   Temp 98  F (36.7  C) (Oral)   Resp 22   Ht 1.89 m (6' 2.41\")   Wt 97 kg (213 lb 13.5 oz)   SpO2 98%   BMI 27.16 kg/m    GEN: In no acute distress, lying comfortably in bed, alert, appropriate, cooperative  HEENT: Normocephalic, atraumatic  RESPIRATORY: Nonlabored breathing on RA  MSK: full active and passive ROM at all major joints of the bilaterally upper and lower extremities  No muscle atrophy noted  NEURO:  CNs: Pupils equal, round. Extraocular movements full. Face moves symmetrically. Hearing intact to conversation  Sensation: Sensation to light touch intact throughout  Strength: 5/5 to bilateral shoulder abduction, elbow flexion/extension, finger , knee flexion/extension, plantar flexion, dorsiflexion, EHL. Left hip flexion 5/5, right hip flexion 4/5  Cognition: fund of knowledge and train of thought appropriate  Speech: fluent  SKIN: no rashes or lesions noted.  " Patient has PIV x2, NG tube   EXT: No lower extremity edema bilaterally.   PSYCH: Normal affect.    LABS:    Lab Results   Component Value Date    WBC 7.7 11/01/2019    HGB 8.1 (L) 11/01/2019    HCT 27.2 (L) 11/01/2019     (H) 11/01/2019     (H) 11/01/2019     Lab Results   Component Value Date     11/01/2019    POTASSIUM 4.2 11/01/2019    CHLORIDE 100 11/01/2019    CO2 35 (H) 11/01/2019     (H) 11/01/2019     Lab Results   Component Value Date    GFRESTIMATED >90 11/01/2019    GFRESTBLACK >90 11/01/2019     Lab Results   Component Value Date    AST 44 10/28/2019    ALT 30 10/28/2019    ALKPHOS 74 10/28/2019    BILITOTAL 1.4 (H) 10/28/2019     Lab Results   Component Value Date    INR 1.17 (H) 10/29/2019     Lab Results   Component Value Date    BUN 36 (H) 11/01/2019    CR 0.79 11/01/2019     IMAGING:  CT Head, 10/16/19:  Impression:   1. Better visualized nonspecific hypodensity in the right paracentral lobule, which is indeterminate for a small infarct versus small mass lesion is better evaluated by MRI (although may not be feasible), or postcontrast CT could help to visualize further.   2. A new right hemicerebellum lesion as well is more suspicious for infarct.  3. Diffuse debris and fluid throughout the paranasal sinuses could relate to intubation, but increased, and could also represent sinusitis.      Thank you for this consultation. Please do not hesitate to call with any questions.     Patient staffed with Dr. Natali Méndez MD   Rehabilitation Physician  Pager: [phone]    Mraía Elena Hurst   For questions regarding ARU, please contact Lucia Almeida at [phone]  Total of 70 min spent in this encounter more than 50% in counseling and coordination.

## 2019-11-01 NOTE — PLAN OF CARE
Discharge Planner PT   Patient plan for discharge: not stated.   Current status: pt limited by weakness fatigue and pain. Today. Pt needing CGA to sligiht min A for all bed mob, and sit to stand. Pt demo supine seated and standing TE x 10 needing V.c for tech and progression.   Barriers to return to prior living situation:   Recommendations for discharge: PT - per plan established by the Physical Therapist, according to functional mobility the  discharge recommendation is ARU  Rationale for recommendations: pt is below baseline, benefit for cont skilled PT to progress functional IND.        Entered by: Wil Nichols 11/01/2019 11:16 AM

## 2019-11-01 NOTE — PHARMACY-ANTICOAGULATION SERVICE
Clinical Pharmacy - Warfarin Dosing Consult     Pharmacy has been consulted to manage this patient s warfarin therapy.  Indication: Atrial Fibrillation  Therapy Goal: INR 2-3  Provider/Team: [facility]  Warfarin Prior to Admission: No  Significant drug interactions: Allopurinol (increased INR due to inhibition of warfarin metabolism), amiodarone (increased INR due to inhibition of warfarin metabolism), aspirin & heparin & ticagrelor (increased risk for bleeding), quetiapine (increased risk for bleeding)  Dose Comments: Patient had a large hemothorax on 10/14, and there was concern for possible GIB that has now resolved    INR   Date Value Ref Range Status   10/29/2019 1.17 (H) 0.86 - 1.14 Final   10/28/2019 1.14 0.86 - 1.14 Final       Recommend warfarin 3 mg today.  Pharmacy will monitor Joel Campbell daily and order warfarin doses to achieve specified goal.      Please contact pharmacy as soon as possible if the warfarin needs to be held for a procedure or if the warfarin goals change.      Jennifer Boyce  Pharm D Candidate  November 1, 2019

## 2019-11-01 NOTE — PROGRESS NOTES
CLINICAL NUTRITION SERVICES     Nutrition Prescription    RECOMMENDATIONS FOR MDs/PROVIDERS TO ORDER:  If upcoming kcal count/s are ~600 kcals/day or greater, then rec decrease cycled TwoCal TF regimen to 75 mL/hr x 12 hrs (vs 65 mL/hr x 14 hrs) which provides ~73%  of kcal needs. Continue Prosource TF modular for additional oral intake. TFs may be further adjusted pending oral intake. Once kcal counts reveal that pt is consuming at least 1625 kcals and 71 g protein daily on average, discontinue TFs.    Recommendations already ordered by Registered Dietitian (RD):  Oral supplements    Future/Additional Recommendations:  For all recommendations, see prior RD notes.       TFs: TwoCal HN via NJT at 75 mL/hr x 18 hrs = 1350 mL, 2700+ kcal (28+ kcals/kg), and 113 g PRO (1.2+ g protein/kg). Ordered to receive Prosource TF modular, 3 pkts daily.     Diet: DD II + thin liquids since 10/30 (and as per SLP rec on 10/31)   Kcal counts:   10/31   536 kcals and 3 g protein (100% Mountain Dew from outside the hospital, orange juice, 75% apple juice,less than 25% pancake w/ syrup, scrambled egg, egg noodles w/ meat sauce and no supplement/s recorded)   11/1-11/2 pending   * Not meeting estimated needs of 4793-7371 kcals/day (25 - 30 kcals/kg) and 118 - 147 grams protein/day (1.2-1.5 grams of pro/kg)    INTERVENTIONS:  Implementation:  Collaboration with other providers: Discussed potential ability to decrease TF pending oral intake with team.   Medical food supplement therapy: Scheduled Boost Breeze at 14:00 and orange Magic Cup at HS.     Follow up/Monitoring:  Will continue to follow pt.    Merry Saldaña, MS, RD, LD, [facility]   6C Pgr: [phone]

## 2019-11-01 NOTE — PROGRESS NOTES
Care Coordinator Progress Note    Admission Date/Time:  10/13/2019  Attending MD:  Kevyn Griffin MD    Data  Chart reviewed, discussed with interdisciplinary team.   Patient was admitted for:    Ventricular tachycardia (H)  Cardiac arrest (H)  Cardiac arrest (H).    Concerns with insurance coverage for discharge needs: TBD  Current Living Situation: Patient lives with spouse.  Support System: Supportive and Involved  Services Involved: None previously  Transportation at Discharge: Family or friend will provide  Barriers to Discharge: Medical plan of care, safe disposition planning.     Coordination of Care and Referrals: Provided patient/family with options for home life vest. Per PA, pt will need a life vest at discharge and that pt's spouse is in agreement with this plan. This writer met with pt's spouse to her informational brochure regarding the life vest.   PT/OT/Speech following and the current recommendation is ARU, SW following for placement.    Intervention  Script and clinicals faxed to [employer] (Ph: [phone], F: [phone]) for home life vest,      Plan  Anticipated Discharge Date: TBD  Anticipated Discharge Plan: ARU  CC will continue to monitor patient's medical condition and progress towards discharge.  Kinsey Fraga RN BSN  6C Unit Care Coordinator  Phone number: [phone]  Pager: [phone]    Addendum 11/1 at 1500:  This writer received an update from Greg Bautista [facility] that pt's home life vest is approved. Will coordinate fitting once discharge date known.

## 2019-11-02 ENCOUNTER — APPOINTMENT (OUTPATIENT)
Dept: PHYSICAL THERAPY | Facility: CLINIC | Age: 54
DRG: 003 | End: 2019-11-02
Attending: INTERNAL MEDICINE
Payer: COMMERCIAL

## 2019-11-02 LAB
ANION GAP SERPL CALCULATED.3IONS-SCNC: 2 MMOL/L (ref 3–14)
BACTERIA SPEC CULT: NORMAL
BUN SERPL-MCNC: 33 MG/DL (ref 7–30)
CALCIUM SERPL-MCNC: 8.8 MG/DL (ref 8.5–10.1)
CHLORIDE SERPL-SCNC: 99 MMOL/L (ref 94–109)
CO2 SERPL-SCNC: 35 MMOL/L (ref 20–32)
CORTIS SERPL-MCNC: 7.9 UG/DL (ref 4–22)
CREAT SERPL-MCNC: 0.77 MG/DL (ref 0.66–1.25)
GFR SERPL CREATININE-BSD FRML MDRD: >90 ML/MIN/{1.73_M2}
GLUCOSE BLDC GLUCOMTR-MCNC: 118 MG/DL (ref 70–99)
GLUCOSE BLDC GLUCOMTR-MCNC: 123 MG/DL (ref 70–99)
GLUCOSE BLDC GLUCOMTR-MCNC: 132 MG/DL (ref 70–99)
GLUCOSE BLDC GLUCOMTR-MCNC: 132 MG/DL (ref 70–99)
GLUCOSE BLDC GLUCOMTR-MCNC: 136 MG/DL (ref 70–99)
GLUCOSE BLDC GLUCOMTR-MCNC: 141 MG/DL (ref 70–99)
GLUCOSE BLDC GLUCOMTR-MCNC: 97 MG/DL (ref 70–99)
GLUCOSE SERPL-MCNC: 129 MG/DL (ref 70–99)
INR PPP: 1.1 (ref 0.86–1.14)
MAGNESIUM SERPL-MCNC: 2.9 MG/DL (ref 1.6–2.3)
POTASSIUM SERPL-SCNC: 4.3 MMOL/L (ref 3.4–5.3)
SODIUM SERPL-SCNC: 136 MMOL/L (ref 133–144)
SPECIMEN SOURCE: NORMAL

## 2019-11-02 PROCEDURE — 25000132 ZZH RX MED GY IP 250 OP 250 PS 637: Performed by: INTERNAL MEDICINE

## 2019-11-02 PROCEDURE — 25000132 ZZH RX MED GY IP 250 OP 250 PS 637: Performed by: STUDENT IN AN ORGANIZED HEALTH CARE EDUCATION/TRAINING PROGRAM

## 2019-11-02 PROCEDURE — 36415 COLL VENOUS BLD VENIPUNCTURE: CPT

## 2019-11-02 PROCEDURE — 00000146 ZZHCL STATISTIC GLUCOSE BY METER IP

## 2019-11-02 PROCEDURE — 97116 GAIT TRAINING THERAPY: CPT | Mod: GP | Performed by: STUDENT IN AN ORGANIZED HEALTH CARE EDUCATION/TRAINING PROGRAM

## 2019-11-02 PROCEDURE — 21400000 ZZH R&B CCU UMMC

## 2019-11-02 PROCEDURE — 99232 SBSQ HOSP IP/OBS MODERATE 35: CPT | Performed by: INTERNAL MEDICINE

## 2019-11-02 PROCEDURE — 40000275 ZZH STATISTIC RCP TIME EA 10 MIN

## 2019-11-02 PROCEDURE — 27210429 ZZH NUTRITION PRODUCT INTERMEDIATE LITER

## 2019-11-02 PROCEDURE — 83735 ASSAY OF MAGNESIUM: CPT

## 2019-11-02 PROCEDURE — 82533 TOTAL CORTISOL: CPT | Performed by: PHYSICIAN ASSISTANT

## 2019-11-02 PROCEDURE — 25000128 H RX IP 250 OP 636: Performed by: STUDENT IN AN ORGANIZED HEALTH CARE EDUCATION/TRAINING PROGRAM

## 2019-11-02 PROCEDURE — 85610 PROTHROMBIN TIME: CPT | Performed by: PHYSICIAN ASSISTANT

## 2019-11-02 PROCEDURE — 25000125 ZZHC RX 250: Performed by: STUDENT IN AN ORGANIZED HEALTH CARE EDUCATION/TRAINING PROGRAM

## 2019-11-02 PROCEDURE — 94640 AIRWAY INHALATION TREATMENT: CPT

## 2019-11-02 PROCEDURE — 97530 THERAPEUTIC ACTIVITIES: CPT | Mod: GP | Performed by: STUDENT IN AN ORGANIZED HEALTH CARE EDUCATION/TRAINING PROGRAM

## 2019-11-02 PROCEDURE — 80048 BASIC METABOLIC PNL TOTAL CA: CPT

## 2019-11-02 PROCEDURE — 25000132 ZZH RX MED GY IP 250 OP 250 PS 637: Performed by: NURSE PRACTITIONER

## 2019-11-02 PROCEDURE — 94640 AIRWAY INHALATION TREATMENT: CPT | Mod: 76

## 2019-11-02 PROCEDURE — 25000132 ZZH RX MED GY IP 250 OP 250 PS 637: Performed by: PHYSICIAN ASSISTANT

## 2019-11-02 RX ORDER — QUETIAPINE FUMARATE 50 MG/1
100 TABLET, FILM COATED ORAL ONCE
Status: DISCONTINUED | OUTPATIENT
Start: 2019-11-02 | End: 2019-11-02

## 2019-11-02 RX ORDER — GABAPENTIN 100 MG/1
200 CAPSULE ORAL 2 TIMES DAILY
Status: DISCONTINUED | OUTPATIENT
Start: 2019-11-03 | End: 2019-11-03

## 2019-11-02 RX ORDER — WARFARIN SODIUM 3 MG/1
3 TABLET ORAL
Status: COMPLETED | OUTPATIENT
Start: 2019-11-02 | End: 2019-11-02

## 2019-11-02 RX ADMIN — Medication 12.5 MG: at 19:16

## 2019-11-02 RX ADMIN — THIAMINE HCL TAB 100 MG 100 MG: 100 TAB at 09:06

## 2019-11-02 RX ADMIN — Medication 1 PACKET: at 09:04

## 2019-11-02 RX ADMIN — TICAGRELOR 90 MG: 90 TABLET ORAL at 19:16

## 2019-11-02 RX ADMIN — MIDODRINE HYDROCHLORIDE 10 MG: 5 TABLET ORAL at 17:59

## 2019-11-02 RX ADMIN — QUETIAPINE FUMARATE 100 MG: 50 TABLET ORAL at 19:15

## 2019-11-02 RX ADMIN — DIGOXIN 125 MCG: 0.05 SOLUTION ORAL at 09:04

## 2019-11-02 RX ADMIN — RAMELTEON 8 MG: 8 TABLET ORAL at 21:33

## 2019-11-02 RX ADMIN — TICAGRELOR 90 MG: 90 TABLET ORAL at 09:06

## 2019-11-02 RX ADMIN — MIDODRINE HYDROCHLORIDE 10 MG: 5 TABLET ORAL at 09:05

## 2019-11-02 RX ADMIN — OMEPRAZOLE 20 MG: KIT at 19:15

## 2019-11-02 RX ADMIN — WARFARIN SODIUM 3 MG: 3 TABLET ORAL at 17:02

## 2019-11-02 RX ADMIN — HEPARIN SODIUM 5000 UNITS: 5000 INJECTION, SOLUTION INTRAVENOUS; SUBCUTANEOUS at 09:04

## 2019-11-02 RX ADMIN — Medication 1 PACKET: at 19:27

## 2019-11-02 RX ADMIN — HEPARIN SODIUM 5000 UNITS: 5000 INJECTION, SOLUTION INTRAVENOUS; SUBCUTANEOUS at 21:33

## 2019-11-02 RX ADMIN — FUROSEMIDE 40 MG: 10 INJECTION, SOLUTION INTRAVENOUS at 09:06

## 2019-11-02 RX ADMIN — MIDODRINE HYDROCHLORIDE 10 MG: 5 TABLET ORAL at 12:01

## 2019-11-02 RX ADMIN — FOLIC ACID 1 MG: 1 TABLET ORAL at 09:05

## 2019-11-02 RX ADMIN — GABAPENTIN 300 MG: 300 CAPSULE ORAL at 09:05

## 2019-11-02 RX ADMIN — GABAPENTIN 300 MG: 300 CAPSULE ORAL at 14:29

## 2019-11-02 RX ADMIN — AMIODARONE HYDROCHLORIDE 200 MG: 200 TABLET ORAL at 09:06

## 2019-11-02 RX ADMIN — LEVETIRACETAM 750 MG: 100 SOLUTION ORAL at 09:04

## 2019-11-02 RX ADMIN — ALLOPURINOL 200 MG: 100 TABLET ORAL at 09:05

## 2019-11-02 RX ADMIN — GABAPENTIN 400 MG: 300 CAPSULE ORAL at 21:33

## 2019-11-02 RX ADMIN — ASPIRIN 81 MG CHEWABLE TABLET 81 MG: 81 TABLET CHEWABLE at 09:05

## 2019-11-02 RX ADMIN — LEVETIRACETAM 750 MG: 100 SOLUTION ORAL at 19:27

## 2019-11-02 RX ADMIN — Medication 1 PACKET: at 14:29

## 2019-11-02 RX ADMIN — QUETIAPINE FUMARATE 100 MG: 100 TABLET ORAL at 09:05

## 2019-11-02 RX ADMIN — NICOTINE 1 PATCH: 21 PATCH TRANSDERMAL at 19:26

## 2019-11-02 RX ADMIN — FUROSEMIDE 40 MG: 10 INJECTION, SOLUTION INTRAVENOUS at 17:02

## 2019-11-02 RX ADMIN — LIDOCAINE 2 PATCH: 560 PATCH PERCUTANEOUS; TOPICAL; TRANSDERMAL at 09:06

## 2019-11-02 RX ADMIN — OMEPRAZOLE 20 MG: KIT at 09:04

## 2019-11-02 RX ADMIN — IPRATROPIUM BROMIDE 0.5 MG: 0.5 SOLUTION RESPIRATORY (INHALATION) at 21:00

## 2019-11-02 RX ADMIN — IPRATROPIUM BROMIDE 0.5 MG: 0.5 SOLUTION RESPIRATORY (INHALATION) at 08:36

## 2019-11-02 RX ADMIN — MULTIVITAMIN 15 ML: LIQUID ORAL at 09:04

## 2019-11-02 RX ADMIN — Medication 12.5 MG: at 09:05

## 2019-11-02 RX ADMIN — ATORVASTATIN CALCIUM 40 MG: 40 TABLET, FILM COATED ORAL at 19:19

## 2019-11-02 ASSESSMENT — ACTIVITIES OF DAILY LIVING (ADL)
ADLS_ACUITY_SCORE: 10
ADLS_ACUITY_SCORE: 9.5
ADLS_ACUITY_SCORE: 10.5

## 2019-11-02 ASSESSMENT — PAIN DESCRIPTION - DESCRIPTORS: DESCRIPTORS: DISCOMFORT

## 2019-11-02 NOTE — PROGRESS NOTES
"  Interventional Cardiology Progress Note  Interval History:  - No acute events overnight  - Patient remains with 1:1 sitter for ongoing delirium, restlessness  - Patient reports got very little sleep last night  - Denies chest pain, SOB, lightheadedness, dizziness    Most recent vital signs:  /75 (BP Location: Right arm)   Pulse 84   Temp 97.7  F (36.5  C) (Oral)   Resp 18   Ht 1.89 m (6' 2.41\")   Wt 97 kg (213 lb 13.5 oz)   SpO2 93%   BMI 27.16 kg/m    Temp:  [97.2  F (36.2  C)-98.3  F (36.8  C)] 97.7  F (36.5  C)  Heart Rate:  [75-99] 84  Resp:  [18-22] 18  BP: ()/(61-83) 100/75  SpO2:  [92 %-98 %] 93 %  Wt Readings from Last 2 Encounters:   11/01/19 97 kg (213 lb 13.5 oz)       Intake/Output Summary (Last 24 hours) at 11/2/2019 0758  Last data filed at 11/2/2019 0513  Gross per 24 hour   Intake 1880 ml   Output 2745 ml   Net -865 ml       Physical exam:  General: In bed, in NAD  HEENT: EOMI, PERRLA, no scleral icterus or injection  CARDIAC: RRR, no m/r/g appreciated. Peripheral pulses 2+  RESP: CTAB, no wheezes, rhonchi or crackles appreciated.  GI: NABS, NT/ND, no guarding or rebound  EXTREMITIES: NO LE edema, pulses 2+. Femoral access site w/o bleeding, dressing c/d/i, steri strips intact. No hematoma. No bruits appreciated.   SKIN: No acute lesions appreciated  NEURO: Alert and oriented X 2 (person, place), CN II-XII grossly intact, no focal neurological deficits noted    Labs (Past three days):  CBC  Recent Labs   Lab 11/01/19  1322 11/01/19  0328 10/31/19  0355 10/30/19  0338   WBC 10.0 7.7 8.3 8.2   RBC 2.79* 2.67* 2.74* 2.90*   HGB 8.6* 8.1* 8.4* 8.9*   HCT 28.7* 27.2* 27.5* 28.9*   * 102* 100 100   MCH 30.8 30.3 30.7 30.7   MCHC 30.0* 29.8* 30.5* 30.8*   RDW 17.6* 17.2* 16.9* 16.3*   * 592* 574* 569*     BMP  Recent Labs   Lab 11/02/19  0647 11/01/19  0328 10/31/19  0355 10/30/19  1104 10/30/19  0338 10/29/19  0338    137 141  --  140 143   POTASSIUM 4.3 4.2 3.9 4.1 " "3.6 3.7   CHLORIDE 99 100 102  --  104 104   CO2 35* 35* 35*  --  32 36*   ANIONGAP 2* 3 4  --  4 3   * 133* 132*  --  147* 155*   BUN 33* 36* 31*  --  35* 36*   CR 0.77 0.79 0.66  --  0.65* 0.69   GFRESTIMATED >90 >90 >90  --  >90 >90   GFRESTBLACK >90 >90 >90  --  >90 >90   HEATHER 8.8 8.5 8.6  --  8.7 8.5   MAG 2.9* 2.8* 2.9*  --  2.6* 2.8*   PHOS  --  4.3  --   --  4.5 3.2     Troponins:   Lab Results   Component Value Date    TROPI 0.612 () 10/25/2019    TROPI 0.827 () 10/24/2019    TROPI 1.074 () 10/24/2019    TROPI 1.449 () 10/23/2019    TROPI 1.868 () 10/23/2019        INR  Recent Labs   Lab 11/02/19  0647 10/29/19  0338 10/28/19  0328 10/27/19  0419   INR 1.10 1.17* 1.14 1.14     Liver panel  Recent Labs   Lab 10/28/19  0328   PROTTOTAL 6.9   ALBUMIN 2.2*   BILITOTAL 1.4*   ALKPHOS 74   AST 44   ALT 30       Imaging/procedure results:  Echocardiogram 10/30/19:  Interpretation Summary  Limited study.  Ischemic cardiomyopathy, LVEF=25-30% with regional wall motion abnormalities  consistent with injury in a wrap-around LAD distribution.  RV size and function are probably normal on limited views.  No pericardial effusion is present.  This study was compared with the study from 10/20/19: There has been no  Change.    EKG 12Lead 11/1/19: Aflutter with variable AV block, HR88      Assessment & Plan:  53 year old male who with past medical history of smoking, was admitted on 10/13/19 as a transfer from [place], [place] for refractory VT/VF arrest s/p PCI to [facility] and placement on peripheral VA ECMO.     #Refractory VT/VF arrest s/p ECMO  ##CAD s/p PCI to LAD 10/13/2019  ###ICM (LVEF 25-30%)  Per report, was witnessed by wife to have seizure like activity on 10/13. EMS called and responded in 3-4 minutes, found a \"shockable rhythm\". Unclear how many shocks he received. Got 40 min CPR en route to [facility], found to have VT and started on Amiodarone. Noted to have ST elevations, and transferred to [facility]" - found to have LAD disease. Placed on VA ECMO, PCI to LAD, and transferred to [facility] for ongoing care. He was cooled for 24h. IABP removed successfully 10/20/2019. Successful extubation 10/25/2019. Coarse complicated by right hemothorax secondary to CPR requiring placement of chest tube from 10/14-10/28, stroke and seizures (discussed below), and vasoplegia requiring midodrine - given no obvious infectious source, cortisol levels normal. Given his severely reduced EF would recommend life vest at discharge. Additionally, due to his akinetic LV, would anticoagulate for LV thrombus prophylaxis (will be on warfarin for atrial fibrillation). Unfortunately, have not been able to get him on GDMT while he is requiring midodrine to maintain appropriate MAPs. Will initiate when able.    - will call attempt to get angiogram images to investigate if other vessels require intervention (CD en route)  - will need heart failure follow up in [place] after discharge.   - Lifevest at discharge (form completed and signed)  - aspirin 81mg + ticagrelor 90mg bid + warfarin. Once INR is therapeutic, will discontinue ASA  - metoprolol tartrate 12.5mg bid  - atorvastatin 40mg starting tonight  - continue midodrine 10mg tid. Decrease as able  - Ideally, if able to get off midodrine, and BP stable, would like to start ACEi     #Atrial fibrillation/Atrial flutter (Chadsvasc at least 3)  Noted in the ICU. Unknown if he has a history of this.   - Pharmacy to dose warfarin. Goal INR 2-3.   - Digoxin .125mcg daily  - Amiodarone 200 mg daily  - Metoprolol as above     #Stroke  ##Seizures  CT head 10/16 concerning for new right hemicerebellum lesion c/w infarct. Noted to have seizure like activity on EEG 10/17 - levitiracetam 750mg bid   - antiplatelet therapy as above     #ICU delirium  - improving  Continues to have delirium, which per wife, is worse in the evening. Will titrate off seroquel as able.   - delirium precautions  - continue 100mg qam  seroquel. Decrease PM dose to 100mg. Continue to decrease by increments of 25 mg as able  - Wean sitter as able     #Dysphagia  Patient with swallowing difficulty s/p extubation. He continues to require tube feeds as he is not meeting his caloric needs.   - RD following  - continue to monitor. De-escalate feeds in coming days     #Macrocytic hypochromic Anemia  ##Thrombocytosis   Hgb persistently low since placement on ECMO. Platelets continuing to rise, likely secondary to proliferation. Normal iron studies  - peripheral smear pending  - CBC daily     #Aspiration pneumonia  Initially vancomycin/zosyn x5 days for presumed aspiration - then extended given multiple invasive procedures. ID consulted given ongoing vasoplegia. No additional recs at this time. CT chest 10/28 with new GGO on right, concerning for infection vs. Atelectasis. Will monitor closely. If any signs of infection, will re-consult ID. Patient remains afebrile. WBC normal.   - daily CBC    Prophylaxis:  DVT: Warfarin, ambulate qshift    Diet: TF, ADAT  Activity: Up as tolerated  Code Status: Full  Disposition: Current recs for ARU; only discharge barrier delirium, possible ready for discharge early next week pending mentation    Patient seen and discussed w/ Dr. Griffin.      VIRGEN Garcia, CNP  [facility]  Interventional Cardiology  [phone]       ATTENDING NOTE:  Patient has been seen and evaluated by me on 11/02/2019. I have reviewed the documentation above.  I have reviewed today's vital signs, medications, labs, and imaging results.  I have reviewed and edited, as necessary, the history, review of systems, physical examination, and assessment and plan.  I have discussed my assessment and plan with the physician assistant.  I have seen and examined the patient today as part of a shared visit with Reg Bailey PA-C.  Joel Campbell is a 53 year old male with risk factor profile (-) HTN, (-) DM, (-)  hypercholesterolemia, (-) tobacco use, (-) fam Hx premature CAD, presented to [facility] on 10/13/19 after having seizure activity followed by shockable rhythm by EMS, persistent VT/VF, prolonged CPR, transferred to [facility], STEMI noted, emergent coronary angiography, stenting of LAD, placed on ECMO, and transferred to [facility] for further management.   He was decannulated on 10/18/19,  IABP was removed on 10/20/19, extubated on 10/25/19, and transferred to telemetry this morning.  ECHO (10/30/19) showed LVEF 25-30%, akinesis of all anterior, anteroseptal and apical segments.   He has had NSVT.  He will require LifeVest prior to discharge and if LVEF remains <35%, he will require AICD.  I would be inclined to anticoagulate for 6 months to prophylax against LV thrombus.  He had presumed aspiration pneumonia earlier in the hospitalization and had a LF chest tube for hemothorax following resuscitation, tube removed 10/26/19.  Renal function Cr 0.7 mg/dl.   Patient remains vasoplegic, persistent hypotension, on Midrodine but no evidence of underlying sepsis (lactate 1.7).  Patient on tube feedings, transitioning slowly to po.  Hgb 8.6, no evidence of acute bleeding, stable.  Serum cortisol7.9 (wnl).     Exam documented.  Neurologically impaired, reducing Seroquel.   PT&OT consulted, recommending acute care rehabilitation.  Continue Ticagrelor and ASA.  Once INR therapeutic, stop ASA.       Kevyn Griffin MD     Cardiovascular Division

## 2019-11-02 NOTE — PROGRESS NOTES
Calorie Count  Intake recorded for: 11/1  Total Kcals: 689 Total Protein: 37g  Kcals from Hospital Food: 549  Protein: 17g  Kcals from Outside Food (average):230 Protein: 20g  # Meals Recorded: 2 meals ( First - 50% chicken quesadilla, apple pie w/ whip cream)      (Second - 50% tuna sandwich brought in by family)  # Supplements Recorded: 0

## 2019-11-02 NOTE — PLAN OF CARE
Discharge Planner PT   Patient plan for discharge: Rehab per wife- patient does not recall education on discharge recommendations   Current status: Patient presents with nearly stable BPs (minor drop with position changes) today. Able to perform supine to sit independently and sit to stand with CGA. Impulsive with gait causing need for up to min A for safety especially on turns and near end of walking. Patient ambulated ~50-75 ft x3 reps today with stable vitals. Continues to display cognitive deficits such as poor memory and impulsivity   Barriers to return to prior living situation: Cognitive status, deconditioning, BP  Recommendations for discharge: ARU  Rationale for recommendations: Patient was previously independent living in community- now with need for 3 disciplines including SLP and OT to manage independence as well as PT  for higher level balance, gait, stairs and community mobility.        Entered by: Jessica Rodriguez 11/02/2019 2:27 PM

## 2019-11-02 NOTE — CONSULTS
Consult received and discussed with CSI team.  Will plan to follow up on Monday as pt has a [place] insurance plan and network for ARU is unknown as of today.  Spouse likely to prefer [place] placement as she has no where to stay in [place] and cannot stay with pt at  ARU.    SANDY Chairez, LCSW  6C Unit   Phone: [phone]  Pager: [phone]  Unit: [phone]

## 2019-11-02 NOTE — PLAN OF CARE
Shift: 0700 - 1530  VS: Temp: 98  F (36.7  C) Temp src: Oral BP: 96/70   Heart Rate: 79 Resp: 20 SpO2: 94 % O2 Device: Nasal cannula Oxygen Delivery: 4 LPM  Pain: Reports pain to lateral right chest, underarm area, r/t significant bruising, old chest tube site with incision. Scheduled pain relief meds given, lidocaine patch applied. Pt reports pain to be manageable. Ketamine-gabapentin cream is not in the NARC drawer, sent message to pharmacy twice still waiting for med to come up at 1400.    Neuro: Patient is lethargic and has intermittent periods of wakefulness. Oriented x4. Spontaneous with need to go to the bathroom. Redirectable, cooperative with care.   Cardiac:   A-flutter. Asymptomatic. Soft BP's taking midodrine.   Respiratory: Lung sounds are clear on ~4LPM NC.   GI/Diet/Appetite: Regular diet and cycled TF from 4PM to 10AM. Carb counts 11/2 - 11/4.   :  Voiding w/o difficulty. Pt had an episode of urine retention over night but no indication this shift with output.  & 97.   LDA's: PIV to LUE SL. NG to Left nare, clamped.  Skin: Incision to right under arm, approximated with sutures dressing changed.   Activity: Ax1-2 w/GB/Walker  Tests/Procedures:   Pertinent Labs/Lab Collection:      Plan: Continue w/POC.

## 2019-11-03 ENCOUNTER — APPOINTMENT (OUTPATIENT)
Dept: OCCUPATIONAL THERAPY | Facility: CLINIC | Age: 54
DRG: 003 | End: 2019-11-03
Attending: INTERNAL MEDICINE
Payer: COMMERCIAL

## 2019-11-03 LAB
ANION GAP SERPL CALCULATED.3IONS-SCNC: 6 MMOL/L (ref 3–14)
BACTERIA SPEC CULT: NO GROWTH
BACTERIA SPEC CULT: NO GROWTH
BUN SERPL-MCNC: 31 MG/DL (ref 7–30)
CALCIUM SERPL-MCNC: 9.1 MG/DL (ref 8.5–10.1)
CHLORIDE SERPL-SCNC: 97 MMOL/L (ref 94–109)
CO2 SERPL-SCNC: 34 MMOL/L (ref 20–32)
CREAT SERPL-MCNC: 0.81 MG/DL (ref 0.66–1.25)
DIGOXIN SERPL-MCNC: 0.8 UG/L (ref 0.5–2)
GFR SERPL CREATININE-BSD FRML MDRD: >90 ML/MIN/{1.73_M2}
GLUCOSE BLDC GLUCOMTR-MCNC: 105 MG/DL (ref 70–99)
GLUCOSE BLDC GLUCOMTR-MCNC: 108 MG/DL (ref 70–99)
GLUCOSE BLDC GLUCOMTR-MCNC: 110 MG/DL (ref 70–99)
GLUCOSE BLDC GLUCOMTR-MCNC: 147 MG/DL (ref 70–99)
GLUCOSE BLDC GLUCOMTR-MCNC: 92 MG/DL (ref 70–99)
GLUCOSE SERPL-MCNC: 119 MG/DL (ref 70–99)
INR PPP: 1.12 (ref 0.86–1.14)
Lab: NORMAL
Lab: NORMAL
MAGNESIUM SERPL-MCNC: 2.8 MG/DL (ref 1.6–2.3)
POTASSIUM SERPL-SCNC: 3.9 MMOL/L (ref 3.4–5.3)
SODIUM SERPL-SCNC: 137 MMOL/L (ref 133–144)
SPECIMEN SOURCE: NORMAL
SPECIMEN SOURCE: NORMAL

## 2019-11-03 PROCEDURE — 36415 COLL VENOUS BLD VENIPUNCTURE: CPT | Performed by: PHYSICIAN ASSISTANT

## 2019-11-03 PROCEDURE — 00000146 ZZHCL STATISTIC GLUCOSE BY METER IP

## 2019-11-03 PROCEDURE — 99232 SBSQ HOSP IP/OBS MODERATE 35: CPT | Performed by: INTERNAL MEDICINE

## 2019-11-03 PROCEDURE — 25000128 H RX IP 250 OP 636: Performed by: STUDENT IN AN ORGANIZED HEALTH CARE EDUCATION/TRAINING PROGRAM

## 2019-11-03 PROCEDURE — 25000132 ZZH RX MED GY IP 250 OP 250 PS 637: Performed by: STUDENT IN AN ORGANIZED HEALTH CARE EDUCATION/TRAINING PROGRAM

## 2019-11-03 PROCEDURE — 40000275 ZZH STATISTIC RCP TIME EA 10 MIN

## 2019-11-03 PROCEDURE — 25000132 ZZH RX MED GY IP 250 OP 250 PS 637: Performed by: INTERNAL MEDICINE

## 2019-11-03 PROCEDURE — 25000132 ZZH RX MED GY IP 250 OP 250 PS 637: Performed by: NURSE PRACTITIONER

## 2019-11-03 PROCEDURE — 21400000 ZZH R&B CCU UMMC

## 2019-11-03 PROCEDURE — 97110 THERAPEUTIC EXERCISES: CPT | Mod: GO

## 2019-11-03 PROCEDURE — 25000125 ZZHC RX 250: Performed by: STUDENT IN AN ORGANIZED HEALTH CARE EDUCATION/TRAINING PROGRAM

## 2019-11-03 PROCEDURE — 25000132 ZZH RX MED GY IP 250 OP 250 PS 637: Performed by: PHYSICIAN ASSISTANT

## 2019-11-03 PROCEDURE — 80048 BASIC METABOLIC PNL TOTAL CA: CPT

## 2019-11-03 PROCEDURE — 94640 AIRWAY INHALATION TREATMENT: CPT | Mod: 76

## 2019-11-03 PROCEDURE — 83735 ASSAY OF MAGNESIUM: CPT

## 2019-11-03 PROCEDURE — 97530 THERAPEUTIC ACTIVITIES: CPT | Mod: GO

## 2019-11-03 PROCEDURE — 85610 PROTHROMBIN TIME: CPT | Performed by: PHYSICIAN ASSISTANT

## 2019-11-03 PROCEDURE — 80162 ASSAY OF DIGOXIN TOTAL: CPT

## 2019-11-03 RX ORDER — ASPIRIN 81 MG/1
81 TABLET, CHEWABLE ORAL DAILY
Status: DISCONTINUED | OUTPATIENT
Start: 2019-11-04 | End: 2019-11-05 | Stop reason: HOSPADM

## 2019-11-03 RX ORDER — QUETIAPINE FUMARATE 50 MG/1
50 TABLET, FILM COATED ORAL EVERY MORNING
Status: DISCONTINUED | OUTPATIENT
Start: 2019-11-04 | End: 2019-11-04

## 2019-11-03 RX ORDER — FUROSEMIDE 40 MG
40 TABLET ORAL
Status: DISCONTINUED | OUTPATIENT
Start: 2019-11-03 | End: 2019-11-04

## 2019-11-03 RX ORDER — GABAPENTIN 300 MG/1
300 CAPSULE ORAL AT BEDTIME
Status: DISCONTINUED | OUTPATIENT
Start: 2019-11-03 | End: 2019-11-04

## 2019-11-03 RX ORDER — DIGOXIN 125 MCG
125 TABLET ORAL DAILY
Status: DISCONTINUED | OUTPATIENT
Start: 2019-11-04 | End: 2019-11-04

## 2019-11-03 RX ORDER — GABAPENTIN 100 MG/1
100 CAPSULE ORAL 2 TIMES DAILY
Status: DISCONTINUED | OUTPATIENT
Start: 2019-11-03 | End: 2019-11-04

## 2019-11-03 RX ORDER — LEVETIRACETAM 750 MG/1
750 TABLET ORAL 2 TIMES DAILY
Status: DISCONTINUED | OUTPATIENT
Start: 2019-11-03 | End: 2019-11-05 | Stop reason: HOSPADM

## 2019-11-03 RX ORDER — MIDODRINE HYDROCHLORIDE 5 MG/1
5 TABLET ORAL
Status: DISCONTINUED | OUTPATIENT
Start: 2019-11-03 | End: 2019-11-04

## 2019-11-03 RX ORDER — WARFARIN SODIUM 5 MG/1
5 TABLET ORAL
Status: COMPLETED | OUTPATIENT
Start: 2019-11-03 | End: 2019-11-03

## 2019-11-03 RX ADMIN — THIAMINE HCL TAB 100 MG 100 MG: 100 TAB at 09:10

## 2019-11-03 RX ADMIN — IPRATROPIUM BROMIDE 0.5 MG: 0.5 SOLUTION RESPIRATORY (INHALATION) at 20:25

## 2019-11-03 RX ADMIN — GABAPENTIN 100 MG: 100 CAPSULE ORAL at 15:06

## 2019-11-03 RX ADMIN — MIDODRINE HYDROCHLORIDE 5 MG: 5 TABLET ORAL at 18:55

## 2019-11-03 RX ADMIN — ALLOPURINOL 200 MG: 100 TABLET ORAL at 09:08

## 2019-11-03 RX ADMIN — WARFARIN SODIUM 5 MG: 5 TABLET ORAL at 18:55

## 2019-11-03 RX ADMIN — LEVETIRACETAM 750 MG: 100 SOLUTION ORAL at 09:11

## 2019-11-03 RX ADMIN — HEPARIN SODIUM 5000 UNITS: 5000 INJECTION, SOLUTION INTRAVENOUS; SUBCUTANEOUS at 09:13

## 2019-11-03 RX ADMIN — GABAPENTIN 200 MG: 100 CAPSULE ORAL at 09:09

## 2019-11-03 RX ADMIN — LEVETIRACETAM 750 MG: 750 TABLET, FILM COATED ORAL at 20:10

## 2019-11-03 RX ADMIN — Medication 1 PACKET: at 09:22

## 2019-11-03 RX ADMIN — DIGOXIN 125 MCG: 0.05 SOLUTION ORAL at 09:12

## 2019-11-03 RX ADMIN — MIDODRINE HYDROCHLORIDE 5 MG: 5 TABLET ORAL at 11:21

## 2019-11-03 RX ADMIN — ACETAMINOPHEN 650 MG: 325 TABLET, FILM COATED ORAL at 00:22

## 2019-11-03 RX ADMIN — QUETIAPINE FUMARATE 100 MG: 50 TABLET ORAL at 20:10

## 2019-11-03 RX ADMIN — FUROSEMIDE 40 MG: 40 TABLET ORAL at 15:06

## 2019-11-03 RX ADMIN — FOLIC ACID 1 MG: 1 TABLET ORAL at 09:11

## 2019-11-03 RX ADMIN — IPRATROPIUM BROMIDE 0.5 MG: 0.5 SOLUTION RESPIRATORY (INHALATION) at 10:10

## 2019-11-03 RX ADMIN — NICOTINE 1 PATCH: 21 PATCH TRANSDERMAL at 20:15

## 2019-11-03 RX ADMIN — Medication 12.5 MG: at 20:09

## 2019-11-03 RX ADMIN — MULTIVITAMIN 15 ML: LIQUID ORAL at 09:13

## 2019-11-03 RX ADMIN — GABAPENTIN 300 MG: 300 CAPSULE ORAL at 23:35

## 2019-11-03 RX ADMIN — OMEPRAZOLE 20 MG: KIT at 09:10

## 2019-11-03 RX ADMIN — RAMELTEON 8 MG: 8 TABLET ORAL at 23:35

## 2019-11-03 RX ADMIN — MIDODRINE HYDROCHLORIDE 5 MG: 5 TABLET ORAL at 09:10

## 2019-11-03 RX ADMIN — ASPIRIN 81 MG CHEWABLE TABLET 81 MG: 81 TABLET CHEWABLE at 09:09

## 2019-11-03 RX ADMIN — QUETIAPINE FUMARATE 75 MG: 50 TABLET ORAL at 09:11

## 2019-11-03 RX ADMIN — ATORVASTATIN CALCIUM 40 MG: 40 TABLET, FILM COATED ORAL at 20:10

## 2019-11-03 RX ADMIN — POTASSIUM CHLORIDE 20 MEQ: 750 TABLET, EXTENDED RELEASE ORAL at 11:21

## 2019-11-03 RX ADMIN — Medication 12.5 MG: at 09:11

## 2019-11-03 RX ADMIN — TICAGRELOR 90 MG: 90 TABLET ORAL at 09:11

## 2019-11-03 RX ADMIN — OXYCODONE HYDROCHLORIDE 10 MG: 5 TABLET ORAL at 00:23

## 2019-11-03 RX ADMIN — TICAGRELOR 90 MG: 90 TABLET ORAL at 20:09

## 2019-11-03 RX ADMIN — FUROSEMIDE 40 MG: 10 INJECTION, SOLUTION INTRAVENOUS at 09:13

## 2019-11-03 ASSESSMENT — ACTIVITIES OF DAILY LIVING (ADL)
ADLS_ACUITY_SCORE: 10

## 2019-11-03 ASSESSMENT — MIFFLIN-ST. JEOR: SCORE: 1875.61

## 2019-11-03 NOTE — PROGRESS NOTES
"  Interventional Cardiology Progress Note    Interval History:  - Patient with increased agitation overnight; per chart review and nursing staff, at times refusing cares, wanting to leave, was however redirectable  - Patient appears much more calm this am; orientated x 3  - Remains with 1:1 sitter  - Patient also requesting to have TF stopped and NG pulled, feeling very full, no appetite.  - Otherwise denies chest pain, SOB, lightheadedness, or dizziness    Most recent vital signs:  /80 (BP Location: Right arm)   Pulse 75   Temp 97.7  F (36.5  C) (Axillary)   Resp 18   Ht 1.89 m (6' 2.41\")   Wt 95.4 kg (210 lb 6.4 oz)   SpO2 97%   BMI 26.72 kg/m    Temp:  [97.6  F (36.4  C)-98.1  F (36.7  C)] 97.7  F (36.5  C)  Pulse:  [75-84] 75  Heart Rate:  [75-84] 75  Resp:  [18-22] 18  BP: ()/(70-85) 109/80  SpO2:  [93 %-97 %] 97 %  Wt Readings from Last 2 Encounters:   11/03/19 95.4 kg (210 lb 6.4 oz)       Intake/Output Summary (Last 24 hours) at 11/3/2019 0725  Last data filed at 11/3/2019 0700  Gross per 24 hour   Intake 2640 ml   Output 2100 ml   Net 540 ml       Physical exam:  General: In bed, in NAD  HEENT: EOMI, PERRLA, no scleral icterus or injection  CARDIAC: irregularly regular rhythm, rate controlled, no m/r/g appreciated. Peripheral pulses 2+  RESP: CTAB, no wheezes, rhonchi or crackles appreciated.  GI: NABS, NT/ND, no guarding or rebound  EXTREMITIES: NO LE edema, pulses 2+. Femoral access site w/o bleeding, dressing c/d/i. No bruits appreciated.   SKIN: No acute lesions appreciated  NEURO: Alert and oriented X3    Labs (Past three days):  CBC  Recent Labs   Lab 11/01/19  1322 11/01/19  0328 10/31/19  0355 10/30/19  0338   WBC 10.0 7.7 8.3 8.2   RBC 2.79* 2.67* 2.74* 2.90*   HGB 8.6* 8.1* 8.4* 8.9*   HCT 28.7* 27.2* 27.5* 28.9*   * 102* 100 100   MCH 30.8 30.3 30.7 30.7   MCHC 30.0* 29.8* 30.5* 30.8*   RDW 17.6* 17.2* 16.9* 16.3*   * 592* 574* 569*     BMP  Recent Labs   Lab " "11/03/19  0524 11/02/19  0647 11/01/19  0328 10/31/19  0355  10/30/19  0338 10/29/19  0338    136 137 141  --  140 143   POTASSIUM 3.9 4.3 4.2 3.9   < > 3.6 3.7   CHLORIDE 97 99 100 102  --  104 104   CO2 34* 35* 35* 35*  --  32 36*   ANIONGAP 6 2* 3 4  --  4 3   * 129* 133* 132*  --  147* 155*   BUN 31* 33* 36* 31*  --  35* 36*   CR 0.81 0.77 0.79 0.66  --  0.65* 0.69   GFRESTIMATED >90 >90 >90 >90  --  >90 >90   GFRESTBLACK >90 >90 >90 >90  --  >90 >90   HEATHER 9.1 8.8 8.5 8.6  --  8.7 8.5   MAG 2.8* 2.9* 2.8* 2.9*  --  2.6* 2.8*   PHOS  --   --  4.3  --   --  4.5 3.2    < > = values in this interval not displayed.     Troponins:   Lab Results   Component Value Date    TROPI 0.612 () 10/25/2019    TROPI 0.827 () 10/24/2019    TROPI 1.074 () 10/24/2019    TROPI 1.449 () 10/23/2019    TROPI 1.868 () 10/23/2019        INR  Recent Labs   Lab 11/03/19  0524 11/02/19  0647 10/29/19  0338 10/28/19  0328   INR 1.12 1.10 1.17* 1.14     Liver panel  Recent Labs   Lab 10/28/19  0328   PROTTOTAL 6.9   ALBUMIN 2.2*   BILITOTAL 1.4*   ALKPHOS 74   AST 44   ALT 30       Imaging/procedure results:  Echocardiogram 10/30/19:  Interpretation Summary  Limited study.  Ischemic cardiomyopathy, LVEF=25-30% with regional wall motion abnormalities  consistent with injury in a wrap-around LAD distribution.  RV size and function are probably normal on limited views.  No pericardial effusion is present.  This study was compared with the study from 10/20/19: There has been no  Change.    Assessment & Plan:  53 year old male who with past medical history of smoking, was admitted on 10/13/19 as a transfer from [place], [place] for refractory VT/VF arrest s/p PCI to LAD and placement on peripheral VA ECMO.      #Refractory VT/VF arrest s/p ECMO  ##CAD s/p PCI to LAD 10/13/2019  ###ICM (LVEF 25-30%)  Per report, was witnessed by wife to have seizure like activity on 10/13. EMS called and responded in 3-4 minutes, found a \"shockable " "rhythm\". Unclear how many shocks he received. Got 40 min CPR en route to [facility], found to have VT and started on Amiodarone. Noted to have ST elevations, and transferred to [facility] - found to have LAD disease. Placed on VA ECMO, PCI to LAD, and transferred to [facility] for ongoing care. He was cooled for 24h. IABP removed successfully 10/20/2019. Successful extubation 10/25/2019. Coarse complicated by right hemothorax secondary to CPR requiring placement of chest tube from 10/14-10/28, stroke and seizures (discussed below), and vasoplegia requiring midodrine - given no obvious infectious source, cortisol levels normal. Given his severely reduced EF would recommend life vest at discharge. Additionally, due to his akinetic LV, would anticoagulate for LV thrombus prophylaxis (will be on warfarin for atrial fibrillation). Unfortunately, have not been able to get him on GDMT while he is requiring midodrine to maintain appropriate MAPs. Will initiate when able.     - will call attempt to get angiogram images to investigate if other vessels require intervention (CD en route)  - will need heart failure follow up in [place] after discharge.   - Lifevest at discharge (form completed and signed)  - aspirin 81mg + ticagrelor 90mg bid + warfarin. Once INR is therapeutic, will discontinue ASA  - metoprolol tartrate 12.5mg bid  - atorvastatin 40mg starting tonight  - Decrease midodrine to 5 mg tid. Continue to decrease as able  - Ideally, if able to get off midodrine, and BP stable, would like to start ACEi     #Atrial fibrillation/Atrial flutter (Chadsvasc at least 3)  Noted in the ICU. Unknown if he has a history of this. Rate controlled while on floor.  - Pharmacy to dose warfarin. Goal INR 2-3.   - Digoxin .125mcg daily  - Stop amiodarone   - Metoprolol as above     #Stroke  ##Seizures  CT head 10/16 concerning for new right hemicerebellum lesion c/w infarct. Noted to have seizure like activity on EEG 10/17   - " levitiracetam 750mg bid   - antiplatelet therapy as above     # ICU delirium   Continues to have delirium, is worse in the evening/nights. Patient complaining of abdominal cramping, feeling full all the time. Per patient's wife, has had no appetitie  - delirium precautions  - Decrease am Seroquel dose to 50 mg.  Continue PM dose to 100mg.   - Continue to decrease by increments of 25 mg as able  - Wean sitter as able  - Will remove NG tube today and stop TF      #Dysphagia, improving  Patient and family report no issues swallowing, has had little -no appetite and patient reporting some abdominal cramping this am.   - RD following  - Stop TF, remove NG (hopeful may also help with delirium)  - Continue to encourage meals  - Snacks/supplements between meals     #Macrocytic hypochromic Anemia  ##Thrombocytosis   Hgb persistently low since placement on ECMO. Platelets continuing to rise, likely secondary to proliferation. Normal iron studies  - CBC daily     #Aspiration pneumonia  Initially vancomycin/zosyn x5 days for presumed aspiration - then extended given multiple invasive procedures. ID consulted given ongoing vasoplegia. No additional recs at this time. CT chest 10/28 with new GGO on right, concerning for infection vs. Atelectasis. Will monitor closely. If any signs of infection, will re-consult ID. Patient remains afebrile. WBC normal.   - daily CBC     Prophylaxis:  DVT: Warfarin, ambulate qshift     Diet: Stop TF, encourage meals  Activity: Up as tolerated  Code Status: Full  Disposition: Current recs for ARU; only discharge barrier delirium, possible ready for discharge early next week pending mentation      Patient seen and discussed w/ Dr. Elias Sandra, APRN, CNP  [facility]  Interventional Cardiology  [phone]      ATTENDING NOTE:  Patient has been seen and evaluated by me on 11/03/2019. I have reviewed the documentation above.  I have reviewed today's vital signs,  medications, labs, and imaging results.  I have reviewed and edited, as necessary, the history, review of systems, physical examination, and assessment and plan.  I have discussed my assessment and plan with the physician assistant.  I have seen and examined the patient today as part of a shared visit with Reg Bailey PA-C.  Joel Campbell is a 53 year old male with risk factor profile (-) HTN, (-) DM, (-) hypercholesterolemia, (-) tobacco use, (-) fam Hx premature CAD, presented to [facility] on 10/13/19 after having seizure activity followed by shockable rhythm by EMS, persistent VT/VF, prolonged CPR, transferred to [facility], STEMI noted, emergent coronary angiography, stenting of LAD, placed on ECMO, and transferred to [facility] for further management.   He was decannulated on 10/18/19,  IABP was removed on 10/20/19, extubated on 10/25/19, and transferred to telemetry this morning.  ECHO (10/30/19) showed LVEF 25-30%, akinesis of all anterior, anteroseptal and apical segments.   He has had NSVT.  He will require LifeVest prior to discharge and if LVEF remains <35%, he will require AICD.  He has persistent AF, rate controlled on Lopressor, Digoxin, and Amiodarone.  Stop Amiodarone.  He also needs anticoagulation for minimum of 6 months to prophylax against LV thrombus.  He had presumed aspiration pneumonia earlier in the hospitalization and had a LF chest tube for hemothorax following resuscitation, tube removed 10/26/19.  Renal function Cr 0.7 mg/dl yesterday.   Patient remains vasoplegic, persistent hypotension, on Midrodine but no evidence of underlying sepsis (lactate 1.7).  Patient on tube feedings, transitioning slowly to po.  Hgb 8.6 yesterday, no evidence of acute bleeding, stable.  Serum cortisol 7.9 (wnl).     Exam documented.  Neurologically impaired, reducing Seroquel.   PT&OT consulted, recommending acute care rehabilitation.  Continue Ticagrelor and ASA.  Once INR therapeutic, stop ASA.        Kevny Griffin MD     Cardiovascular Division

## 2019-11-03 NOTE — PROGRESS NOTES
Calorie Count  Intake recorded for: 11/2  Total Kcals: 496 Total Protein: 7g  Kcals from Hospital Food: 496  Protein: 7g  Kcals from Outside Food (average):0 Protein: 0g  # Meals Recorded: 2 meals (First - 100% apple juice, 25% grilled cheese)      (Second - 100% banana bread, 50% popsicle, 15% apple pie)  # Supplements Recorded: 0

## 2019-11-03 NOTE — PLAN OF CARE
D: Pt admitted after VT/VF arrest requiring ECMO  I/A: Pt agitated, uncooperative- refusing cares (sliding scale insulin, pulse ox monitoring, O2 at times, etc). Pulling at lines (IV, NG) stating he wants them out and insisting he's going home. Somewhat redirectable. Oxycodone and tylenol given x1 for ribcage pain. TF running overnight until 0600 (container empty, no refill available). Bedside attendant for pt safety.   P: Continue to monitor and assess pt condition and contact treatment team with questions or concerns.

## 2019-11-03 NOTE — PLAN OF CARE
D/I/A. Pt transferred from OSH on 10/13/19 due to VT/VF arrest s/p. S/p PCI to LAD.     Neuro: ALOx4. Forgetful and irritable at times. He has been cooperative most of the shift. Pt has a bed side 1:1 attendant to prevent falls and pulling lines.    Cardiac: Afib. BP stable.   Respiratory: on 2LNC and bipap at HS. Using IS with encouragement.   GI/: Poor appetite. On [place] count. Pt is on TF from 1600 to 1000 via [place]. Pt had a BM during the day.   Activity: up with SBA.   Pain: mild chest discomfort. Declined pain meds.    Skin: Bruising. Intergluteal cleft wound care done per POC. Bilateral groin would site care done/drsg was changed. Back of head/hair has dry adhesive from EEG stickers.   LDA's: PIV-SL  Plan:  ARU pending mentation/delirium.

## 2019-11-03 NOTE — DISCHARGE SUMMARY
[address] [address]  p: [phone]    Discharge Summary: Cardiology Service    Joel Campbell MRN# [id number]   YOB: 1965 Age: 53 year old       Admission Date: 10/13/2019  Discharge Date: 11/05/19      Discharge Diagnoses:  1. VT/VF arrest s/p ECMO  2. CAD s/p PCI to LAD (10/13/19)  3. Ischemic Cardiomyopathy (EF 25-30%)  4. Bilateral Rib Fracutre  5. Paroxysmal atrial fibrillation/flutter  6. Stroke  7. Seizure  8. ICU Delirium  9. Macrocytic hypochromic Anemia  10. Thrombocytosis   11. Aspiration Pneumonia    Pertinent Procedures:  VA ECMO 10/13-10/25  Intubation 10/13-10/25    Consults:  Neurology  Palliative Care  Vascular Surgery  Thoracic Surgery  Infectious Disease  Dietician  PT/OT/SP  SW/RN Care Coordinator    Imaging with results:  Echocardiogram 10/30/19:  Interpretation Summary  Limited study.  Ischemic cardiomyopathy, LVEF=25-30% with regional wall motion abnormalities  consistent with injury in a wrap-around LAD distribution.  RV size and function are probably normal on limited views.  No pericardial effusion is present.  This study was compared with the study from 10/20/19: There has been no  change.    Other imaging studies:  EKG 12 Lead 11/1/19:      CT Chest 10/28/19:  IMPRESSION:   1. New primarily right-sided groundglass and nodular opacities.  Concerning for infectious etiology  2. Redemonstration of reticular groundglass and solid densities  primarily in the lung bases, right greater than left. Mildly improved  compared to prior CT 10/21/2019.  3. Prominent mediastinal lymphadenopathy, unchanged.    Brief HPI:  Joel Campbell is a 53 year old male who with past medical history of smoking, was admitted on 10/13/19 as a transfer from [place], [place] for refractory VT/VF arrest s/p PCI to LAD and placement on peripheral VA ECMO    Hospital Course by Diagnosis:  #Refractory VT/VF arrest s/p ECMO  ##CAD s/p PCI to LAD  "10/13/2019  ###ICM (LVEF 25-30%)  ###Tobacco Abuse  Per report, was witnessed by wife to have seizure like activity on 10/13. EMS called and responded in 3-4 minutes, found a \"shockable rhythm\". Unclear how many shocks he received. Got 40 min CPR en route to [facility], found to have VT and started on Amiodarone. Noted to have ST elevations, and transferred to [facility] - found to have LAD disease. Placed on VA ECMO, PCI to LAD, and transferred to [facility] for ongoing care. He was cooled for 24h. IABP removed successfully 10/20/2019. Successful extubation 10/25/2019. Coarse complicated by right hemothorax secondary to CPR requiring placement of chest tube from 10/14-10/28 (stitches in place), stroke and seizures (discussed below), and vasoplegia requiring midodrine - given no obvious infectious source, cortisol levels normal. Given his severely reduced EF would recommend life vest at discharge. Additionally, due to his akinetic LV, would anticoagulate for LV thrombus prophylaxis (will be on warfarin for atrial fibrillation).     - will need heart failure follow up in [place] after discharge.   - Lifevest at discharge  - aspirin 81mg + ticagrelor 90mg bid + warfarin  - Once INR is therapeutic (2-3), will discontinue ASA  - metoprolol succinate 50 mg daily  - lisinopril 5 mg daily, can titrate as OP  - Lasix 20 mg daily; call cardiology clinic with any weight gain of 2 pounds in 1 day or 5 pounds in 1 week  - atorvastatin 40mg   - Educated patient about the importance of tobacco cessation; declined pharmacological interventional at this time  - Follow up with PCP this week (appt made for Thursday); also check right upper chest incision (from chest tube) for possible stitches removal    #Bilateral Rib fracture  From CPR. Patient continues to endorse moderate pain with movement  - Prn tylenol   - Prn Oxycodone 5 mg q6h for severe pain  - Continue to encourage use of IS to prevent pna     #Paroxysmal Atrial " fibrillation/Atrial flutter (Chadsvasc at least 3)  Noted in the ICU. Unknown if he has a history of this. Rate controlled while on floor. Started on Warfarin (also for LV thrombus prophylaxis). 11/3 2030 converted to NSR, remains in NSR.  INR today 1.17  - Continue PO Warfarin (do prefer Warfarin over DOAC for LV thrombus prophylaxis)  - Goal INR 2-3  - Metoprolol as above     #Stroke  ##Seizures  CT head 10/16 concerning for new right hemicerebellum lesion c/w infarct. Noted to have seizure like activity on EEG 10/17   - levitiracetam 750mg bid   - antiplatelet therapy as above  - Follow up with Neurology in 3-6 weeks      # ICU delirium, improving  Patient off 1:1 11/3. Mentation much improved, patient remains confused at times, but redirectable.  Titrated off Gabpentin and Seroquel. PT/OT recs for TCU, patient and wife refusing and requesting to be discharged home.  They have been educated about the importance of 24/7 care at home, which they say they can provide. Patient and family also turned down offer of home PT/OT  - Follow up with PCP in 7-10 days for post hospitalization visit     #Dysphagia, resolved  2/2 intubation and delirium  - Continue to encourage meals  - Snacks/supplements between meals     #Macrocytic hypochromic Anemia  ##Thrombocytosis   Hgb persistently low since placement on ECMO. Platelets continuing to rise, 11.6 on day of discharge. Likely secondary to proliferation. Normal iron studies     #Aspiration pneumonia  Initially vancomycin/zosyn x5 days for presumed aspiration - then extended given multiple invasive procedures. ID consulted given ongoing vasoplegia. No additional recs at this time. CT chest 10/28 with new GGO on right, concerning for infection vs. Atelectasis.  Patient remains afebrile. WBC normal.   - no further intervention needed at this time  - Continue use of IS    Condition on discharge  Temp:  [97.4  F (36.3  C)-98.6  F (37  C)] 97.4  F (36.3  C)  Pulse:  [85] 85  Heart  Rate:  [80-94] 94  Resp:  [16-18] 18  BP: (108-113)/() 109/77  SpO2:  [94 %-98 %] 97 %  General: Alert, interactive, NAD  Eyes: sclera anicteric, EOMI  Neck: JVP flat, carotid 2+ bilaterally  Cardiovascular: regular rate and rhythm, normal S1 and S2, no murmurs, gallops, or rubs  Resp: clear to auscultation bilaterally, diminished in bases, no rales, wheezes, or rhonchi  GI: Soft, nontender, nondistended. +BS.  No HSM or masses, no rebound or guarding.  Extremities: no edema, no cyanosis or clubbing, dorsalis pedis and posterior tibialis pulses 2+ bilaterally  Skin: Warm and dry, no jaundice or rash  Neuro: CN 2-12 intact, moves all extremities equally  Psych: Alert & oriented x 3    Medication Changes:  START ASA 81 mg (until INR 2, then stop)  START Warfarin (INR goal 2-3)  START Brilinta 90 mg BID (for at least 1 year uninterrupted)  START Metoprolol succinate 50 mg daily  START Lisinopril 5 mg daily  START Lasix 20 mg daily  START Lipitor 40 mg daily  PRN Oxycodone    Discharge medications:   Current Discharge Medication List      START taking these medications    Details   acetaminophen (TYLENOL) 325 MG tablet Take 2 tablets (650 mg) by mouth every 4 hours as needed for mild pain or fever  Qty: 60 tablet, Refills: 1    Associated Diagnoses: Cardiac arrest (H); Multiple closed fractures of ribs of both sides with routine healing, subsequent encounter      allopurinol (ZYLOPRIM) 100 MG tablet Take 2 tablets (200 mg) by mouth daily      aspirin (ASA) 81 MG chewable tablet Take 1 tablet (81 mg) by mouth daily Take until INR 2-3, then stop  Qty: 30 tablet, Refills: 0    Comments: Take until INR 2, then DC  Associated Diagnoses: Cardiac arrest (H); Coronary artery disease involving native coronary artery of native heart with angina pectoris (H)      atorvastatin (LIPITOR) 40 MG tablet 1 tablet (40 mg) by Oral or Feeding Tube route every evening  Qty: 60 tablet, Refills: 1    Associated Diagnoses: Cardiac arrest  (H); Coronary artery disease involving native coronary artery of native heart with angina pectoris (H)      furosemide (LASIX) 20 MG tablet Take 1 tablet (20 mg) by mouth daily  Qty: 60 tablet, Refills: 1    Associated Diagnoses: Systolic heart failure secondary to coronary artery disease (H)      levETIRAcetam (KEPPRA) 750 MG tablet Take 1 tablet (750 mg) by mouth 2 times daily  Qty: 60 tablet, Refills: 1    Associated Diagnoses: Seizure disorder (H)      Lidocaine (LIDOCARE) 4 % Patch Place 1-2 patches onto the skin every 24 hours To prevent lidocaine toxicity, patient should be patch free for 12 hrs daily.  Qty: 12 patch, Refills: 1    Associated Diagnoses: Multiple closed fractures of ribs of both sides with routine healing, subsequent encounter      lisinopril (PRINIVIL/ZESTRIL) 5 MG tablet Take 1 tablet (5 mg) by mouth daily  Qty: 60 tablet, Refills: 1    Associated Diagnoses: Systolic heart failure secondary to coronary artery disease (H)      metoprolol succinate ER (TOPROL-XL) 50 MG 24 hr tablet Take 1 tablet (50 mg) by mouth daily  Qty: 60 tablet, Refills: 1    Associated Diagnoses: Cardiac arrest (H); Systolic heart failure secondary to coronary artery disease (H)      multivitamin w/minerals (THERA-VIT-M) tablet Take 1 tablet by mouth daily  Qty: 60 tablet, Refills: 1    Associated Diagnoses: Open wound      oxyCODONE (ROXICODONE) 5 MG tablet Take 1 tablet (5 mg) by mouth every 6 hours as needed for pain  Qty: 20 tablet, Refills: 0    Associated Diagnoses: Multiple closed fractures of ribs of both sides with routine healing, subsequent encounter      ticagrelor (BRILINTA) 90 MG tablet 1 tablet (90 mg) by Oral or Feeding Tube route 2 times daily  Qty: 180 tablet, Refills: 3    Associated Diagnoses: Cardiac arrest (H); Coronary artery disease involving native coronary artery of native heart with angina pectoris (H)             Labs or imaging requiring follow-up after discharge:  CMP, CBC in 1 week  INR  this week (Thursday)  Repeat Echocardiogram 3 months    Follow-up:  Follow up with PCP in 7-10 days for post hospitalization visit (appointment made for this Thursday)  Follow up with Cardiology (prefer heart failure) in 2-4 weeks to establish care  Follow up with Neurology in 3-6 weeks for follow up s/p seizure/stroke    Code status:  Full    Patient Care Team:  No Ref-Primary, Physician as PCP - General     VIRGEN Garcia, CNP  [facility]  [phone]      Physician Attestation   I, Senthil Goodwin MD, personally saw and evaluated Joel Campbell as part of a shared visit.  I have reviewed and discussed with the advanced practice provider their discharge plan.    My key decision:  Discharge today     Senthil Goodwin

## 2019-11-03 NOTE — PLAN OF CARE
OT/6C:  Discharge Planner OT   Patient plan for discharge: Open to rehab. Would like to be closer to home in [place].   Current status: Patient completes bed mobility SBA. Reports some dizziness supine <> EOB. Slight systolic drop (115/72, 101/78). Remains steady in 100/70-80's during session. Ambulates ~125 feet x2 with 2WW, contact guard assist and wheelchair follow. Increasing O2 up to 4L during ambulation. HR up to 120's. Continues to be impulsive at times; needs cues for safety awareness and appropriate set-up of walker, as attempting to initiate ambulation outside of walker frame.   Barriers to return to prior living situation: Medical Status, Deconditioning, Cognition  Recommendations for discharge: ARU  Rationale for recommendations: Patient well below baseline ADL independence with new physical and cognitive deficits. Would benefit from intensive therapies to facilitate return toward PLOF. Has good family support, and anticipate could tolerate 3 hours of therapies per day.        Entered by: Mariaelena Parr 11/03/2019 4:23 PM

## 2019-11-03 NOTE — PHARMACY-CONSULT NOTE
Pharmacy Delirium Chart Review    Upon chart review, the following medications may contribute to possible patient delirium:   - Gabapentin may cause AMS/delirium associated especially in patient with JULIO C/decreased renal function  - Oxycodone (all opiates) may cause AMS/delirium  - Seroquel may cause AMS/delirium, that is more prominent in increasing dosages.    Please consult unit pharmacist with further questions.    Iron Tang PharmD, BCPS  Inpatient clinical pharmacist

## 2019-11-04 ENCOUNTER — APPOINTMENT (OUTPATIENT)
Dept: OCCUPATIONAL THERAPY | Facility: CLINIC | Age: 54
DRG: 003 | End: 2019-11-04
Attending: INTERNAL MEDICINE
Payer: COMMERCIAL

## 2019-11-04 ENCOUNTER — APPOINTMENT (OUTPATIENT)
Dept: SPEECH THERAPY | Facility: CLINIC | Age: 54
DRG: 003 | End: 2019-11-04
Attending: INTERNAL MEDICINE
Payer: COMMERCIAL

## 2019-11-04 ENCOUNTER — APPOINTMENT (OUTPATIENT)
Dept: PHYSICAL THERAPY | Facility: CLINIC | Age: 54
DRG: 003 | End: 2019-11-04
Attending: INTERNAL MEDICINE
Payer: COMMERCIAL

## 2019-11-04 PROBLEM — I50.20 SYSTOLIC HEART FAILURE SECONDARY TO CORONARY ARTERY DISEASE (H): Status: ACTIVE | Noted: 2019-11-04

## 2019-11-04 PROBLEM — I25.5: Status: ACTIVE | Noted: 2019-11-04

## 2019-11-04 PROBLEM — I25.10 SYSTOLIC HEART FAILURE SECONDARY TO CORONARY ARTERY DISEASE (H): Status: ACTIVE | Noted: 2019-11-04

## 2019-11-04 PROBLEM — R41.0 DELIRIUM: Status: ACTIVE | Noted: 2019-11-04

## 2019-11-04 PROBLEM — I50.9: Status: ACTIVE | Noted: 2019-11-04

## 2019-11-04 PROBLEM — I48.0 PAROXYSMAL A-FIB (H): Status: ACTIVE | Noted: 2019-11-04

## 2019-11-04 PROBLEM — I25.119 CORONARY ARTERY DISEASE INVOLVING NATIVE CORONARY ARTERY OF NATIVE HEART WITH ANGINA PECTORIS (H): Status: ACTIVE | Noted: 2019-11-04

## 2019-11-04 LAB
ANION GAP SERPL CALCULATED.3IONS-SCNC: 4 MMOL/L (ref 3–14)
BUN SERPL-MCNC: 28 MG/DL (ref 7–30)
CALCIUM SERPL-MCNC: 9.3 MG/DL (ref 8.5–10.1)
CHLORIDE SERPL-SCNC: 98 MMOL/L (ref 94–109)
CO2 SERPL-SCNC: 34 MMOL/L (ref 20–32)
COPATH REPORT: NORMAL
CREAT SERPL-MCNC: 0.91 MG/DL (ref 0.66–1.25)
ERYTHROCYTE [DISTWIDTH] IN BLOOD BY AUTOMATED COUNT: 19.6 % (ref 10–15)
GFR SERPL CREATININE-BSD FRML MDRD: >90 ML/MIN/{1.73_M2}
GLUCOSE BLDC GLUCOMTR-MCNC: 101 MG/DL (ref 70–99)
GLUCOSE BLDC GLUCOMTR-MCNC: 113 MG/DL (ref 70–99)
GLUCOSE BLDC GLUCOMTR-MCNC: 77 MG/DL (ref 70–99)
GLUCOSE BLDC GLUCOMTR-MCNC: 85 MG/DL (ref 70–99)
GLUCOSE SERPL-MCNC: 85 MG/DL (ref 70–99)
HCT VFR BLD AUTO: 33.9 % (ref 40–53)
HGB BLD-MCNC: 10.2 G/DL (ref 13.3–17.7)
INR PPP: 1.12 (ref 0.86–1.14)
MAGNESIUM SERPL-MCNC: 2.9 MG/DL (ref 1.6–2.3)
MCH RBC QN AUTO: 30.7 PG (ref 26.5–33)
MCHC RBC AUTO-ENTMCNC: 30.1 G/DL (ref 31.5–36.5)
MCV RBC AUTO: 102 FL (ref 78–100)
PLATELET # BLD AUTO: 601 10E9/L (ref 150–450)
POTASSIUM SERPL-SCNC: 4 MMOL/L (ref 3.4–5.3)
RBC # BLD AUTO: 3.32 10E12/L (ref 4.4–5.9)
SODIUM SERPL-SCNC: 137 MMOL/L (ref 133–144)
WBC # BLD AUTO: 10.3 10E9/L (ref 4–11)

## 2019-11-04 PROCEDURE — 94640 AIRWAY INHALATION TREATMENT: CPT | Mod: 76

## 2019-11-04 PROCEDURE — 25000132 ZZH RX MED GY IP 250 OP 250 PS 637: Performed by: STUDENT IN AN ORGANIZED HEALTH CARE EDUCATION/TRAINING PROGRAM

## 2019-11-04 PROCEDURE — 97530 THERAPEUTIC ACTIVITIES: CPT | Mod: GP

## 2019-11-04 PROCEDURE — 80048 BASIC METABOLIC PNL TOTAL CA: CPT | Performed by: PHYSICIAN ASSISTANT

## 2019-11-04 PROCEDURE — 36415 COLL VENOUS BLD VENIPUNCTURE: CPT | Performed by: PHYSICIAN ASSISTANT

## 2019-11-04 PROCEDURE — 40000275 ZZH STATISTIC RCP TIME EA 10 MIN

## 2019-11-04 PROCEDURE — 21400000 ZZH R&B CCU UMMC

## 2019-11-04 PROCEDURE — 25000132 ZZH RX MED GY IP 250 OP 250 PS 637: Performed by: INTERNAL MEDICINE

## 2019-11-04 PROCEDURE — 25000132 ZZH RX MED GY IP 250 OP 250 PS 637: Performed by: NURSE PRACTITIONER

## 2019-11-04 PROCEDURE — 25000132 ZZH RX MED GY IP 250 OP 250 PS 637: Performed by: PHYSICIAN ASSISTANT

## 2019-11-04 PROCEDURE — 92526 ORAL FUNCTION THERAPY: CPT | Mod: GN

## 2019-11-04 PROCEDURE — 97535 SELF CARE MNGMENT TRAINING: CPT | Mod: GO

## 2019-11-04 PROCEDURE — 85610 PROTHROMBIN TIME: CPT | Performed by: PHYSICIAN ASSISTANT

## 2019-11-04 PROCEDURE — 97530 THERAPEUTIC ACTIVITIES: CPT | Mod: GO

## 2019-11-04 PROCEDURE — 85027 COMPLETE CBC AUTOMATED: CPT | Performed by: PHYSICIAN ASSISTANT

## 2019-11-04 PROCEDURE — 25000125 ZZHC RX 250: Performed by: STUDENT IN AN ORGANIZED HEALTH CARE EDUCATION/TRAINING PROGRAM

## 2019-11-04 PROCEDURE — 00000146 ZZHCL STATISTIC GLUCOSE BY METER IP

## 2019-11-04 PROCEDURE — 97110 THERAPEUTIC EXERCISES: CPT | Mod: GP

## 2019-11-04 PROCEDURE — 94640 AIRWAY INHALATION TREATMENT: CPT

## 2019-11-04 PROCEDURE — 83735 ASSAY OF MAGNESIUM: CPT | Performed by: PHYSICIAN ASSISTANT

## 2019-11-04 PROCEDURE — 99232 SBSQ HOSP IP/OBS MODERATE 35: CPT | Performed by: NURSE PRACTITIONER

## 2019-11-04 RX ORDER — METOPROLOL TARTRATE 25 MG/1
25 TABLET, FILM COATED ORAL 2 TIMES DAILY
Qty: 90 TABLET | Refills: 1 | Status: CANCELLED | OUTPATIENT
Start: 2019-11-04

## 2019-11-04 RX ORDER — METOPROLOL TARTRATE 25 MG/1
25 TABLET, FILM COATED ORAL 2 TIMES DAILY
Status: DISCONTINUED | OUTPATIENT
Start: 2019-11-04 | End: 2019-11-05

## 2019-11-04 RX ORDER — WARFARIN SODIUM 5 MG/1
5 TABLET ORAL
Status: COMPLETED | OUTPATIENT
Start: 2019-11-04 | End: 2019-11-04

## 2019-11-04 RX ORDER — ALLOPURINOL 100 MG/1
200 TABLET ORAL DAILY
Status: CANCELLED | COMMUNITY
Start: 2019-11-05

## 2019-11-04 RX ORDER — ASPIRIN 81 MG/1
81 TABLET, CHEWABLE ORAL DAILY
Qty: 90 TABLET | Refills: 1 | Status: CANCELLED | OUTPATIENT
Start: 2019-11-05

## 2019-11-04 RX ORDER — FUROSEMIDE 20 MG
20 TABLET ORAL DAILY
Status: DISCONTINUED | OUTPATIENT
Start: 2019-11-04 | End: 2019-11-05 | Stop reason: HOSPADM

## 2019-11-04 RX ORDER — NICOTINE 21 MG/24HR
1 PATCH, TRANSDERMAL 24 HOURS TRANSDERMAL EVERY EVENING
Qty: 28 PATCH | Refills: 1 | Status: CANCELLED | OUTPATIENT
Start: 2019-11-04

## 2019-11-04 RX ORDER — ATORVASTATIN CALCIUM 40 MG/1
40 TABLET, FILM COATED ORAL EVERY EVENING
Qty: 90 TABLET | Refills: 1 | Status: CANCELLED | OUTPATIENT
Start: 2019-11-04

## 2019-11-04 RX ORDER — FUROSEMIDE 20 MG
20 TABLET ORAL DAILY
Qty: 90 TABLET | Refills: 1 | Status: CANCELLED | OUTPATIENT
Start: 2019-11-05

## 2019-11-04 RX ORDER — LIDOCAINE 4 G/G
2 PATCH TOPICAL EVERY 24 HOURS
Qty: 12 PATCH | Refills: 1 | Status: CANCELLED | OUTPATIENT
Start: 2019-11-04

## 2019-11-04 RX ORDER — QUETIAPINE FUMARATE 50 MG/1
50 TABLET, FILM COATED ORAL AT BEDTIME
Status: DISCONTINUED | OUTPATIENT
Start: 2019-11-04 | End: 2019-11-04

## 2019-11-04 RX ORDER — MULTIPLE VITAMINS W/ MINERALS TAB 9MG-400MCG
1 TAB ORAL DAILY
Status: DISCONTINUED | OUTPATIENT
Start: 2019-11-04 | End: 2019-11-05 | Stop reason: HOSPADM

## 2019-11-04 RX ORDER — LEVETIRACETAM 750 MG/1
750 TABLET ORAL 2 TIMES DAILY
Qty: 90 TABLET | Refills: 1 | Status: CANCELLED | OUTPATIENT
Start: 2019-11-04

## 2019-11-04 RX ORDER — ACETAMINOPHEN 500 MG
500-1000 TABLET ORAL EVERY 4 HOURS PRN
Qty: 90 TABLET | Refills: 1 | Status: CANCELLED | OUTPATIENT
Start: 2019-11-04

## 2019-11-04 RX ORDER — WARFARIN SODIUM 5 MG/1
5 TABLET ORAL DAILY
Qty: 30 TABLET | Refills: 1 | Status: CANCELLED | OUTPATIENT
Start: 2019-11-04

## 2019-11-04 RX ORDER — GABAPENTIN 100 MG/1
100 CAPSULE ORAL AT BEDTIME
Status: DISCONTINUED | OUTPATIENT
Start: 2019-11-04 | End: 2019-11-05

## 2019-11-04 RX ADMIN — METOPROLOL TARTRATE 25 MG: 25 TABLET ORAL at 08:58

## 2019-11-04 RX ADMIN — ALLOPURINOL 200 MG: 100 TABLET ORAL at 08:05

## 2019-11-04 RX ADMIN — WARFARIN SODIUM 5 MG: 5 TABLET ORAL at 17:52

## 2019-11-04 RX ADMIN — Medication: at 16:54

## 2019-11-04 RX ADMIN — FOLIC ACID 1 MG: 1 TABLET ORAL at 08:05

## 2019-11-04 RX ADMIN — IPRATROPIUM BROMIDE 0.5 MG: 0.5 SOLUTION RESPIRATORY (INHALATION) at 07:16

## 2019-11-04 RX ADMIN — TICAGRELOR 90 MG: 90 TABLET ORAL at 20:24

## 2019-11-04 RX ADMIN — LEVETIRACETAM 750 MG: 750 TABLET, FILM COATED ORAL at 20:24

## 2019-11-04 RX ADMIN — RAMELTEON 8 MG: 8 TABLET ORAL at 21:57

## 2019-11-04 RX ADMIN — ASPIRIN 81 MG CHEWABLE TABLET 81 MG: 81 TABLET CHEWABLE at 08:04

## 2019-11-04 RX ADMIN — ATORVASTATIN CALCIUM 40 MG: 40 TABLET, FILM COATED ORAL at 20:24

## 2019-11-04 RX ADMIN — THIAMINE HCL TAB 100 MG 100 MG: 100 TAB at 08:04

## 2019-11-04 RX ADMIN — OMEPRAZOLE 20 MG: 20 CAPSULE, DELAYED RELEASE ORAL at 08:04

## 2019-11-04 RX ADMIN — ACETAMINOPHEN 650 MG: 325 TABLET, FILM COATED ORAL at 20:24

## 2019-11-04 RX ADMIN — METOPROLOL TARTRATE 25 MG: 25 TABLET ORAL at 20:24

## 2019-11-04 RX ADMIN — IPRATROPIUM BROMIDE 0.5 MG: 0.5 SOLUTION RESPIRATORY (INHALATION) at 20:47

## 2019-11-04 RX ADMIN — ACETAMINOPHEN 650 MG: 325 TABLET, FILM COATED ORAL at 16:01

## 2019-11-04 RX ADMIN — FUROSEMIDE 20 MG: 20 TABLET ORAL at 08:58

## 2019-11-04 RX ADMIN — MULTIPLE VITAMINS W/ MINERALS TAB 1 TABLET: TAB at 16:01

## 2019-11-04 RX ADMIN — TICAGRELOR 90 MG: 90 TABLET ORAL at 08:05

## 2019-11-04 RX ADMIN — LEVETIRACETAM 750 MG: 750 TABLET, FILM COATED ORAL at 08:05

## 2019-11-04 RX ADMIN — GABAPENTIN 100 MG: 100 CAPSULE ORAL at 21:57

## 2019-11-04 ASSESSMENT — ACTIVITIES OF DAILY LIVING (ADL)
ADLS_ACUITY_SCORE: 9.5
ADLS_ACUITY_SCORE: 13
ADLS_ACUITY_SCORE: 13
ADLS_ACUITY_SCORE: 10
ADLS_ACUITY_SCORE: 10
ADLS_ACUITY_SCORE: 13

## 2019-11-04 ASSESSMENT — MIFFLIN-ST. JEOR: SCORE: 1837.51

## 2019-11-04 ASSESSMENT — PAIN DESCRIPTION - DESCRIPTORS
DESCRIPTORS: ACHING

## 2019-11-04 NOTE — PROGRESS NOTES
CLINICAL NUTRITION SERVICES - REASSESSMENT NOTE     Nutrition Prescription    RECOMMENDATIONS FOR MDs/PROVIDERS TO ORDER:  If oral intake does not improve and pt not agreeable to feeding tube replacement, then rec consider appetite stimulant.     Malnutrition Status:    Non-severe malnutrition in the context of acute illness/injury    Recommendations already ordered by Registered Dietitian (RD):  Ordered a multivitamin with minerals   Changed oral supplements to prn    Future/Additional Recommendations:  1. Continue current diet as ordered, liberalized to help encourage oral intake. Encourage high protein/high kcal options.   2. Monitor fluid status and potential need for a sodium restriction and possibly a fluid restriction. However, keep diet liberalized, if able, to help encourage oral intake.   3. Continue thiamine as ordered due to cardiac status.   4. If pt agreeable to TFs, rec restart previous TF regimen.      EVALUATION OF THE PROGRESS TOWARD GOALS   Diet: Regular diet order since 11/1. Diet was full liquids on 10/28 and then advanced to a DD II + thin liquid diet order 10/30. Ordered to receive Boost Breeze at 14:00 and orange Magic Cup at HS.   Intake: Poor diet tolerance. Flowsheets indicate pt consuming bites of meals with a poor appetite 10/29, 25% of meals with a poor appetite 10/31, 0-50% of meals with a  fair appetite 11/1, 0% of meals with a poor appetite 11/2, 25-50% of meals 11/3, and 100% of small meal (banan bread with butter) on 11/4. Pt denies early satiety and nausea. He has a generalized lack of appetite. Pt's family is bringing some outside food. Noticed family brought macaroni cheese but pt consumed minimally.      Kcal counts:   10/31 536 kcals and 3 g protein (100% Mountain Dew from outside the hospital, orange juice, 75% apple juice,less than 25% pancake w/ syrup, scrambled egg, egg noodles w/ meat sauce and no supplement/s recorded)   11/1   689 kcals and 37 g protein (Two meal/s and  "no supplement/s recorded)   11/2   496 kcals and 7 g protein (Two meal/s and no supplement/s recorded)   * Pt consumed a three-day average of 574 kcals and 16 g protein daily which does not meet estimated needs noted below.     Previous Nutrition Support:    - Feeding Tube (FT) access: NJT (Cortrak) + bridle (AXR) placed on 10/16. Feeding tube was removed on 11/3 as per pt request due to feeling full and no appetite.  - Previous TF regimen: TwoCal HN at 75 mL/hr x 18 hrs = 1350 mL, 2700 kcals and 113 g PRO daily.  - Previous TF modulars: Prosource TF modular, three packets daily.  - Intake: Pt received a seven-day TF average of 1091 mL/day. This provided 2182+ kcals and 92+ g protein which meets estimated needs when receiving protein modular as ordered. Pt was started on continuous TFs on 10/16 and then TFs were cycled on 10/30 before TFs stopped on 11/3.      NEW FINDINGS   [facility] 10/30: \"Pressure Injury: on left upper lip , hospital acquired. This is a Medical Device Related Pressure Injury (MDRPI) due to ETT. Pressure Injury is Stage Mucosal. Contributing factor of the pressure injury: pressure and immobility. Status: follow up assessment, healed. Recommend provider order: NA.\"     Wt Hx: 96.3 kg (11/20/17), 75 kg (10/13/19, admit but suspect wt is an outlier), 98 kg (10/13/19), 91.6 kg (11/4/19) - Pt has lost 7.5% of his body wt in less than a month.     ASSESSED NUTRITION NEEDS  Dosing Weight: 92 kg (based on lowest wt this admission of 91.6 kg)   Estimated Energy Needs: 1354-2651 kcals/day (25 - 30 kcals/kg)  Justification: Maintenance needs  Estimated Protein Needs: 110-138 grams protein/day (1.2-1.5 grams of pro/kg)  Justification: Increased needs with medical course, stress factors, and cardiac status  Estimated Fluid Needs: 1 mL/kcal/day   Justification: Maintenance needs or per provider pending fluid status    MALNUTRITION  % Intake: Decreased intake does not meet criteria  % Weight Loss: > 5% in 1 month " (severe)  Subcutaneous Fat Loss: Facial region:  Mild  Muscle Loss: Temporal:  Mild  Fluid Accumulation/Edema: None noted  Malnutrition Diagnosis: Non-severe malnutrition in the context of acute illness/injury    Previous Goals   Total avg nutritional intake to meet a minimum of 25 kcal/kg and 1.2 g PRO/kg daily (per dosing wt 98 kg).  Evaluation: Meeting before TFs stopped.     Previous Nutrition Diagnosis  Inadequate energy intake related to inadequate TF infusion 2/2 to slow advancement of TF and TF interupptions as evidenced by pt only receiving 21/25 kcals/kg over the past 7 days.   Evaluation: Improved before TFs stopped. Inadequate nutrition intake after TFs stopped yesterday.    CURRENT NUTRITION DIAGNOSIS  Inadequate oral intake related to generalized lack of appetite as evidenced by pt consumed a three-day average of 574 kcals and 16 g protein daily which does not meet estimated needs of 0467-2062 kcals/day (25 - 30 kcals/kg) and 110-138 grams protein/day (1.2-1.5 grams of pro/kg).    INTERVENTIONS  Implementation  Nutrition education for nutrition relationship to health/disease: Encouraged oral intake and goal for adequate kcals/protein. Rec small, frequent meals. Gave suggestions. Pt's wife is supportive and states she will buy him or bring him any food that sounds good. Pt states he will review the menu again and see if anything sounds good. Gave handouts, Coping with Decreased Appetite and Weight Loss. Heart-Healthy Nutrition Therapy handout provided (pt/family aware this is recommended in the long-term).   Medical food supplement therapy: Discussed oral supplements. Pt dislikes Boost Breeze and Magic Cup. Provided oral supplement menu. Encourage pt to try other oral supplements. Modified oral supplement order to prn.   Ordered a multivitamin with minerals as pt with a pressure injury (note, previously on certavite).     Goals  Patient to consume % of nutritionally adequate meal trays TID, or the  equivalent with supplements/snacks.    Monitoring/Evaluation  Progress toward goals will be monitored and evaluated per protocol.     Nutrition will continue to follow.      Merry Saldaña MS, RD, LD, [facility]   6C Pgr: [phone]

## 2019-11-04 NOTE — PLAN OF CARE
Discharge Planner SLP   Patient plan for discharge: Did not discuss  Current status: Pt tolerating regular solids and thin liquids with adequate oral manipulation and clearance. No s/sx of aspiration. Pt and his wife deny any difficulty swallowing and state appetite is his only issue related to PO currently.     Continue current diet of regular solids and thin liquids with general safe swallow precautions (upright position, slow rate of intake, small bites/sips)    Barriers to return to prior living situation: Cognition   Recommendations for discharge: ARU  Rationale for recommendations: Pt meet dysphagia goals, however recommend a cognitive/linguistic evaluation at next level of care.         Speech Language Therapy Discharge Summary    Reason for therapy discharge:    All goals and outcomes met, no further needs identified.  All acute care goals met, recommend cog/ling evaluation at next level of care     Progress towards therapy goal(s). See goals on Care Plan in [facility] electronic health record for goal details.  Goals met    Therapy recommendation(s):    Continued therapy is recommended.  Rationale/Recommendations:  Dysphagia goals met. Recommend full cognitive/linguistic evaluation at next level of care.             Entered by: Ashley Solis 11/04/2019 10:41 AM

## 2019-11-04 NOTE — PLAN OF CARE
Discharge Planner OT   Patient plan for discharge: home with OP PT  Current status: Pt up in halls with spouse without 2WW or GB, GB/2WW obtained with edu provided on safety/balance deficits, pt amb ~150ft and 75ft with 2WW and CGA  with pt reporting dizziness with SpO2 of 85% and recovering after 2min seated rest, VC/TC required for pt to use 2WW for transfers, pt demonstrated lateral tub transfer with close CGA and VC with pt relying on grab bars, edu on grab bars and shower chair benefits with spouse reporting she can take care of it, pt with improved cognition although still appears below baseline (when asked where he was pt only able to state he was somewhere in [place])  Barriers to return to prior living situation: cognition, balance deficits, medical status, falls risk  Recommendations for discharge: ARU with family declining, therefore home with 24/7 SBA with HH OT for home safety eval and OP PT  Rationale for recommendations: Long discussion with pt and pt spouse regarding benefits of IP rehab and safety concerns for discharge home. Pt and pt spouse continued to deny rehab recommendation. If pt if to discharge home, he will required 24/7 SBA for cognition and balance deficits and would benefit from HH OT to ensure safety with ADL/IADLs and OP PT to progress pt balance/functional mobility.        Entered by: Naty Bailey 11/04/2019 2:03 PM

## 2019-11-04 NOTE — PROGRESS NOTES
"  Interventional Cardiology Progress Note    Interval History:  - No acute events overnight  - Converted to NSR last night around 2030, remains in SR this am  - 1:1 removed yesterday afternoon  - Patient and wife continue to report decreased appetite  - Mentation continues to improve  - Patient reports intermittent chest wall/rib pain, otherwise denies chest pain, SOB, lightheadedness, dizziness    Most recent vital signs:  /80 (BP Location: Right arm)   Pulse 95   Temp 98.2  F (36.8  C) (Oral)   Resp 18   Ht 1.89 m (6' 2.41\")   Wt 91.6 kg (202 lb)   SpO2 92%   BMI 25.65 kg/m    Temp:  [97.7  F (36.5  C)-98.3  F (36.8  C)] 98.2  F (36.8  C)  Pulse:  [89-95] 95  Heart Rate:  [87-95] 89  Resp:  [16-20] 18  BP: ()/(66-97) 112/80  SpO2:  [92 %-96 %] 92 %  Wt Readings from Last 2 Encounters:   11/04/19 91.6 kg (202 lb)       Intake/Output Summary (Last 24 hours) at 11/4/2019 0907  Last data filed at 11/4/2019 0546  Gross per 24 hour   Intake 400 ml   Output 3150 ml   Net -2750 ml     Physical exam:  General: In bed, in NAD  HEENT: EOMI, PERRLA, no scleral icterus or injection  CARDIAC: RRR, no m/r/g appreciated. Peripheral pulses 2+  RESP: CTAB, no wheezes, rhonchi or crackles appreciated.  GI: NABS, NT/ND, no guarding or rebound  EXTREMITIES: NO LE edema, pulses 2+. Femoral access site w/o bleeding, dressing c/d/i. Minimal bruising. Steri strips intact to right groin.   SKIN: No acute lesions appreciated  NEURO: Alert and oriented X3, CN II-XII grossly intact, no focal neurological deficits noted    Labs (Past three days):  CBC  Recent Labs   Lab 11/04/19  0524 11/01/19  1322 11/01/19  0328 10/31/19  0355   WBC 10.3 10.0 7.7 8.3   RBC 3.32* 2.79* 2.67* 2.74*   HGB 10.2* 8.6* 8.1* 8.4*   HCT 33.9* 28.7* 27.2* 27.5*   * 103* 102* 100   MCH 30.7 30.8 30.3 30.7   MCHC 30.1* 30.0* 29.8* 30.5*   RDW 19.6* 17.6* 17.2* 16.9*   * 597* 592* 574*     BMP  Recent Labs   Lab 11/04/19  0524 " "11/03/19  0524 11/02/19  0647 11/01/19 0328  10/30/19  0338 10/29/19  0338    137 136 137   < > 140 143   POTASSIUM 4.0 3.9 4.3 4.2   < > 3.6 3.7   CHLORIDE 98 97 99 100   < > 104 104   CO2 34* 34* 35* 35*   < > 32 36*   ANIONGAP 4 6 2* 3   < > 4 3   GLC 85 119* 129* 133*   < > 147* 155*   BUN 28 31* 33* 36*   < > 35* 36*   CR 0.91 0.81 0.77 0.79   < > 0.65* 0.69   GFRESTIMATED >90 >90 >90 >90   < > >90 >90   GFRESTBLACK >90 >90 >90 >90   < > >90 >90   HEATHER 9.3 9.1 8.8 8.5   < > 8.7 8.5   MAG 2.9* 2.8* 2.9* 2.8*   < > 2.6* 2.8*   PHOS  --   --   --  4.3  --  4.5 3.2    < > = values in this interval not displayed.     Troponins:   Lab Results   Component Value Date    TROPI 0.612 () 10/25/2019    TROPI 0.827 () 10/24/2019    TROPI 1.074 () 10/24/2019    TROPI 1.449 () 10/23/2019    TROPI 1.868 () 10/23/2019        INR  Recent Labs   Lab 11/04/19  0524 11/03/19  0524 11/02/19  0647 10/29/19  0338   INR 1.12 1.12 1.10 1.17*     Liver panelNo lab results found in last 7 days.    Imaging/procedure results: Reviewed    Assessment & Plan:  Joel Campbell is a 53 year old male who with no significant PMHx, current smoker, who was admitted on 10/13/19 as a transfer from [place], [place] for refractory VT/VF arrest s/p PCI to LAD and placement on peripheral VA ECMO.     #Refractory VT/VF arrest s/p ECMO  ##CAD s/p PCI to LAD 10/13/2019  ###ICM (LVEF 25-30%)  10/13 wife noted patient was having seizure like activity on 10/13. EMS called and responded in 3-4 minutes, found a \"shockable rhythm\". Unclear how many shocks he received. Got 40 min CPR en route to [facility], found to have VT and started on Amiodarone. Noted to have ST elevations, and transferred to [facility] - found to have LAD disease. Placed on VA ECMO, PCI to LAD, and transferred to [facility] for ongoing care. He was cooled for 24h. IABP removed successfully 10/20/2019. Successful extubation 10/25/2019. Coarse complicated by right hemothorax secondary to " CPR requiring placement of chest tube from 10/14-10/28, stroke and seizures (discussed below), and vasoplegia requiring midodrine - given no obvious infectious source, cortisol levels normal. Given his severely reduced EF would recommend life vest at discharge. Additionally, due to his akinetic LV, would anticoagulate for LV thrombus prophylaxis (will be on warfarin for atrial fibrillation).      - will call attempt to get angiogram images to investigate if other vessels require intervention (CD en route)  - aspirin 81mg + ticagrelor 90mg bid + warfarin. Once INR is therapeutic, will discontinue ASA  - metoprolol tartrate 25mg bid  - atorvastatin 40mg starting tonight  - BP stable, with MAP , will discontinue Midodrine  - If BP remains stable will start low dose ACEi (this can also be started and titrated as OP)  - will need heart failure follow up in [place] after discharge.   - Lifevest at discharge (form completed and signed)    #Atrial fibrillation/Atrial flutter (Chadsvasc at least 3)  Noted in the ICU. Unknown if he has a history of this. Rate controlled while on floor. Converted to NSR 11/3.   - Pharmacy to dose warfarin. Goal INR 2-3.   - Stop Digoxin  - Metoprolol as above     #Stroke  ##Seizures  CT head 10/16 concerning for new right hemicerebellum lesion c/w infarct. Noted to have seizure like activity on EEG 10/17   - levitiracetam 750mg bid   - antiplatelet therapy as above     # ICU delirium, improving   Improving with medications, patient off 1:1 yesterday.  Mentation improving greatly. Pharmacy consulted to assist with medications, appreciate assistance  - delirium precautions  - Stop am dose Seroquel.  -  Decrease nightly Seroquel dose to 50 mg; hopeful to discontinue or make prn by tomorrow  - Stop Gabapentin  - Discontinue opioids      #Dysphagia, improving  Patient and family report no issues swallowing, has had little -no appetite. NG removed 11/3.   - RD and SP following  - Continue to  encourage meals  - Snacks/supplements between meals     #Macrocytic hypochromic Anemia, improving  ##Thrombocytosis, improving  Hgb persistently low since placement on ECMO. Platelets continuing to rise, likely secondary to proliferation. Normal iron studies. ?component dilution, hgb greatly improved this am, 10.2, after stopping IVF and free water flushes  - CBC daily     #Aspiration pneumonia  Initially vancomycin/zosyn x5 days for presumed aspiration - then extended given multiple invasive procedures. ID consulted given ongoing vasoplegia. No additional recs at this time. CT chest 10/28 with new GGO on right, concerning for infection vs. Atelectasis. Will monitor closely. If any signs of infection, will re-consult ID. Patient remains afebrile. WBC normal.   - daily CBC     Prophylaxis:  DVT: Warfarin, ambulate qshift     Diet: Regular diet  Activity: Up as tolerated  Code Status: Full  Disposition: Current recs for ARU; mentation greatly improved, patient and wife requesting OP Rehab; will check in with PT/OT. Patient likely ready for discharge early this week    Patient discussed w/ Dr. Santillan.      Yamile Sandra, VIRGEN, CNP  [facility]  Interventional Cardiology  [phone]

## 2019-11-04 NOTE — PLAN OF CARE
"D: Admitted 10/13 from OSH after VT/VF arrest requiring PCI/ECMO. Hx of alcohol and tobacco use.     I: Monitored vitals and assessed pt status.   Changed: NG tube removed,  trial off of attendant.  PRN: K replaced per protocol.    A: A0x2, disoriented to place and situation. VSS on RA-2L with sleep and activity. Afebrile. Urinating adequately. Pt still have extremely poor appetite, refusing to eat claiming that \"nothing sounds good,\" despite spouse willing to get food from outside the hospital. Pt ambulated in the umana 2x, but said that he didn't feel as though he was able to walk. Pt stated many times that \"he just wants to home\" and that he will be better at home. Pt does not show impulsive behavior about getting up and is cooperative.     P: Continue to monitor Pt status and report changes to CSI. Discharge to TCU that  is closer to home once attendant is no longer needed.          "

## 2019-11-04 NOTE — PROGRESS NOTES
Care Coordinator - Discharge Planning    Admission Date/Time:  10/13/2019  Attending MD:  Kevyn Griffin MD   Data  Date of initial CC assessment:  11/1/19  Chart reviewed, discussed with interdisciplinary team.   Patient was admitted for:   1. Ventricular tachycardia (H)    2. Cardiac arrest (H)    3. Cardiac arrest (H)    Assessment   Concerns with insurance coverage for discharge needs: None stated by pt.  Current Living Situation: Patient lives with spouse.  Support System: Supportive and Involved wife and friends.   Services Involved: none currently.   Transportation at Discharge: Pt's wife will provide pt with a ride home.   Transportation to Medical Appointments:    - Name of caregiver: wife.     Coordination of Care and Referrals: Provided patient/family with options for home LifeVest by previous RN CC, outpt INR & Warfarin monitoring.   Pt and pt's wife are declining TCU and home care; instead they want to go to OPCR at the [facility] in [place], [place].   Per NP, pt will need outpt INR and Warfarin monitoring arranged; pt and pt's wife are in agreement with this plan.   Intervention:        Arrangements made with [employer] [facility] (Ph: [phone], F: [phone]) for home LifeVest. Pt to be fitted tonight at about 5:30 PM.       Arrangements made a the [facility] (Ph: [phone] Fax: [phone]) for INR and Warfarin monitoring (Goal=2-3). Indication for Anticoagulation: Atrial Fibrillation. IAN Guo to follow. Appointment made on 11/7/19 at 2:15 PM with IAN Guo for post hospitalization follow up, INR lab draw and Warfarin monitoring.   Plan  Anticipated Discharge Date:  11/5/19  Anticipated Discharge Plan:  Discharge to home with outpt f/u.     JUSTIN DIANA RN BSN  Care Coordinator Unit   [phone]

## 2019-11-04 NOTE — PLAN OF CARE
"/88 (BP Location: Right arm)   Pulse 85   Temp 98.6  F (37  C) (Oral)   Resp 16   Ht 1.89 m (6' 2.41\")   Wt 91.6 kg (202 lb)   SpO2 94%   BMI 25.65 kg/m      Neuro: Oriented to self, knows he is in a hospital in [place], and knows that the month is November. Unsure of situation. Pt has been mainly calm and cooperative, however very eager to discharge home and impulsive at times.   Cardiac: VSS. SR with HR 80-90s  Respiratory:  Maintaining adequate O2 sats on RA at rest. Pt desat to 85% with activity while working with PT and may need oxygen at discharge.   GI/: voiding without difficulty. Educated pt on the importance of voiding in the urinal to measure I/O. BM x1  Diet/appetite: On regular diet. Pt needs much encouragement to eat. Poor appetite. Denies nausea  Activity: Up with SBA and FWW. Walked halls and worked with therapy  Pain: Denies  Skin: No new skin deficits noted  LDA's: PIV saline locked    Plan: Pt and wife adamant about discharging home today. Plan to  discharge home with OP PT. Nursing will continue to follow the POC and update the MD team with concerns.    "

## 2019-11-04 NOTE — PROGRESS NOTES
ADDENDUM 1:30 PM  Family declining ARU, family to discharge home with home care.    Social Work Services Progress Note    Hospital Day: 23  Date of Initial Social Work Evaluation:  Not completed   Collaborated with:  Pt insurance, provider, PT    Data:  Pt is a 53-year-old man with past history of alcohol abuse who was admitted on 10/13 as a transfer from V. tach/V. fib status post CPR for 40 minutes which was followed by ROSC. His course was complicated by hemothorax status post chest tube with complicating migration of his chest tube such that the sidehole was external and this chest tube was ultimately removed on 10/28.    SW following for TCU placement.    Intervention:  SW received a call from Utilization Management regarding an insurance contact who could provide guidance on facilities that would be in-network with pt's insurance.    SW called Pat in Medical Management from [facility] [phone] to obtain information about ARU's that pt could go to closer to home. Pat stated that pt had a HMO plan and could go to  Rehab in [place] or [facility] (further from home than [facility]).     Per medical team, wife now wants to take pt home. PT assessing for safety today.     Assessment:  ARU/TCU was original recommendation, though the wife is now wanting to take pt home. Pt is off 1:1, is on Seroquil, and they have removed tube feedings.    Plan:    Anticipated Disposition:  Home with services    Barriers to d/c plan:  Medical stability    Follow Up:  SW to be available for discharge planning as needed.    Ana Dailey ADDIE Murrell   Covering for Unit: [phone]

## 2019-11-04 NOTE — PLAN OF CARE
D: Pt admitted after VT/VF arrest requiring ECMO  I/A: Pt spontaneously converted from A flutter to SR at 2030. Pt mostly cooperative, calm, in better spirits this shift. Occasionally irritated by required cares. Voiding well. Ate few bites mac and cheese with pulled pork. Drinking full-calorie soda. States pain has greatly improved. Slept fairly soundly from ~ 8315-1519.  P: Continue to monitor and assess pt condition and contact treatment team with questions or concerns.

## 2019-11-04 NOTE — PLAN OF CARE
Discharge Planner PT   Patient plan for discharge: home with wife assist  Current status: Pt Cindy supine<>sit, SBA sit<>stand to FWW, ambulates 2x100' + x200' with close SBA and limited by SOB (HR 90s and O2 desat to 85% on RA, may have home O2 needs). Pt noted poor walker management with transition to bed/chair needing cues for safety. Lengthy discussion with discharge planning as pt and wife endorsing they decline ARU/TCU even if closer to home despite max PT education on benefits of this. Discharge planning for pt needs for safest return home with wife and pt, which they are agreeable to (see below).  Barriers to return to prior living situation: medical needs, weakness, reduced activity tolerance, impaired balance, cognition/impulsivity/poor safety awareness.  Recommendations for discharge: ARU- however, pt and wife declining so will need 24/7 SBA/supervision, FWW use, + OP PT (also declining HH PT).  Rationale for recommendations: Though pt demos need for multidisciplinary rehab stay as he is well below IND baseline and would tolerate 3 hours therapy daily, pt and wife declining. This writer believes pt would best benefit and have improved prognosis for recovery with ARU stay. However, since pt and wife declining would rec home with 24/7 SBA, FWW use, and ongoing OP PT .        Entered by: Bree Sears 11/04/2019 12:53 PM

## 2019-11-05 ENCOUNTER — DOCUMENTATION ONLY (OUTPATIENT)
Dept: CARE COORDINATION | Facility: CLINIC | Age: 54
End: 2019-11-05

## 2019-11-05 ENCOUNTER — APPOINTMENT (OUTPATIENT)
Dept: PHYSICAL THERAPY | Facility: CLINIC | Age: 54
DRG: 003 | End: 2019-11-05
Attending: INTERNAL MEDICINE
Payer: COMMERCIAL

## 2019-11-05 VITALS
HEIGHT: 74 IN | TEMPERATURE: 97.4 F | OXYGEN SATURATION: 97 % | DIASTOLIC BLOOD PRESSURE: 77 MMHG | WEIGHT: 202 LBS | HEART RATE: 85 BPM | RESPIRATION RATE: 18 BRPM | SYSTOLIC BLOOD PRESSURE: 109 MMHG | BODY MASS INDEX: 25.93 KG/M2

## 2019-11-05 LAB
ANION GAP SERPL CALCULATED.3IONS-SCNC: 8 MMOL/L (ref 3–14)
BUN SERPL-MCNC: 24 MG/DL (ref 7–30)
CALCIUM SERPL-MCNC: 9.6 MG/DL (ref 8.5–10.1)
CHLORIDE SERPL-SCNC: 100 MMOL/L (ref 94–109)
CO2 SERPL-SCNC: 29 MMOL/L (ref 20–32)
CREAT SERPL-MCNC: 0.78 MG/DL (ref 0.66–1.25)
ERYTHROCYTE [DISTWIDTH] IN BLOOD BY AUTOMATED COUNT: 19.5 % (ref 10–15)
GFR SERPL CREATININE-BSD FRML MDRD: >90 ML/MIN/{1.73_M2}
GLUCOSE BLDC GLUCOMTR-MCNC: 106 MG/DL (ref 70–99)
GLUCOSE BLDC GLUCOMTR-MCNC: 90 MG/DL (ref 70–99)
GLUCOSE SERPL-MCNC: 91 MG/DL (ref 70–99)
HCT VFR BLD AUTO: 37 % (ref 40–53)
HGB BLD-MCNC: 11.6 G/DL (ref 13.3–17.7)
INR PPP: 1.17 (ref 0.86–1.14)
MAGNESIUM SERPL-MCNC: 2.7 MG/DL (ref 1.6–2.3)
MCH RBC QN AUTO: 31.1 PG (ref 26.5–33)
MCHC RBC AUTO-ENTMCNC: 31.4 G/DL (ref 31.5–36.5)
MCV RBC AUTO: 99 FL (ref 78–100)
PLATELET # BLD AUTO: 639 10E9/L (ref 150–450)
POTASSIUM SERPL-SCNC: 3.9 MMOL/L (ref 3.4–5.3)
RBC # BLD AUTO: 3.73 10E12/L (ref 4.4–5.9)
SODIUM SERPL-SCNC: 137 MMOL/L (ref 133–144)
WBC # BLD AUTO: 11 10E9/L (ref 4–11)

## 2019-11-05 PROCEDURE — 25000132 ZZH RX MED GY IP 250 OP 250 PS 637: Performed by: STUDENT IN AN ORGANIZED HEALTH CARE EDUCATION/TRAINING PROGRAM

## 2019-11-05 PROCEDURE — 25000132 ZZH RX MED GY IP 250 OP 250 PS 637: Performed by: NURSE PRACTITIONER

## 2019-11-05 PROCEDURE — 40000275 ZZH STATISTIC RCP TIME EA 10 MIN

## 2019-11-05 PROCEDURE — 85027 COMPLETE CBC AUTOMATED: CPT | Performed by: PHYSICIAN ASSISTANT

## 2019-11-05 PROCEDURE — 00000146 ZZHCL STATISTIC GLUCOSE BY METER IP

## 2019-11-05 PROCEDURE — 99238 HOSP IP/OBS DSCHRG MGMT 30/<: CPT | Performed by: INTERNAL MEDICINE

## 2019-11-05 PROCEDURE — 97530 THERAPEUTIC ACTIVITIES: CPT | Mod: GP | Performed by: REHABILITATION PRACTITIONER

## 2019-11-05 PROCEDURE — 80048 BASIC METABOLIC PNL TOTAL CA: CPT

## 2019-11-05 PROCEDURE — 25000132 ZZH RX MED GY IP 250 OP 250 PS 637: Performed by: INTERNAL MEDICINE

## 2019-11-05 PROCEDURE — 97110 THERAPEUTIC EXERCISES: CPT | Mod: GP | Performed by: REHABILITATION PRACTITIONER

## 2019-11-05 PROCEDURE — 25000125 ZZHC RX 250: Performed by: STUDENT IN AN ORGANIZED HEALTH CARE EDUCATION/TRAINING PROGRAM

## 2019-11-05 PROCEDURE — 94640 AIRWAY INHALATION TREATMENT: CPT

## 2019-11-05 PROCEDURE — 99238 HOSP IP/OBS DSCHRG MGMT 30/<: CPT | Performed by: NURSE PRACTITIONER

## 2019-11-05 PROCEDURE — 85610 PROTHROMBIN TIME: CPT | Performed by: PHYSICIAN ASSISTANT

## 2019-11-05 PROCEDURE — 83735 ASSAY OF MAGNESIUM: CPT

## 2019-11-05 PROCEDURE — 97116 GAIT TRAINING THERAPY: CPT | Mod: GP | Performed by: REHABILITATION PRACTITIONER

## 2019-11-05 PROCEDURE — 36415 COLL VENOUS BLD VENIPUNCTURE: CPT

## 2019-11-05 RX ORDER — FUROSEMIDE 20 MG
20 TABLET ORAL DAILY
Qty: 60 TABLET | Refills: 1 | Status: SHIPPED | OUTPATIENT
Start: 2019-11-05

## 2019-11-05 RX ORDER — METOPROLOL SUCCINATE 50 MG/1
50 TABLET, EXTENDED RELEASE ORAL DAILY
Qty: 60 TABLET | Refills: 1 | Status: SHIPPED | OUTPATIENT
Start: 2019-11-05

## 2019-11-05 RX ORDER — MULTIPLE VITAMINS W/ MINERALS TAB 9MG-400MCG
1 TAB ORAL DAILY
Qty: 60 TABLET | Refills: 1 | Status: SHIPPED | OUTPATIENT
Start: 2019-11-06

## 2019-11-05 RX ORDER — LISINOPRIL 5 MG/1
5 TABLET ORAL DAILY
Status: DISCONTINUED | OUTPATIENT
Start: 2019-11-05 | End: 2019-11-05 | Stop reason: HOSPADM

## 2019-11-05 RX ORDER — LEVETIRACETAM 750 MG/1
750 TABLET ORAL 2 TIMES DAILY
Qty: 60 TABLET | Refills: 1 | Status: SHIPPED | OUTPATIENT
Start: 2019-11-05

## 2019-11-05 RX ORDER — WARFARIN SODIUM 7.5 MG/1
7.5 TABLET ORAL
Status: DISCONTINUED | OUTPATIENT
Start: 2019-11-05 | End: 2019-11-05 | Stop reason: HOSPADM

## 2019-11-05 RX ORDER — OXYCODONE HYDROCHLORIDE 5 MG/1
5 TABLET ORAL EVERY 6 HOURS PRN
Qty: 20 TABLET | Refills: 0 | Status: SHIPPED | OUTPATIENT
Start: 2019-11-05 | End: 2019-11-10

## 2019-11-05 RX ORDER — METOPROLOL SUCCINATE 50 MG/1
50 TABLET, EXTENDED RELEASE ORAL DAILY
Status: DISCONTINUED | OUTPATIENT
Start: 2019-11-05 | End: 2019-11-05 | Stop reason: HOSPADM

## 2019-11-05 RX ORDER — ACETAMINOPHEN 325 MG/1
650 TABLET ORAL EVERY 4 HOURS PRN
Qty: 60 TABLET | Refills: 1 | Status: SHIPPED | OUTPATIENT
Start: 2019-11-05

## 2019-11-05 RX ORDER — LISINOPRIL 5 MG/1
5 TABLET ORAL DAILY
Qty: 60 TABLET | Refills: 1 | Status: SHIPPED | OUTPATIENT
Start: 2019-11-05

## 2019-11-05 RX ORDER — ASPIRIN 81 MG/1
81 TABLET, CHEWABLE ORAL DAILY
Qty: 30 TABLET | Refills: 0 | Status: SHIPPED | OUTPATIENT
Start: 2019-11-05

## 2019-11-05 RX ORDER — ALLOPURINOL 100 MG/1
200 TABLET ORAL DAILY
COMMUNITY
Start: 2019-11-05

## 2019-11-05 RX ORDER — ATORVASTATIN CALCIUM 40 MG/1
40 TABLET, FILM COATED ORAL EVERY EVENING
Qty: 60 TABLET | Refills: 1 | Status: SHIPPED | OUTPATIENT
Start: 2019-11-05

## 2019-11-05 RX ORDER — LIDOCAINE 4 G/G
1-2 PATCH TOPICAL EVERY 24 HOURS
Qty: 12 PATCH | Refills: 1 | Status: SHIPPED | OUTPATIENT
Start: 2019-11-05

## 2019-11-05 RX ADMIN — FUROSEMIDE 20 MG: 20 TABLET ORAL at 07:56

## 2019-11-05 RX ADMIN — ASPIRIN 81 MG CHEWABLE TABLET 81 MG: 81 TABLET CHEWABLE at 07:55

## 2019-11-05 RX ADMIN — ALLOPURINOL 200 MG: 100 TABLET ORAL at 07:55

## 2019-11-05 RX ADMIN — IPRATROPIUM BROMIDE 0.5 MG: 0.5 SOLUTION RESPIRATORY (INHALATION) at 08:06

## 2019-11-05 RX ADMIN — TICAGRELOR 90 MG: 90 TABLET ORAL at 07:55

## 2019-11-05 RX ADMIN — METOPROLOL SUCCINATE 50 MG: 50 TABLET, EXTENDED RELEASE ORAL at 07:55

## 2019-11-05 RX ADMIN — THIAMINE HCL TAB 100 MG 100 MG: 100 TAB at 07:55

## 2019-11-05 RX ADMIN — MULTIPLE VITAMINS W/ MINERALS TAB 1 TABLET: TAB at 07:56

## 2019-11-05 RX ADMIN — OMEPRAZOLE 20 MG: 20 CAPSULE, DELAYED RELEASE ORAL at 07:55

## 2019-11-05 RX ADMIN — LEVETIRACETAM 750 MG: 750 TABLET, FILM COATED ORAL at 07:55

## 2019-11-05 RX ADMIN — FOLIC ACID 1 MG: 1 TABLET ORAL at 07:55

## 2019-11-05 RX ADMIN — LISINOPRIL 5 MG: 5 TABLET ORAL at 07:56

## 2019-11-05 ASSESSMENT — ACTIVITIES OF DAILY LIVING (ADL)
ADLS_ACUITY_SCORE: 13

## 2019-11-05 NOTE — PLAN OF CARE
Discharge at 1000  Discharged to: home  Via: wheelchair  Accompanied by: wife, NST  Belongings: sent with patient  Teaching: follow-up appts, medications, cardiac rehab, smoking cessation, S/S of heart attack  Clinic Appointment: scheduled for 11/7  Tele/IV: removed prior to discharge, sent home with lifevest on

## 2019-11-05 NOTE — PROGRESS NOTES
Neuro: A&Ox4.    Cardiac: Afebrile, VSS, NSR.   Respiratory: RA   GI/: Voiding spontaneously. No BM this shift.   Diet/appetite: Tolerating regular diet. Denies nausea. Blood sugars ACHS    Activity: Up with SBA     Pain: . Denies   Skin: No new deficits noted.  Lines: PIV saline locked     Wife at bedside attentive with cares. Possible discharge today. Lifevest at bedside.   Pt has been resting comfortably throughout night, will continue to monitor and follow plan of care.

## 2019-11-05 NOTE — PLAN OF CARE
PT -     Discharge Planner PT   Patient plan for discharge: home with A of wife    Physical Therapy Discharge Summary  Reason for discharge:   Discharge from hospital to TCU   Progress towards goals: See goals on Care Plan in [facility] electronic health record for goal details.  Goals partially met. Today,  Pt cues for supine>sit, mod Indep sit<>stand to FWW, ambulates with FWW with SBA to mod Indep. Issued FWW via [employer] at pt's request. Barriers to achieving goals (and if applicable, to prior living situation): limited tolerance, Barriers to return to prior living situation: medical needs, weakness, reduced activity tolerance, impaired balance, cognition/impulsivity/poor safety awareness.  Recommendations for discharge: ARU- however, pt and wife declining so will need 24/7 SBA/supervision, FWW use, + OP PT (also declining HH PT).  Rationale for recommendations: Though pt demos need for multidisciplinary rehab stay as he is well below IND baseline and would tolerate 3 hours therapy daily, pt and wife declining. This writer believes pt would best benefit and have improved prognosis for recovery with ARU stay. However, since pt and wife declining would rec home with 24/7 SBA, FWW use, and ongoing OP PT .   Recommendation(s):   Continued therapy is recommended. Rationale/Recommendations: TCU to increase safety and independence with basic functional mobility, and to address unmet goals.  Entered by: Annetta Lechuga 11/05/2019 10:07 AM

## 2019-11-05 NOTE — PLAN OF CARE
Occupational Therapy Discharge Summary    Reason for therapy discharge:    Discharged to home.    Progress towards therapy goal(s). See goals on Care Plan in [facility] electronic health record for goal details.  Goals partially met.  Barriers to achieving goals:   discharge from facility.    Therapy recommendation(s):    Continued therapy is recommended.  Rationale/Recommendations:  Recommended ARU, pt/wife declined. Recommended 24/7 supervision and  OT/PT. Pt/wife also declined home therapies.

## 2019-11-05 NOTE — PLAN OF CARE
D-Transferred from OSH on 10/13/19 following vt/vf arrest with cardiogenic shock requiring IABP and ECMO. S/P PCI to LAD. See flow sheets for vs and assessments. Spouse staying at bedside. C/o R sided cp, presumable r/t CPR. R side of chest is ecchymotic.Fair appetite.  I-Prn tylenol, scheduled neurontin and scheduled ketamine/gabapentin/ lidocaine topical for pain. Pt requesting oxycodone. Discussed with YULIA LunsfordI, NP. No new orders obtained. Life vest initiated.  A-Inadequate pain control. Telemetry trend for this shift SR  with rare pvcs.  P-Continue with current poc. Anticipate discharge to home tomorrow.

## 2019-11-06 NOTE — PROGRESS NOTES
Patient has clinic visit within 24-48 hours of discharge so no post DC follow up call is needed.    Arrangements made a the [facility] (Ph: [phone] Fax: [phone]) for INR and Warfarin monitoring (Goal=2-3). Indication for Anticoagulation: Atrial Fibrillation. IAN Guo to follow. Appointment made on 11/7/19 at 2:15 PM with IAN Guo for post hospitalization follow up, INR lab draw and Warfarin monitoring.

## (undated) DEVICE — VESSEL LOOPS YELLOW MAXI 31145694

## (undated) DEVICE — PREP POVIDONE IODINE SOLUTION 10% 4OZ

## (undated) DEVICE — SU VICRYL 0 CT-1 36" J346H

## (undated) DEVICE — DRSG PREVENA NEGATIVE PRESSURE WND 13CM SGL LF PRE1101US

## (undated) DEVICE — SUTURE BOOTS 051003PBX

## (undated) DEVICE — Device

## (undated) DEVICE — PREP POVIDONE IODINE SCRUB 7.5% 4OZ APL82212

## (undated) DEVICE — SU VICRYL 0 CTX 36" J370H

## (undated) DEVICE — SOL WATER IRRIG 1000ML BOTTLE 2F7114

## (undated) DEVICE — PITCHER STERILE 1000ML  SSK9004A

## (undated) DEVICE — DRSG TELFA 3X8" 1238

## (undated) DEVICE — WIPES FOLEY CARE SURESTEP PROVON DFC100

## (undated) DEVICE — PREP SKIN SCRUB TRAY 4461A

## (undated) DEVICE — DEFIB PRO-PADZ LVP LQD GEL ADULT 8900-2105-01

## (undated) DEVICE — ESU GROUND PAD ADULT W/CORD E7507

## (undated) DEVICE — CLIP HORIZON MED BLUE 002200

## (undated) DEVICE — SU PROLENE 5-0 RB-2DA 30" 8710H

## (undated) DEVICE — DRAIN CHEST TUBE 32FR STR 8032

## (undated) DEVICE — SURGICEL HEMOSTAT 4X8" 1952

## (undated) DEVICE — DRSG ABDOMINAL 07 1/2X8" 7197D

## (undated) DEVICE — SU ETHIBOND 0 TIE 6X30" X306H

## (undated) DEVICE — TAPE MEDIPORE 4"X2YD 2864

## (undated) DEVICE — DRAPE SHEET REV FOLD 3/4 9349

## (undated) DEVICE — DRAPE IOBAN INCISE 23X17" 6650EZ

## (undated) DEVICE — SU VICRYL 2-0 CT-1 27" UND J259H

## (undated) DEVICE — SU PLEDGET SOFT TFE 3/8"X3/26"X1/16" PCP40

## (undated) DEVICE — WIRE GUIDE 0.035"X145CM AMPLATZ XSTIFF J THSCF-35-145-3-AES

## (undated) DEVICE — INTRO SHEATH MICRO PLATINUM TIP 4FRX40CM 7274

## (undated) DEVICE — LINEN TOWEL PACK X30 5481

## (undated) DEVICE — PACK HEART RIGHT CUSTOM SAN32RHF18

## (undated) DEVICE — TIES BANDING T50R

## (undated) DEVICE — SU ETHIBOND 0 CT-1 CR 8X18" CX21D

## (undated) DEVICE — SU PROLENE 6-0 C-1DA 30" 8706H

## (undated) DEVICE — PROTECTOR ARM ONE-STEP TRENDELENBURG 40418

## (undated) DEVICE — PREP CHLORAPREP 26ML TINTED ORANGE  260815

## (undated) DEVICE — MANIFOLD KIT ANGIO AUTOMATED 014613

## (undated) DEVICE — LINEN TOWEL PACK X6 WHITE 5487

## (undated) DEVICE — CLIP HORIZON SM RED WIDE SLOT 001201

## (undated) DEVICE — INTRO SHTH INTRO SHTH 8FR X 11

## (undated) DEVICE — KIT HAND CONTROL ACIST 014644 AR-P54

## (undated) DEVICE — DRSG TEGADERM 4X4 3/4" 1626W

## (undated) DEVICE — SU VICRYL 3-0 FS-1 27" J442H

## (undated) DEVICE — LABEL MEDICATION SYSTEM 3303-P

## (undated) DEVICE — DRSG GAUZE 4X4" TRAY 6939

## (undated) DEVICE — SUCTION DRY CHEST DRAIN OASIS 3600-100

## (undated) DEVICE — SU STEEL 6 CCS 4X18" M654G

## (undated) DEVICE — DRSG DRAIN 4X4" 7086

## (undated) DEVICE — GLOVE PROTEXIS POWDER FREE 7.5 ORTHOPEDIC 2D73ET75

## (undated) DEVICE — LINEN GOWN XLG 5407

## (undated) DEVICE — SOL ADH LIQUID BENZOIN SWAB 0.6ML C1544

## (undated) DEVICE — SYR 10ML LL W/O NDL 302995

## (undated) DEVICE — DRAPE TIBURON CARDIOVASCULAR PERI-GROIN LF 9154

## (undated) DEVICE — SU PROLENE 4-0 SHDA 36" 8521H

## (undated) DEVICE — ADH SKIN CLOSURE PREMIERPRO EXOFIN 1.0ML 3470

## (undated) RX ORDER — PROTAMINE SULFATE 10 MG/ML
INJECTION, SOLUTION INTRAVENOUS
Status: DISPENSED
Start: 2019-10-18

## (undated) RX ORDER — FENTANYL CITRATE 50 UG/ML
INJECTION, SOLUTION INTRAMUSCULAR; INTRAVENOUS
Status: DISPENSED
Start: 2019-10-18

## (undated) RX ORDER — CEFAZOLIN SODIUM 1 G/3ML
INJECTION, POWDER, FOR SOLUTION INTRAMUSCULAR; INTRAVENOUS
Status: DISPENSED
Start: 2019-10-18

## (undated) RX ORDER — LIDOCAINE HYDROCHLORIDE 10 MG/ML
INJECTION, SOLUTION EPIDURAL; INFILTRATION; INTRACAUDAL; PERINEURAL
Status: DISPENSED
Start: 2019-10-18

## (undated) RX ORDER — HEPARIN SODIUM 1000 [USP'U]/ML
INJECTION, SOLUTION INTRAVENOUS; SUBCUTANEOUS
Status: DISPENSED
Start: 2019-10-18